# Patient Record
Sex: FEMALE | Race: WHITE | NOT HISPANIC OR LATINO | Employment: OTHER | URBAN - METROPOLITAN AREA
[De-identification: names, ages, dates, MRNs, and addresses within clinical notes are randomized per-mention and may not be internally consistent; named-entity substitution may affect disease eponyms.]

---

## 2017-01-06 ENCOUNTER — OFFICE VISIT (OUTPATIENT)
Dept: INTERNAL MEDICINE | Facility: CLINIC | Age: 67
End: 2017-01-06

## 2017-01-06 VITALS
BODY MASS INDEX: 28.79 KG/M2 | WEIGHT: 173 LBS | RESPIRATION RATE: 16 BRPM | DIASTOLIC BLOOD PRESSURE: 82 MMHG | HEART RATE: 84 BPM | SYSTOLIC BLOOD PRESSURE: 132 MMHG

## 2017-01-06 DIAGNOSIS — K59.00 CONSTIPATION, UNSPECIFIED CONSTIPATION TYPE: Primary | ICD-10-CM

## 2017-01-06 DIAGNOSIS — F32.A DEPRESSION, UNSPECIFIED DEPRESSION TYPE: ICD-10-CM

## 2017-01-06 DIAGNOSIS — M19.90 ARTHRITIS: ICD-10-CM

## 2017-01-06 DIAGNOSIS — R13.10 DYSPHAGIA, UNSPECIFIED TYPE: ICD-10-CM

## 2017-01-06 PROCEDURE — 99214 OFFICE O/P EST MOD 30 MIN: CPT | Performed by: INTERNAL MEDICINE

## 2017-01-06 RX ORDER — VENLAFAXINE HYDROCHLORIDE 150 MG/1
150 CAPSULE, EXTENDED RELEASE ORAL DAILY
Qty: 90 CAPSULE | Refills: 3 | Status: SHIPPED | OUTPATIENT
Start: 2017-01-06 | End: 2018-01-23 | Stop reason: SDUPTHER

## 2017-01-06 RX ORDER — PANTOPRAZOLE SODIUM 40 MG/1
TABLET, DELAYED RELEASE ORAL
Qty: 90 TABLET | Refills: 3 | Status: SHIPPED | OUTPATIENT
Start: 2017-01-06 | End: 2018-02-28 | Stop reason: SDUPTHER

## 2017-01-06 RX ORDER — FLUTICASONE PROPIONATE 50 MCG
SPRAY, SUSPENSION (ML) NASAL
Qty: 3 BOTTLE | Refills: 3 | Status: SHIPPED | OUTPATIENT
Start: 2017-01-06 | End: 2018-01-23 | Stop reason: SDUPTHER

## 2017-01-06 RX ORDER — PANTOPRAZOLE SODIUM 40 MG/1
TABLET, DELAYED RELEASE ORAL
Refills: 11 | COMMUNITY
Start: 2016-12-02 | End: 2017-01-06 | Stop reason: SDUPTHER

## 2017-01-06 NOTE — PROGRESS NOTES
Subjective   Barbara Coelho is a 66 y.o. female.   Pt is here to follow up on constipation,arthritis, depression and dysphagia.           History of Present Illness   Pt is here to follow up on constipation,dysphagia, arthritis and depression. She states that all chronic issues are doing well. Dysphagia has resolved. Constipation resolves with Linzess when needed.   EGD on 12/2/2016 revealed hiatal hernia, past gastric surgery and esophagitis, she is currently taking Protonix daily.             The following portions of the patient's history were reviewed and updated as appropriate: allergies, current medications, past family history, past medical history, past social history, past surgical history and problem list.               Review of Systems   Constitutional: Negative.    HENT: Negative.    Eyes: Negative.    Respiratory: Negative.    Cardiovascular: Negative.    Gastrointestinal:        See HPI.    Endocrine: Negative.    Genitourinary: Negative.    Musculoskeletal:        See HPI.    Skin: Negative.    Allergic/Immunologic: Negative.    Neurological: Negative.    Hematological: Negative.    Psychiatric/Behavioral:        See HPI.                 Objective   Physical Exam   Constitutional: She is oriented to person, place, and time. She appears well-developed and well-nourished.   HENT:   Head: Normocephalic and atraumatic.   Right Ear: External ear normal.   Left Ear: External ear normal.   Nose: Nose normal.   Mouth/Throat: Oropharynx is clear and moist.   Eyes: Conjunctivae and EOM are normal. Pupils are equal, round, and reactive to light.   Neck: Normal range of motion. Neck supple.   Cardiovascular: Normal rate, regular rhythm, normal heart sounds and intact distal pulses.    Pulmonary/Chest: Effort normal and breath sounds normal.   Abdominal: Soft. Bowel sounds are normal. She exhibits no distension. There is no tenderness.   Neurological: She is alert and oriented to person, place, and time. She  has normal reflexes.   Skin: Skin is warm and dry.   Psychiatric: She has a normal mood and affect.   Nursing note and vitals reviewed.                 Assessment/Plan   Diagnoses and all orders for this visit:    Constipation, unspecified constipation type  -     Linaclotide 145 MCG capsule; Take 145 mcg by mouth Daily.    Dysphagia, unspecified type  -     pantoprazole (PROTONIX) 40 MG EC tablet; Take 1 tablet PO Daily.    Depression, unspecified depression type  -     venlafaxine XR (EFFEXOR-XR) 150 MG 24 hr capsule; Take 1 capsule by mouth Daily.    Arthritis    Other orders  -     Discontinue: pantoprazole (PROTONIX) 40 MG EC tablet; TAKE 1 TABLET BY MOUTH EVERY MORNING  -     Discontinue: fluticasone-salmeterol (ADVAIR) 500-50 MCG/DOSE DISKUS; Inhale 1 puff 2 (Two) Times a Day.  -     fluticasone (FLONASE) 50 MCG/ACT nasal spray; ! Spray up each nostril once daily.  -     fluticasone-salmeterol (ADVAIR) 500-50 MCG/DOSE DISKUS; Inhale 1 puff 2 (Two) Times a Day.          1.) Keep scheduled follow up for physical next month   2.) Reviewed chronic meds and refilled.     * Total time spent in discussion and exam 25 minutes.

## 2017-01-06 NOTE — MR AVS SNAPSHOT
Barbara ZEINA Coelho   1/6/2017 2:00 PM   Office Visit    Provider:  Alvina Sellers DO   Department:  [facility] INTERNAL MEDICINE AND PEDIATRICS   Dept Phone:  [phone]                Your Full Care Plan              Today's Medication Changes          These changes are accurate as of: 1/6/17  2:41 PM.  If you have any questions, ask your nurse or doctor.               Medication(s)that have changed:     fluticasone 50 MCG/ACT nasal spray   Commonly known as:  FLONASE   ! Spray up each nostril once daily.   What changed:  See the new instructions.   Changed by:  Alvina Sellers DO       fluticasone-salmeterol 500-50 MCG/DOSE DISKUS   Commonly known as:  ADVAIR   Inhale 1 puff 2 (Two) Times a Day.   What changed:  Another medication with the same name was removed. Continue taking this medication, and follow the directions you see here.   Changed by:  Alvina Sellers DO       pantoprazole 40 MG EC tablet   Commonly known as:  PROTONIX   Take 1 tablet PO Daily.   What changed:  See the new instructions.   Changed by:  Alvina Sellers DO       venlafaxine  MG 24 hr capsule   Commonly known as:  EFFEXOR-XR   Take 1 capsule by mouth Daily.   What changed:  See the new instructions.   Changed by:  Alvina Sellers DO         Stop taking medication(s)listed here:     ipratropium 0.03 % nasal spray   Commonly known as:  ATROVENT   Stopped by:  Alvina Sellers DO                Where to Get Your Medications      These medications were sent to [facility]/pharmacy #[id number] - [place], [place] - [address] [facility] - [phone]  - [phone]   [address] [facility] [address]     Phone:  [phone]     fluticasone 50 MCG/ACT nasal spray    fluticasone-salmeterol 500-50 MCG/DOSE DISKUS    Linaclotide 145 MCG capsule    pantoprazole 40 MG EC tablet    venlafaxine  MG 24 hr capsule                     Your Updated Medication List          This list is accurate as of: 17  2:41 PM.  Always use your most recent med list.                fluticasone 50 MCG/ACT nasal spray   Commonly known as:  FLONASE   ! Spray up each nostril once daily.       fluticasone-salmeterol 500-50 MCG/DOSE DISKUS   Commonly known as:  ADVAIR   Inhale 1 puff 2 (Two) Times a Day.       Linaclotide 145 MCG capsule   Take 145 mcg by mouth Daily.       pantoprazole 40 MG EC tablet   Commonly known as:  PROTONIX   Take 1 tablet PO Daily.       venlafaxine  MG 24 hr capsule   Commonly known as:  EFFEXOR-XR   Take 1 capsule by mouth Daily.               You Were Diagnosed With        Codes Comments    Constipation, unspecified constipation type    -  Primary ICD-10-CM: K59.00  ICD-9-CM: 564.00     Dysphagia, unspecified type     ICD-10-CM: R13.10  ICD-9-CM: 787.20     Depression, unspecified depression type     ICD-10-CM: F32.9  ICD-9-CM: 311     Arthritis     ICD-10-CM: M19.90  ICD-9-CM: 716.90       Instructions     None    Patient Instructions History      [employer] [facility] Signup     [employer] allows you to send messages to your doctor, view your test results, renew your prescriptions, schedule appointments, and more. To sign up, go to [employer] and click on the Sign Up Now link in the New User? box. Enter your [employer] Activation Code exactly as it appears below along with the last four digits of your Social Security Number and your Date of Birth () to complete the sign-up process. If you do not sign up before the expiration date, you must request a new code.    [employer] Activation Code: [email or web address]  Expires: 2017  2:40 PM    If you have questions, you can email [email or web address] or call [phone] to talk to our [employer] staff. Remember, [employer] is NOT to be used for urgent needs. For medical emergencies, dial 911.               Other Info from Your Visit           Your Appointments      Feb 17, 2017 10:30 AM EST   Physical with Alvina Sellers DO   [facility] INTERNAL MEDICINE AND PEDIATRICS (--)    [address]  [place] [address] [phone]           Arrive 15 minutes prior to appointment.              Allergies     Celebrex [Celecoxib]      CAPS      Reason for Visit     Constipation follow up      Vital Signs     Blood Pressure Pulse Respirations Weight Body Mass Index Smoking Status    132/82 (BP Location: Left arm, Patient Position: Sitting) 84 16 173 lb (78.5 kg) 28.79 kg/m2 Never Smoker      Problems and Diagnoses Noted     Arthritis    Constipation    Depression    Difficulty swallowing      No Longer an Issue     Tiredness    Routine medical exam    Breast lump

## 2017-01-15 PROCEDURE — 87077 CULTURE AEROBIC IDENTIFY: CPT | Performed by: FAMILY MEDICINE

## 2017-01-15 PROCEDURE — 87186 SC STD MICRODIL/AGAR DIL: CPT | Performed by: FAMILY MEDICINE

## 2017-01-15 PROCEDURE — 87086 URINE CULTURE/COLONY COUNT: CPT | Performed by: FAMILY MEDICINE

## 2017-01-17 ENCOUNTER — TELEPHONE (OUTPATIENT)
Dept: URGENT CARE | Facility: CLINIC | Age: 67
End: 2017-01-17

## 2017-01-17 NOTE — TELEPHONE ENCOUNTER
Called pt, discussed urine culture results and that she is on appropriate medication.  Instructed to complete medication, drink plenty of fluids and follow up with PCP.  She states she is feeling better.

## 2017-02-17 ENCOUNTER — OFFICE VISIT (OUTPATIENT)
Dept: INTERNAL MEDICINE | Facility: CLINIC | Age: 67
End: 2017-02-17

## 2017-02-17 VITALS
RESPIRATION RATE: 20 BRPM | WEIGHT: 180 LBS | BODY MASS INDEX: 29.95 KG/M2 | SYSTOLIC BLOOD PRESSURE: 118 MMHG | HEART RATE: 68 BPM | DIASTOLIC BLOOD PRESSURE: 65 MMHG

## 2017-02-17 DIAGNOSIS — Z13.6 SCREENING FOR CARDIOVASCULAR CONDITION: ICD-10-CM

## 2017-02-17 DIAGNOSIS — F32.A DEPRESSION, UNSPECIFIED DEPRESSION TYPE: Primary | ICD-10-CM

## 2017-02-17 DIAGNOSIS — L98.9 SKIN LESIONS: ICD-10-CM

## 2017-02-17 DIAGNOSIS — Z11.59 NEED FOR HEPATITIS C SCREENING TEST: ICD-10-CM

## 2017-02-17 LAB
ALBUMIN SERPL-MCNC: 3.9 G/DL (ref 3.2–4.8)
ALBUMIN/GLOB SERPL: 1.6 G/DL (ref 1.5–2.5)
ALP SERPL-CCNC: 82 U/L (ref 25–100)
ALT SERPL W P-5'-P-CCNC: 13 U/L (ref 7–40)
ANION GAP SERPL CALCULATED.3IONS-SCNC: 3 MMOL/L (ref 3–11)
ARTICHOKE IGE QN: 97 MG/DL (ref 0–130)
AST SERPL-CCNC: 18 U/L (ref 0–33)
BILIRUB SERPL-MCNC: 0.4 MG/DL (ref 0.3–1.2)
BUN BLD-MCNC: 9 MG/DL (ref 9–23)
BUN/CREAT SERPL: 10 (ref 7–25)
CALCIUM SPEC-SCNC: 9.5 MG/DL (ref 8.7–10.4)
CHLORIDE SERPL-SCNC: 104 MMOL/L (ref 99–109)
CHOLEST SERPL-MCNC: 208 MG/DL (ref 0–200)
CO2 SERPL-SCNC: 33 MMOL/L (ref 20–31)
CREAT BLD-MCNC: 0.9 MG/DL (ref 0.6–1.3)
DEPRECATED RDW RBC AUTO: 42 FL (ref 37–54)
ERYTHROCYTE [DISTWIDTH] IN BLOOD BY AUTOMATED COUNT: 12.8 % (ref 11.3–14.5)
GFR SERPL CREATININE-BSD FRML MDRD: 62 ML/MIN/1.73
GLOBULIN UR ELPH-MCNC: 2.5 GM/DL
GLUCOSE BLD-MCNC: 99 MG/DL (ref 70–100)
HCT VFR BLD AUTO: 41.8 % (ref 34.5–44)
HCV AB SER DONR QL: NORMAL
HDLC SERPL-MCNC: 94 MG/DL (ref 40–60)
HGB BLD-MCNC: 13.5 G/DL (ref 11.5–15.5)
MCH RBC QN AUTO: 29.2 PG (ref 27–31)
MCHC RBC AUTO-ENTMCNC: 32.3 G/DL (ref 32–36)
MCV RBC AUTO: 90.3 FL (ref 80–99)
PLATELET # BLD AUTO: 234 10*3/MM3 (ref 150–450)
PMV BLD AUTO: 9.5 FL (ref 6–12)
POTASSIUM BLD-SCNC: 3.8 MMOL/L (ref 3.5–5.5)
PROT SERPL-MCNC: 6.4 G/DL (ref 5.7–8.2)
RBC # BLD AUTO: 4.63 10*6/MM3 (ref 3.89–5.14)
SODIUM BLD-SCNC: 140 MMOL/L (ref 132–146)
TRIGL SERPL-MCNC: 74 MG/DL (ref 0–150)
WBC NRBC COR # BLD: 4.41 10*3/MM3 (ref 3.5–10.8)

## 2017-02-17 PROCEDURE — 85027 COMPLETE CBC AUTOMATED: CPT | Performed by: INTERNAL MEDICINE

## 2017-02-17 PROCEDURE — 99214 OFFICE O/P EST MOD 30 MIN: CPT | Performed by: INTERNAL MEDICINE

## 2017-02-17 PROCEDURE — 86803 HEPATITIS C AB TEST: CPT | Performed by: INTERNAL MEDICINE

## 2017-02-17 PROCEDURE — 80061 LIPID PANEL: CPT | Performed by: INTERNAL MEDICINE

## 2017-02-17 PROCEDURE — 80053 COMPREHEN METABOLIC PANEL: CPT | Performed by: INTERNAL MEDICINE

## 2017-02-17 PROCEDURE — 36415 COLL VENOUS BLD VENIPUNCTURE: CPT | Performed by: INTERNAL MEDICINE

## 2017-02-17 RX ORDER — DESLORATADINE 5 MG/1
TABLET ORAL
Refills: 6 | COMMUNITY
Start: 2017-01-29 | End: 2018-04-25

## 2017-02-17 NOTE — PROGRESS NOTES
Subjective   Barbara Coelho is a 67 y.o. female.   Pt is here to follow up on depression, skin lesions and  cardiovascular screenings.           History of Present Illness   Pt is here to follow up on depression, skin lesions and  cardiovascular screenings. She states that all chronic issues are doing well with no acute complaints.     Colonoscopy- last year   Pap-last year by GYN  Breast exam- Done at GYN, declines today   Mammogram- will be due next year did last year.   DEXA scan- She can not remember if it has been done in the last 2 years, she states her last one was done here.   Shingles vaccine- 1/16/2016  Tetanus vaccine- will give today   Flu vaccine- 11/12016   PNA vaccine- Has received but can not remember when     She would like referred to a new Dermatology to have annual skin checks done, she also has several lesions on her face and back that need to be excised.           The following portions of the patient's history were reviewed and updated as appropriate: allergies, current medications, past family history, past medical history, past social history, past surgical history and problem list.             Review of Systems   Constitutional: Negative.    HENT: Negative.    Eyes: Negative.    Respiratory: Negative.    Cardiovascular: Negative.    Gastrointestinal: Negative.    Endocrine: Negative.    Genitourinary: Negative.    Musculoskeletal: Negative.    Skin:        See HPI.    Allergic/Immunologic: Negative.    Neurological: Negative.    Hematological: Negative.    Psychiatric/Behavioral:        See HPI.               Objective   Physical Exam   Constitutional: She is oriented to person, place, and time. She appears well-developed and well-nourished.   HENT:   Head: Normocephalic and atraumatic.   Right Ear: External ear normal.   Left Ear: External ear normal.   Nose: Nose normal.   Mouth/Throat: Oropharynx is clear and moist.   Eyes: Conjunctivae and EOM are normal. Pupils are equal, round, and  reactive to light.   Neck: Normal range of motion. Neck supple. No tracheal deviation present. No thyromegaly present.   Cardiovascular: Normal rate, regular rhythm, normal heart sounds and intact distal pulses.    Pulmonary/Chest: Effort normal and breath sounds normal.   Abdominal: Soft. Bowel sounds are normal. She exhibits no distension. There is no tenderness.   Neurological: She is alert and oriented to person, place, and time. She has normal reflexes.   Skin: Skin is warm and dry.   Psychiatric: She has a normal mood and affect.   Nursing note and vitals reviewed.               Assessment/Plan     Depression, unspecified depression type  -     CBC (No Diff)    Screening for cardiovascular condition  -     Comprehensive Metabolic Panel  -     Lipid Panel    Need for hepatitis C screening test  -     Hepatitis C Antibody    Skin lesions  -     Ambulatory Referral to Dermatology    1.) Check CBC,CMP,lipid panel and Hepatitis C screening   2.) Follow up in 1 year   3.) Discussed Contrave and Belviq   4.) Dermatology referral   5.) 25 minutes spent in exam, 15 minutes spent counseling on preventative screenings and vaccines.     * Total time spent in discussion and exam 40 minutes .

## 2017-02-20 ENCOUNTER — TELEPHONE (OUTPATIENT)
Dept: INTERNAL MEDICINE | Facility: CLINIC | Age: 67
End: 2017-02-20

## 2017-02-20 NOTE — TELEPHONE ENCOUNTER
Contrave 1 tab PO daily # 30 wihtno refills.     Tell her she needs to follow up in 1 month so we can see how she is doing.

## 2017-02-20 NOTE — TELEPHONE ENCOUNTER
----- Message from Liane De Jesus sent at 2/20/2017  8:38 AM EST -----  [phone]  PT WAS HERE ON Friday AND TOLD TO GO HOME AND LOOK 2 DIET PILLS UP AND CALL BACK WITH THE ONE PT WANTS TO TAKE , PT WOULD LIKE TO TAKE CONTRAZE . CALL IN TO  [facility]

## 2017-08-14 ENCOUNTER — OFFICE VISIT (OUTPATIENT)
Dept: INTERNAL MEDICINE | Facility: CLINIC | Age: 67
End: 2017-08-14

## 2017-08-14 VITALS
TEMPERATURE: 98.1 F | SYSTOLIC BLOOD PRESSURE: 122 MMHG | DIASTOLIC BLOOD PRESSURE: 70 MMHG | RESPIRATION RATE: 18 BRPM | HEIGHT: 65 IN | BODY MASS INDEX: 29.82 KG/M2 | HEART RATE: 78 BPM | WEIGHT: 179 LBS

## 2017-08-14 DIAGNOSIS — M25.511 RIGHT SHOULDER PAIN, UNSPECIFIED CHRONICITY: Primary | ICD-10-CM

## 2017-08-14 PROCEDURE — 99213 OFFICE O/P EST LOW 20 MIN: CPT | Performed by: INTERNAL MEDICINE

## 2017-08-14 RX ORDER — HYDROCODONE BITARTRATE AND ACETAMINOPHEN 7.5; 325 MG/1; MG/1
1 TABLET ORAL EVERY 6 HOURS PRN
Qty: 20 TABLET | Refills: 0 | Status: SHIPPED | OUTPATIENT
Start: 2017-08-14 | End: 2017-10-27

## 2017-08-14 NOTE — PROGRESS NOTES
Subjective   Barbara Coelho is a 67 y.o. female.   Pt presents with right shoulder pain.           History of Present Illness   Pt presents with right shoulder pain. She has had the same issues in the past and related it to OA. 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 Her pain is located ANT around the bursa. She states it is worse at night and can not sleep on her right side due to this. She denies any trauma to the area.           The following portions of the patient's history were reviewed and updated as appropriate: allergies, current medications, past family history, past medical history, past social history, past surgical history and problem list.           Review of Systems   Constitutional: Negative.    HENT: Negative.    Eyes: Negative.    Respiratory: Negative.    Cardiovascular: Negative.    Gastrointestinal: Negative.    Endocrine: Negative.    Genitourinary: Negative.    Musculoskeletal:        See HPI.    Skin: Negative.    Allergic/Immunologic: Negative.    Neurological: Negative.    Hematological: Negative.    Psychiatric/Behavioral: Negative.              Objective   Physical Exam   Constitutional: She is oriented to person, place, and time. She appears well-developed and well-nourished.   HENT:   Head: Normocephalic and atraumatic.   Cardiovascular: Normal rate, regular rhythm and normal heart sounds.    Pulmonary/Chest: Effort normal and breath sounds normal.   Musculoskeletal:   Pain with any active range of motion to ANT right shoulder. Pain to palpation around Bursa.    Neurological: She is alert and oriented to person, place, and time.   Skin: Skin is warm and dry.   Psychiatric: She has a normal mood and affect.   Nursing note and vitals reviewed.             Assessment/Plan   Right shoulder pain, unspecified chronicity  -     Cancel: XR Shoulder 1 View Right; Future  -     Cancel: XR Spine Cervical 2 or 3 View;  Future  -     PredniSONE 5 MG tablet therapy pack dosepak; Take as directed on package instructions.  -     HYDROcodone-acetaminophen (NORCO) 7.5-325 MG per tablet; Take 1 tablet by mouth Every 6 (Six) Hours As Needed for Moderate Pain (4-6).        1.) Lortab 7.5 mg PO every 4- 6 hours prn   2.) Prednisone taper as directed   3.) Keep scheduled follow up

## 2017-10-27 ENCOUNTER — OFFICE VISIT (OUTPATIENT)
Dept: INTERNAL MEDICINE | Facility: CLINIC | Age: 67
End: 2017-10-27

## 2017-10-27 VITALS
DIASTOLIC BLOOD PRESSURE: 66 MMHG | HEIGHT: 65 IN | BODY MASS INDEX: 30.42 KG/M2 | TEMPERATURE: 98.1 F | SYSTOLIC BLOOD PRESSURE: 126 MMHG | WEIGHT: 182.6 LBS

## 2017-10-27 DIAGNOSIS — M25.511 CHRONIC RIGHT SHOULDER PAIN: Primary | ICD-10-CM

## 2017-10-27 DIAGNOSIS — G89.29 CHRONIC RIGHT SHOULDER PAIN: Primary | ICD-10-CM

## 2017-10-27 DIAGNOSIS — Z23 NEED FOR INFLUENZA VACCINATION: ICD-10-CM

## 2017-10-27 DIAGNOSIS — M54.31 SCIATICA OF RIGHT SIDE WITHOUT BACK PAIN: ICD-10-CM

## 2017-10-27 PROCEDURE — 90662 IIV NO PRSV INCREASED AG IM: CPT | Performed by: INTERNAL MEDICINE

## 2017-10-27 PROCEDURE — 99214 OFFICE O/P EST MOD 30 MIN: CPT | Performed by: INTERNAL MEDICINE

## 2017-10-27 PROCEDURE — G0008 ADMIN INFLUENZA VIRUS VAC: HCPCS | Performed by: INTERNAL MEDICINE

## 2017-10-27 RX ORDER — HYDROCODONE BITARTRATE AND ACETAMINOPHEN 7.5; 325 MG/1; MG/1
1 TABLET ORAL EVERY 6 HOURS PRN
COMMUNITY
End: 2017-10-27 | Stop reason: SDUPTHER

## 2017-10-27 RX ORDER — HYDROCODONE BITARTRATE AND ACETAMINOPHEN 7.5; 325 MG/1; MG/1
1 TABLET ORAL EVERY 6 HOURS PRN
Qty: 20 TABLET | Refills: 0 | Status: SHIPPED | OUTPATIENT
Start: 2017-10-27 | End: 2018-02-28 | Stop reason: SDUPTHER

## 2017-10-27 NOTE — PROGRESS NOTES
Subjective   Barbara Coelho is a 67 y.o. female.   New pt here to establish. Had seen Dr Sellers before    Shoulder Injury    The right shoulder is affected. Incident onset: x 6 months. The injury mechanism is unknown. The quality of the pain is described as aching. The pain radiates to the chest and right arm. The pain is at a severity of 5/10. The pain is moderate. Pertinent negatives include no chest pain. She has tried NSAIDs and acetaminophen (ibuprofen 200, hydrocodone) for the symptoms. The treatment provided moderate relief.    she has tried ice - helps a little. Hurts to lay on it at night. Pain has been constant aching pain. Wakes her up at night. rom is good, hurts at rest. No previous shoulder problems    LBP - h/o back surgery (? Diskectomy) in '95 and uses hydrocodone prn, more in the summer when she is more active- 2-3x/month on average. Usually has just pain in legs, right side more, but sometimes left,down side of leg  all the way to foot. Not much back pain. Doesn't see back specialist here, had seen surgeon in [place]. Also used to see chiropractor regularly. Had a fall yesterday - tripped on rug and hurt R leg and R shoulder. Was able to bear weight    Allergies/asthma/nasal polyps - she has been on allergy shots x 40 yrs, and goes to [place] [facility]. Also on advair, flonase, clarinex. She has had both pneumovax and prevnar since she turned 64    She has been on effexor x years, initially for HRT replacement, then her  was murdered 5 yrs ago so has been very difficult time period for her. She prefers to stay on for now , as re-trial is coming up, but will want to d/c at some point    Gastritis - she had EGD last year w/ Dr Arcos that showed reflux. Taking protonix and works well    The following portions of the patient's history were reviewed and updated as appropriate: allergies, current medications, past family history, past medical history, past social history, past  "surgical history and problem list.    Review of Systems   Constitutional: Negative for activity change, appetite change, fatigue, fever and unexpected weight change.   HENT:        H/o allergies   Respiratory:        H/o asthma   Cardiovascular: Negative for chest pain and leg swelling.   Gastrointestinal: Positive for abdominal pain (protonix helps).   Musculoskeletal: Positive for arthralgias, back pain and myalgias. Negative for neck pain and neck stiffness.   Allergic/Immunologic: Positive for environmental allergies. Negative for immunocompromised state.   Neurological: Negative for seizures and syncope.   Psychiatric/Behavioral: Positive for dysphoric mood (effexor helps).       Objective   Blood pressure 126/66, temperature 98.1 °F (36.7 °C), temperature source Temporal Artery , height 64.6\" (164.1 cm), weight 182 lb 9.6 oz (82.8 kg).  Physical Exam   Constitutional: She is oriented to person, place, and time. She appears well-developed and well-nourished. No distress.   HENT:   Head: Normocephalic and atraumatic.   Eyes: Conjunctivae and EOM are normal.   Cardiovascular: Normal rate, regular rhythm and normal heart sounds.  Exam reveals no gallop and no friction rub.    No murmur heard.  Pulmonary/Chest: Effort normal and breath sounds normal. No respiratory distress. She has no wheezes.   Musculoskeletal: Normal range of motion. She exhibits tenderness (tender along R shoulder and upper right arm. tender below R knee laterally. no lumbar tenderness). She exhibits no edema or deformity.   R shoulder active and passive ROM is normal. Minimal crepitus   Neurological: She is alert and oriented to person, place, and time.   Skin: Skin is warm and dry. She is not diaphoretic.   Psychiatric: She has a normal mood and affect. Her behavior is normal. Judgment and thought content normal.       Assessment/Plan   Barbara was seen today for establish care, shoulder pain and fall.    Diagnoses and all orders for this " visit:    Chronic right shoulder pain  -     XR Shoulder 2+ View Right  -     Ambulatory Referral to Orthopedic Surgery    Need for influenza vaccination  -     Flu Vaccine High Dose PF 65YR+ ([id number])    Sciatica of right side without back pain - uses norco infrequently. Controlled substance contract reviewed and signed by pt. Adverse effects and risks of addiction of medication reviewed with pt. Luis Manuel done today and appropriate.   -     HYDROcodone-acetaminophen (NORCO) 7.5-325 MG per tablet; Take 1 tablet by mouth Every 6 (Six) Hours As Needed for Moderate Pain .      She will return for physical in Feb

## 2017-11-07 ENCOUNTER — HOSPITAL ENCOUNTER (OUTPATIENT)
Dept: GENERAL RADIOLOGY | Facility: HOSPITAL | Age: 67
Discharge: HOME | End: 2017-11-07
Admitting: INTERNAL MEDICINE

## 2017-11-07 PROCEDURE — 73030 X-RAY EXAM OF SHOULDER: CPT

## 2017-11-08 ENCOUNTER — OFFICE VISIT (OUTPATIENT)
Dept: ORTHOPEDIC SURGERY | Facility: CLINIC | Age: 67
End: 2017-11-08

## 2017-11-08 VITALS
SYSTOLIC BLOOD PRESSURE: 140 MMHG | DIASTOLIC BLOOD PRESSURE: 89 MMHG | HEART RATE: 84 BPM | HEIGHT: 65 IN | BODY MASS INDEX: 29.82 KG/M2 | WEIGHT: 179 LBS

## 2017-11-08 DIAGNOSIS — M75.91 SUPRASPINATUS TENDINITIS, RIGHT: ICD-10-CM

## 2017-11-08 DIAGNOSIS — M25.511 RIGHT SHOULDER PAIN, UNSPECIFIED CHRONICITY: Primary | ICD-10-CM

## 2017-11-08 PROCEDURE — 99204 OFFICE O/P NEW MOD 45 MIN: CPT | Performed by: ORTHOPAEDIC SURGERY

## 2017-11-08 PROCEDURE — 20610 DRAIN/INJ JOINT/BURSA W/O US: CPT | Performed by: ORTHOPAEDIC SURGERY

## 2017-11-08 RX ORDER — LIDOCAINE HYDROCHLORIDE 10 MG/ML
4 INJECTION, SOLUTION INFILTRATION; PERINEURAL
Status: COMPLETED | OUTPATIENT
Start: 2017-11-08 | End: 2017-11-08

## 2017-11-08 RX ORDER — BETAMETHASONE SODIUM PHOSPHATE AND BETAMETHASONE ACETATE 3; 3 MG/ML; MG/ML
6 INJECTION, SUSPENSION INTRA-ARTICULAR; INTRALESIONAL; INTRAMUSCULAR; SOFT TISSUE
Status: COMPLETED | OUTPATIENT
Start: 2017-11-08 | End: 2017-11-08

## 2017-11-08 RX ADMIN — LIDOCAINE HYDROCHLORIDE 4 ML: 10 INJECTION, SOLUTION INFILTRATION; PERINEURAL at 10:45

## 2017-11-08 RX ADMIN — BETAMETHASONE SODIUM PHOSPHATE AND BETAMETHASONE ACETATE 6 MG: 3; 3 INJECTION, SUSPENSION INTRA-ARTICULAR; INTRALESIONAL; INTRAMUSCULAR; SOFT TISSUE at 10:45

## 2017-11-08 NOTE — PROGRESS NOTES
Procedure   Large Joint Arthrocentesis  Date/Time: 11/8/2017 10:45 AM  Consent given by: patient  Site marked: site marked  Timeout: Immediately prior to procedure a time out was called to verify the correct patient, procedure, equipment, support staff and site/side marked as required   Supporting Documentation  Indications: pain   Procedure Details  Location: shoulder - R subacromial bursa  Preparation: Patient was prepped and draped in the usual sterile fashion  Needle size: 22 G  Medications administered: 4 mL lidocaine 1 %; 6 mg betamethasone acetate-betamethasone sodium phosphate 6 (3-3) MG/ML (4 cc marcaine- NDC 55759-874-31, Lot [id number], exp 04/01/2019)  Patient tolerance: patient tolerated the procedure well with no immediate complications

## 2017-11-08 NOTE — PROGRESS NOTES
[facility] [place] [facility] Note    Subjective     Chief Complaint   Patient presents with   • Right Shoulder - Pain        HPI      Barbara Coelho is a 67 y.o. female.  She has a 5 month history of right shoulder pain.  It wakes her up at night and is worse with overuse better with rest.  Pain 7 out of 10 the pain as aching and throbbing.        Past Medical History:   Diagnosis Date   • Allergic rhinitis     on allergy shots   • Asthma     ALLERGIC   • Back pain    • Bladder infection     Recurrent Bladder Infections   • Bronchitis    • Depression    • Diverticulitis    • Fibrocystic breast    • GERD (gastroesophageal reflux disease)     EGD 12/16 Arcos - reflux, HH. no barretts   • H/O blood clots    • H/O bone density study 2015   • H/O mammogram 01/13/2016    [facility]   • Nasal polyps    • Pap smear for cervical cancer screening 2015   • Postmenopausal    • Urinary incontinence       Past Surgical History:   Procedure Laterality Date   • BACK SURGERY  1995    Back (L5/S1)   • BREAST LUMPECTOMY  1990    Breast - Lump Removed   • COLONOSCOPY  2015   • GASTRIC BYPASS      HIGH GASTRIC BYPASS   • OTHER SURGICAL HISTORY  1985    Stomach Stabled   • REPLACEMENT TOTAL KNEE Left 2012   • SINUS SURGERY     • TOTAL KNEE ARTHROPLASTY  2012   • TUBAL ABDOMINAL LIGATION  1980      Family History   Problem Relation Age of Onset   • Cancer Other      BREAST, LUNG, OVARIAN, KIDNEY   • Breast cancer Other    • Kidney cancer Other    • Lung cancer Other      pGF as well   • Cancer Mother      kidney   • Cancer Father      lung   • Mental illness Father    • Cancer Sister      breast, age 30yrs, survived     Social History     Social History   • Marital status:      Spouse name: N/A   • Number of children: N/A   • Years of education: N/A     Occupational History   • Not on file.     Social History Main Topics   • Smoking status: Never Smoker   • Smokeless tobacco: Never Used   • Alcohol use 2.4 - 3.0 oz/week      "4 - 5 Standard drinks or equivalent per week      Comment: a week   • Drug use: No   • Sexual activity: Defer     Other Topics Concern   • Not on file     Social History Narrative      Current Outpatient Prescriptions on File Prior to Visit   Medication Sig Dispense Refill   • desloratadine (CLARINEX) 5 MG tablet TAKE 1 TABLET BY MOUTH AT BEDTIME AS DIRECTED  6   • fluticasone (FLONASE) 50 MCG/ACT nasal spray ! Spray up each nostril once daily. 3 bottle 3   • fluticasone-salmeterol (ADVAIR) 500-50 MCG/DOSE DISKUS Inhale 1 puff 2 (Two) Times a Day. 180 each 3   • HYDROcodone-acetaminophen (NORCO) 7.5-325 MG per tablet Take 1 tablet by mouth Every 6 (Six) Hours As Needed for Moderate Pain . 20 tablet 0   • pantoprazole (PROTONIX) 40 MG EC tablet Take 1 tablet PO Daily. 90 tablet 3   • venlafaxine XR (EFFEXOR-XR) 150 MG 24 hr capsule Take 1 capsule by mouth Daily. 90 capsule 3     No current facility-administered medications on file prior to visit.       Allergies   Allergen Reactions   • Celebrex [Celecoxib]      CAPS   • Sulfa Antibiotics Itching        The following portions of the patient's history were reviewed and updated as appropriate: allergies, current medications, past family history, past medical history, past social history, past surgical history and problem list.    Review of Systems   Constitutional: Negative.    HENT: Negative.    Eyes: Negative.    Respiratory: Negative.    Cardiovascular: Negative.    Gastrointestinal: Negative.    Endocrine: Negative.    Genitourinary: Negative.    Musculoskeletal: Positive for arthralgias.   Skin: Negative.    Allergic/Immunologic: Negative.    Neurological: Negative.    Hematological: Negative.    Psychiatric/Behavioral: Negative.         Objective      Physical Exam  /89  Pulse 84  Ht 64.5\" (163.8 cm)  Wt 179 lb (81.2 kg)  BMI 30.25 kg/m2    Body mass index is 30.25 kg/(m^2).        GENERAL APPEARANCE: awake, alert & oriented x 3, in no acute distress " and well developed, well nourished  PSYCH: normal mood andaffect  LUNGS:  breathing nonlabored, no wheezing  EYES: sclera anicteric, pupils equal  CARDIOVASCULAR: palpable pulses dorsalis pedis, palpable posterior tibial bilaterally. Capillary refill less than 2 seconds  INTEGUMENTARY: skin intact, no clubbing, cyanosis  NEUROLOGIC:  Normal gait and balance            Ortho Exam  Musculoskeletal   Upper Extremity   Right Shoulder     Inspection and Palpation:     Tenderness - none    Crepitus - none    Sensation is normal    Examination reveals no ecchymosis.      Strength and Tone:    Supraspinatus - 5/5    Infraspinatus - 5/5    Subscapularis - 5/5    Deltoid - 5/5     Range of Motion   Left shoulder:    Internal Rotation: ROM - T7    External Rotation: AROM - 80 degrees    Elevation through flexion: AROM - 180 degrees    Right Shoulder:    Internal Rotation: ROM - T7    External Rotation: AROM - 80 degrees    Elevation through flexion: AROM - 180 degrees     Abduction -180 degrees     Instability   Right shoulder    Sulcus sign negative    Apprehension test negative    Enoch relocation test negative    Jerk test negative   Impingement   Right shoulder    Olvera impingement test positive    Neer impingement test positive   Functional Testing   Right shoulder    AC crossover adduction test negative    Abdominal compression test negative    Lift-off sign negative    Speed's test negative    White's test negative    Horriblower's sign negative       Imaging/Studies  Imaging Results (last 24 hours)     ** No results found for the last 24 hours. **      I reviewed x-rays right shoulder which are negative.    Assessment/Plan      Barbara was seen today for pain.    Diagnoses and all orders for this visit:    Right shoulder pain, unspecified chronicity  -     Large Joint Arthrocentesis    Supraspinatus tendinitis, right      She received a right shoulder subacromial steroid injection today.  She tolerated the injection  well without complication.  She will follow up in 3 weeks.  I ordered physical therapy she does not want to go.  She wants to try the injection first    Medical Decision Making  Management Options : over-the-counter medicine and prescription/IM medicine  Data/Risk: radiology tests and independent visualization of imaging, lab tests, or EMG/NCV    Emeka Douglas MD  11/08/17  11:00 AM

## 2017-11-29 ENCOUNTER — OFFICE VISIT (OUTPATIENT)
Dept: ORTHOPEDIC SURGERY | Facility: CLINIC | Age: 67
End: 2017-11-29

## 2017-11-29 VITALS
HEART RATE: 84 BPM | DIASTOLIC BLOOD PRESSURE: 82 MMHG | WEIGHT: 182 LBS | HEIGHT: 65 IN | SYSTOLIC BLOOD PRESSURE: 146 MMHG | BODY MASS INDEX: 30.32 KG/M2

## 2017-11-29 DIAGNOSIS — M75.91 SUPRASPINATUS TENDINITIS, RIGHT: Primary | ICD-10-CM

## 2017-11-29 PROCEDURE — 99212 OFFICE O/P EST SF 10 MIN: CPT | Performed by: ORTHOPAEDIC SURGERY

## 2017-11-29 NOTE — PROGRESS NOTES
[facility] [place] [facility] Note    Subjective     Chief Complaint   Patient presents with   • Right Shoulder - Follow-up     3 week follow up: Right shoulder pain, supraspinatus tendinitis (Corticosteroid injection last visit on 11/8/2017)        TERENCE Coelho is a 67 y.o. female. She is doing well status post the right shoulder injection.  She is doing well with no pain.  She has full motion and full strength    Past Medical History:   Diagnosis Date   • Allergic rhinitis     on allergy shots   • Asthma     ALLERGIC   • Back pain    • Bladder infection     Recurrent Bladder Infections   • Bronchitis    • Depression    • Diverticulitis    • Fibrocystic breast    • GERD (gastroesophageal reflux disease)     EGD 12/16 [facility] - reflux, HH. no barretts   • H/O blood clots    • H/O bone density study 2015   • H/O mammogram 01/13/2016    [facility]   • Nasal polyps    • Pap smear for cervical cancer screening 2015   • Postmenopausal    • Urinary incontinence       Past Surgical History:   Procedure Laterality Date   • BACK SURGERY  1995    Back (L5/S1)   • BREAST LUMPECTOMY  1990    Breast - Lump Removed   • COLONOSCOPY  2015   • GASTRIC BYPASS      HIGH GASTRIC BYPASS   • OTHER SURGICAL HISTORY  1985    Stomach Stabled   • REPLACEMENT TOTAL KNEE Left 2012   • SINUS SURGERY     • TOTAL KNEE ARTHROPLASTY  2012   • TUBAL ABDOMINAL LIGATION  1980      Family History   Problem Relation Age of Onset   • Cancer Other      BREAST, LUNG, OVARIAN, KIDNEY   • Breast cancer Other    • Kidney cancer Other    • Lung cancer Other      pGF as well   • Cancer Mother      kidney   • Cancer Father      lung   • Mental illness Father    • Cancer Sister      breast, age 30yrs, survived     Social History     Social History   • Marital status:      Spouse name: N/A   • Number of children: N/A   • Years of education: N/A     Occupational History   • Not on file.     Social History Main Topics   • Smoking status: Never  "Smoker   • Smokeless tobacco: Never Used   • Alcohol use 2.4 - 3.0 oz/week     4 - 5 Standard drinks or equivalent per week      Comment: a week   • Drug use: No   • Sexual activity: Defer     Other Topics Concern   • Not on file     Social History Narrative      Current Outpatient Prescriptions on File Prior to Visit   Medication Sig Dispense Refill   • desloratadine (CLARINEX) 5 MG tablet TAKE 1 TABLET BY MOUTH AT BEDTIME AS DIRECTED  6   • fluticasone (FLONASE) 50 MCG/ACT nasal spray ! Spray up each nostril once daily. 3 bottle 3   • fluticasone-salmeterol (ADVAIR) 500-50 MCG/DOSE DISKUS Inhale 1 puff 2 (Two) Times a Day. 180 each 3   • HYDROcodone-acetaminophen (NORCO) 7.5-325 MG per tablet Take 1 tablet by mouth Every 6 (Six) Hours As Needed for Moderate Pain . 20 tablet 0   • pantoprazole (PROTONIX) 40 MG EC tablet Take 1 tablet PO Daily. 90 tablet 3   • venlafaxine XR (EFFEXOR-XR) 150 MG 24 hr capsule Take 1 capsule by mouth Daily. 90 capsule 3     No current facility-administered medications on file prior to visit.       Allergies   Allergen Reactions   • Celebrex [Celecoxib]      CAPS   • Sulfa Antibiotics Itching        The following portions of the patient's history were reviewed and updated as appropriate: allergies, current medications, past family history, past medical history, past social history, past surgical history and problem list.    Review of Systems   Constitutional: Negative.    HENT: Positive for congestion and postnasal drip.    Eyes: Negative.    Respiratory: Positive for cough.    Cardiovascular: Negative.    Gastrointestinal: Negative.    Endocrine: Negative.    Genitourinary: Negative.    Musculoskeletal: Positive for arthralgias.   Skin: Negative.    Allergic/Immunologic: Negative.    Neurological: Negative.    Hematological: Negative.    Psychiatric/Behavioral: Negative.         Objective      Physical Exam  /82  Pulse 84  Ht 64.5\" (163.8 cm)  Wt 182 lb (82.6 kg)  BMI 30.76 " kg/m2    Body mass index is 30.76 kg/(m^2).        GENERAL APPEARANCE: awake, alert & oriented x 3, in no acute distress and well developed, well nourished  PSYCH: normal mood andaffect  LUNGS:  breathing nonlabored, no wheezing  EYES: sclera anicteric, pupils equal  CARDIOVASCULAR: palpable pulses dorsalis pedis, palpable posterior tibial bilaterally. Capillary refill less than 2 seconds  INTEGUMENTARY: skin intact, no clubbing, cyanosis  NEUROLOGIC:  Normal gait and balance            Ortho Exam  Musculoskeletal   Upper Extremity   Right Shoulder     Inspection and Palpation:     Tenderness - none    Crepitus - none    Sensation is normal    Examination reveals no ecchymosis.      Strength and Tone:    Supraspinatus - 5/5    Infraspinatus - 5/5    Subscapularis - 5/5    Deltoid - 5/5     Range of Motion   Left shoulder:    Internal Rotation: ROM - T7    External Rotation: AROM - 80 degrees    Elevation through flexion: AROM - 180 degrees    Right Shoulder:    Internal Rotation: ROM - T7    External Rotation: AROM - 80 degrees    Elevation through flexion: AROM - 180 degrees     Abduction -180 degrees     Instability   Right shoulder    Sulcus sign negative    Apprehension test negative    Enoch relocation test negative    Jerk test negative   Impingement   Right shoulder    Olvera impingement test negative    Neer impingement test negative   Functional Testing   Right shoulder    AC crossover adduction test negative    Abdominal compression test negative    Lift-off sign negative    Speed's test negative    White's test negative    Horriblower's sign negative       Imaging/Studies  Imaging Results (last 24 hours)     ** No results found for the last 24 hours. **          Assessment/Plan      Barbara was seen today for follow-up.    Diagnoses and all orders for this visit:    Supraspinatus tendinitis, right      She will follow-up as needed    Medical Decision Making  Management Options : over-the-counter  medicine      Emeka Douglas MD  11/29/17  1:13 PM

## 2018-01-23 ENCOUNTER — TELEPHONE (OUTPATIENT)
Dept: INTERNAL MEDICINE | Facility: CLINIC | Age: 68
End: 2018-01-23

## 2018-01-23 DIAGNOSIS — F32.A DEPRESSION, UNSPECIFIED DEPRESSION TYPE: ICD-10-CM

## 2018-01-23 RX ORDER — VENLAFAXINE HYDROCHLORIDE 150 MG/1
150 CAPSULE, EXTENDED RELEASE ORAL DAILY
Qty: 90 CAPSULE | Refills: 1 | Status: SHIPPED | OUTPATIENT
Start: 2018-01-23 | End: 2018-07-26 | Stop reason: SDUPTHER

## 2018-01-23 RX ORDER — FLUTICASONE PROPIONATE 50 MCG
SPRAY, SUSPENSION (ML) NASAL
Qty: 3 BOTTLE | Refills: 3 | Status: SHIPPED | OUTPATIENT
Start: 2018-01-23 | End: 2019-07-01

## 2018-02-28 ENCOUNTER — OFFICE VISIT (OUTPATIENT)
Dept: INTERNAL MEDICINE | Facility: CLINIC | Age: 68
End: 2018-02-28

## 2018-02-28 VITALS
HEART RATE: 70 BPM | SYSTOLIC BLOOD PRESSURE: 138 MMHG | DIASTOLIC BLOOD PRESSURE: 82 MMHG | OXYGEN SATURATION: 96 % | TEMPERATURE: 98.1 F | HEIGHT: 65 IN | WEIGHT: 185 LBS | BODY MASS INDEX: 30.82 KG/M2 | RESPIRATION RATE: 12 BRPM

## 2018-02-28 DIAGNOSIS — R13.10 DYSPHAGIA, UNSPECIFIED TYPE: ICD-10-CM

## 2018-02-28 DIAGNOSIS — M54.31 SCIATICA OF RIGHT SIDE WITHOUT BACK PAIN: ICD-10-CM

## 2018-02-28 DIAGNOSIS — Z78.0 POSTMENOPAUSAL: ICD-10-CM

## 2018-02-28 DIAGNOSIS — E55.9 VITAMIN D DEFICIENCY: ICD-10-CM

## 2018-02-28 DIAGNOSIS — E78.00 PURE HYPERCHOLESTEROLEMIA: ICD-10-CM

## 2018-02-28 DIAGNOSIS — R10.10 UPPER ABDOMINAL PAIN: ICD-10-CM

## 2018-02-28 DIAGNOSIS — Z00.00 ANNUAL PHYSICAL EXAM: Primary | ICD-10-CM

## 2018-02-28 LAB
25(OH)D3 SERPL-MCNC: 6.7 NG/ML
ALBUMIN SERPL-MCNC: 4.2 G/DL (ref 3.2–4.8)
ALBUMIN/GLOB SERPL: 1.8 G/DL (ref 1.5–2.5)
ALP SERPL-CCNC: 86 U/L (ref 25–100)
ALT SERPL W P-5'-P-CCNC: 20 U/L (ref 7–40)
ANION GAP SERPL CALCULATED.3IONS-SCNC: 7 MMOL/L (ref 3–11)
ARTICHOKE IGE QN: 111 MG/DL (ref 0–130)
AST SERPL-CCNC: 26 U/L (ref 0–33)
BASOPHILS # BLD AUTO: 0.04 10*3/MM3 (ref 0–0.2)
BASOPHILS NFR BLD AUTO: 0.8 % (ref 0–1)
BILIRUB BLD-MCNC: NEGATIVE MG/DL
BILIRUB SERPL-MCNC: 0.5 MG/DL (ref 0.3–1.2)
BUN BLD-MCNC: 14 MG/DL (ref 9–23)
BUN/CREAT SERPL: 17.5 (ref 7–25)
CALCIUM SPEC-SCNC: 8.9 MG/DL (ref 8.7–10.4)
CHLORIDE SERPL-SCNC: 102 MMOL/L (ref 99–109)
CHOLEST SERPL-MCNC: 204 MG/DL (ref 0–200)
CLARITY, POC: CLEAR
CO2 SERPL-SCNC: 31 MMOL/L (ref 20–31)
COLOR UR: YELLOW
CREAT BLD-MCNC: 0.8 MG/DL (ref 0.6–1.3)
DEPRECATED RDW RBC AUTO: 42.8 FL (ref 37–54)
EOSINOPHIL # BLD AUTO: 0.71 10*3/MM3 (ref 0–0.3)
EOSINOPHIL NFR BLD AUTO: 13.3 % (ref 0–3)
ERYTHROCYTE [DISTWIDTH] IN BLOOD BY AUTOMATED COUNT: 12.7 % (ref 11.3–14.5)
GFR SERPL CREATININE-BSD FRML MDRD: 71 ML/MIN/1.73
GLOBULIN UR ELPH-MCNC: 2.4 GM/DL
GLUCOSE BLD-MCNC: 103 MG/DL (ref 70–100)
GLUCOSE UR STRIP-MCNC: NEGATIVE MG/DL
HCT VFR BLD AUTO: 43.6 % (ref 34.5–44)
HDLC SERPL-MCNC: 77 MG/DL (ref 40–60)
HGB BLD-MCNC: 13.8 G/DL (ref 11.5–15.5)
IMM GRANULOCYTES # BLD: 0.01 10*3/MM3 (ref 0–0.03)
IMM GRANULOCYTES NFR BLD: 0.2 % (ref 0–0.6)
KETONES UR QL: NEGATIVE
LEUKOCYTE EST, POC: NEGATIVE
LYMPHOCYTES # BLD AUTO: 1.69 10*3/MM3 (ref 0.6–4.8)
LYMPHOCYTES NFR BLD AUTO: 31.7 % (ref 24–44)
MCH RBC QN AUTO: 29.1 PG (ref 27–31)
MCHC RBC AUTO-ENTMCNC: 31.7 G/DL (ref 32–36)
MCV RBC AUTO: 91.8 FL (ref 80–99)
MONOCYTES # BLD AUTO: 0.48 10*3/MM3 (ref 0–1)
MONOCYTES NFR BLD AUTO: 9 % (ref 0–12)
NEUTROPHILS # BLD AUTO: 2.4 10*3/MM3 (ref 1.5–8.3)
NEUTROPHILS NFR BLD AUTO: 45 % (ref 41–71)
NITRITE UR-MCNC: NEGATIVE MG/ML
PH UR: 6 [PH] (ref 5–8)
PLATELET # BLD AUTO: 244 10*3/MM3 (ref 150–450)
PMV BLD AUTO: 9.6 FL (ref 6–12)
POTASSIUM BLD-SCNC: 4 MMOL/L (ref 3.5–5.5)
PROT SERPL-MCNC: 6.6 G/DL (ref 5.7–8.2)
PROT UR STRIP-MCNC: NEGATIVE MG/DL
RBC # BLD AUTO: 4.75 10*6/MM3 (ref 3.89–5.14)
RBC # UR STRIP: NEGATIVE /UL
SODIUM BLD-SCNC: 140 MMOL/L (ref 132–146)
SP GR UR: 1.02 (ref 1–1.03)
TRIGL SERPL-MCNC: 107 MG/DL (ref 0–150)
TSH SERPL DL<=0.05 MIU/L-ACNC: 1.57 MIU/ML (ref 0.35–5.35)
UROBILINOGEN UR QL: NORMAL
WBC NRBC COR # BLD: 5.33 10*3/MM3 (ref 3.5–10.8)

## 2018-02-28 PROCEDURE — 80061 LIPID PANEL: CPT | Performed by: INTERNAL MEDICINE

## 2018-02-28 PROCEDURE — 81003 URINALYSIS AUTO W/O SCOPE: CPT | Performed by: INTERNAL MEDICINE

## 2018-02-28 PROCEDURE — 84443 ASSAY THYROID STIM HORMONE: CPT | Performed by: INTERNAL MEDICINE

## 2018-02-28 PROCEDURE — 80053 COMPREHEN METABOLIC PANEL: CPT | Performed by: INTERNAL MEDICINE

## 2018-02-28 PROCEDURE — G0439 PPPS, SUBSEQ VISIT: HCPCS | Performed by: INTERNAL MEDICINE

## 2018-02-28 PROCEDURE — 99397 PER PM REEVAL EST PAT 65+ YR: CPT | Performed by: INTERNAL MEDICINE

## 2018-02-28 PROCEDURE — 85025 COMPLETE CBC W/AUTO DIFF WBC: CPT | Performed by: INTERNAL MEDICINE

## 2018-02-28 PROCEDURE — 82306 VITAMIN D 25 HYDROXY: CPT | Performed by: INTERNAL MEDICINE

## 2018-02-28 RX ORDER — PANTOPRAZOLE SODIUM 40 MG/1
TABLET, DELAYED RELEASE ORAL
Qty: 90 TABLET | Refills: 3 | Status: SHIPPED | OUTPATIENT
Start: 2018-02-28 | End: 2019-02-27

## 2018-02-28 RX ORDER — HYDROCODONE BITARTRATE AND ACETAMINOPHEN 7.5; 325 MG/1; MG/1
1 TABLET ORAL EVERY 6 HOURS PRN
Qty: 20 TABLET | Refills: 0 | Status: SHIPPED | OUTPATIENT
Start: 2018-02-28 | End: 2018-04-26 | Stop reason: HOSPADM

## 2018-02-28 NOTE — PROGRESS NOTES
"HPI:  Here for physical and wellness exam. Doing well overall and in good health    Depression screen - PHQ-2 - 8 - harder in the winter  Falls: tripped once but overall balance good  Memory: good voerall, just a little forgetful  Living will: pt has  Specialists:  Ortho - Dr Douglas. GI - Dr Arcos  Exercise: does have elliptical but hasn't done as much  Diet: likes sweets; not a lot of fruits veggies; centrum daily.      PMH, SH, FH reviewed and updated  Meds and allergies: reviewed and updated, see chart    ROS:  Gen: wt stable; energy ok  GI:takes protonix prn for gastritis. Misses it occasionally; also some ruq pain intermittently, under R breast. Not worse w/ foods; also a lot of a gas. Uses miralax daily and has regular BM w/ that; no n/v  :  No urinary sympoms  CV: no CP, does have the lower chest pressure  Pulm:   Neuro:    PE:  Vitals:    02/28/18 1056   BP: 138/82   BP Location: Right arm   Pulse: 70   Resp: 12   Temp: 98.1 °F (36.7 °C)   TempSrc: Temporal Artery    SpO2: 96%   Weight: 83.9 kg (185 lb)   Height: 163.8 cm (64.5\")     Physical Exam   Constitutional: oriented to person, place, and time.  well-developed and well-nourished. No distress.   HENT:   Head: Normocephalic and atraumatic.   Eyes: Conjunctivae and EOM are normal.   Cardiovascular: Normal rate, regular rhythm and normal heart sounds.  Exam reveals no gallop and no friction rub.    No murmur heard.  Pulmonary/Chest: Effort normal and breath sounds normal. No respiratory distress.   no wheezes.   GI: tender over RUQ and epigastrum  Neurological:  alert and oriented to person, place, and time.   Skin: Skin is warm and dry. not diaphoretic.   Psychiatric:  normal mood and affect. behavior is normal. Judgment and thought content normal.   Breast - no masses or lesions    A/P:  1. Wellness exam/physical - frida numbers given - she will schedule.BSE q month. dexa due (last one was '14) - will schedule. Colon due in '25. shingrex discussed -  " can check at pharmacy since we do not have yet. Regular exercise/healthy diet. BSE q month. Sunscreen use encouraged .      2. Upper abdominal pain - will check abd US. Continue protonix    3. LBP/sciatica - uses norco jut occasionally. Pt has already signed controlled substance contract. Luis Manuel done today and appropriate.norco refilled today

## 2018-03-01 RX ORDER — ERGOCALCIFEROL 1.25 MG/1
50000 CAPSULE ORAL
Qty: 4 CAPSULE | Refills: 2 | Status: SHIPPED | OUTPATIENT
Start: 2018-03-01 | End: 2018-06-04

## 2018-03-13 ENCOUNTER — HOSPITAL ENCOUNTER (OUTPATIENT)
Dept: ULTRASOUND IMAGING | Facility: HOSPITAL | Age: 68
Discharge: HOME | End: 2018-03-13
Admitting: INTERNAL MEDICINE

## 2018-03-13 DIAGNOSIS — K80.20 GALLSTONES: Primary | ICD-10-CM

## 2018-03-13 PROCEDURE — 76700 US EXAM ABDOM COMPLETE: CPT

## 2018-03-16 ENCOUNTER — TRANSCRIBE ORDERS (OUTPATIENT)
Dept: ADMINISTRATIVE | Facility: HOSPITAL | Age: 68
End: 2018-03-16

## 2018-03-16 DIAGNOSIS — Z12.31 VISIT FOR SCREENING MAMMOGRAM: Primary | ICD-10-CM

## 2018-03-29 ENCOUNTER — OFFICE VISIT (OUTPATIENT)
Dept: INTERNAL MEDICINE | Facility: CLINIC | Age: 68
End: 2018-03-29

## 2018-03-29 VITALS
WEIGHT: 187.4 LBS | TEMPERATURE: 98.7 F | BODY MASS INDEX: 31.22 KG/M2 | DIASTOLIC BLOOD PRESSURE: 78 MMHG | HEART RATE: 93 BPM | SYSTOLIC BLOOD PRESSURE: 140 MMHG | HEIGHT: 65 IN | OXYGEN SATURATION: 98 %

## 2018-03-29 DIAGNOSIS — M25.532 PAIN AND SWELLING OF LEFT WRIST: Primary | ICD-10-CM

## 2018-03-29 DIAGNOSIS — M25.432 PAIN AND SWELLING OF LEFT WRIST: Primary | ICD-10-CM

## 2018-03-29 DIAGNOSIS — R79.89 POSITIVE D DIMER: ICD-10-CM

## 2018-03-29 LAB
ANION GAP SERPL CALCULATED.3IONS-SCNC: 6 MMOL/L (ref 3–11)
BASOPHILS # BLD AUTO: 0.02 10*3/MM3 (ref 0–0.2)
BASOPHILS NFR BLD AUTO: 0.2 % (ref 0–1)
BUN BLD-MCNC: 13 MG/DL (ref 9–23)
BUN/CREAT SERPL: 13 (ref 7–25)
CALCIUM SPEC-SCNC: 9.2 MG/DL (ref 8.7–10.4)
CHLORIDE SERPL-SCNC: 101 MMOL/L (ref 99–109)
CO2 SERPL-SCNC: 32 MMOL/L (ref 20–31)
CREAT BLD-MCNC: 1 MG/DL (ref 0.6–1.3)
D DIMER PPP FEU-MCNC: 1.76 MG/L (FEU) (ref 0–0.5)
DEPRECATED RDW RBC AUTO: 42.7 FL (ref 37–54)
EOSINOPHIL # BLD AUTO: 1.57 10*3/MM3 (ref 0–0.3)
EOSINOPHIL NFR BLD AUTO: 19.4 % (ref 0–3)
ERYTHROCYTE [DISTWIDTH] IN BLOOD BY AUTOMATED COUNT: 12.9 % (ref 11.3–14.5)
ERYTHROCYTE [SEDIMENTATION RATE] IN BLOOD: 12 MM/HR (ref 0–30)
GFR SERPL CREATININE-BSD FRML MDRD: 55 ML/MIN/1.73
GLUCOSE BLD-MCNC: 121 MG/DL (ref 70–100)
HCT VFR BLD AUTO: 44.8 % (ref 34.5–44)
HGB BLD-MCNC: 14.4 G/DL (ref 11.5–15.5)
IMM GRANULOCYTES # BLD: 0.02 10*3/MM3 (ref 0–0.03)
IMM GRANULOCYTES NFR BLD: 0.2 % (ref 0–0.6)
LYMPHOCYTES # BLD AUTO: 1.53 10*3/MM3 (ref 0.6–4.8)
LYMPHOCYTES NFR BLD AUTO: 18.9 % (ref 24–44)
MCH RBC QN AUTO: 29.2 PG (ref 27–31)
MCHC RBC AUTO-ENTMCNC: 32.1 G/DL (ref 32–36)
MCV RBC AUTO: 90.9 FL (ref 80–99)
MONOCYTES # BLD AUTO: 0.43 10*3/MM3 (ref 0–1)
MONOCYTES NFR BLD AUTO: 5.3 % (ref 0–12)
NEUTROPHILS # BLD AUTO: 4.53 10*3/MM3 (ref 1.5–8.3)
NEUTROPHILS NFR BLD AUTO: 56 % (ref 41–71)
PLATELET # BLD AUTO: 290 10*3/MM3 (ref 150–450)
PMV BLD AUTO: 9.7 FL (ref 6–12)
POTASSIUM BLD-SCNC: 4.5 MMOL/L (ref 3.5–5.5)
RBC # BLD AUTO: 4.93 10*6/MM3 (ref 3.89–5.14)
SODIUM BLD-SCNC: 139 MMOL/L (ref 132–146)
URATE SERPL-MCNC: 4.8 MG/DL (ref 3.1–7.8)
WBC NRBC COR # BLD: 8.1 10*3/MM3 (ref 3.5–10.8)

## 2018-03-29 PROCEDURE — 85025 COMPLETE CBC W/AUTO DIFF WBC: CPT | Performed by: NURSE PRACTITIONER

## 2018-03-29 PROCEDURE — 85652 RBC SED RATE AUTOMATED: CPT | Performed by: NURSE PRACTITIONER

## 2018-03-29 PROCEDURE — 99213 OFFICE O/P EST LOW 20 MIN: CPT | Performed by: NURSE PRACTITIONER

## 2018-03-29 PROCEDURE — 85379 FIBRIN DEGRADATION QUANT: CPT | Performed by: NURSE PRACTITIONER

## 2018-03-29 PROCEDURE — 84550 ASSAY OF BLOOD/URIC ACID: CPT | Performed by: NURSE PRACTITIONER

## 2018-03-29 PROCEDURE — 80048 BASIC METABOLIC PNL TOTAL CA: CPT | Performed by: NURSE PRACTITIONER

## 2018-03-29 RX ORDER — CEPHALEXIN 500 MG/1
500 CAPSULE ORAL 2 TIMES DAILY
Qty: 20 CAPSULE | Refills: 0 | OUTPATIENT
Start: 2018-03-29 | End: 2018-04-18

## 2018-03-29 NOTE — PROGRESS NOTES
Subjective   Barbara Coelho is a 68 y.o. female.     Upper Extremity Issue   This is a new problem. The current episode started in the past 7 days. The problem occurs constantly. The problem has been gradually worsening. Associated symptoms include arthralgias (left wrist). Pertinent negatives include no abdominal pain, anorexia, change in bowel habit, chest pain, chills, congestion, coughing, diaphoresis, fatigue, fever, headaches, myalgias, nausea, neck pain, numbness, rash, sore throat, swollen glands, urinary symptoms, vertigo, visual change, vomiting or weakness. Exacerbated by: touching it. Treatments tried: jose rice. The treatment provided no relief.    Left arm swelling and pain at wrist with onset 2 days ago.  Hot and painful to the touch.  She reports a history of phlebitis in the left forearm in the past.  Also reports a history of DVT to the lower extremities in the past.  No fever, injury/trauma or wounds.     The following portions of the patient's history were reviewed and updated as appropriate: allergies, current medications, past family history, past medical history, past social history, past surgical history and problem list.    Review of Systems   Constitutional: Negative for chills, diaphoresis, fatigue and fever.   HENT: Negative for congestion and sore throat.    Respiratory: Negative for cough.    Cardiovascular: Negative for chest pain.   Gastrointestinal: Negative for abdominal pain, anorexia, change in bowel habit, nausea and vomiting.   Musculoskeletal: Positive for arthralgias (left wrist). Negative for myalgias and neck pain.        Left wrist pain, swelling and warmth.    Skin: Negative for rash.   Neurological: Negative for vertigo, weakness, numbness and headaches.   Hematological: Negative for adenopathy. Does not bruise/bleed easily.       Objective   Physical Exam   Constitutional: She is oriented to person, place, and time. She appears well-developed and well-nourished.   HENT:    Head: Normocephalic and atraumatic.   Cardiovascular: Normal rate, regular rhythm and normal heart sounds.    Pulmonary/Chest: Effort normal and breath sounds normal. No respiratory distress.   Abdominal: Soft.   Neurological: She is alert and oriented to person, place, and time.   Skin: Skin is warm. Capillary refill takes less than 2 seconds. There is erythema.        Psychiatric: She has a normal mood and affect. Her behavior is normal.   Nursing note and vitals reviewed.      Assessment/Plan      Barbara was seen today for arm pain.    Diagnoses and all orders for this visit:    Pain and swelling of left wrist  -     CBC & Differential  -     Uric Acid  -     D-dimer, Quantitative  -     Sedimentation Rate  -     Basic metabolic panel  -     cephalexin (KEFLEX) 500 MG capsule; Take 1 capsule by mouth 2 (Two) Times a Day.  -     CBC Auto Differential      Labs sent  Keflex as directed  Return in about 7 days (around 4/5/2018). Will call with results and change FU accordingly.   Plan of care discussed with pt. They verbalized understanding and agreement.

## 2018-03-29 NOTE — TELEPHONE ENCOUNTER
Received a request for 90 day fill on her Vit. D2 1.25 mg.  She had already filled for one month and will  the monthly script for this.  I denied the 90 day fill to the pharmacy.

## 2018-03-30 ENCOUNTER — HOSPITAL ENCOUNTER (OUTPATIENT)
Dept: CARDIOLOGY | Facility: HOSPITAL | Age: 68
Discharge: HOME | End: 2018-03-30

## 2018-03-30 ENCOUNTER — TELEPHONE (OUTPATIENT)
Dept: INTERNAL MEDICINE | Facility: CLINIC | Age: 68
End: 2018-03-30

## 2018-03-30 ENCOUNTER — HOSPITAL ENCOUNTER (OUTPATIENT)
Dept: BONE DENSITY | Facility: HOSPITAL | Age: 68
Discharge: HOME | End: 2018-03-30
Admitting: INTERNAL MEDICINE

## 2018-03-30 DIAGNOSIS — M25.432 PAIN AND SWELLING OF LEFT WRIST: ICD-10-CM

## 2018-03-30 DIAGNOSIS — R79.89 POSITIVE D DIMER: ICD-10-CM

## 2018-03-30 DIAGNOSIS — M25.532 PAIN AND SWELLING OF LEFT WRIST: ICD-10-CM

## 2018-03-30 LAB
BH CV ECHO MEAS - BSA(HAYCOCK): 2 M^2
BH CV ECHO MEAS - BSA: 1.9 M^2
BH CV ECHO MEAS - BZI_BMI: 31.1 KILOGRAMS/M^2
BH CV ECHO MEAS - BZI_METRIC_HEIGHT: 165.1 CM
BH CV ECHO MEAS - BZI_METRIC_WEIGHT: 84.8 KG
BH CV UPPER VENOUS LEFT AXILLARY AUGMENT: NORMAL
BH CV UPPER VENOUS LEFT AXILLARY COMPETENT: NORMAL
BH CV UPPER VENOUS LEFT AXILLARY COMPRESS: NORMAL
BH CV UPPER VENOUS LEFT AXILLARY PHASIC: NORMAL
BH CV UPPER VENOUS LEFT AXILLARY SPONT: NORMAL
BH CV UPPER VENOUS LEFT BASILIC FOREARM COMPRESS: NORMAL
BH CV UPPER VENOUS LEFT BASILIC UPPER COMPRESS: NORMAL
BH CV UPPER VENOUS LEFT BRACHIAL COMPRESS: NORMAL
BH CV UPPER VENOUS LEFT CEPHALIC FOREARM COMPRESS: NORMAL
BH CV UPPER VENOUS LEFT CEPHALIC UPPER COMPRESS: NORMAL
BH CV UPPER VENOUS LEFT INTERNAL JUGULAR AUGMENT: NORMAL
BH CV UPPER VENOUS LEFT INTERNAL JUGULAR COMPETENT: NORMAL
BH CV UPPER VENOUS LEFT INTERNAL JUGULAR COMPRESS: NORMAL
BH CV UPPER VENOUS LEFT INTERNAL JUGULAR PHASIC: NORMAL
BH CV UPPER VENOUS LEFT INTERNAL JUGULAR SPONT: NORMAL
BH CV UPPER VENOUS LEFT RADIAL COMPRESS: NORMAL
BH CV UPPER VENOUS LEFT SUBCLAVIAN AUGMENT: NORMAL
BH CV UPPER VENOUS LEFT SUBCLAVIAN COMPETENT: NORMAL
BH CV UPPER VENOUS LEFT SUBCLAVIAN COMPRESS: NORMAL
BH CV UPPER VENOUS LEFT SUBCLAVIAN PHASIC: NORMAL
BH CV UPPER VENOUS LEFT SUBCLAVIAN SPONT: NORMAL
BH CV UPPER VENOUS LEFT ULNAR COMPRESS: NORMAL
BH CV UPPER VENOUS RIGHT SUBCLAVIAN AUGMENT: NORMAL
BH CV UPPER VENOUS RIGHT SUBCLAVIAN COMPETENT: NORMAL
BH CV UPPER VENOUS RIGHT SUBCLAVIAN COMPRESS: NORMAL
BH CV UPPER VENOUS RIGHT SUBCLAVIAN PHASIC: NORMAL
BH CV UPPER VENOUS RIGHT SUBCLAVIAN SPONT: NORMAL

## 2018-03-30 PROCEDURE — 93971 EXTREMITY STUDY: CPT | Performed by: INTERNAL MEDICINE

## 2018-03-30 PROCEDURE — 93971 EXTREMITY STUDY: CPT

## 2018-03-30 PROCEDURE — 77080 DXA BONE DENSITY AXIAL: CPT

## 2018-03-30 NOTE — TELEPHONE ENCOUNTER
----- Message from SULEMA Linda sent at 3/30/2018  4:21 PM EDT -----  Please let her know us is negative for DVT. She can continue the antibiotic and if not resolved come in for recheck.

## 2018-04-06 ENCOUNTER — TELEPHONE (OUTPATIENT)
Dept: INTERNAL MEDICINE | Facility: CLINIC | Age: 68
End: 2018-04-06

## 2018-04-06 NOTE — TELEPHONE ENCOUNTER
PATIENT WAS SEEN RECENTLY FOR SWOLLEN ARM. PATIENT WAS SENT FOR A DUPLEX DOPPLER THAT SHOWED NO SIGNS OF A CLOT. PATIENT SAID THAT HER ARM GOT BETTER BUT SHE CALLS THIS AFTERNOON STATING THAT NOW HER OTHER ARM IS SWELLING. SHE ALSO STATES THAT SHE HAS SWOLLEN EYES AND HER GLANDS ARE SWOLLEN. SHE QUESTIONS IF SHE HAS STREPT OR NOT. ADVISED PATIENT TO GO TO ER OR TO URGENT CARE TO BE EVALUATED.

## 2018-04-17 ENCOUNTER — HOSPITAL ENCOUNTER (OUTPATIENT)
Dept: MAMMOGRAPHY | Facility: HOSPITAL | Age: 68
Discharge: HOME | End: 2018-04-17
Attending: INTERNAL MEDICINE | Admitting: INTERNAL MEDICINE

## 2018-04-17 DIAGNOSIS — Z12.31 VISIT FOR SCREENING MAMMOGRAM: ICD-10-CM

## 2018-04-17 PROCEDURE — 77067 SCR MAMMO BI INCL CAD: CPT

## 2018-04-17 PROCEDURE — 77063 BREAST TOMOSYNTHESIS BI: CPT | Performed by: RADIOLOGY

## 2018-04-17 PROCEDURE — 77063 BREAST TOMOSYNTHESIS BI: CPT

## 2018-04-17 PROCEDURE — 77067 SCR MAMMO BI INCL CAD: CPT | Performed by: RADIOLOGY

## 2018-04-25 ENCOUNTER — APPOINTMENT (OUTPATIENT)
Dept: PREADMISSION TESTING | Facility: HOSPITAL | Age: 68
End: 2018-04-25

## 2018-04-25 ENCOUNTER — ANESTHESIA EVENT (OUTPATIENT)
Dept: PERIOP | Facility: HOSPITAL | Age: 68
End: 2018-04-25

## 2018-04-25 VITALS — HEIGHT: 65 IN | WEIGHT: 186.73 LBS | BODY MASS INDEX: 31.11 KG/M2

## 2018-04-25 LAB
ALBUMIN SERPL-MCNC: 4.2 G/DL (ref 3.2–4.8)
ALBUMIN/GLOB SERPL: 2 G/DL (ref 1.5–2.5)
ALP SERPL-CCNC: 83 U/L (ref 25–100)
ALT SERPL W P-5'-P-CCNC: 23 U/L (ref 7–40)
ANION GAP SERPL CALCULATED.3IONS-SCNC: 5 MMOL/L (ref 3–11)
AST SERPL-CCNC: 22 U/L (ref 0–33)
BILIRUB SERPL-MCNC: 0.5 MG/DL (ref 0.3–1.2)
BUN BLD-MCNC: 11 MG/DL (ref 9–23)
BUN/CREAT SERPL: 12.2 (ref 7–25)
CALCIUM SPEC-SCNC: 9.4 MG/DL (ref 8.7–10.4)
CHLORIDE SERPL-SCNC: 106 MMOL/L (ref 99–109)
CO2 SERPL-SCNC: 30 MMOL/L (ref 20–31)
CREAT BLD-MCNC: 0.9 MG/DL (ref 0.6–1.3)
DEPRECATED RDW RBC AUTO: 43.7 FL (ref 37–54)
ERYTHROCYTE [DISTWIDTH] IN BLOOD BY AUTOMATED COUNT: 13.3 % (ref 11.3–14.5)
GFR SERPL CREATININE-BSD FRML MDRD: 62 ML/MIN/1.73
GLOBULIN UR ELPH-MCNC: 2.1 GM/DL
GLUCOSE BLD-MCNC: 116 MG/DL (ref 70–100)
HCT VFR BLD AUTO: 43 % (ref 34.5–44)
HGB BLD-MCNC: 13.8 G/DL (ref 11.5–15.5)
MCH RBC QN AUTO: 28.9 PG (ref 27–31)
MCHC RBC AUTO-ENTMCNC: 32.1 G/DL (ref 32–36)
MCV RBC AUTO: 90 FL (ref 80–99)
PLATELET # BLD AUTO: 213 10*3/MM3 (ref 150–450)
PMV BLD AUTO: 9 FL (ref 6–12)
POTASSIUM BLD-SCNC: 4.1 MMOL/L (ref 3.5–5.5)
PROT SERPL-MCNC: 6.3 G/DL (ref 5.7–8.2)
RBC # BLD AUTO: 4.78 10*6/MM3 (ref 3.89–5.14)
SODIUM BLD-SCNC: 141 MMOL/L (ref 132–146)
WBC NRBC COR # BLD: 5.05 10*3/MM3 (ref 3.5–10.8)

## 2018-04-25 PROCEDURE — 80053 COMPREHEN METABOLIC PANEL: CPT | Performed by: SURGERY

## 2018-04-25 PROCEDURE — 93005 ELECTROCARDIOGRAM TRACING: CPT

## 2018-04-25 PROCEDURE — 36415 COLL VENOUS BLD VENIPUNCTURE: CPT

## 2018-04-25 PROCEDURE — 85027 COMPLETE CBC AUTOMATED: CPT | Performed by: SURGERY

## 2018-04-25 PROCEDURE — 93010 ELECTROCARDIOGRAM REPORT: CPT | Performed by: INTERNAL MEDICINE

## 2018-04-25 RX ORDER — DESLORATADINE 5 MG/1
5 TABLET ORAL DAILY PRN
COMMUNITY
End: 2018-06-04 | Stop reason: SDUPTHER

## 2018-04-25 NOTE — DISCHARGE INSTRUCTIONS

## 2018-04-26 ENCOUNTER — ANESTHESIA (OUTPATIENT)
Dept: PERIOP | Facility: HOSPITAL | Age: 68
End: 2018-04-26

## 2018-04-26 ENCOUNTER — APPOINTMENT (OUTPATIENT)
Dept: GENERAL RADIOLOGY | Facility: HOSPITAL | Age: 68
End: 2018-04-26

## 2018-04-26 ENCOUNTER — HOSPITAL ENCOUNTER (OUTPATIENT)
Facility: HOSPITAL | Age: 68
Setting detail: SURGERY ADMIT
Discharge: HOME | End: 2018-04-26
Attending: SURGERY | Admitting: SURGERY

## 2018-04-26 VITALS
BODY MASS INDEX: 30.99 KG/M2 | TEMPERATURE: 98.3 F | SYSTOLIC BLOOD PRESSURE: 147 MMHG | HEART RATE: 75 BPM | OXYGEN SATURATION: 91 % | RESPIRATION RATE: 18 BRPM | WEIGHT: 186 LBS | HEIGHT: 65 IN | DIASTOLIC BLOOD PRESSURE: 81 MMHG

## 2018-04-26 DIAGNOSIS — K80.20 CHOLELITHIASIS: ICD-10-CM

## 2018-04-26 PROCEDURE — 25010000002 PROMETHAZINE PER 50 MG: Performed by: NURSE ANESTHETIST, CERTIFIED REGISTERED

## 2018-04-26 PROCEDURE — 25010000002 ONDANSETRON PER 1 MG: Performed by: NURSE ANESTHETIST, CERTIFIED REGISTERED

## 2018-04-26 PROCEDURE — 25010000002 NEOSTIGMINE 10 MG/10ML SOLUTION: Performed by: NURSE ANESTHETIST, CERTIFIED REGISTERED

## 2018-04-26 PROCEDURE — 25010000002 PROPOFOL 10 MG/ML EMULSION: Performed by: NURSE ANESTHETIST, CERTIFIED REGISTERED

## 2018-04-26 PROCEDURE — 25010000002 PHENYLEPHRINE PER 1 ML: Performed by: NURSE ANESTHETIST, CERTIFIED REGISTERED

## 2018-04-26 PROCEDURE — 25010000002 DEXAMETHASONE PER 1 MG: Performed by: NURSE ANESTHETIST, CERTIFIED REGISTERED

## 2018-04-26 PROCEDURE — 25010000002 FENTANYL CITRATE (PF) 100 MCG/2ML SOLUTION: Performed by: NURSE ANESTHETIST, CERTIFIED REGISTERED

## 2018-04-26 PROCEDURE — 88304 TISSUE EXAM BY PATHOLOGIST: CPT | Performed by: SURGERY

## 2018-04-26 PROCEDURE — 74300 X-RAY BILE DUCTS/PANCREAS: CPT

## 2018-04-26 PROCEDURE — 25010000002 IOPAMIDOL 61 % SOLUTION: Performed by: SURGERY

## 2018-04-26 PROCEDURE — 25010000002 VANCOMYCIN 10 G RECONSTITUTED SOLUTION: Performed by: SURGERY

## 2018-04-26 RX ORDER — NALOXONE HCL 0.4 MG/ML
0.4 VIAL (ML) INJECTION AS NEEDED
Status: DISCONTINUED | OUTPATIENT
Start: 2018-04-26 | End: 2018-04-26 | Stop reason: HOSPADM

## 2018-04-26 RX ORDER — DOCUSATE SODIUM 250 MG
250 CAPSULE ORAL 2 TIMES DAILY
Qty: 20 CAPSULE | Refills: 0 | Status: SHIPPED | OUTPATIENT
Start: 2018-04-26 | End: 2018-06-04

## 2018-04-26 RX ORDER — PROMETHAZINE HYDROCHLORIDE 12.5 MG/1
12.5 TABLET ORAL ONCE AS NEEDED
Status: DISCONTINUED | OUTPATIENT
Start: 2018-04-26 | End: 2018-04-26 | Stop reason: HOSPADM

## 2018-04-26 RX ORDER — PROMETHAZINE HYDROCHLORIDE 25 MG/ML
6.25 INJECTION, SOLUTION INTRAMUSCULAR; INTRAVENOUS ONCE AS NEEDED
Status: COMPLETED | OUTPATIENT
Start: 2018-04-26 | End: 2018-04-26

## 2018-04-26 RX ORDER — LIDOCAINE HYDROCHLORIDE 10 MG/ML
INJECTION, SOLUTION EPIDURAL; INFILTRATION; INTRACAUDAL; PERINEURAL AS NEEDED
Status: DISCONTINUED | OUTPATIENT
Start: 2018-04-26 | End: 2018-04-26 | Stop reason: SURG

## 2018-04-26 RX ORDER — SODIUM CHLORIDE 9 MG/ML
INJECTION, SOLUTION INTRAVENOUS AS NEEDED
Status: DISCONTINUED | OUTPATIENT
Start: 2018-04-26 | End: 2018-04-26 | Stop reason: HOSPADM

## 2018-04-26 RX ORDER — NEOSTIGMINE METHYLSULFATE 1 MG/ML
INJECTION, SOLUTION INTRAVENOUS AS NEEDED
Status: DISCONTINUED | OUTPATIENT
Start: 2018-04-26 | End: 2018-04-26 | Stop reason: SURG

## 2018-04-26 RX ORDER — OXYCODONE AND ACETAMINOPHEN 7.5; 325 MG/1; MG/1
1 TABLET ORAL ONCE AS NEEDED
Status: DISCONTINUED | OUTPATIENT
Start: 2018-04-26 | End: 2018-04-26 | Stop reason: HOSPADM

## 2018-04-26 RX ORDER — HYDRALAZINE HYDROCHLORIDE 20 MG/ML
5 INJECTION INTRAMUSCULAR; INTRAVENOUS
Status: DISCONTINUED | OUTPATIENT
Start: 2018-04-26 | End: 2018-04-26 | Stop reason: HOSPADM

## 2018-04-26 RX ORDER — BUPIVACAINE HYDROCHLORIDE AND EPINEPHRINE 2.5; 5 MG/ML; UG/ML
INJECTION, SOLUTION EPIDURAL; INFILTRATION; INTRACAUDAL; PERINEURAL AS NEEDED
Status: DISCONTINUED | OUTPATIENT
Start: 2018-04-26 | End: 2018-04-26 | Stop reason: HOSPADM

## 2018-04-26 RX ORDER — LABETALOL HYDROCHLORIDE 5 MG/ML
5 INJECTION, SOLUTION INTRAVENOUS
Status: DISCONTINUED | OUTPATIENT
Start: 2018-04-26 | End: 2018-04-26 | Stop reason: HOSPADM

## 2018-04-26 RX ORDER — MEPERIDINE HYDROCHLORIDE 25 MG/ML
12.5 INJECTION INTRAMUSCULAR; INTRAVENOUS; SUBCUTANEOUS
Status: DISCONTINUED | OUTPATIENT
Start: 2018-04-26 | End: 2018-04-26 | Stop reason: HOSPADM

## 2018-04-26 RX ORDER — DEXAMETHASONE SODIUM PHOSPHATE 4 MG/ML
INJECTION, SOLUTION INTRA-ARTICULAR; INTRALESIONAL; INTRAMUSCULAR; INTRAVENOUS; SOFT TISSUE AS NEEDED
Status: DISCONTINUED | OUTPATIENT
Start: 2018-04-26 | End: 2018-04-26 | Stop reason: SURG

## 2018-04-26 RX ORDER — SODIUM CHLORIDE 0.9 % (FLUSH) 0.9 %
1-10 SYRINGE (ML) INJECTION AS NEEDED
Status: DISCONTINUED | OUTPATIENT
Start: 2018-04-26 | End: 2018-04-26 | Stop reason: HOSPADM

## 2018-04-26 RX ORDER — GLYCOPYRROLATE 0.2 MG/ML
INJECTION INTRAMUSCULAR; INTRAVENOUS AS NEEDED
Status: DISCONTINUED | OUTPATIENT
Start: 2018-04-26 | End: 2018-04-26 | Stop reason: SURG

## 2018-04-26 RX ORDER — SODIUM CHLORIDE, SODIUM LACTATE, POTASSIUM CHLORIDE, CALCIUM CHLORIDE 600; 310; 30; 20 MG/100ML; MG/100ML; MG/100ML; MG/100ML
9 INJECTION, SOLUTION INTRAVENOUS CONTINUOUS
Status: DISCONTINUED | OUTPATIENT
Start: 2018-04-26 | End: 2018-04-26 | Stop reason: HOSPADM

## 2018-04-26 RX ORDER — IPRATROPIUM BROMIDE AND ALBUTEROL SULFATE 2.5; .5 MG/3ML; MG/3ML
3 SOLUTION RESPIRATORY (INHALATION) ONCE AS NEEDED
Status: DISCONTINUED | OUTPATIENT
Start: 2018-04-26 | End: 2018-04-26 | Stop reason: HOSPADM

## 2018-04-26 RX ORDER — OXYCODONE HYDROCHLORIDE AND ACETAMINOPHEN 5; 325 MG/1; MG/1
1 TABLET ORAL ONCE AS NEEDED
Status: DISCONTINUED | OUTPATIENT
Start: 2018-04-26 | End: 2018-04-26 | Stop reason: HOSPADM

## 2018-04-26 RX ORDER — ONDANSETRON 2 MG/ML
4 INJECTION INTRAMUSCULAR; INTRAVENOUS ONCE AS NEEDED
Status: DISCONTINUED | OUTPATIENT
Start: 2018-04-26 | End: 2018-04-26 | Stop reason: HOSPADM

## 2018-04-26 RX ORDER — PROMETHAZINE HYDROCHLORIDE 25 MG/1
25 SUPPOSITORY RECTAL ONCE AS NEEDED
Status: COMPLETED | OUTPATIENT
Start: 2018-04-26 | End: 2018-04-26

## 2018-04-26 RX ORDER — PROMETHAZINE HYDROCHLORIDE 25 MG/1
25 TABLET ORAL ONCE AS NEEDED
Status: COMPLETED | OUTPATIENT
Start: 2018-04-26 | End: 2018-04-26

## 2018-04-26 RX ORDER — FAMOTIDINE 20 MG/1
20 TABLET, FILM COATED ORAL ONCE
Status: COMPLETED | OUTPATIENT
Start: 2018-04-26 | End: 2018-04-26

## 2018-04-26 RX ORDER — ATRACURIUM BESYLATE 10 MG/ML
INJECTION, SOLUTION INTRAVENOUS AS NEEDED
Status: DISCONTINUED | OUTPATIENT
Start: 2018-04-26 | End: 2018-04-26 | Stop reason: SURG

## 2018-04-26 RX ORDER — LIDOCAINE HYDROCHLORIDE 10 MG/ML
0.5 INJECTION, SOLUTION EPIDURAL; INFILTRATION; INTRACAUDAL; PERINEURAL ONCE AS NEEDED
Status: COMPLETED | OUTPATIENT
Start: 2018-04-26 | End: 2018-04-26

## 2018-04-26 RX ORDER — DOCUSATE SODIUM 100 MG/1
100 CAPSULE, LIQUID FILLED ORAL ONCE
Status: DISCONTINUED | OUTPATIENT
Start: 2018-04-26 | End: 2018-04-26 | Stop reason: HOSPADM

## 2018-04-26 RX ORDER — HYDROMORPHONE HYDROCHLORIDE 1 MG/ML
0.2 INJECTION, SOLUTION INTRAMUSCULAR; INTRAVENOUS; SUBCUTANEOUS
Status: DISCONTINUED | OUTPATIENT
Start: 2018-04-26 | End: 2018-04-26 | Stop reason: HOSPADM

## 2018-04-26 RX ORDER — OXYCODONE HYDROCHLORIDE AND ACETAMINOPHEN 5; 325 MG/1; MG/1
1-2 TABLET ORAL EVERY 4 HOURS PRN
Qty: 37 TABLET | Refills: 0 | Status: SHIPPED | OUTPATIENT
Start: 2018-04-26 | End: 2018-06-04

## 2018-04-26 RX ORDER — PROPOFOL 10 MG/ML
VIAL (ML) INTRAVENOUS AS NEEDED
Status: DISCONTINUED | OUTPATIENT
Start: 2018-04-26 | End: 2018-04-26 | Stop reason: SURG

## 2018-04-26 RX ORDER — FENTANYL CITRATE 50 UG/ML
INJECTION, SOLUTION INTRAMUSCULAR; INTRAVENOUS AS NEEDED
Status: DISCONTINUED | OUTPATIENT
Start: 2018-04-26 | End: 2018-04-26 | Stop reason: SURG

## 2018-04-26 RX ORDER — FENTANYL CITRATE 50 UG/ML
50 INJECTION, SOLUTION INTRAMUSCULAR; INTRAVENOUS
Status: DISCONTINUED | OUTPATIENT
Start: 2018-04-26 | End: 2018-04-26 | Stop reason: HOSPADM

## 2018-04-26 RX ORDER — ONDANSETRON 2 MG/ML
INJECTION INTRAMUSCULAR; INTRAVENOUS AS NEEDED
Status: DISCONTINUED | OUTPATIENT
Start: 2018-04-26 | End: 2018-04-26 | Stop reason: SURG

## 2018-04-26 RX ADMIN — FENTANYL CITRATE 50 MCG: 50 INJECTION, SOLUTION INTRAMUSCULAR; INTRAVENOUS at 09:35

## 2018-04-26 RX ADMIN — PROMETHAZINE HYDROCHLORIDE 6.25 MG: 25 INJECTION INTRAMUSCULAR; INTRAVENOUS at 11:28

## 2018-04-26 RX ADMIN — FAMOTIDINE 20 MG: 20 TABLET ORAL at 06:54

## 2018-04-26 RX ADMIN — LIDOCAINE HYDROCHLORIDE 40 MG: 10 INJECTION, SOLUTION EPIDURAL; INFILTRATION; INTRACAUDAL; PERINEURAL at 07:57

## 2018-04-26 RX ADMIN — VANCOMYCIN HYDROCHLORIDE 1250 MG: 10 INJECTION, POWDER, LYOPHILIZED, FOR SOLUTION INTRAVENOUS at 07:41

## 2018-04-26 RX ADMIN — ATRACURIUM BESYLATE 35 MG: 10 INJECTION, SOLUTION INTRAVENOUS at 07:57

## 2018-04-26 RX ADMIN — FENTANYL CITRATE 25 MCG: 50 INJECTION, SOLUTION INTRAMUSCULAR; INTRAVENOUS at 08:49

## 2018-04-26 RX ADMIN — EPHEDRINE SULFATE 10 MG: 50 INJECTION INTRAMUSCULAR; INTRAVENOUS; SUBCUTANEOUS at 08:13

## 2018-04-26 RX ADMIN — ONDANSETRON 4 MG: 2 INJECTION INTRAMUSCULAR; INTRAVENOUS at 08:37

## 2018-04-26 RX ADMIN — FENTANYL CITRATE 25 MCG: 50 INJECTION, SOLUTION INTRAMUSCULAR; INTRAVENOUS at 08:33

## 2018-04-26 RX ADMIN — DEXAMETHASONE SODIUM PHOSPHATE 8 MG: 4 INJECTION, SOLUTION INTRAMUSCULAR; INTRAVENOUS at 07:57

## 2018-04-26 RX ADMIN — SODIUM CHLORIDE, POTASSIUM CHLORIDE, SODIUM LACTATE AND CALCIUM CHLORIDE 9 ML/HR: 600; 310; 30; 20 INJECTION, SOLUTION INTRAVENOUS at 06:25

## 2018-04-26 RX ADMIN — FENTANYL CITRATE 50 MCG: 50 INJECTION, SOLUTION INTRAMUSCULAR; INTRAVENOUS at 08:04

## 2018-04-26 RX ADMIN — OXYCODONE HYDROCHLORIDE AND ACETAMINOPHEN 1 TABLET: 5; 325 TABLET ORAL at 11:04

## 2018-04-26 RX ADMIN — SODIUM CHLORIDE, POTASSIUM CHLORIDE, SODIUM LACTATE AND CALCIUM CHLORIDE: 600; 310; 30; 20 INJECTION, SOLUTION INTRAVENOUS at 07:52

## 2018-04-26 RX ADMIN — LIDOCAINE HYDROCHLORIDE 0.2 ML: 10 INJECTION, SOLUTION EPIDURAL; INFILTRATION; INTRACAUDAL; PERINEURAL at 06:25

## 2018-04-26 RX ADMIN — NEOSTIGMINE METHYLSULFATE 2 MG: 1 INJECTION, SOLUTION INTRAVENOUS at 08:43

## 2018-04-26 RX ADMIN — PHENYLEPHRINE HYDROCHLORIDE 160 MCG: 10 INJECTION INTRAVENOUS at 08:16

## 2018-04-26 RX ADMIN — GLYCOPYRROLATE 0.4 MG: 0.2 INJECTION, SOLUTION INTRAMUSCULAR; INTRAVENOUS at 08:43

## 2018-04-26 RX ADMIN — EPHEDRINE SULFATE 10 MG: 50 INJECTION INTRAMUSCULAR; INTRAVENOUS; SUBCUTANEOUS at 08:11

## 2018-04-26 RX ADMIN — PROPOFOL 160 MG: 10 INJECTION, EMULSION INTRAVENOUS at 07:57

## 2018-04-26 NOTE — ANESTHESIA POSTPROCEDURE EVALUATION
Patient: Barbara Coelho    Procedure Summary     Date:  04/26/18 Room / Location:   [facility] /  [facility]    Anesthesia Start:  0752 Anesthesia Stop:  0858    Procedure:  CHOLECYSTECTOMY LAPAROSCOPIC INTRAOPERATIVE CHOLANGIOGRAM (N/A Abdomen) Diagnosis:      Surgeon:  Lit Morelos MD Provider:  Keven Gil MD    Anesthesia Type:  general ASA Status:  2          Anesthesia Type: general  Last vitals  BP   122/85 (04/26/18 0644)   Temp   98.2   Pulse 76   Resp 18   SpO2 99%     Post Anesthesia Care and Evaluation    Patient location during evaluation: PACU  Patient participation: complete - patient participated  Level of consciousness: sleepy but conscious  Pain score: 0  Pain management: adequate  Airway patency: patent  Anesthetic complications: No anesthetic complications  PONV Status: none  Cardiovascular status: hemodynamically stable and acceptable  Respiratory status: nonlabored ventilation, acceptable and nasal cannula  Hydration status: acceptable

## 2018-04-26 NOTE — ANESTHESIA PREPROCEDURE EVALUATION
Anesthesia Evaluation     Patient summary reviewed and Nursing notes reviewed   no history of anesthetic complications:  NPO Solid Status: > 8 hours  NPO Liquid Status: > 8 hours           Airway   Mallampati: II  TM distance: >3 FB  Neck ROM: full  No difficulty expected  Dental - normal exam     Pulmonary - normal exam    breath sounds clear to auscultation  (+) asthma (controlled),   Cardiovascular - negative cardio ROS and normal exam    ECG reviewed  Rhythm: regular  Rate: normal        Neuro/Psych- negative ROS  GI/Hepatic/Renal/Endo    (+) obesity,  GERD well controlled,      Musculoskeletal     (+) back pain,   Abdominal    Substance History      OB/GYN          Other   (+) arthritis                     Anesthesia Plan    ASA 2     general     intravenous induction   Anesthetic plan and risks discussed with patient.    Plan discussed with CRNA.

## 2018-04-26 NOTE — ANESTHESIA PROCEDURE NOTES
Airway  Urgency: elective    Airway not difficult    General Information and Staff    Patient location during procedure: OR  CRNA: PAMELA YI    Indications and Patient Condition  Indications for airway management: airway protection    Preoxygenated: yes  MILS not maintained throughout  Mask difficulty assessment: 1 - vent by mask    Final Airway Details  Final airway type: endotracheal airway      Successful airway: ETT  Cuffed: yes   Successful intubation technique: direct laryngoscopy  Facilitating devices/methods: intubating stylet  Endotracheal tube insertion site: oral  Blade: Mendez  Blade size: #3  ETT size: 7.0 mm  Cormack-Lehane Classification: grade IIa - partial view of glottis  Placement verified by: chest auscultation and capnometry   Cuff volume (mL): 6  Measured from: lips  ETT to lips (cm): 20  Number of attempts at approach: 1    Additional Comments  Negative epigastric sounds, Breath sound equal bilaterally with symmetric chest rise and fall. Teeth and lips as preop.

## 2018-04-27 LAB
CYTO UR: NORMAL
LAB AP CASE REPORT: NORMAL
LAB AP CLINICAL INFORMATION: NORMAL
Lab: NORMAL
PATH REPORT.FINAL DX SPEC: NORMAL
PATH REPORT.GROSS SPEC: NORMAL

## 2018-05-14 ENCOUNTER — OFFICE VISIT (OUTPATIENT)
Dept: INTERNAL MEDICINE | Facility: CLINIC | Age: 68
End: 2018-05-14

## 2018-05-14 VITALS
OXYGEN SATURATION: 97 % | TEMPERATURE: 99.5 F | WEIGHT: 183.6 LBS | BODY MASS INDEX: 30.59 KG/M2 | HEART RATE: 86 BPM | SYSTOLIC BLOOD PRESSURE: 126 MMHG | HEIGHT: 65 IN | DIASTOLIC BLOOD PRESSURE: 66 MMHG

## 2018-05-14 DIAGNOSIS — M25.512 ACUTE PAIN OF LEFT SHOULDER: ICD-10-CM

## 2018-05-14 DIAGNOSIS — R07.2 PRECORDIAL PAIN: ICD-10-CM

## 2018-05-14 DIAGNOSIS — R22.0 FACIAL SWELLING: Primary | ICD-10-CM

## 2018-05-14 PROCEDURE — 93000 ELECTROCARDIOGRAM COMPLETE: CPT | Performed by: INTERNAL MEDICINE

## 2018-05-14 PROCEDURE — 99214 OFFICE O/P EST MOD 30 MIN: CPT | Performed by: INTERNAL MEDICINE

## 2018-05-14 NOTE — PROGRESS NOTES
History of Present Illness   Here for sweling in her face - woke up yesterday  w/ facial swelling on both sides. Had felt fine the day before. Didn't hurt initially, but did have some jaw pain last night B. No throat swelling, no SOB. Did mow the day before the symptoms started and she is allergic to grass. She is on allergy shots every 2 weeks. She takes clarinex as needed but has not recently. She does feel swelling is better today  Had another episode of swelling in her face at end of March, but it was while she was on keflex, so it was felt to be allergy to keflex. It was similar to this episode. Also yesterday she had pain in L upper arm into her shoulder and hurt to move arm. This is better today, though still a little residual pain  Just had dental cleaning 2 weeks ago - had xrays no problems seen. No tooth pain w/ this swelling    In March she had swelling in her L arm near wrist and was seen here. Ddimer was slightly high, but duplex was normal, and that is when she was placed on the keflex for presumed cellulitis, which is then when the facial swelling 1st happened.she has not had a recurrence of the wrist swelling since then    She is s/p CCY on 4/26 w/ Dr Morelos and feels well from that    No h/o tobacco abuse    The following portions of the patient's history were reviewed and updated as appropriate: allergies, current medications, past family history, past medical history, past social history, past surgical history and problem list.    Review of Systems   Constitutional: Negative for fatigue and fever.   Eye: no pain in eyes, no vision loss  ENT: some drainage from her allergies, no sinus symptoms  Respiratory: Negative for shortness of breath.  neg cough  Cardiovascular: positive for chest pain - occasional in center of chest, short-lived  Gastrointestinal: Negative for abdominal pain.   PV: no arm swelling  Skin: no rash    Objective    Vitals:    05/14/18 1557   BP: 126/66   Pulse: 86   Temp: 99.5 °F  (37.5 °C)   SpO2: 97%     Physical Exam   Constitutional: oriented to person, place, and time. well-developed and well-nourished. No distress.   HENT: Op clear, no exudate or drainage  Head: slight edema B cheeks. No enlargement of parotids appreciated. No sinus tenderness. TM dull R, normal L  Eyes: Conjunctivae and EOM are normal.   Cardiovascular: Normal rate, regular rhythm and normal heart sounds.  Exam reveals no gallop and no friction rub.    No murmur heard.  Pulmonary/Chest: Effort normal and breath sounds normal. No respiratory distress.  no wheezes.   Abd: soft, NTND; ccy incisions well-healed  Neurological:  alert and oriented to person, place, and time.   Skin: Skin is warm and dry. not diaphoretic. no rash  Psychiatric: normal mood and affect. Behavior and judgement normal  MS: L shoulder ROM normal. Slight tenderness in upper L arm, no tenderness over AC joint    ECG 12 Lead  Date/Time: 5/14/2018 4:35 PM  Performed by: STU WILSON.  Authorized by: STU WILSON   Comparison: not compared with previous ECG   Previous ECG: no previous ECG available  Rhythm: sinus rhythm  Rate: normal  Conduction: conduction normal  T Waves: T waves normal  QRS axis: normal  Clinical impression: normal ECG          Assessment/Plan   Barbara was seen today for facial swelling and arm pain.    Diagnoses and all orders for this visit:    Facial swelling - better today. Will have her restart antihistamine daily. If worsens she will call and we cna do a round of prednisone. Not clear the cause - possibly the exposure to grass since she is allergic, though on allergy shots.     Precordial pain  -     XR Chest PA & Lateral  -     ECG 12 Lead    Acute pain of left shoulder  -     XR Shoulder 2+ View Left        Preventative:

## 2018-05-16 ENCOUNTER — HOSPITAL ENCOUNTER (OUTPATIENT)
Dept: GENERAL RADIOLOGY | Facility: HOSPITAL | Age: 68
Discharge: HOME | End: 2018-05-16
Attending: INTERNAL MEDICINE | Admitting: INTERNAL MEDICINE

## 2018-05-16 PROCEDURE — 73030 X-RAY EXAM OF SHOULDER: CPT

## 2018-05-16 PROCEDURE — 71046 X-RAY EXAM CHEST 2 VIEWS: CPT

## 2018-06-04 ENCOUNTER — OFFICE VISIT (OUTPATIENT)
Dept: INTERNAL MEDICINE | Facility: CLINIC | Age: 68
End: 2018-06-04

## 2018-06-04 VITALS
TEMPERATURE: 97.8 F | HEART RATE: 81 BPM | SYSTOLIC BLOOD PRESSURE: 116 MMHG | OXYGEN SATURATION: 96 % | HEIGHT: 65 IN | WEIGHT: 183 LBS | DIASTOLIC BLOOD PRESSURE: 68 MMHG | BODY MASS INDEX: 30.49 KG/M2

## 2018-06-04 DIAGNOSIS — R22.0 FACIAL SWELLING: Primary | ICD-10-CM

## 2018-06-04 DIAGNOSIS — E55.9 VITAMIN D DEFICIENCY: ICD-10-CM

## 2018-06-04 DIAGNOSIS — R53.83 OTHER FATIGUE: ICD-10-CM

## 2018-06-04 DIAGNOSIS — M79.10 MYALGIA: ICD-10-CM

## 2018-06-04 DIAGNOSIS — R21 RASH: ICD-10-CM

## 2018-06-04 LAB
25(OH)D3 SERPL-MCNC: 52.4 NG/ML
ALBUMIN SERPL-MCNC: 3.6 G/DL (ref 3.2–4.8)
ALBUMIN/GLOB SERPL: 1.3 G/DL (ref 1.5–2.5)
ALP SERPL-CCNC: 105 U/L (ref 25–100)
ALT SERPL W P-5'-P-CCNC: 18 U/L (ref 7–40)
ANION GAP SERPL CALCULATED.3IONS-SCNC: 6 MMOL/L (ref 3–11)
AST SERPL-CCNC: 24 U/L (ref 0–33)
BASOPHILS # BLD MANUAL: 0 10*3/MM3 (ref 0–0.2)
BASOPHILS NFR BLD AUTO: 0 % (ref 0–1)
BILIRUB SERPL-MCNC: 0.6 MG/DL (ref 0.3–1.2)
BUN BLD-MCNC: 10 MG/DL (ref 9–23)
BUN/CREAT SERPL: 10 (ref 7–25)
CALCIUM SPEC-SCNC: 8.7 MG/DL (ref 8.7–10.4)
CHLORIDE SERPL-SCNC: 102 MMOL/L (ref 99–109)
CK SERPL-CCNC: 64 U/L (ref 26–174)
CO2 SERPL-SCNC: 30 MMOL/L (ref 20–31)
CREAT BLD-MCNC: 1 MG/DL (ref 0.6–1.3)
CRP SERPL-MCNC: 2.51 MG/DL (ref 0–1)
DEPRECATED RDW RBC AUTO: 44.7 FL (ref 37–54)
EOSINOPHIL # BLD MANUAL: 2.48 10*3/MM3 (ref 0.1–0.3)
EOSINOPHIL NFR BLD MANUAL: 29 % (ref 0–3)
ERYTHROCYTE [DISTWIDTH] IN BLOOD BY AUTOMATED COUNT: 13.8 % (ref 11.3–14.5)
GFR SERPL CREATININE-BSD FRML MDRD: 55 ML/MIN/1.73
GLOBULIN UR ELPH-MCNC: 2.7 GM/DL
GLUCOSE BLD-MCNC: 86 MG/DL (ref 70–100)
HCT VFR BLD AUTO: 42.4 % (ref 34.5–44)
HGB BLD-MCNC: 13.6 G/DL (ref 11.5–15.5)
LYMPHOCYTES # BLD MANUAL: 1.37 10*3/MM3 (ref 0.6–4.8)
LYMPHOCYTES NFR BLD MANUAL: 16 % (ref 24–44)
LYMPHOCYTES NFR BLD MANUAL: 6 % (ref 0–12)
MCH RBC QN AUTO: 28.9 PG (ref 27–31)
MCHC RBC AUTO-ENTMCNC: 32.1 G/DL (ref 32–36)
MCV RBC AUTO: 90.2 FL (ref 80–99)
MONOCYTES # BLD AUTO: 0.51 10*3/MM3 (ref 0–1)
NEUTROPHILS # BLD AUTO: 4.1 10*3/MM3 (ref 1.5–8.3)
NEUTROPHILS NFR BLD MANUAL: 47 % (ref 41–71)
NEUTS BAND NFR BLD MANUAL: 1 % (ref 0–5)
PLAT MORPH BLD: NORMAL
PLATELET # BLD AUTO: 367 10*3/MM3 (ref 150–450)
PMV BLD AUTO: 9.9 FL (ref 6–12)
POTASSIUM BLD-SCNC: 4.3 MMOL/L (ref 3.5–5.5)
PROT SERPL-MCNC: 6.3 G/DL (ref 5.7–8.2)
RBC # BLD AUTO: 4.7 10*6/MM3 (ref 3.89–5.14)
RBC MORPH BLD: NORMAL
SODIUM BLD-SCNC: 138 MMOL/L (ref 132–146)
TSH SERPL DL<=0.05 MIU/L-ACNC: 1.34 MIU/ML (ref 0.35–5.35)
VARIANT LYMPHS NFR BLD MANUAL: 1 % (ref 0–5)
VIT B12 BLD-MCNC: 553 PG/ML (ref 211–911)
WBC MORPH BLD: NORMAL
WBC NRBC COR # BLD: 8.54 10*3/MM3 (ref 3.5–10.8)

## 2018-06-04 PROCEDURE — 85025 COMPLETE CBC W/AUTO DIFF WBC: CPT | Performed by: INTERNAL MEDICINE

## 2018-06-04 PROCEDURE — 80053 COMPREHEN METABOLIC PANEL: CPT | Performed by: INTERNAL MEDICINE

## 2018-06-04 PROCEDURE — 86140 C-REACTIVE PROTEIN: CPT | Performed by: INTERNAL MEDICINE

## 2018-06-04 PROCEDURE — 82306 VITAMIN D 25 HYDROXY: CPT | Performed by: INTERNAL MEDICINE

## 2018-06-04 PROCEDURE — 99214 OFFICE O/P EST MOD 30 MIN: CPT | Performed by: INTERNAL MEDICINE

## 2018-06-04 PROCEDURE — 36415 COLL VENOUS BLD VENIPUNCTURE: CPT | Performed by: INTERNAL MEDICINE

## 2018-06-04 PROCEDURE — 86038 ANTINUCLEAR ANTIBODIES: CPT | Performed by: INTERNAL MEDICINE

## 2018-06-04 PROCEDURE — 85007 BL SMEAR W/DIFF WBC COUNT: CPT | Performed by: INTERNAL MEDICINE

## 2018-06-04 PROCEDURE — 86160 COMPLEMENT ANTIGEN: CPT | Performed by: INTERNAL MEDICINE

## 2018-06-04 PROCEDURE — 84443 ASSAY THYROID STIM HORMONE: CPT | Performed by: INTERNAL MEDICINE

## 2018-06-04 PROCEDURE — 86162 COMPLEMENT TOTAL (CH50): CPT | Performed by: INTERNAL MEDICINE

## 2018-06-04 PROCEDURE — 82550 ASSAY OF CK (CPK): CPT | Performed by: INTERNAL MEDICINE

## 2018-06-04 PROCEDURE — 82607 VITAMIN B-12: CPT | Performed by: INTERNAL MEDICINE

## 2018-06-04 RX ORDER — DESLORATADINE 5 MG/1
5 TABLET ORAL DAILY
Qty: 30 TABLET | Refills: 5 | Status: SHIPPED | OUTPATIENT
Start: 2018-06-04 | End: 2018-10-08 | Stop reason: SDUPTHER

## 2018-06-04 NOTE — PROGRESS NOTES
History of Present Illness   Here for f/u on:     Episodes of facial swelling as well as swelling in both arms intermittently. Will get erythema over area that swells, and will be tender as well. l foot was swollen a few weeks ago, but that is better. A few days ago upper lip was swollen, but that has resolved. Has had a rash in B axilla over last 2-3 days. She is using otc antihistamine, maybe cetirizine, but not daily. She is not on an ACEI. She does take ibuprofen about 2x/week  She has used benadryl gel on rash.  No throat swelling w/ these episodes, but does feel like she is more SOB. Also very fatigued  She has h/o allergies and asthma, and is on allergy shots; she is on advair and allergist had raised dose last visit 6months ago to 500 bid, but can't tell much of a difference so far. Has albuterol, but does not use much.   She sees allergist tomorrow for her scheduled appt - Dr Jaramillo's PA or ARNP.     Vit D def - She took last dose of high dose D last week    The following portions of the patient's history were reviewed and updated as appropriate: allergies, current medications, past family history, past medical history, past social history, past surgical history and problem list.    Review of Systems   Constitutional:+ fatigue; +subj fever.   Respiratory: + for shortness of breath.    Cardiovascular: Negative for chest pain.   Gastrointestinal:neg abdominal pain.+nausea; no vomitting; no diarrhea - uses miralax   Psych: she is on effexor and doing ok; has upcoming trial in 8/18 for her 's murder so is a lot of stress  Skin: rash over last 3-4 days on B axilla; R arm is red      Objective    Vitals:    06/04/18 1053   BP: 116/68   Pulse: 81   Temp: 97.8 °F (36.6 °C)   SpO2: 96%     Physical Exam   Constitutional: oriented to person, place, and time. well-developed and well-nourished. No distress.   HENT:   Head: Normocephalic and atraumatic. OP: no edema in uvula; op clear. Lips not swollen. Left  side of face slightly edematous; no erythema or rash on face  Eyes: Conjunctivae and EOM are normal.   Neck: no LAD  Cardiovascular: Normal rate, regular rhythm and normal heart sounds.  Exam reveals no gallop and no friction rub.    No murmur heard.  Pulmonary/Chest: Effort normal and breath sounds normal. No respiratory distress.  no wheezes.   Abd: soft, mild tenderness in RUQ  Neurological:  alert and oriented to person, place, and time.   Skin: Skin is warm and dry. not diaphoretic. R arm mild erythema upper and lower arm and slightly edematous. B axilla w/ blotchy erythema  Psychiatric:tearful at times as she feels bad.  Behavior and judgement normal    Assessment/Plan   Barbara was seen today for edema, nausea, rash, fatigue and vitamin d deficiency.    Diagnoses and all orders for this visit:    Facial swelling  -intermittent - ? Angioedema. D/c the ibuprofen in case a trigger. Will check labs. She sees allergist tomorrow and will also discuss w/ them. We will send labs to their office when available  -     desloratadine (CLARINEX) 5 MG tablet; Take 1 tablet by mouth Daily.  -     C4 Complement  -     Complement, Total    Other fatigue - check labs; consider a stress test. cxr and ekg ok last visit  -     Comprehensive Metabolic Panel  -     CBC & Differential  -     Nuclear Antigen Antibody, IFA  -     C-reactive Protein  -     Vitamin B12  -     TSH  -     CBC Auto Differential  -     Scan Slide; Future  -     Cancel: Scan Slide  -     Manual Differential; Future  -     Manual Differential    Rash  -     Comprehensive Metabolic Panel  -     CBC & Differential  -     Nuclear Antigen Antibody, IFA  -     C-reactive Protein  -     C4 Complement  -     TSH  -     Complement, Total  -     CBC Auto Differential  -     Scan Slide; Future  -     Cancel: Scan Slide  -     Manual Differential; Future  -     Manual Differential    Myalgia  -     Nuclear Antigen Antibody, IFA  -     C-reactive Protein  -     TSH  -      CK    Vitamin D deficiency  -     Vitamin D 25 Hydroxy

## 2018-06-05 ENCOUNTER — TELEPHONE (OUTPATIENT)
Dept: INTERNAL MEDICINE | Facility: CLINIC | Age: 68
End: 2018-06-05

## 2018-06-05 LAB
C4 SERPL-MCNC: 21 MG/DL (ref 14–44)
CH50 SERPL-ACNC: 38 U/ML

## 2018-06-05 NOTE — TELEPHONE ENCOUNTER
----- Message from Ania Martel MD sent at 6/4/2018  9:54 PM EDT -----  Can you fax these labs that are back to Dr Jaramillo's office? She has appt there Tues 6/5

## 2018-06-06 LAB
ANA HOMOGEN TITR SER: NORMAL {TITER}
ANA SER QL IA: POSITIVE
Lab: NORMAL

## 2018-06-11 ENCOUNTER — TELEPHONE (OUTPATIENT)
Dept: INTERNAL MEDICINE | Facility: CLINIC | Age: 68
End: 2018-06-11

## 2018-06-28 DIAGNOSIS — R60.9 SWELLING: ICD-10-CM

## 2018-06-28 DIAGNOSIS — R76.8 ANA POSITIVE: ICD-10-CM

## 2018-06-28 DIAGNOSIS — R21 RASH: Primary | ICD-10-CM

## 2018-07-26 DIAGNOSIS — F32.A DEPRESSION, UNSPECIFIED DEPRESSION TYPE: ICD-10-CM

## 2018-07-26 RX ORDER — VENLAFAXINE HYDROCHLORIDE 150 MG/1
150 CAPSULE, EXTENDED RELEASE ORAL DAILY
Qty: 90 CAPSULE | Refills: 0 | Status: SHIPPED | OUTPATIENT
Start: 2018-07-26 | End: 2018-08-27 | Stop reason: SDUPTHER

## 2018-08-25 PROBLEM — M51.36 DDD (DEGENERATIVE DISC DISEASE), LUMBAR: Status: ACTIVE | Noted: 2018-08-25

## 2018-08-25 PROBLEM — Z11.59 NEED FOR HEPATITIS C SCREENING TEST: Status: RESOLVED | Noted: 2017-02-17 | Resolved: 2018-08-25

## 2018-08-25 PROBLEM — M51.369 DDD (DEGENERATIVE DISC DISEASE), LUMBAR: Status: ACTIVE | Noted: 2018-08-25

## 2018-08-26 NOTE — PROGRESS NOTES
"Subjective   Barbara Coelho is a 68 y.o. female.     History of Present Illness     Episodes of swelling in face, arms - her allergist is having more testing done and has found more food allergies - yeast, milk, and is having more testing done as well. She has been doing better and no recent episodes  She was also placed on steroids for about 2 weeks (has finished), as well as ranitidine bid which she is still on and these have seemed to help too    LBP - h/o back surgery (? Diskectomy) in '95 and uses hydrocodone prn,  Not needed it in last several months so does not need refill today    Depression - she is doing ok w/ the effexor. She does have the re-trial for her 's murder coming up, so she knows this will be very difficult. However, she thinks after it has finished, she may want to get off the effexor    The following portions of the patient's history were reviewed and updated as appropriate: allergies, current medications, past family history, past medical history, past social history, past surgical history and problem list.    Review of Systems   Constitutional: Positive for unexpected weight loss (less fluid-weight). Negative for activity change, appetite change and unexpected weight gain.   Respiratory: Negative for chest tightness and shortness of breath.    Cardiovascular: Negative for leg swelling.   Gastrointestinal: Negative for abdominal pain (better w/ the zantac).   Skin: Negative for rash.   Allergic/Immunologic: Positive for food allergies.   Psychiatric/Behavioral: Positive for stress. Negative for depressed mood (controlled w/ effexor).       Objective    Vitals:    08/27/18 1017   BP: 128/72   BP Location: Right arm   Pulse: 76   Temp: 98.3 °F (36.8 °C)   TempSrc: Temporal Artery    SpO2: 98%   Weight: 79.6 kg (175 lb 6.4 oz)   Height: 165.1 cm (65\")     Physical Exam   Constitutional: She is oriented to person, place, and time. She appears well-developed and well-nourished. No " distress.   HENT:   Head: Normocephalic and atraumatic.   Eyes: Pupils are equal, round, and reactive to light. EOM are normal.   Neck: Normal range of motion. Neck supple.   Cardiovascular: Normal rate and regular rhythm.    Pulmonary/Chest: Effort normal and breath sounds normal.   Musculoskeletal: She exhibits no edema.   Neurological: She is alert and oriented to person, place, and time. No cranial nerve deficit.   Skin: Skin is warm and dry. No rash noted. She is not diaphoretic.   Psychiatric: She has a normal mood and affect. Her behavior is normal. Judgment and thought content normal.         Assessment/Plan   Barbara was seen today for depression.    Diagnoses and all orders for this visit:    Chronic allergic rhinitis due to food - doing better w/ no recent episodes of edema. Seeing allergist regualrly    DDD (degenerative disc disease), lumbar - does not need refill on norco today    Depression, unspecified depression type - will continue effexor. We did discuss a gradual wean down the road - she will let me know when she is ready to try  -     venlafaxine XR (EFFEXOR-XR) 150 MG 24 hr capsule; Take 1 capsule by mouth Daily.      Will see rheumatologist in October at [facility] [place]    Prev - flu shot when available. I looked through old records and she had prevnar at Dr Romero in '15, and that note indicated that she had pneumovax prior

## 2018-08-27 ENCOUNTER — OFFICE VISIT (OUTPATIENT)
Dept: INTERNAL MEDICINE | Facility: CLINIC | Age: 68
End: 2018-08-27

## 2018-08-27 VITALS
SYSTOLIC BLOOD PRESSURE: 128 MMHG | HEART RATE: 76 BPM | DIASTOLIC BLOOD PRESSURE: 72 MMHG | TEMPERATURE: 98.3 F | OXYGEN SATURATION: 98 % | HEIGHT: 65 IN | BODY MASS INDEX: 29.22 KG/M2 | WEIGHT: 175.4 LBS

## 2018-08-27 DIAGNOSIS — M51.36 DDD (DEGENERATIVE DISC DISEASE), LUMBAR: ICD-10-CM

## 2018-08-27 DIAGNOSIS — J30.5 CHRONIC ALLERGIC RHINITIS DUE TO FOOD: Primary | ICD-10-CM

## 2018-08-27 DIAGNOSIS — F32.A DEPRESSION, UNSPECIFIED DEPRESSION TYPE: ICD-10-CM

## 2018-08-27 PROCEDURE — 99213 OFFICE O/P EST LOW 20 MIN: CPT | Performed by: INTERNAL MEDICINE

## 2018-08-27 RX ORDER — VENLAFAXINE HYDROCHLORIDE 150 MG/1
150 CAPSULE, EXTENDED RELEASE ORAL DAILY
Qty: 90 CAPSULE | Refills: 3 | Status: SHIPPED | OUTPATIENT
Start: 2018-08-27 | End: 2019-10-26 | Stop reason: SDUPTHER

## 2018-10-08 RX ORDER — DESLORATADINE 5 MG/1
5 TABLET ORAL DAILY
Qty: 90 TABLET | Refills: 3 | Status: SHIPPED | OUTPATIENT
Start: 2018-10-08 | End: 2019-07-01

## 2018-10-19 ENCOUNTER — FLU SHOT (OUTPATIENT)
Dept: INTERNAL MEDICINE | Facility: CLINIC | Age: 68
End: 2018-10-19

## 2018-10-19 PROCEDURE — G0008 ADMIN INFLUENZA VIRUS VAC: HCPCS | Performed by: INTERNAL MEDICINE

## 2018-10-19 PROCEDURE — 90662 IIV NO PRSV INCREASED AG IM: CPT | Performed by: INTERNAL MEDICINE

## 2018-12-03 ENCOUNTER — TELEPHONE (OUTPATIENT)
Dept: INTERNAL MEDICINE | Facility: CLINIC | Age: 68
End: 2018-12-03

## 2018-12-03 NOTE — TELEPHONE ENCOUNTER
REFILL: PIETER, MS. AGUIRRE SAYS THAT SHE IS OUT OF TOWN, AND HAS RUN OUT OF HER ADVAIR, SHE IS NEEDING SENT TO [facility] SHE IS GOING TO BE IN COURT TODAY. LEAVE A MESSAGE ON VM IF YOU ARE UNABLE TO SPEAK TO HER.

## 2019-01-24 ENCOUNTER — TRANSCRIBE ORDERS (OUTPATIENT)
Dept: PULMONOLOGY | Facility: HOSPITAL | Age: 69
End: 2019-01-24

## 2019-01-24 DIAGNOSIS — G47.33 OBSTRUCTIVE SLEEP APNEA (ADULT) (PEDIATRIC): Primary | ICD-10-CM

## 2019-02-06 ENCOUNTER — HOSPITAL ENCOUNTER (OUTPATIENT)
Dept: SLEEP MEDICINE | Facility: HOSPITAL | Age: 69
End: 2019-02-06

## 2019-02-06 DIAGNOSIS — G47.33 OBSTRUCTIVE SLEEP APNEA (ADULT) (PEDIATRIC): ICD-10-CM

## 2019-02-06 PROCEDURE — 95806 SLEEP STUDY UNATT&RESP EFFT: CPT

## 2019-02-08 VITALS
WEIGHT: 170 LBS | DIASTOLIC BLOOD PRESSURE: 72 MMHG | RESPIRATION RATE: 16 BRPM | SYSTOLIC BLOOD PRESSURE: 132 MMHG | HEART RATE: 72 BPM | OXYGEN SATURATION: 93 % | HEIGHT: 65 IN | BODY MASS INDEX: 28.32 KG/M2

## 2019-02-26 PROBLEM — F32.A DEPRESSION (EMOTION): Status: RESOLVED | Noted: 2017-01-06 | Resolved: 2019-02-26

## 2019-02-27 ENCOUNTER — OFFICE VISIT (OUTPATIENT)
Dept: INTERNAL MEDICINE | Facility: CLINIC | Age: 69
End: 2019-02-27

## 2019-02-27 VITALS
DIASTOLIC BLOOD PRESSURE: 64 MMHG | HEART RATE: 109 BPM | OXYGEN SATURATION: 98 % | HEIGHT: 64 IN | SYSTOLIC BLOOD PRESSURE: 134 MMHG | BODY MASS INDEX: 29.88 KG/M2 | WEIGHT: 175 LBS | TEMPERATURE: 98.9 F

## 2019-02-27 DIAGNOSIS — M35.1 MIXED CONNECTIVE TISSUE DISEASE (HCC): ICD-10-CM

## 2019-02-27 DIAGNOSIS — J45.20 MILD INTERMITTENT ASTHMA WITHOUT COMPLICATION: ICD-10-CM

## 2019-02-27 DIAGNOSIS — Z00.00 ENCOUNTER FOR MEDICARE ANNUAL WELLNESS EXAM: Primary | ICD-10-CM

## 2019-02-27 DIAGNOSIS — Z79.899 HIGH RISK MEDICATIONS (NOT ANTICOAGULANTS) LONG-TERM USE: ICD-10-CM

## 2019-02-27 DIAGNOSIS — Z00.00 ROUTINE GENERAL MEDICAL EXAMINATION AT A HEALTH CARE FACILITY: ICD-10-CM

## 2019-02-27 DIAGNOSIS — Z13.220 SCREENING CHOLESTEROL LEVEL: ICD-10-CM

## 2019-02-27 DIAGNOSIS — F32.1 CURRENT MODERATE EPISODE OF MAJOR DEPRESSIVE DISORDER, UNSPECIFIED WHETHER RECURRENT (HCC): ICD-10-CM

## 2019-02-27 DIAGNOSIS — J30.5 CHRONIC ALLERGIC RHINITIS DUE TO FOOD: ICD-10-CM

## 2019-02-27 DIAGNOSIS — L50.9 HIVES: ICD-10-CM

## 2019-02-27 LAB
ALBUMIN SERPL-MCNC: 3.46 G/DL (ref 3.2–4.8)
ALBUMIN/GLOB SERPL: 1.3 G/DL (ref 1.5–2.5)
ALP SERPL-CCNC: 84 U/L (ref 25–100)
ALT SERPL W P-5'-P-CCNC: 13 U/L (ref 7–40)
ANION GAP SERPL CALCULATED.3IONS-SCNC: 8 MMOL/L (ref 3–11)
ARTICHOKE IGE QN: 98 MG/DL (ref 0–130)
AST SERPL-CCNC: 17 U/L (ref 0–33)
BASOPHILS # BLD AUTO: 0.01 10*3/MM3 (ref 0–0.2)
BASOPHILS NFR BLD AUTO: 0.1 % (ref 0–1)
BILIRUB BLD-MCNC: NEGATIVE MG/DL
BILIRUB SERPL-MCNC: 0.7 MG/DL (ref 0.3–1.2)
BUN BLD-MCNC: 9 MG/DL (ref 9–23)
BUN/CREAT SERPL: 10.8 (ref 7–25)
CALCIUM SPEC-SCNC: 8.4 MG/DL (ref 8.7–10.4)
CHLORIDE SERPL-SCNC: 100 MMOL/L (ref 99–109)
CHOLEST SERPL-MCNC: 157 MG/DL (ref 0–200)
CLARITY, POC: CLEAR
CO2 SERPL-SCNC: 26 MMOL/L (ref 20–31)
COLOR UR: NORMAL
CREAT BLD-MCNC: 0.83 MG/DL (ref 0.6–1.3)
DEPRECATED RDW RBC AUTO: 47.5 FL (ref 37–54)
EOSINOPHIL # BLD AUTO: 1.24 10*3/MM3 (ref 0–0.3)
EOSINOPHIL NFR BLD AUTO: 18.4 % (ref 0–3)
ERYTHROCYTE [DISTWIDTH] IN BLOOD BY AUTOMATED COUNT: 14.5 % (ref 11.3–14.5)
GFR SERPL CREATININE-BSD FRML MDRD: 68 ML/MIN/1.73
GLOBULIN UR ELPH-MCNC: 2.7 GM/DL
GLUCOSE BLD-MCNC: 104 MG/DL (ref 70–100)
GLUCOSE UR STRIP-MCNC: NEGATIVE MG/DL
HCT VFR BLD AUTO: 38.8 % (ref 34.5–44)
HDLC SERPL-MCNC: 43 MG/DL (ref 40–60)
HGB BLD-MCNC: 12.4 G/DL (ref 11.5–15.5)
IMM GRANULOCYTES # BLD AUTO: 0.05 10*3/MM3 (ref 0–0.05)
IMM GRANULOCYTES NFR BLD AUTO: 0.7 % (ref 0–0.6)
KETONES UR QL: NEGATIVE
LEUKOCYTE EST, POC: NEGATIVE
LYMPHOCYTES # BLD AUTO: 1.7 10*3/MM3 (ref 0.6–4.8)
LYMPHOCYTES NFR BLD AUTO: 25.2 % (ref 24–44)
MCH RBC QN AUTO: 29 PG (ref 27–31)
MCHC RBC AUTO-ENTMCNC: 32 G/DL (ref 32–36)
MCV RBC AUTO: 90.7 FL (ref 80–99)
MONOCYTES # BLD AUTO: 0.14 10*3/MM3 (ref 0–1)
MONOCYTES NFR BLD AUTO: 2.1 % (ref 0–12)
NEUTROPHILS # BLD AUTO: 3.61 10*3/MM3 (ref 1.5–8.3)
NEUTROPHILS NFR BLD AUTO: 53.5 % (ref 41–71)
NITRITE UR-MCNC: NEGATIVE MG/ML
PH UR: 6 [PH] (ref 5–8)
PLATELET # BLD AUTO: 426 10*3/MM3 (ref 150–450)
PMV BLD AUTO: 8.9 FL (ref 6–12)
POTASSIUM BLD-SCNC: 4.2 MMOL/L (ref 3.5–5.5)
PROT SERPL-MCNC: 6.2 G/DL (ref 5.7–8.2)
PROT UR STRIP-MCNC: NEGATIVE MG/DL
RBC # BLD AUTO: 4.28 10*6/MM3 (ref 3.89–5.14)
RBC # UR STRIP: NEGATIVE /UL
SODIUM BLD-SCNC: 134 MMOL/L (ref 132–146)
SP GR UR: 1.01 (ref 1–1.03)
TRIGL SERPL-MCNC: 91 MG/DL (ref 0–150)
TSH SERPL DL<=0.05 MIU/L-ACNC: 1.31 MIU/ML (ref 0.35–5.35)
UROBILINOGEN UR QL: NORMAL
WBC NRBC COR # BLD: 6.75 10*3/MM3 (ref 3.5–10.8)

## 2019-02-27 PROCEDURE — 85025 COMPLETE CBC W/AUTO DIFF WBC: CPT | Performed by: INTERNAL MEDICINE

## 2019-02-27 PROCEDURE — 81003 URINALYSIS AUTO W/O SCOPE: CPT | Performed by: INTERNAL MEDICINE

## 2019-02-27 PROCEDURE — G0439 PPPS, SUBSEQ VISIT: HCPCS | Performed by: INTERNAL MEDICINE

## 2019-02-27 PROCEDURE — 84443 ASSAY THYROID STIM HORMONE: CPT | Performed by: INTERNAL MEDICINE

## 2019-02-27 PROCEDURE — 80053 COMPREHEN METABOLIC PANEL: CPT | Performed by: INTERNAL MEDICINE

## 2019-02-27 PROCEDURE — 80061 LIPID PANEL: CPT | Performed by: INTERNAL MEDICINE

## 2019-02-27 PROCEDURE — 99397 PER PM REEVAL EST PAT 65+ YR: CPT | Performed by: INTERNAL MEDICINE

## 2019-02-27 RX ORDER — LEVOCETIRIZINE DIHYDROCHLORIDE 5 MG/1
5 TABLET, FILM COATED ORAL EVERY EVENING
Start: 2019-02-27 | End: 2019-07-01

## 2019-02-27 NOTE — PROGRESS NOTES
History of Present Illness     Here for medicare wellness exam    Depression screen - PHQ-9 -8  Falls: tripped once but overall balance good  Memory: good overall  Living will: pt has  Specialists:  Ortho - Dr Douglas. GI - Dr Arcos. Surgeon - Dr Morelos. Allergist - Hortensia Gonzales. Eye - Gary. Rheum - Blaze  Exercise: is thinking about joining gym. does have elliptical but hasn't done as much  Diet: likes sweets; not a lot of fruits veggies; centrum daily    She has been diagnosed with mixed connective disease - she is seeing Dr Pinky Thakur (sees her again this spring). Not on anything right now, but they are thinking about plaquenil. Has the rash on her back and arms and legs again over the last several weeks, and is very tender over the areas. She was on prednisone, which does help. She did see allergist 2 weeks ago, and was given prednisone rx, but has not started yet    Osteopenia - we did DEXA lastyear and her hip FRAX was 3%. She was on medication several years ago - tolerated but just got tired of taking    The following portions of the patient's history were reviewed and updated as appropriate: allergies, current medications, past family history, past medical history, past social history, past surgical history and problem list.    Review of Systems   Constitutional: Positive for fatigue. Negative for activity change, appetite change, fever, unexpected weight gain and unexpected weight loss.   HENT: Positive for trouble swallowing (in past - had EGD).    Eyes: Negative.    Respiratory: Negative for shortness of breath (does use advair bid) and wheezing.    Cardiovascular: Negative for chest pain, palpitations and leg swelling.   Gastrointestinal: Negative.    Endocrine: Negative.    Genitourinary: Negative for difficulty urinating and dysuria.   Musculoskeletal: Positive for arthralgias and myalgias.   Skin: Negative.    Allergic/Immunologic: Negative for immunocompromised state.   Neurological: Negative for  seizures, speech difficulty, memory problem and confusion.   Hematological: Does not bruise/bleed easily.   Psychiatric/Behavioral: Negative for agitation.         Objective    Vitals:    02/27/19 1354   BP: 134/64   Pulse: 109   Temp: 98.9 °F (37.2 °C)   SpO2: 98%     Physical Exam   Physical Exam   Constitutional: She is oriented to person, place, and time. She appears well-developed and well-nourished. No distress.   HENT:   Head: Normocephalic and atraumatic.   Right Ear: External ear normal.   Left Ear: External ear normal.   Nose: Nose normal.   Mouth/Throat: Oropharynx is clear and moist. No oropharyngeal exudate.   Eyes: Conjunctivae and EOM are normal. Pupils are equal, round, and reactive to light. Right eye exhibits no discharge. Left eye exhibits no discharge. No scleral icterus.   Neck: Normal range of motion. Neck supple. No thyromegaly present.   Cardiovascular: Normal rate, regular rhythm, normal heart sounds and intact distal pulses. Exam reveals no gallop and no friction rub.   No murmur heard.  Pulmonary/Chest: Effort normal and breath sounds normal. No respiratory distress. She has no wheezes. She has no rales. Right breast exhibits no mass, no nipple discharge, no skin change and no tenderness. Left breast exhibits no mass, no nipple discharge, no skin change and no tenderness.   Abdominal: Soft. Bowel sounds are normal. She exhibits no distension and no mass. There is no tenderness. There is no rebound and no guarding.   Musculoskeletal: Normal range of motion. She exhibits no edema or deformity.   Lymphadenopathy:     She has no cervical adenopathy.   Neurological: She is alert and oriented to person, place, and time. She displays normal reflexes. Coordination normal.   Skin: Skin is warm and dry. No rash noted. She is not diaphoretic. No erythema. No pallor.   Psychiatric: She has a normal mood and affect. Her behavior is normal. Judgment and thought content normal.   Nursing note and vitals  reviewed.         Assessment/Plan   Barbara was seen today for medicare wellness-subsequent and annual exam.    Diagnoses and all orders for this visit:    Encounter for Medicare annual wellness exam/Routine general medical examination at a health care facility  -     POC Urinalysis Dipstick, Automated  Regular exercise/healthy diet. BSE q month. Sunscreen use encouraged. caclium intake reviewed.  Living will -has  Mariano due 4/19  Colon due in '25  Pneumovax - had  Flu - had  Hep A - she will check at pharmacy for this  prevnar - had  DEXA due 2/20 - we discussed medication for osteopenia but will hold for now since she may be starting medication for the mixed connective tissue disease  Shingles - shingrix discussed -  can check at pharmacy since we do not have   Does not need paps since age 65 or older and has had normal paps in past    Depression-she eventually would like to cut back on the Effexor dose.  She will let me know when she is due for a refill, and we can send in the 75 mg    Screening cholesterol level  -     Lipid Panel        Skin change   -     TSH    Mixed connective tissue disease (CMS/HCC)  Comments:  She has become more symptomatic this month.  I asked her to talk with Dr. Thakur and see if she needs to be seen sooner and started on medication

## 2019-02-28 ENCOUNTER — TELEPHONE (OUTPATIENT)
Dept: INTERNAL MEDICINE | Facility: CLINIC | Age: 69
End: 2019-02-28

## 2019-02-28 NOTE — TELEPHONE ENCOUNTER
----- Message from Ania Martel MD sent at 2/27/2019  2:43 PM EST -----  Regarding: Dr Pinky Thakur  Can we get her office note?

## 2019-03-04 ENCOUNTER — TRANSCRIBE ORDERS (OUTPATIENT)
Dept: PULMONOLOGY | Facility: HOSPITAL | Age: 69
End: 2019-03-04

## 2019-03-07 ENCOUNTER — TRANSCRIBE ORDERS (OUTPATIENT)
Dept: PULMONOLOGY | Facility: HOSPITAL | Age: 69
End: 2019-03-07

## 2019-03-07 DIAGNOSIS — G47.33 OBSTRUCTIVE SLEEP APNEA (ADULT) (PEDIATRIC): Primary | ICD-10-CM

## 2019-03-08 ENCOUNTER — OFFICE VISIT (OUTPATIENT)
Dept: INTERNAL MEDICINE | Facility: CLINIC | Age: 69
End: 2019-03-08

## 2019-03-08 VITALS
BODY MASS INDEX: 31.21 KG/M2 | HEIGHT: 64 IN | WEIGHT: 182.8 LBS | DIASTOLIC BLOOD PRESSURE: 64 MMHG | TEMPERATURE: 98.8 F | SYSTOLIC BLOOD PRESSURE: 124 MMHG

## 2019-03-08 DIAGNOSIS — R21 RASH: ICD-10-CM

## 2019-03-08 DIAGNOSIS — M35.1 MIXED CONNECTIVE TISSUE DISEASE (HCC): Primary | ICD-10-CM

## 2019-03-08 PROCEDURE — 99214 OFFICE O/P EST MOD 30 MIN: CPT | Performed by: INTERNAL MEDICINE

## 2019-03-08 PROCEDURE — 96372 THER/PROPH/DIAG INJ SC/IM: CPT | Performed by: INTERNAL MEDICINE

## 2019-03-08 RX ORDER — METHYLPREDNISOLONE ACETATE 40 MG/ML
40 INJECTION, SUSPENSION INTRA-ARTICULAR; INTRALESIONAL; INTRAMUSCULAR; SOFT TISSUE ONCE
Status: COMPLETED | OUTPATIENT
Start: 2019-03-08 | End: 2019-03-08

## 2019-03-08 RX ORDER — DEXAMETHASONE SODIUM PHOSPHATE 4 MG/ML
4 INJECTION, SOLUTION INTRA-ARTICULAR; INTRALESIONAL; INTRAMUSCULAR; INTRAVENOUS; SOFT TISSUE ONCE
Status: COMPLETED | OUTPATIENT
Start: 2019-03-08 | End: 2019-03-08

## 2019-03-08 RX ADMIN — METHYLPREDNISOLONE ACETATE 40 MG: 40 INJECTION, SUSPENSION INTRA-ARTICULAR; INTRALESIONAL; INTRAMUSCULAR; SOFT TISSUE at 16:55

## 2019-03-08 RX ADMIN — DEXAMETHASONE SODIUM PHOSPHATE 4 MG: 4 INJECTION, SOLUTION INTRA-ARTICULAR; INTRALESIONAL; INTRAMUSCULAR; INTRAVENOUS; SOFT TISSUE at 16:55

## 2019-03-08 NOTE — PROGRESS NOTES
"Subjective   Barbara Coelho is a 69 y.o. female.     History of Present Illness     History of mixed connective tissue disease- Rash has worsened over the last week and is now present on her trunk and her extremities.  It is very itchy and uncomfortable.  She does have a prescription for steroids from her allergist, but she has not taken them yet.  She has used antihistamines and benadryl gel, with minimal relief.  She feels her breathing is about the same.   She called her rheumatologist, Dr. Thakur's, office but they were unable to work her in this week and advised her to come here.  They are going to call her back with an earlier appointment.    The following portions of the patient's history were reviewed and updated as appropriate: allergies, current medications, past family history, past medical history, past social history, past surgical history and problem list.    Review of Systems   Constitutional: Positive for fatigue and unexpected weight gain. Negative for fever and unexpected weight loss.   Respiratory: Positive for shortness of breath.    Gastrointestinal: Abdominal pain: epigastric.   Musculoskeletal: Positive for myalgias.   Skin: Positive for rash.   Allergic/Immunologic: Positive for environmental allergies. Negative for immunocompromised state.   Psychiatric/Behavioral: Positive for agitation and stress.       Objective   /64 (BP Location: Right arm)   Temp 98.8 °F (37.1 °C) (Temporal)   Ht 161.7 cm (63.65\")   Wt 82.9 kg (182 lb 12.8 oz)   BMI 31.72 kg/m²   Physical Exam   Constitutional: She is oriented to person, place, and time. She appears well-developed and well-nourished. No distress.   HENT:   Head: Normocephalic and atraumatic.   Nose: Nose normal.   Eyes: Conjunctivae and EOM are normal. Pupils are equal, round, and reactive to light. Right eye exhibits no discharge. Left eye exhibits no discharge. No scleral icterus.   Neurological: She is alert and oriented to person, place, " and time. No cranial nerve deficit.   Skin: Skin is warm and dry. Rash (Diffuse purplish patchy purplish rash on trunk and extremities.) noted. She is not diaphoretic. No erythema.   Psychiatric: Her behavior is normal. Judgment and thought content normal.   Tearful at times   Nursing note and vitals reviewed.        Assessment/Plan   Barbara was seen today for rash.    Diagnoses and all orders for this visit:    Mixed connective tissue disease (CMS/HCC)/Rash -patient is quite miserable with her symptoms currently.  We will go ahead with depo-dexa steroid injection today, and she will start her oral steroids that her allergist gave her tomorrow.  Dr. Thakur's office is supposed to call her soon for a follow-up as well  -     dexamethasone (DECADRON) injection 4 mg; Inject 1 mL into the appropriate muscle as directed by prescriber 1 (One) Time.  -     methylPREDNISolone acetate (DEPO-medrol) injection 40 mg; Inject 1 mL into the appropriate muscle as directed by prescriber 1 (One) Time.

## 2019-03-12 DIAGNOSIS — Z78.9 NEED FOR FOLLOW-UP BY SOCIAL WORKER: Primary | ICD-10-CM

## 2019-03-28 ENCOUNTER — HOSPITAL ENCOUNTER (OUTPATIENT)
Dept: CARDIOLOGY | Facility: HOSPITAL | Age: 69
Discharge: HOME | End: 2019-03-28
Admitting: OTOLARYNGOLOGY

## 2019-03-28 ENCOUNTER — TRANSCRIBE ORDERS (OUTPATIENT)
Dept: LAB | Facility: HOSPITAL | Age: 69
End: 2019-03-28

## 2019-03-28 ENCOUNTER — TRANSCRIBE ORDERS (OUTPATIENT)
Dept: ADMINISTRATIVE | Facility: HOSPITAL | Age: 69
End: 2019-03-28

## 2019-03-28 ENCOUNTER — LAB (OUTPATIENT)
Dept: LAB | Facility: HOSPITAL | Age: 69
End: 2019-03-28

## 2019-03-28 DIAGNOSIS — Z01.818 PRE-OP TESTING: ICD-10-CM

## 2019-03-28 DIAGNOSIS — Z01.812 PRE-OPERATIVE LABORATORY EXAMINATION: Primary | ICD-10-CM

## 2019-03-28 DIAGNOSIS — Z01.818 PRE-OP TESTING: Primary | ICD-10-CM

## 2019-03-28 DIAGNOSIS — Z01.812 PRE-OPERATIVE LABORATORY EXAMINATION: ICD-10-CM

## 2019-03-28 LAB
ALBUMIN SERPL-MCNC: 3.82 G/DL (ref 3.2–4.8)
ALBUMIN/GLOB SERPL: 1.5 G/DL (ref 1.5–2.5)
ALP SERPL-CCNC: 86 U/L (ref 25–100)
ALT SERPL W P-5'-P-CCNC: 21 U/L (ref 7–40)
ANION GAP SERPL CALCULATED.3IONS-SCNC: 8 MMOL/L (ref 3–11)
AST SERPL-CCNC: 24 U/L (ref 0–33)
BASOPHILS # BLD AUTO: 0.01 10*3/MM3 (ref 0–0.2)
BASOPHILS NFR BLD AUTO: 0.2 % (ref 0–1)
BILIRUB SERPL-MCNC: 0.5 MG/DL (ref 0.3–1.2)
BUN BLD-MCNC: 11 MG/DL (ref 9–23)
BUN/CREAT SERPL: 12.8 (ref 7–25)
CALCIUM SPEC-SCNC: 8.9 MG/DL (ref 8.7–10.4)
CHLORIDE SERPL-SCNC: 104 MMOL/L (ref 99–109)
CO2 SERPL-SCNC: 29 MMOL/L (ref 20–31)
CREAT BLD-MCNC: 0.86 MG/DL (ref 0.6–1.3)
DEPRECATED RDW RBC AUTO: 52.3 FL (ref 37–54)
EOSINOPHIL # BLD AUTO: 0.87 10*3/MM3 (ref 0–0.3)
EOSINOPHIL NFR BLD AUTO: 16.9 % (ref 0–3)
ERYTHROCYTE [DISTWIDTH] IN BLOOD BY AUTOMATED COUNT: 15.2 % (ref 11.3–14.5)
GFR SERPL CREATININE-BSD FRML MDRD: 65 ML/MIN/1.73
GLOBULIN UR ELPH-MCNC: 2.5 GM/DL
GLUCOSE BLD-MCNC: 99 MG/DL (ref 70–100)
HCT VFR BLD AUTO: 39.4 % (ref 34.5–44)
HGB BLD-MCNC: 12.4 G/DL (ref 11.5–15.5)
IMM GRANULOCYTES # BLD AUTO: 0.01 10*3/MM3 (ref 0–0.05)
IMM GRANULOCYTES NFR BLD AUTO: 0.2 % (ref 0–0.6)
LYMPHOCYTES # BLD AUTO: 1.82 10*3/MM3 (ref 0.6–4.8)
LYMPHOCYTES NFR BLD AUTO: 35.3 % (ref 24–44)
MCH RBC QN AUTO: 29.6 PG (ref 27–31)
MCHC RBC AUTO-ENTMCNC: 31.5 G/DL (ref 32–36)
MCV RBC AUTO: 94 FL (ref 80–99)
MONOCYTES # BLD AUTO: 0.33 10*3/MM3 (ref 0–1)
MONOCYTES NFR BLD AUTO: 6.4 % (ref 0–12)
NEUTROPHILS # BLD AUTO: 2.12 10*3/MM3 (ref 1.5–8.3)
NEUTROPHILS NFR BLD AUTO: 41.2 % (ref 41–71)
PLATELET # BLD AUTO: 315 10*3/MM3 (ref 150–450)
PMV BLD AUTO: 9.1 FL (ref 6–12)
POTASSIUM BLD-SCNC: 4.2 MMOL/L (ref 3.5–5.5)
PROT SERPL-MCNC: 6.3 G/DL (ref 5.7–8.2)
RBC # BLD AUTO: 4.19 10*6/MM3 (ref 3.89–5.14)
SODIUM BLD-SCNC: 141 MMOL/L (ref 132–146)
WBC NRBC COR # BLD: 5.15 10*3/MM3 (ref 3.5–10.8)

## 2019-03-28 PROCEDURE — 85025 COMPLETE CBC W/AUTO DIFF WBC: CPT

## 2019-03-28 PROCEDURE — 93010 ELECTROCARDIOGRAM REPORT: CPT | Performed by: INTERNAL MEDICINE

## 2019-03-28 PROCEDURE — 36415 COLL VENOUS BLD VENIPUNCTURE: CPT

## 2019-03-28 PROCEDURE — 93005 ELECTROCARDIOGRAM TRACING: CPT | Performed by: OTOLARYNGOLOGY

## 2019-03-28 PROCEDURE — 80053 COMPREHEN METABOLIC PANEL: CPT

## 2019-04-15 ENCOUNTER — HOSPITAL ENCOUNTER (OUTPATIENT)
Dept: SLEEP MEDICINE | Facility: HOSPITAL | Age: 69
Discharge: HOME | End: 2019-04-15
Admitting: INTERNAL MEDICINE

## 2019-04-15 VITALS
WEIGHT: 164.68 LBS | BODY MASS INDEX: 27.44 KG/M2 | SYSTOLIC BLOOD PRESSURE: 147 MMHG | HEART RATE: 85 BPM | OXYGEN SATURATION: 98 % | HEIGHT: 65 IN | DIASTOLIC BLOOD PRESSURE: 71 MMHG

## 2019-04-15 DIAGNOSIS — G47.33 OBSTRUCTIVE SLEEP APNEA (ADULT) (PEDIATRIC): ICD-10-CM

## 2019-04-15 PROCEDURE — 95810 POLYSOM 6/> YRS 4/> PARAM: CPT

## 2019-07-01 ENCOUNTER — HOSPITAL ENCOUNTER (OUTPATIENT)
Facility: HOSPITAL | Age: 69
Setting detail: OBSERVATION
Discharge: HOME | End: 2019-07-03
Attending: EMERGENCY MEDICINE | Admitting: HOSPITALIST

## 2019-07-01 ENCOUNTER — APPOINTMENT (OUTPATIENT)
Dept: CT IMAGING | Facility: HOSPITAL | Age: 69
End: 2019-07-01

## 2019-07-01 ENCOUNTER — APPOINTMENT (OUTPATIENT)
Dept: GENERAL RADIOLOGY | Facility: HOSPITAL | Age: 69
End: 2019-07-01

## 2019-07-01 DIAGNOSIS — D72.10 EOSINOPHILIA: ICD-10-CM

## 2019-07-01 DIAGNOSIS — J90 PLEURAL EFFUSION: Primary | ICD-10-CM

## 2019-07-01 DIAGNOSIS — R07.9 CHEST PAIN, UNSPECIFIED TYPE: ICD-10-CM

## 2019-07-01 DIAGNOSIS — R06.02 SHORTNESS OF BREATH: ICD-10-CM

## 2019-07-01 LAB
ALBUMIN SERPL-MCNC: 3 G/DL (ref 3.5–5.2)
ALBUMIN/GLOB SERPL: 1 G/DL
ALP SERPL-CCNC: 84 U/L (ref 39–117)
ALT SERPL W P-5'-P-CCNC: 8 U/L (ref 1–33)
ANION GAP SERPL CALCULATED.3IONS-SCNC: 8 MMOL/L (ref 5–15)
AST SERPL-CCNC: 14 U/L (ref 1–32)
BASOPHILS # BLD MANUAL: 0.06 10*3/MM3 (ref 0–0.2)
BASOPHILS NFR BLD AUTO: 1 % (ref 0–1.5)
BILIRUB SERPL-MCNC: 0.3 MG/DL (ref 0.2–1.2)
BUN BLD-MCNC: 10 MG/DL (ref 8–23)
BUN/CREAT SERPL: 12.8 (ref 7–25)
CALCIUM SPEC-SCNC: 8.1 MG/DL (ref 8.6–10.5)
CHLORIDE SERPL-SCNC: 102 MMOL/L (ref 98–107)
CHROMATIN AB SERPL-ACNC: >650 IU/ML (ref 0–14)
CO2 SERPL-SCNC: 27 MMOL/L (ref 22–29)
CREAT BLD-MCNC: 0.78 MG/DL (ref 0.57–1)
CRP SERPL-MCNC: 2.34 MG/DL (ref 0–0.5)
DEPRECATED RDW RBC AUTO: 51.1 FL (ref 37–54)
EOSINOPHIL # BLD MANUAL: 1.33 10*3/MM3 (ref 0–0.4)
EOSINOPHIL NFR BLD MANUAL: 21 % (ref 0.3–6.2)
ERYTHROCYTE [DISTWIDTH] IN BLOOD BY AUTOMATED COUNT: 15 % (ref 12.3–15.4)
ERYTHROCYTE [SEDIMENTATION RATE] IN BLOOD: 32 MM/HR (ref 0–30)
GFR SERPL CREATININE-BSD FRML MDRD: 73 ML/MIN/1.73
GLOBULIN UR ELPH-MCNC: 3 GM/DL
GLUCOSE BLD-MCNC: 112 MG/DL (ref 65–99)
HCT VFR BLD AUTO: 37.2 % (ref 34–46.6)
HGB BLD-MCNC: 11.7 G/DL (ref 12–15.9)
HOLD SPECIMEN: NORMAL
HOLD SPECIMEN: NORMAL
LYMPHOCYTES # BLD MANUAL: 1.14 10*3/MM3 (ref 0.7–3.1)
LYMPHOCYTES NFR BLD MANUAL: 18 % (ref 19.6–45.3)
LYMPHOCYTES NFR BLD MANUAL: 3 % (ref 5–12)
MCH RBC QN AUTO: 29.2 PG (ref 26.6–33)
MCHC RBC AUTO-ENTMCNC: 31.5 G/DL (ref 31.5–35.7)
MCV RBC AUTO: 92.8 FL (ref 79–97)
MONOCYTES # BLD AUTO: 0.19 10*3/MM3 (ref 0.1–0.9)
NEUTROPHILS # BLD AUTO: 3.6 10*3/MM3 (ref 1.7–7)
NEUTROPHILS NFR BLD MANUAL: 57 % (ref 42.7–76)
NT-PROBNP SERPL-MCNC: 437.3 PG/ML (ref 5–900)
PLAT MORPH BLD: NORMAL
PLATELET # BLD AUTO: 323 10*3/MM3 (ref 140–450)
PMV BLD AUTO: 8.4 FL (ref 6–12)
POTASSIUM BLD-SCNC: 4 MMOL/L (ref 3.5–5.2)
PROT SERPL-MCNC: 6 G/DL (ref 6–8.5)
RBC # BLD AUTO: 4.01 10*6/MM3 (ref 3.77–5.28)
RBC MORPH BLD: NORMAL
SODIUM BLD-SCNC: 137 MMOL/L (ref 136–145)
TROPONIN T SERPL-MCNC: <0.01 NG/ML (ref 0–0.03)
WBC MORPH BLD: NORMAL
WBC NRBC COR # BLD: 6.31 10*3/MM3 (ref 3.4–10.8)
WHOLE BLOOD HOLD SPECIMEN: NORMAL
WHOLE BLOOD HOLD SPECIMEN: NORMAL

## 2019-07-01 PROCEDURE — 86235 NUCLEAR ANTIGEN ANTIBODY: CPT | Performed by: HOSPITALIST

## 2019-07-01 PROCEDURE — 86682 HELMINTH ANTIBODY: CPT | Performed by: EMERGENCY MEDICINE

## 2019-07-01 PROCEDURE — 86225 DNA ANTIBODY NATIVE: CPT | Performed by: HOSPITALIST

## 2019-07-01 PROCEDURE — 86003 ALLG SPEC IGE CRUDE XTRC EA: CPT | Performed by: EMERGENCY MEDICINE

## 2019-07-01 PROCEDURE — 80053 COMPREHEN METABOLIC PANEL: CPT | Performed by: EMERGENCY MEDICINE

## 2019-07-01 PROCEDURE — 25010000002 METHYLPREDNISOLONE PER 125 MG: Performed by: EMERGENCY MEDICINE

## 2019-07-01 PROCEDURE — 0 IOPAMIDOL PER 1 ML: Performed by: EMERGENCY MEDICINE

## 2019-07-01 PROCEDURE — 82785 ASSAY OF IGE: CPT | Performed by: EMERGENCY MEDICINE

## 2019-07-01 PROCEDURE — 85652 RBC SED RATE AUTOMATED: CPT | Performed by: EMERGENCY MEDICINE

## 2019-07-01 PROCEDURE — 99284 EMERGENCY DEPT VISIT MOD MDM: CPT

## 2019-07-01 PROCEDURE — 86431 RHEUMATOID FACTOR QUANT: CPT | Performed by: EMERGENCY MEDICINE

## 2019-07-01 PROCEDURE — G0378 HOSPITAL OBSERVATION PER HR: HCPCS

## 2019-07-01 PROCEDURE — 25010000002 HYDROMORPHONE PER 4 MG: Performed by: EMERGENCY MEDICINE

## 2019-07-01 PROCEDURE — 96376 TX/PRO/DX INJ SAME DRUG ADON: CPT

## 2019-07-01 PROCEDURE — 93005 ELECTROCARDIOGRAM TRACING: CPT | Performed by: EMERGENCY MEDICINE

## 2019-07-01 PROCEDURE — 71045 X-RAY EXAM CHEST 1 VIEW: CPT

## 2019-07-01 PROCEDURE — 25010000002 ONDANSETRON PER 1 MG: Performed by: EMERGENCY MEDICINE

## 2019-07-01 PROCEDURE — 85025 COMPLETE CBC W/AUTO DIFF WBC: CPT | Performed by: EMERGENCY MEDICINE

## 2019-07-01 PROCEDURE — 96374 THER/PROPH/DIAG INJ IV PUSH: CPT

## 2019-07-01 PROCEDURE — 25010000002 HYDROMORPHONE PER 4 MG: Performed by: FAMILY MEDICINE

## 2019-07-01 PROCEDURE — 86140 C-REACTIVE PROTEIN: CPT | Performed by: EMERGENCY MEDICINE

## 2019-07-01 PROCEDURE — 96375 TX/PRO/DX INJ NEW DRUG ADDON: CPT

## 2019-07-01 PROCEDURE — 84484 ASSAY OF TROPONIN QUANT: CPT | Performed by: EMERGENCY MEDICINE

## 2019-07-01 PROCEDURE — 71275 CT ANGIOGRAPHY CHEST: CPT

## 2019-07-01 PROCEDURE — 99219 PR INITIAL OBSERVATION CARE/DAY 50 MINUTES: CPT | Performed by: FAMILY MEDICINE

## 2019-07-01 PROCEDURE — 93005 ELECTROCARDIOGRAM TRACING: CPT

## 2019-07-01 PROCEDURE — 83880 ASSAY OF NATRIURETIC PEPTIDE: CPT | Performed by: EMERGENCY MEDICINE

## 2019-07-01 PROCEDURE — 85007 BL SMEAR W/DIFF WBC COUNT: CPT | Performed by: EMERGENCY MEDICINE

## 2019-07-01 RX ORDER — DESLORATADINE 5 MG/1
5 TABLET ORAL NIGHTLY
COMMUNITY
End: 2019-10-28 | Stop reason: SDUPTHER

## 2019-07-01 RX ORDER — ONDANSETRON 2 MG/ML
4 INJECTION INTRAMUSCULAR; INTRAVENOUS ONCE
Status: COMPLETED | OUTPATIENT
Start: 2019-07-01 | End: 2019-07-01

## 2019-07-01 RX ORDER — ONDANSETRON 2 MG/ML
4 INJECTION INTRAMUSCULAR; INTRAVENOUS EVERY 6 HOURS PRN
Status: DISCONTINUED | OUTPATIENT
Start: 2019-07-01 | End: 2019-07-03 | Stop reason: HOSPADM

## 2019-07-01 RX ORDER — METHYLPREDNISOLONE SODIUM SUCCINATE 125 MG/2ML
125 INJECTION, POWDER, LYOPHILIZED, FOR SOLUTION INTRAMUSCULAR; INTRAVENOUS ONCE
Status: COMPLETED | OUTPATIENT
Start: 2019-07-01 | End: 2019-07-01

## 2019-07-01 RX ORDER — BUDESONIDE AND FORMOTEROL FUMARATE DIHYDRATE 160; 4.5 UG/1; UG/1
2 AEROSOL RESPIRATORY (INHALATION)
Status: DISCONTINUED | OUTPATIENT
Start: 2019-07-01 | End: 2019-07-03 | Stop reason: HOSPADM

## 2019-07-01 RX ORDER — CETIRIZINE HYDROCHLORIDE 10 MG/1
10 TABLET ORAL DAILY
Status: DISCONTINUED | OUTPATIENT
Start: 2019-07-02 | End: 2019-07-03 | Stop reason: HOSPADM

## 2019-07-01 RX ORDER — NALOXONE HCL 0.4 MG/ML
0.4 VIAL (ML) INJECTION
Status: DISCONTINUED | OUTPATIENT
Start: 2019-07-01 | End: 2019-07-03 | Stop reason: HOSPADM

## 2019-07-01 RX ORDER — VENLAFAXINE HYDROCHLORIDE 75 MG/1
150 CAPSULE, EXTENDED RELEASE ORAL DAILY
Status: DISCONTINUED | OUTPATIENT
Start: 2019-07-02 | End: 2019-07-03 | Stop reason: HOSPADM

## 2019-07-01 RX ORDER — TEMAZEPAM 15 MG/1
15 CAPSULE ORAL NIGHTLY PRN
Status: DISCONTINUED | OUTPATIENT
Start: 2019-07-01 | End: 2019-07-03 | Stop reason: HOSPADM

## 2019-07-01 RX ORDER — HYDROMORPHONE HYDROCHLORIDE 1 MG/ML
0.5 INJECTION, SOLUTION INTRAMUSCULAR; INTRAVENOUS; SUBCUTANEOUS ONCE
Status: COMPLETED | OUTPATIENT
Start: 2019-07-01 | End: 2019-07-01

## 2019-07-01 RX ORDER — FLUTICASONE PROPIONATE 50 MCG
1 SPRAY, SUSPENSION (ML) NASAL 2 TIMES DAILY
Status: DISCONTINUED | OUTPATIENT
Start: 2019-07-01 | End: 2019-07-03 | Stop reason: HOSPADM

## 2019-07-01 RX ORDER — SODIUM CHLORIDE 0.9 % (FLUSH) 0.9 %
10 SYRINGE (ML) INJECTION AS NEEDED
Status: DISCONTINUED | OUTPATIENT
Start: 2019-07-01 | End: 2019-07-03 | Stop reason: HOSPADM

## 2019-07-01 RX ORDER — ONDANSETRON 4 MG/1
4 TABLET, FILM COATED ORAL EVERY 6 HOURS PRN
Status: DISCONTINUED | OUTPATIENT
Start: 2019-07-01 | End: 2019-07-03 | Stop reason: HOSPADM

## 2019-07-01 RX ORDER — SODIUM CHLORIDE 0.9 % (FLUSH) 0.9 %
3 SYRINGE (ML) INJECTION EVERY 12 HOURS SCHEDULED
Status: DISCONTINUED | OUTPATIENT
Start: 2019-07-01 | End: 2019-07-03 | Stop reason: HOSPADM

## 2019-07-01 RX ORDER — SODIUM CHLORIDE 0.9 % (FLUSH) 0.9 %
3-10 SYRINGE (ML) INJECTION AS NEEDED
Status: DISCONTINUED | OUTPATIENT
Start: 2019-07-01 | End: 2019-07-03 | Stop reason: HOSPADM

## 2019-07-01 RX ORDER — HYDROMORPHONE HYDROCHLORIDE 1 MG/ML
0.5 INJECTION, SOLUTION INTRAMUSCULAR; INTRAVENOUS; SUBCUTANEOUS
Status: DISCONTINUED | OUTPATIENT
Start: 2019-07-01 | End: 2019-07-03 | Stop reason: HOSPADM

## 2019-07-01 RX ORDER — FLUTICASONE PROPIONATE 50 MCG
1 SPRAY, SUSPENSION (ML) NASAL 2 TIMES DAILY
COMMUNITY
End: 2021-01-11

## 2019-07-01 RX ADMIN — IOPAMIDOL 66 ML: 755 INJECTION, SOLUTION INTRAVENOUS at 17:15

## 2019-07-01 RX ADMIN — HYDROMORPHONE HYDROCHLORIDE 0.5 MG: 1 INJECTION, SOLUTION INTRAMUSCULAR; INTRAVENOUS; SUBCUTANEOUS at 16:59

## 2019-07-01 RX ADMIN — ONDANSETRON 4 MG: 2 INJECTION INTRAMUSCULAR; INTRAVENOUS at 16:59

## 2019-07-01 RX ADMIN — HYDROMORPHONE HYDROCHLORIDE 0.5 MG: 1 INJECTION, SOLUTION INTRAMUSCULAR; INTRAVENOUS; SUBCUTANEOUS at 21:46

## 2019-07-01 RX ADMIN — METHYLPREDNISOLONE SODIUM SUCCINATE 125 MG: 125 INJECTION, POWDER, FOR SOLUTION INTRAMUSCULAR; INTRAVENOUS at 18:05

## 2019-07-01 RX ADMIN — TEMAZEPAM 15 MG: 15 CAPSULE ORAL at 23:39

## 2019-07-01 RX ADMIN — SODIUM CHLORIDE, PRESERVATIVE FREE 3 ML: 5 INJECTION INTRAVENOUS at 23:39

## 2019-07-01 NOTE — ED PROVIDER NOTES
Subjective   Barbara Coelho is a 69 y.o.female who presents to the emergency department via EMS with complaints of chest pain. She states that 4 days ago she developed a pain on the posterior aspect of the left knee. The pain then moved to her groin, right armpit and is not on the left lateral chest. The pain radiates to her left shoulder and worsens with deep breathing or movement. She has had associated shortness of breath. She notes that she has some swelling in her ankles and feet. She has had recent long travel. She does not take daily aspirin. There are no other acute complaints at this time.          History provided by:  Patient  Chest Pain   Pain location:  L lateral chest  Pain radiates to:  L shoulder  Pain severity:  Severe  Onset quality:  Sudden  Duration:  4 days  Timing:  Constant  Progression:  Worsening  Chronicity:  New  Relieved by:  None tried  Worsened by:  Deep breathing and movement  Ineffective treatments:  None tried  Associated symptoms: shortness of breath    Associated symptoms: no cough and no fever    Risk factors: prior DVT/PE        Review of Systems   Constitutional: Negative for chills and fever.   HENT: Negative for congestion.    Respiratory: Positive for shortness of breath. Negative for cough.    Cardiovascular: Positive for chest pain and leg swelling.   All other systems reviewed and are negative.      Past Medical History:   Diagnosis Date   • Allergic rhinitis     on allergy shots   • Asthma     ALLERGIC   • Back pain     Low   • Bladder infection     Recurrent Bladder Infections   • Depression    • Diverticulitis    • GERD (gastroesophageal reflux disease)     EGD 12/16 [facility], [facility]. no barretts   • H/O bone density study 2015   • H/O mammogram 04/17/2018    [facility]   • History of DVT of lower extremity     left leg   • Mixed connective tissue disease (CMS/HCC) 01/2019    Dr. Thakur   • Osteopenia 03/28/2018    calculated frax - 11/3%   • Pap smear for cervical  cancer screening 2015   • Postmenopausal    • Urinary incontinence    • Wears contact lenses    • Wears prescription eyeglasses        Allergies   Allergen Reactions   • Celebrex [Celecoxib] Hives     HIVES    • Sulfa Antibiotics Itching     ITCHING        Past Surgical History:   Procedure Laterality Date   • BACK SURGERY  1995    Back (L5/S1)   • BREAST EXCISIONAL BIOPSY Left 1985   • CHOLECYSTECTOMY     • CHOLECYSTECTOMY WITH INTRAOPERATIVE CHOLANGIOGRAM N/A 4/26/2018    Procedure: CHOLECYSTECTOMY LAPAROSCOPIC INTRAOPERATIVE CHOLANGIOGRAM;  Surgeon: Lit Morelos MD;  Location: [facility];  Service: General   • COLONOSCOPY  2016   • GASTRIC BYPASS      HIGH GASTRIC BYPASS, 1980's   • OTHER SURGICAL HISTORY  1985    Stomach Stabled   • REPLACEMENT TOTAL KNEE Left 2012   • SINUS SURGERY      x2   • TUBAL ABDOMINAL LIGATION  1980       Family History   Problem Relation Age of Onset   • Cancer Other         BREAST, LUNG, OVARIAN, KIDNEY   • Kidney cancer Other    • Lung cancer Other         pGF as well   • Cancer Mother         kidney   • Cancer Father         lung   • Mental illness Father    • Breast cancer Sister 30   • Connective Tissue Disease Sister    • Hypertension Sister         pulmonary hypertension   • Ovarian cancer Neg Hx        Social History     Socioeconomic History   • Marital status:      Spouse name: Not on file   • Number of children: Not on file   • Years of education: Not on file   • Highest education level: Not on file   Tobacco Use   • Smoking status: Never Smoker   • Smokeless tobacco: Never Used   Substance and Sexual Activity   • Alcohol use: Yes     Alcohol/week: 2.4 - 3.0 oz     Types: 4 - 5 Standard drinks or equivalent per week     Comment: a week   • Drug use: No   • Sexual activity: Defer         Objective   Physical Exam   Constitutional: She is oriented to person, place, and time. She appears well-developed and well-nourished. No distress.   HENT:   Head: Normocephalic  and atraumatic.   Nose: Nose normal.   Eyes: Conjunctivae are normal. No scleral icterus.   Neck: Normal range of motion. Neck supple. No JVD present.   Cardiovascular: Normal rate, regular rhythm and normal heart sounds.   No bruit appreciated.   Pulmonary/Chest: Effort normal and breath sounds normal. No respiratory distress. She exhibits tenderness.   Abdominal: Soft. There is no tenderness.   Musculoskeletal: Normal range of motion.   No lower extremity edema. No joint swelling or erythema.    Neurological: She is alert and oriented to person, place, and time.   Skin: Skin is warm and dry.   Psychiatric: She has a normal mood and affect. Her behavior is normal.   Nursing note and vitals reviewed.      Procedures         ED Course  ED Course as of Jul 02 0149   Mon Jul 01, 2019   1649 EKG is normal, chest x-ray is nonspecific.  Will proceed with pain medicine and CT angiogram of her chest.  We are awaiting laboratory exam  [DT]   1757 Discussed with Dr. Gonzalez, who ask to discuss with the pulmonologist. Paged the Dr. Chauhan at this time.  [TK]   1811 Discussed with Dr. Chauhan, Pulmonology, who recommends laboratory studies, admission, steroid treatment and pulmonary follow up in the morning. He reviewed the CT scan and he recommends tapping the effusion.  [TK]   1815 I called lab regarding the RANDI with reflex as it does not appear on my screen.  The  is going to try to enter the order as it is not available to me.  [DT]   1819 Discussed with Dr. Gonzalez who agrees with plan for care and accepts the patient for admission.  [TK]   1820 Dr. Najera spoke with Mrs. Coelho and her daughter who agree with treatment plan.  [TK]      ED Course User Index  [DT] Henok Najera MD  [TK] Jim Li     Recent Results (from the past 24 hour(s))   Comprehensive Metabolic Panel    Collection Time: 07/01/19  4:26 PM   Result Value Ref Range    Glucose 112 (H) 65 - 99 mg/dL    BUN 10 8 - 23 mg/dL    Creatinine  0.78 0.57 - 1.00 mg/dL    Sodium 137 136 - 145 mmol/L    Potassium 4.0 3.5 - 5.2 mmol/L    Chloride 102 98 - 107 mmol/L    CO2 27.0 22.0 - 29.0 mmol/L    Calcium 8.1 (L) 8.6 - 10.5 mg/dL    Total Protein 6.0 6.0 - 8.5 g/dL    Albumin 3.00 (L) 3.50 - 5.20 g/dL    ALT (SGPT) 8 1 - 33 U/L    AST (SGOT) 14 1 - 32 U/L    Alkaline Phosphatase 84 39 - 117 U/L    Total Bilirubin 0.3 0.2 - 1.2 mg/dL    eGFR Non African Amer 73 >60 mL/min/1.73    Globulin 3.0 gm/dL    A/G Ratio 1.0 g/dL    BUN/Creatinine Ratio 12.8 7.0 - 25.0    Anion Gap 8.0 5.0 - 15.0 mmol/L   BNP    Collection Time: 07/01/19  4:26 PM   Result Value Ref Range    proBNP 437.3 5.0 - 900.0 pg/mL   Troponin    Collection Time: 07/01/19  4:26 PM   Result Value Ref Range    Troponin T <0.010 0.000 - 0.030 ng/mL   Light Blue Top    Collection Time: 07/01/19  4:26 PM   Result Value Ref Range    Extra Tube hold for add-on    Green Top (Gel)    Collection Time: 07/01/19  4:26 PM   Result Value Ref Range    Extra Tube Hold for add-ons.    Lavender Top    Collection Time: 07/01/19  4:26 PM   Result Value Ref Range    Extra Tube hold for add-on    Gold Top - SST    Collection Time: 07/01/19  4:26 PM   Result Value Ref Range    Extra Tube Hold for add-ons.    CBC Auto Differential    Collection Time: 07/01/19  4:26 PM   Result Value Ref Range    WBC 6.31 3.40 - 10.80 10*3/mm3    RBC 4.01 3.77 - 5.28 10*6/mm3    Hemoglobin 11.7 (L) 12.0 - 15.9 g/dL    Hematocrit 37.2 34.0 - 46.6 %    MCV 92.8 79.0 - 97.0 fL    MCH 29.2 26.6 - 33.0 pg    MCHC 31.5 31.5 - 35.7 g/dL    RDW 15.0 12.3 - 15.4 %    RDW-SD 51.1 37.0 - 54.0 fl    MPV 8.4 6.0 - 12.0 fL    Platelets 323 140 - 450 10*3/mm3   C-reactive Protein    Collection Time: 07/01/19  4:26 PM   Result Value Ref Range    C-Reactive Protein 2.34 (H) 0.00 - 0.50 mg/dL   Sedimentation Rate    Collection Time: 07/01/19  4:26 PM   Result Value Ref Range    Sed Rate 32 (H) 0 - 30 mm/hr   Manual Differential    Collection Time: 07/01/19   4:26 PM   Result Value Ref Range    Neutrophil % 57.0 42.7 - 76.0 %    Lymphocyte % 18.0 (L) 19.6 - 45.3 %    Monocyte % 3.0 (L) 5.0 - 12.0 %    Eosinophil % 21.0 (H) 0.3 - 6.2 %    Basophil % 1.0 0.0 - 1.5 %    Neutrophils Absolute 3.60 1.70 - 7.00 10*3/mm3    Lymphocytes Absolute 1.14 0.70 - 3.10 10*3/mm3    Monocytes Absolute 0.19 0.10 - 0.90 10*3/mm3    Eosinophils Absolute 1.33 (H) 0.00 - 0.40 10*3/mm3    Basophils Absolute 0.06 0.00 - 0.20 10*3/mm3    RBC Morphology Normal Normal    WBC Morphology Normal Normal    Platelet Morphology Normal Normal   Rheumatoid Factor    Collection Time: 07/01/19  4:26 PM   Result Value Ref Range    Rheumatoid Factor Quantitative >650.0 (H) 0.0 - 14.0 IU/mL     Note: In addition to lab results from this visit, the labs listed above may include labs taken at another facility or during a different encounter within the last 24 hours. Please correlate lab times with ED admission and discharge times for further clarification of the services performed during this visit.    CT Angiogram Chest With Contrast   Preliminary Result   1. No PE at least level of the lobar arteries on somewhat limited   contrast bolus timing with cardiac silhouette in normal limits and   without evidence of right heart strain or central filling defect within   the pulmonary arteries to suggest PE.   2. Interlobular septal thickening of the lower lobe predominance   consistent with interstitial edema pattern along with small right and   trace to small left pleural effusions and adjacent atelectasis.   3. No focal pulmonary consolidation or infiltrates.              XR Chest 1 View   Final Result   Small bilateral pleural effusions with mild increased   markings at lung bases. The upper lung fields are clear.       D:  07/01/2019   E:  07/01/2019       This report was finalized on 7/1/2019 4:46 PM by Dr. Lupe Palencia MD.            Vitals:    07/01/19 1830 07/01/19 1858 07/01/19 1935 07/02/19 0004  "  BP: 128/68 131/64 118/67 115/69   BP Location:  Right arm Left arm    Patient Position:  Lying Lying    Pulse: 90 81     Resp:  20  18   Temp:  99.3 °F (37.4 °C)  98.7 °F (37.1 °C)   TempSrc:  Oral  Oral   SpO2: 94% 94%     Weight:  79.7 kg (175 lb 9.6 oz)     Height:  165.1 cm (65\")       Medications   sodium chloride 0.9 % flush 10 mL (not administered)   cetirizine (zyrTEC) tablet 10 mg (not administered)   fluticasone (FLONASE) 50 MCG/ACT nasal spray 1 spray (1 spray Nasal Not Given 7/1/19 2317)   budesonide-formoterol (SYMBICORT) 160-4.5 MCG/ACT inhaler 2 puff ( Inhalation Canceled Entry 7/2/19 0025)   venlafaxine XR (EFFEXOR-XR) 24 hr capsule 150 mg (not administered)   sodium chloride 0.9 % flush 3 mL (3 mL Intravenous Given 7/1/19 2339)   sodium chloride 0.9 % flush 3-10 mL (not administered)   HYDROmorphone (DILAUDID) injection 0.5 mg (not administered)     And   naloxone (NARCAN) injection 0.4 mg (not administered)   ondansetron (ZOFRAN) tablet 4 mg (not administered)     Or   ondansetron (ZOFRAN) injection 4 mg (not administered)   temazepam (RESTORIL) capsule 15 mg (15 mg Oral Given 7/1/19 2339)   HYDROmorphone (DILAUDID) injection 0.5 mg (0.5 mg Intravenous Given 7/1/19 1659)   ondansetron (ZOFRAN) injection 4 mg (4 mg Intravenous Given 7/1/19 1659)   iopamidol (ISOVUE-370) 76 % injection 100 mL (66 mL Intravenous Given 7/1/19 1715)   methylPREDNISolone sodium succinate (SOLU-Medrol) injection 125 mg (125 mg Intravenous Given 7/1/19 1805)   HYDROmorphone (DILAUDID) injection 0.5 mg (0.5 mg Intravenous Given 7/1/19 2146)     ECG/EMG Results (last 24 hours)     Procedure Component Value Units Date/Time    ECG 12 Lead [[id number]] Collected:  07/01/19 1625     Updated:  07/01/19 1624        ECG 12 Lead                             MDM  Number of Diagnoses or Management Options  Chest pain, unspecified type: new and requires workup  Eosinophilia:   Pleural effusion: established and worsening  Shortness of " breath: new and requires workup     Amount and/or Complexity of Data Reviewed  Clinical lab tests: reviewed and ordered  Tests in the radiology section of CPT®: ordered and reviewed  Review and summarize past medical records: yes  Discuss the patient with other providers: yes  Independent visualization of images, tracings, or specimens: yes    Patient Progress  Patient progress: improved      Final diagnoses:   Pleural effusion   Chest pain, unspecified type   Shortness of breath   Eosinophilia       Documentation assistance provided by rebeca Li.  Information recorded by the scribe was done at my direction and has been verified and validated by me.     Jim Li  07/01/19 0219       Henok Najera MD  07/02/19 0146

## 2019-07-02 ENCOUNTER — APPOINTMENT (OUTPATIENT)
Dept: CT IMAGING | Facility: HOSPITAL | Age: 69
End: 2019-07-02

## 2019-07-02 LAB
ANION GAP SERPL CALCULATED.3IONS-SCNC: 10 MMOL/L (ref 5–15)
APPEARANCE FLD: ABNORMAL
APTT PPP: 25.6 SECONDS (ref 24–37)
BASOPHILS # BLD AUTO: 0.02 10*3/MM3 (ref 0–0.2)
BASOPHILS NFR BLD AUTO: 0.3 % (ref 0–1.5)
BUN BLD-MCNC: 14 MG/DL (ref 8–23)
BUN/CREAT SERPL: 16.7 (ref 7–25)
CALCIUM SPEC-SCNC: 8.4 MG/DL (ref 8.6–10.5)
CHLORIDE SERPL-SCNC: 101 MMOL/L (ref 98–107)
CO2 SERPL-SCNC: 27 MMOL/L (ref 22–29)
COLOR FLD: YELLOW
CREAT BLD-MCNC: 0.84 MG/DL (ref 0.57–1)
DEPRECATED RDW RBC AUTO: 52 FL (ref 37–54)
EOSINOPHIL # BLD AUTO: 0.12 10*3/MM3 (ref 0–0.4)
EOSINOPHIL NFR BLD AUTO: 2 % (ref 0.3–6.2)
ERYTHROCYTE [DISTWIDTH] IN BLOOD BY AUTOMATED COUNT: 15.2 % (ref 12.3–15.4)
GFR SERPL CREATININE-BSD FRML MDRD: 67 ML/MIN/1.73
GLUCOSE BLD-MCNC: 205 MG/DL (ref 65–99)
HCT VFR BLD AUTO: 34.8 % (ref 34–46.6)
HGB BLD-MCNC: 11.1 G/DL (ref 12–15.9)
IMM GRANULOCYTES # BLD AUTO: 0.04 10*3/MM3 (ref 0–0.05)
IMM GRANULOCYTES NFR BLD AUTO: 0.7 % (ref 0–0.5)
INR PPP: 1.07 (ref 0.85–1.16)
LDH SERPL-CCNC: 265 U/L (ref 135–214)
LYMPHOCYTES # BLD AUTO: 1.49 10*3/MM3 (ref 0.7–3.1)
LYMPHOCYTES NFR BLD AUTO: 24.7 % (ref 19.6–45.3)
LYMPHOCYTES NFR FLD MANUAL: 4 %
MCH RBC QN AUTO: 29.8 PG (ref 26.6–33)
MCHC RBC AUTO-ENTMCNC: 31.9 G/DL (ref 31.5–35.7)
MCV RBC AUTO: 93.3 FL (ref 79–97)
MONOCYTES # BLD AUTO: 0.31 10*3/MM3 (ref 0.1–0.9)
MONOCYTES NFR BLD AUTO: 5.1 % (ref 5–12)
MONOCYTES NFR FLD: 3 %
NEUTROPHILS # BLD AUTO: 4.06 10*3/MM3 (ref 1.7–7)
NEUTROPHILS NFR BLD AUTO: 67.2 % (ref 42.7–76)
NEUTROPHILS NFR FLD MANUAL: 93 %
NRBC BLD AUTO-RTO: 0 /100 WBC (ref 0–0.2)
PH FLD: 7.59 [PH]
PLATELET # BLD AUTO: 320 10*3/MM3 (ref 140–450)
PMV BLD AUTO: 8.7 FL (ref 6–12)
POTASSIUM BLD-SCNC: 4.8 MMOL/L (ref 3.5–5.2)
PROTHROMBIN TIME: 13.4 SECONDS (ref 11.2–14.3)
RBC # BLD AUTO: 3.73 10*6/MM3 (ref 3.77–5.28)
RBC # FLD AUTO: 7000 /MM3
SODIUM BLD-SCNC: 138 MMOL/L (ref 136–145)
WBC # FLD AUTO: ABNORMAL /MM3
WBC NRBC COR # BLD: 6.04 10*3/MM3 (ref 3.4–10.8)

## 2019-07-02 PROCEDURE — 85610 PROTHROMBIN TIME: CPT | Performed by: RADIOLOGY

## 2019-07-02 PROCEDURE — 88112 CYTOPATH CELL ENHANCE TECH: CPT | Performed by: INTERNAL MEDICINE

## 2019-07-02 PROCEDURE — 89051 BODY FLUID CELL COUNT: CPT | Performed by: INTERNAL MEDICINE

## 2019-07-02 PROCEDURE — 75989 ABSCESS DRAINAGE UNDER X-RAY: CPT

## 2019-07-02 PROCEDURE — 25010000003 LIDOCAINE 1 % SOLUTION: Performed by: RADIOLOGY

## 2019-07-02 PROCEDURE — 82945 GLUCOSE OTHER FLUID: CPT | Performed by: INTERNAL MEDICINE

## 2019-07-02 PROCEDURE — 80048 BASIC METABOLIC PNL TOTAL CA: CPT | Performed by: NURSE PRACTITIONER

## 2019-07-02 PROCEDURE — G0378 HOSPITAL OBSERVATION PER HR: HCPCS

## 2019-07-02 PROCEDURE — 94799 UNLISTED PULMONARY SVC/PX: CPT

## 2019-07-02 PROCEDURE — 87206 SMEAR FLUORESCENT/ACID STAI: CPT | Performed by: INTERNAL MEDICINE

## 2019-07-02 PROCEDURE — 88184 FLOWCYTOMETRY/ TC 1 MARKER: CPT

## 2019-07-02 PROCEDURE — 83986 ASSAY PH BODY FLUID NOS: CPT | Performed by: INTERNAL MEDICINE

## 2019-07-02 PROCEDURE — 87102 FUNGUS ISOLATION CULTURE: CPT | Performed by: INTERNAL MEDICINE

## 2019-07-02 PROCEDURE — 87075 CULTR BACTERIA EXCEPT BLOOD: CPT | Performed by: INTERNAL MEDICINE

## 2019-07-02 PROCEDURE — 87205 SMEAR GRAM STAIN: CPT | Performed by: INTERNAL MEDICINE

## 2019-07-02 PROCEDURE — 83615 LACTATE (LD) (LDH) ENZYME: CPT | Performed by: INTERNAL MEDICINE

## 2019-07-02 PROCEDURE — 87116 MYCOBACTERIA CULTURE: CPT | Performed by: INTERNAL MEDICINE

## 2019-07-02 PROCEDURE — 99220 PR INITIAL OBSERVATION CARE/DAY 70 MINUTES: CPT | Performed by: INTERNAL MEDICINE

## 2019-07-02 PROCEDURE — 99225 PR SBSQ OBSERVATION CARE/DAY 25 MINUTES: CPT | Performed by: HOSPITALIST

## 2019-07-02 PROCEDURE — 94640 AIRWAY INHALATION TREATMENT: CPT

## 2019-07-02 PROCEDURE — 88305 TISSUE EXAM BY PATHOLOGIST: CPT | Performed by: INTERNAL MEDICINE

## 2019-07-02 PROCEDURE — 83615 LACTATE (LD) (LDH) ENZYME: CPT | Performed by: HOSPITALIST

## 2019-07-02 PROCEDURE — 85025 COMPLETE CBC W/AUTO DIFF WBC: CPT | Performed by: NURSE PRACTITIONER

## 2019-07-02 PROCEDURE — 84157 ASSAY OF PROTEIN OTHER: CPT | Performed by: INTERNAL MEDICINE

## 2019-07-02 PROCEDURE — 87015 SPECIMEN INFECT AGNT CONCNTJ: CPT | Performed by: INTERNAL MEDICINE

## 2019-07-02 PROCEDURE — 87070 CULTURE OTHR SPECIMN AEROBIC: CPT | Performed by: INTERNAL MEDICINE

## 2019-07-02 PROCEDURE — 85730 THROMBOPLASTIN TIME PARTIAL: CPT | Performed by: RADIOLOGY

## 2019-07-02 PROCEDURE — 88185 FLOWCYTOMETRY/TC ADD-ON: CPT

## 2019-07-02 RX ORDER — LIDOCAINE HYDROCHLORIDE 10 MG/ML
20 INJECTION, SOLUTION INFILTRATION; PERINEURAL ONCE
Status: COMPLETED | OUTPATIENT
Start: 2019-07-02 | End: 2019-07-02

## 2019-07-02 RX ADMIN — FLUTICASONE PROPIONATE 1 SPRAY: 50 SPRAY, METERED NASAL at 20:06

## 2019-07-02 RX ADMIN — CETIRIZINE HYDROCHLORIDE 10 MG: 10 TABLET, FILM COATED ORAL at 11:31

## 2019-07-02 RX ADMIN — SODIUM CHLORIDE, PRESERVATIVE FREE 3 ML: 5 INJECTION INTRAVENOUS at 20:05

## 2019-07-02 RX ADMIN — BUDESONIDE AND FORMOTEROL FUMARATE DIHYDRATE 2 PUFF: 160; 4.5 AEROSOL RESPIRATORY (INHALATION) at 20:27

## 2019-07-02 RX ADMIN — FLUTICASONE PROPIONATE 1 SPRAY: 50 SPRAY, METERED NASAL at 08:40

## 2019-07-02 RX ADMIN — BUDESONIDE AND FORMOTEROL FUMARATE DIHYDRATE 2 PUFF: 160; 4.5 AEROSOL RESPIRATORY (INHALATION) at 07:49

## 2019-07-02 RX ADMIN — LIDOCAINE HYDROCHLORIDE 20 ML: 10 INJECTION, SOLUTION INFILTRATION; PERINEURAL at 11:08

## 2019-07-02 RX ADMIN — VENLAFAXINE HYDROCHLORIDE 150 MG: 75 CAPSULE, EXTENDED RELEASE ORAL at 11:32

## 2019-07-02 NOTE — PROGRESS NOTES
[facility] Medicine Services  PROGRESS NOTE    Patient Name: Barbara Coelho  : 1950  MRN: [id number]    Date of Admission: 2019  Length of Stay: 0  Primary Care Physician: Ania Martel MD    Subjective   Subjective     CC:  F/u pleural effusions    HPI:  Pt sitting up in bed, on room air, during my visit. She has no active left-sided pleurisy and is comfortable. She does note a dry non-productive cough.  Daughter at bedside.     Review of Systems  Gen- No active fevers or chills  CV- No chest pain, palpitations  Resp- As above   GI- No V/D, abd pain; nausea has resolved     Otherwise ROS is negative except as mentioned in the HPI.    Objective   Objective     Vital Signs:   Temp:  [94.2 °F (34.6 °C)-99.8 °F (37.7 °C)] 96.8 °F (36 °C)  Heart Rate:  [66-96] 70  Resp:  [16-20] 20  BP: (110-137)/(59-80) 112/67      Physical Exam:  Constitutional: No acute distress, awake, alert  HENT: NCAT, mucous membranes moist  Respiratory: Clear to auscultation bilaterally with no crackles or wheezing, respiratory effort normal   Cardiovascular: RRR, no murmurs, rubs, or gallops, palpable pedal pulses bilaterally  Gastrointestinal: Positive bowel sounds, soft, nontender, nondistended  Musculoskeletal: No bilateral ankle edema  Psychiatric: Appropriate affect, cooperative  Neurologic: Strength symmetric in all extremities, speech clear    Results Reviewed:  I have personally reviewed current lab, radiology, and data and agree.    Results from last 7 days   Lab Units 19  0923 19  0554 19  1626   WBC 10*3/mm3  --  6.04 6.31   HEMOGLOBIN g/dL  --  11.1* 11.7*   HEMATOCRIT %  --  34.8 37.2   PLATELETS 10*3/mm3  --  320 323   INR  1.07  --   --      Results from last 7 days   Lab Units 19  0554 19  1626   SODIUM mmol/L 138 137   POTASSIUM mmol/L 4.8 4.0   CHLORIDE mmol/L 101 102   CO2 mmol/L 27.0 27.0   BUN mg/dL 14 10   CREATININE mg/dL 0.84 0.78   GLUCOSE mg/dL  205* 112*   CALCIUM mg/dL 8.4* 8.1*   ALT (SGPT) U/L  --  8   AST (SGOT) U/L  --  14   TROPONIN T ng/mL  --  <0.010   PROBNP pg/mL  --  437.3     Estimated Creatinine Clearance: 65 mL/min (by C-G formula based on SCr of 0.84 mg/dL).    Microbiology Results Abnormal     None          Imaging Results (last 24 hours)     Procedure Component Value Units Date/Time    CT Guided Thoracentesis [[id number]] Collected:  07/02/19 1234     Updated:  07/02/19 1252    Narrative:       EXAMINATION: CT-GUIDED RIGHT DIAGNOSTIC THORACENTESIS     HISTORY: Pleural effusions.     COMPARISON: Unenhanced chest CT scan 07/01/2019.     FINDINGS: Informed written consent was obtained from the patient prior  to the beginning. We discussed the procedure in detail including  potential risks of bleeding, infection and lung collapse. Ms. Coelho  indicates her understanding and agrees to proceed.     With the patient in LPO position on the CT scan table, the skin  overlying the relatively small right pleural effusion was marked,  prepped and draped in sterile fashion and 1% lidocaine infiltrated into  the skin and subcutaneous tissues as local anesthesia. Effusion is  borderline too small for thoracentesis catheter, and with the patient  change in positions, and with different breath holds, there is somewhat  increased risk from catheter drainage.  It was decided to perform FNA  for diagnosis as the patient was not significantly short of breath, and  effusions are relatively small.     The skin overlying the right pleural effusion was marked, prepped and  draped in sterile fashion and 1% lidocaine infiltrated into the skin  subcutaneous tissues and down the pleura with a 1-1/2 inch 25-gauge  needle. Subsequently, a 20-gauge Chiba needle was advanced to the  effusion, but fluid could only be withdrawn in fits and starts despite  apparent appropriate positioning of the needle, and repositioning. This  needle was ultimately withdrawn and an 18-gauge  Chiba needle advanced  into the fusion under CT guidance, with somewhat better flow of pleural  fluid into the needle. A total of 65 cc of cloudy almost turbid  straw-colored fluid was removed. The needle was then withdrawn and  pressure held at the stick site for 3 minutes. Followup scanning shows  small residual effusion and no evidence of hemorrhage or pneumothorax.  Samples were distributed to labeled containers appropriate for labs  previously ordered and hand-carried to pathology by the technologist.  There were no complications. Ms. Coelho tolerated the procedure very  well.       Impression:       CT guided diagnostic right thoracentesis without  complication.     D:  07/02/2019  E:  07/02/2019       CT Angiogram Chest With Contrast [[id number]] Collected:  07/01/19 1734     Updated:  07/02/19 0851    Narrative:       EXAMINATION: CT ANGIOGRAM CHEST W CONTRAST-      INDICATION: Chest pain, questionable PE.      TECHNIQUE: CT angiogram chest with intravenous contrast administration  following PE protocol.  Two-D reconstructions performed.     The radiation dose reduction device was turned on for each scan per the  ALARA (As Low as Reasonably Achievable) protocol.     COMPARISON: None.     FINDINGS: Thyroid is homogeneous in attenuation. No bulky mediastinal  adenopathy. Central airways are patent. Esophagus is in normal course  with thickened mucosa of the distal segment. No hiatal hernia. Patent  nonaneurysmal thoracic aorta with patent great vessel origins.  Satisfactory opacification of the pulmonary arteries to the level of the  lobar arteries in particular without filling defect in the segments to  suggest pulmonary embolus. Cardiac size within normal limits without  pericardial effusion. Extended lung windows demonstrate intralobular  septal thickening of a lower lobe predominance consistent with  interstitial edema pattern and decompensation as there is small right  and trace to small left pleural  effusions with adjacent atelectasis. No  focal consolidation within the parenchyma otherwise noted. No soft  tissue body wall findings of concern. Visual is portions of the upper  abdomen demonstrate postoperative changes of the gastric lumen and prior  cholecystectomy.       Impression:       1. No PE at least to the level of the lobar arteries on somewhat limited  contrast bolus timing with cardiac silhouette in normal limits and  without evidence of right heart strain or central filling defect within  the pulmonary arteries to suggest PE.  2. Interlobular septal thickening of the lower lobe predominance  consistent with interstitial edema pattern along with small right and  trace to small left pleural effusions and adjacent atelectasis.  3. No focal pulmonary consolidation or infiltrates.     D:  07/01/2019  E:  07/02/2019       XR Chest 1 View [[id number]] Collected:  07/01/19 1639     Updated:  07/01/19 1649    Narrative:       EXAMINATION: XR CHEST 1 VW- 07/01/2019      INDICATION: SOA triage protocol      COMPARISON: 05/16/2018     FINDINGS: Portable chest reveals heart to be borderline enlarged. Small  bilateral pleural effusions with mild increased markings at lung bases  bilaterally. The upper lung fields are clear.           Impression:       Small bilateral pleural effusions with mild increased  markings at lung bases. The upper lung fields are clear.     D:  07/01/2019  E:  07/01/2019     This report was finalized on 7/1/2019 4:46 PM by Dr. Lupe Palencia MD.                  I have reviewed the medications:  Scheduled Meds:  hold 1 each Does not apply Once   budesonide-formoterol 2 puff Inhalation BID - RT   cetirizine 10 mg Oral Daily   fluticasone 1 spray Nasal BID   sodium chloride 3 mL Intravenous Q12H   venlafaxine  mg Oral Daily     Continuous Infusions:   PRN Meds:.HYDROmorphone **AND** naloxone  •  ondansetron **OR** ondansetron  •  sodium chloride  •  sodium chloride  •   temazepam      Assessment/Plan   Assessment / Plan     Active Hospital Problems    Diagnosis  POA   • **Pleural effusion [J90]  Yes   • GERD (gastroesophageal reflux disease) [K21.9]  Yes   • Asthma [J45.909]  Yes      Resolved Hospital Problems   No resolved problems to display.        Brief Hospital Course to date:  Mrs. Coelho is a 69 year-old F with a past medical hx of asthma, GERD, depression, possible mixed connective tissue disease (follows with Dr. Pinky Thakur), constipation, and osteoarthritis who presented to the [facility] ER on 7/1 with left lateral chest pain, aggravated by deep breathing and movement, in the setting of recent left knee and shoulder pain.  Pt admitted to observation status for further evaluation:     Bilateral Pleuritic Chest Pain, improved, due to effusions   - Initially had right-sided pleurisy, then left-sided at time of admission    Bilateral Pleural Effusions with elevated inflammatory markers  - s/p CT guided right thoracentesis today with removal of 65mL of cloudy, straw-colored fluid   - Pulm consulted   - F/u on pleural fluid studies to determine exudate vs. Transudate   - autoimmune serologies pending  - continue further inpt vs. outpt work-up depending on results and consultant input  - Follows with Dr. Pinky Thakur  - hold on further steroids pending Pulmonology input     Possible MCTD  - positive family hx     Asthma without exacerbation  Chronic Anemia   GERD    DVT Prophylaxis:  Mechanical   Disposition: I expect the patient to be discharged home pending results of pleural studies and clinical progress.    CODE STATUS:   Code Status and Medical Interventions:   Ordered at: 07/01/19 8029     Level Of Support Discussed With:    Patient     Code Status:    CPR     Medical Interventions (Level of Support Prior to Arrest):    Full         Electronically signed by Keven Lang MD, 07/02/19, 1:08 PM.

## 2019-07-02 NOTE — CONSULTS
PULMONARY CONSULT  NOTE    Chief Complaint     Pleural effusion    History of Present Illness     69-year-old white female who we have been asked to see regarding pleuritic chest pain and bilateral pleural effusions.    Patient is a lifelong non-smoker.  She has no history of chronic lung disease although has been treated in the past for asthma.    She has had a rash for which she recently underwent a skin biopsy [facility].  Those results are still outstanding.    She also has seen Dr. Thakur for possible autoimmune process.  My understanding is that she has tentatively been diagnosed with mixed connective tissue disease.  As of yet, she has not been started on any disease modifying medication.    She was admitted through the emergency room last evening with complaints of chest pain.  She had had the chest pain beginning last week.  She has had migratory pains including pains in her leg, arm, and most recently in her left chest wall.  She also had some swelling in her ankles, as well.    She underwent a CT scan of the chest which revealed no pulmonary emboli but small bilateral pleural effusions.  She has been admitted for further evaluation and care.    Problem List, Surgical History, Family, Social History, and ROS     Patient Active Problem List   Diagnosis   • Arthritis   • Dysphagia   • Constipation   • Screening for cardiovascular condition   • Skin lesions (Recent Bx results pending)   • Right shoulder pain   • Chronic allergic rhinitis due to food   • Asthma   • GERD (gastroesophageal reflux disease)   • Urinary incontinence   • Symptomatic cholelithiasis   • DDD (degenerative disc disease), lumbar   • Depression   • Mixed connective tissue disease (CMS/HCC)   • Bilateral pleural effusions (Exudate on Right with high WBC)     Past Surgical History:   Procedure Laterality Date   • BACK SURGERY  1995    Back (L5/S1)   • BREAST EXCISIONAL BIOPSY Left 1985   • CHOLECYSTECTOMY     •  CHOLECYSTECTOMY WITH INTRAOPERATIVE CHOLANGIOGRAM N/A 4/26/2018    Procedure: CHOLECYSTECTOMY LAPAROSCOPIC INTRAOPERATIVE CHOLANGIOGRAM;  Surgeon: Lit Morelos MD;  Location: [facility];  Service: General   • COLONOSCOPY  2016   • GASTRIC BYPASS      HIGH GASTRIC BYPASS, 1980's   • OTHER SURGICAL HISTORY  1985    Stomach Stabled   • REPLACEMENT TOTAL KNEE Left 2012   • SINUS SURGERY      x2   • TUBAL ABDOMINAL LIGATION  1980       Allergies   Allergen Reactions   • Celebrex [Celecoxib] Hives     HIVES    • Sulfa Antibiotics Itching     ITCHING      No current facility-administered medications on file prior to encounter.      Current Outpatient Medications on File Prior to Encounter   Medication Sig   • desloratadine (CLARINEX) 5 MG tablet Take 5 mg by mouth Every Night.   • fluticasone (FLONASE) 50 MCG/ACT nasal spray 1 spray into the nostril(s) as directed by provider 2 (Two) Times a Day.   • fluticasone-salmeterol (ADVAIR) 500-50 MCG/DOSE DISKUS Inhale 1 puff 2 (Two) Times a Day.   • venlafaxine XR (EFFEXOR-XR) 150 MG 24 hr capsule Take 1 capsule by mouth Daily.     MEDICATION LIST AND ALLERGIES REVIEWED.    Family History   Problem Relation Age of Onset   • Cancer Other         BREAST, LUNG, OVARIAN, KIDNEY   • Kidney cancer Other    • Lung cancer Other         pGF as well   • Cancer Mother         kidney   • Cancer Father         lung   • Mental illness Father    • Breast cancer Sister 30   • Connective Tissue Disease Sister    • Hypertension Sister         pulmonary hypertension   • Ovarian cancer Neg Hx      Social History     Tobacco Use   • Smoking status: Never Smoker   • Smokeless tobacco: Never Used   Substance Use Topics   • Alcohol use: Yes     Alcohol/week: 2.4 - 3.0 oz     Types: 4 - 5 Standard drinks or equivalent per week     Comment: a week   • Drug use: No     Social History     Social History Narrative   • Not on file     FAMILY AND SOCIAL HISTORY REVIEWED.    Review of Systems  ALL OTHER  "SYSTEMS REVIEWED AND ARE NEGATIVE.    Physical Exam and Clinical Information   /67 (BP Location: Right arm, Patient Position: Lying)   Pulse 70   Temp 96.8 °F (36 °C) (Axillary)   Resp 20   Ht 165.1 cm (65\")   Wt 79.7 kg (175 lb 9.6 oz)   SpO2 (!) 87%   BMI 29.22 kg/m²   Physical Exam:   GENERAL: Awake, no distress    HEENT: No adenopathy or thyromegaly   LUNGS: No wheezes or rhonchi   HEART: Regular rate and rhythm without murmurs   GI: Soft, nontender   EXTREMITIES: Palpable pulses.  No clubbing or cyanosis.  She has some patchy areas on her abdomen on her legs which are \"fading\" according to the patient without palpable petechial lesions   NEURO/PSYCH: Awake, intact, no deficit    Results from last 7 days   Lab Units 07/02/19  0554 07/01/19  1626   WBC 10*3/mm3 6.04 6.31   HEMOGLOBIN g/dL 11.1* 11.7*   PLATELETS 10*3/mm3 320 323     Results from last 7 days   Lab Units 07/02/19  0554 07/01/19  1626   SODIUM mmol/L 138 137   POTASSIUM mmol/L 4.8 4.0   CO2 mmol/L 27.0 27.0   BUN mg/dL 14 10   CREATININE mg/dL 0.84 0.78   GLUCOSE mg/dL 205* 112*     Estimated Creatinine Clearance: 65 mL/min (by C-G formula based on SCr of 0.84 mg/dL).          No results found for: LACTATE    IMAGES: CT scan of the chest revealed small bilateral pleural effusions and some basilar atelectasis but no other overt abnormalities    I reviewed the patient's results and images    Assesment     Active Hospital Problems    Diagnosis   • **Bilateral pleural effusions (Exudate on Right with high WBC)   • Mixed connective tissue disease (CMS/HCC)     Dr. Thakur     • GERD (gastroesophageal reflux disease)     EGD 12/16 [facility], . no barretts     • Asthma     ALLERGIC     • Skin lesions (Recent Bx results pending)     Plan/Recommendations     Patient underwent a right diagnostic thoracentesis this morning which revealed a small amount of pleural fluid that was exudate by criteria and had extremely high WBC count, most of " which are noted to be neutrophils.  She does not have evidence of an infectious pneumonic process, with no real parenchymal lesions.  In addition she is afebrile and has a normal white count.  This does not appear to be an empyema clinically.  Her clinical picture appears most consistent with pleuritis, possibly of postinfectious etiology (viral) or autoimmune.    We will try to get the skin biopsy results and follow-up on the pleural fluid analysis.  I will see if they can send pleural fluid for flow cytometry as well.  We will try to get records from Dr. Thakur's office and a rheumatology consultation either as an inpatient or for her to see Dr. Thakur again as an outpatient would probably be beneficial    TIMBO Sellers MD  Pulmonary and Critical Care Medicine     CC: Ania Martel MD

## 2019-07-02 NOTE — POST-PROCEDURE NOTE
Radiology Procedure    Pre-procedure: Informed written consent was obtained prior to beginning.   agrees to proceed with CT guided right thoracentesis.                 Procedure Performed: CT guided right thoracentesis     IV Sedation and/or Anesthesia:  No    Complications: None.    Preliminary Findings: 65 cc cloudy/turbid dull straw colored thin fluid    Specimen Removed: As above, sent for labs ordered    Estimated Blood Loss:  0ml    Post-Procedure Diagnosis: Right pleural effusion    Post-Procedure Plan: She has no c/o.  Return to room and resume previous orders.     Standard Discharge Instructions Given:no     Rigoberto Mccloud MD  07/02/19  12:38 PM

## 2019-07-02 NOTE — PLAN OF CARE
Problem: Fall Risk (Adult)  Goal: Absence of Fall   07/02/19 0406   Fall Risk (Adult)   Absence of Fall making progress toward outcome

## 2019-07-02 NOTE — H&P
[facility] Medicine Services  HISTORY AND PHYSICAL    Patient Name: Barbara Coelho  : 1950  MRN: [id number]  Primary Care Physician: Ania Martel MD  Date of admission: 2019      Subjective   Subjective     Chief Complaint:  Chest pain    HPI:  Barbara Coelho is a 69 y.o. female with a history of asthma, GERD, DVT, presents to the ED with complaints of left lateral chest pain that is worse with deep breathing or movement.  Patient reports that four days ago she developed pain behind her left knee and left shoulder that improved after sitting in a whirlpool.  On Friday after she mowed her daughters lawn she developed left groin pain and pain in her right armpit that resolved by Saturday morning.  Today she developed left lateral chest pain that has continued to worsen which prompted her to come to the ED.  She reports having shortness of air, nausea, diaphoresis, and some swelling in her ankles and feet. She denies fever, cough, vomiting, abdominal pain, dysuria, or any other complaints at this time.  Patient reports that over the last three years he she has had numerous rashes and is currently being followed by Dr. Pinky Thakur (Rheumatology).  On Monday of last week she had a biopsy of a rash to her right lower abdomen to rule out a possible mixed connective tissue disorder.  Patient reports that in  she traveled by car to [place] and [place].  CTA of the chest shows interlobular septal thickening of the lower lobe predominance consistent with interstitial edema pattern along with small right and trace to small left pleural effusions and adjacent atelectasis.  Labs show an elevated sed rate and CRP.  ED discussed case with Dr. Chauhan who ordered additional lab work.  Patient was given IV Solu-Medrol in the ED, and is being admitted to the Hospitalist for further evaluation and management.       Review of Systems   Constitutional: Negative.    HENT: Negative.     Eyes: Negative.    Respiratory: Positive for shortness of breath. Negative for cough and wheezing.    Cardiovascular: Positive for chest pain (left lateral ) and leg swelling. Negative for palpitations.   Gastrointestinal: Positive for nausea. Negative for abdominal distention, abdominal pain, blood in stool, constipation, diarrhea and vomiting.   Endocrine: Negative.    Genitourinary: Negative.    Musculoskeletal: Negative.    Skin: Positive for rash. Negative for color change, pallor and wound.   Allergic/Immunologic: Negative.    Neurological: Negative.    Hematological: Negative.    Psychiatric/Behavioral: Negative.           Otherwise complete ROS reviewed and is negative except as mentioned in the HPI.    Personal History     Past Medical History:   Diagnosis Date   • Allergic rhinitis     on allergy shots   • Asthma     ALLERGIC   • Back pain     Low   • Bladder infection     Recurrent Bladder Infections   • Depression    • Diverticulitis    • GERD (gastroesophageal reflux disease)     EGD 12/16 [facility] - reflux, HH. no barretts   • H/O bone density study 2015   • H/O mammogram 04/17/2018    [facility]   • History of DVT of lower extremity     left leg   • Mixed connective tissue disease (CMS/HCC) 01/2019    Dr. Thakur   • Osteopenia 03/28/2018    calculated frax - 11/3%   • Pap smear for cervical cancer screening 2015   • Postmenopausal    • Urinary incontinence    • Wears contact lenses    • Wears prescription eyeglasses        Past Surgical History:   Procedure Laterality Date   • BACK SURGERY  1995    Back (L5/S1)   • BREAST EXCISIONAL BIOPSY Left 1985   • CHOLECYSTECTOMY     • CHOLECYSTECTOMY WITH INTRAOPERATIVE CHOLANGIOGRAM N/A 4/26/2018    Procedure: CHOLECYSTECTOMY LAPAROSCOPIC INTRAOPERATIVE CHOLANGIOGRAM;  Surgeon: Lit Morelos MD;  Location: [facility];  Service: General   • COLONOSCOPY  2016   • GASTRIC BYPASS      HIGH GASTRIC BYPASS, 1980's   • OTHER SURGICAL HISTORY  1985    Stomach Stabled    • REPLACEMENT TOTAL KNEE Left 2012   • SINUS SURGERY      x2   • TUBAL ABDOMINAL LIGATION  1980       Family History: family history includes Breast cancer (age of onset: 30) in her sister; Cancer in her father, mother, and other; Connective Tissue Disease in her sister; Hypertension in her sister; Kidney cancer in her other; Lung cancer in her other; Mental illness in her father. Otherwise pertinent FHx was reviewed and unremarkable.     Social History:  reports that she has never smoked. She has never used smokeless tobacco. She reports that she drinks about 2.4 - 3.0 oz of alcohol per week. She reports that she does not use drugs.  Social History     Social History Narrative   • Not on file       Medications:    Available home medication information reviewed.  Medications Prior to Admission   Medication Sig Dispense Refill Last Dose   • desloratadine (CLARINEX) 5 MG tablet Take 5 mg by mouth Every Night.   6/30/2019 at Unknown time   • fluticasone (FLONASE) 50 MCG/ACT nasal spray 1 spray into the nostril(s) as directed by provider 2 (Two) Times a Day.   7/1/2019 at Unknown time   • fluticasone-salmeterol (ADVAIR) 500-50 MCG/DOSE DISKUS Inhale 1 puff 2 (Two) Times a Day.   7/1/2019 at Unknown time   • venlafaxine XR (EFFEXOR-XR) 150 MG 24 hr capsule Take 1 capsule by mouth Daily. 90 capsule 3 6/30/2019 at Unknown time       Allergies   Allergen Reactions   • Celebrex [Celecoxib] Hives     HIVES    • Sulfa Antibiotics Itching     ITCHING        Objective   Objective     Vital Signs:   Temp:  [99.3 °F (37.4 °C)-99.8 °F (37.7 °C)] 99.3 °F (37.4 °C)  Heart Rate:  [81-96] 81  Resp:  [16-20] 20  BP: (118-137)/(64-74) 118/67        Physical Exam   Constitutional: She is oriented to person, place, and time. She appears well-developed and well-nourished. No distress.   HENT:   Head: Normocephalic.   Eyes: Pupils are equal, round, and reactive to light.   Neck: Normal range of motion. Neck supple.   Cardiovascular: Normal  rate, regular rhythm, normal heart sounds and intact distal pulses. Exam reveals no gallop and no friction rub.   No murmur heard.  Pulmonary/Chest: Effort normal. No stridor. No respiratory distress. She has no wheezes. She has no rales. She exhibits tenderness (left lower lateral chest).   Diminished in the bases   Abdominal: Soft. Bowel sounds are normal. She exhibits no distension and no mass. There is no tenderness. There is no rebound and no guarding.   Musculoskeletal: Normal range of motion. She exhibits no edema, tenderness or deformity.   Neurological: She is alert and oriented to person, place, and time. No cranial nerve deficit.   Skin: Skin is warm and dry. Rash (bilateral thighs, abdomen) noted. She is not diaphoretic. No erythema. No pallor.   Psychiatric: She has a normal mood and affect. Her behavior is normal. Judgment and thought content normal.        Results Reviewed:  I have personally reviewed current lab, radiology, and data and agree.    Results from last 7 days   Lab Units 07/01/19  1626   WBC 10*3/mm3 6.31   HEMOGLOBIN g/dL 11.7*   HEMATOCRIT % 37.2   PLATELETS 10*3/mm3 323     Results from last 7 days   Lab Units 07/01/19  1626   SODIUM mmol/L 137   POTASSIUM mmol/L 4.0   CHLORIDE mmol/L 102   CO2 mmol/L 27.0   BUN mg/dL 10   CREATININE mg/dL 0.78   GLUCOSE mg/dL 112*   CALCIUM mg/dL 8.1*   ALT (SGPT) U/L 8   AST (SGOT) U/L 14   TROPONIN T ng/mL <0.010   PROBNP pg/mL 437.3     Estimated Creatinine Clearance: 68.3 mL/min (by C-G formula based on SCr of 0.78 mg/dL).  Brief Urine Lab Results  (Last result in the past 365 days)      Color   Clarity   Blood   Leuk Est   Nitrite   Protein   CREAT   Urine HCG        02/27/19 1424 Dark Yellow Clear Negative Negative Negative Negative             Imaging Results (last 24 hours)     Procedure Component Value Units Date/Time    CT Angiogram Chest With Contrast [[id number]] Collected:  07/01/19 1734     Updated:  07/01/19 1737    Narrative:        EXAMINATION: CT ANGIOGRAM CHEST W CONTRAST-      INDICATION: cp, ? PE      TECHNIQUE: CT angiogram chest with intravenous contrast administration  via PE protocol. 2-D reconstructions performed.     The radiation dose reduction device was turned on for each scan per the  ALARA (As Low as Reasonably Achievable) protocol.     COMPARISON: NONE     FINDINGS: Thyroid is homogeneous in attenuation. No bulky mediastinal  adenopathy. Central airways are patent. Esophagus is in normal course  with thickened mucosa of the distal segment. No hiatal hernia. Patent  nonaneurysmal thoracic aorta with patent great vessel origins.  Satisfactory opacification of the pulmonary arteries to the level of the  lobar arteries in particular without filling defect in the segments to  suggest pulmonary embolus. Cardiac size within normal limits without  pericardial effusion. Extended lung windows demonstrate intralobular  septal thickening of a lower lobe predominance consistent with  interstitial edema pattern and decompensation as there is small right  and trace to small left pleural effusions with adjacent atelectasis. No  focal consolidation within the parenchyma otherwise noted. No soft  tissue body wall findings of concern. Visual is portions of the upper  abdomen show postoperative changes of the gastric lumen and prior  cholecystectomy.             Impression:       1. No PE at least level of the lobar arteries on somewhat limited  contrast bolus timing with cardiac silhouette in normal limits and  without evidence of right heart strain or central filling defect within  the pulmonary arteries to suggest PE.  2. Interlobular septal thickening of the lower lobe predominance  consistent with interstitial edema pattern along with small right and  trace to small left pleural effusions and adjacent atelectasis.  3. No focal pulmonary consolidation or infiltrates.          XR Chest 1 View [[id number]] Collected:  07/01/19 1631     Updated:   07/01/19 1649    Narrative:       EXAMINATION: XR CHEST 1 VW- 07/01/2019      INDICATION: SOA triage protocol      COMPARISON: 05/16/2018     FINDINGS: Portable chest reveals heart to be borderline enlarged. Small  bilateral pleural effusions with mild increased markings at lung bases  bilaterally. The upper lung fields are clear.           Impression:       Small bilateral pleural effusions with mild increased  markings at lung bases. The upper lung fields are clear.     D:  07/01/2019  E:  07/01/2019     This report was finalized on 7/1/2019 4:46 PM by Dr. Lupe Palencia MD.                Assessment/Plan   Assessment / Plan     Active Hospital Problems    Diagnosis POA   • **Pleural effusion [J90] Yes   • GERD (gastroesophageal reflux disease) [K21.9] Yes     EGD 12/16 [facility] - reflux, HH. no barretts     • Asthma [J45.909] Yes     ALLERGIC         ASSESSMENT and PLAN:    1.  Bilateral pleural effusion with elevated inflammatory markers   -consult pulmonary in the am for possible thoracentesis   -npo after midnight   -Solu-Medrol 60 mg IV BID   -incentive spirometry   -rheumatoid factor, tien with reflex, regional allergen zone 8 with IgE, Strongyloides antibody IgG, Dasha pending (recommended by pulm)   -cbc, bmp in the am    2.  History of asthma   -symbicort    3.  GERD    4.  Elevated inflammatory markers   -followed by Dr. Pinky Thakur   -biopsy of rash on abdomen last week with results pending r/o mixed connective tissue disease       DVT prophylaxis:  Mechanical    CODE STATUS:    Code Status and Medical Interventions:   Ordered at: 07/01/19 0804     Level Of Support Discussed With:    Patient     Code Status:    CPR     Medical Interventions (Level of Support Prior to Arrest):    Full       Admission Status:  I believe this patient meets OBSERVATION status, however if further evaluation or treatment plans warrant, status may change.  Based upon current information, I predict patient's care encounter to  be less than or equal to 2 midnights.       Electronically signed by SULEMA Beach, 07/01/19, 9:49 PM.      Brief Attending Admission Attestation     I have seen and examined the patient, performing an independent face-to-face diagnostic evaluation with plan of care reviewed and developed with the advanced practice clinician (APC).      Brief Summary Statement:   Barbara Coelho is a 69 y.o. female with PMH significant for asthma, allergies, GERD, and history of DVT.  She developed left sided chest pain today associated with nausea, diaphoresis and some LE edema.  In the 3-4 days prior, she has had migratory pains of the left leg, left shoulder, left groin and right axilla.  Of note, she is being evaluated by Dr. Pinky Thakur for mixed connective tissue disease.  In the ER today, She was noted to have elevated inflammatory markers with negative troponins and EKG.  On CTA, she was found to have NO PE, but did have interlobular septal thickening (of the lower lobe predominant) and small right and trace to small left pleural effusions.      Dr. Chauhan was consulted by the ER and advised admission with pulm consult in the AM with consideration for thoracentesis.    Remainder of detailed HPI is as noted above and has been reviewed and/or edited by me for completeness.      Attending Physical Exam:  Constitutional: Awake, alert  Eyes: PERRLA, sclerae anicteric, no conjunctival injection  HENT: NCAT, mucous membranes moist  Neck: Supple, no thyromegaly, no lymphadenopathy, trachea midline  Respiratory: Coarse auscultation bilaterally, nonlabored respirations   Cardiovascular: RRR, no murmurs, rubs, or gallops, palpable pedal pulses bilaterally  Gastrointestinal: Positive bowel sounds, soft, nontender, nondistended  Musculoskeletal: No bilateral ankle edema, no clubbing or cyanosis to extremities  Psychiatric: Appropriate affect, cooperative  Neurologic: Oriented x 3, strength symmetric in all extremities,  Cranial Nerves grossly intact to confrontation, speech clear  Skin: Macular widespread dark rash anterior thighs (sin distribution area)        Brief Assessment/Plan :  See above for further detailed assessment and plan developed with APC which I have reviewed and/or edited for completeness.      Electronically signed by Anna Marie Gonzalez MD, 07/01/19, 11:23 PM.

## 2019-07-02 NOTE — PROGRESS NOTES
Discharge Planning Assessment  [facility]     Patient Name: Barbara Coelho  MRN: [id number]  Today's Date: 7/2/2019    Admit Date: 7/1/2019    Discharge Needs Assessment     Row Name 07/02/19 1203       Living Environment    Lives With  other (see comments)    Name(s) of Who Lives With Patient  Pts 89 Mother in law lives with her    Current Living Arrangements  home/apartment/condo    Primary Care Provided by  self    Provides Primary Care For  other (see comments) Pt assists her mother in law if needed    Family Caregiver if Needed  child(desirae), adult    Quality of Family Relationships  involved;helpful;supportive    Able to Return to Prior Arrangements  yes    Living Arrangement Comments  Spoke with pt in room with permission in regards to discharge plan. Pts daughter and grandchild present. Pt resides in [facility] and her mother in law lives with her.        Resource/Environmental Concerns    Resource/Environmental Concerns  none       Transition Planning    Patient/Family Anticipates Transition to  home with family    Patient/Family Anticipated Services at Transition      Transportation Anticipated  car, drives self;family or friend will provide       Discharge Needs Assessment    Readmission Within the Last 30 Days  no previous admission in last 30 days    Concerns to be Addressed  denies needs/concerns at this time    Equipment Currently Used at Home  bipap/cpap    Equipment Needed After Discharge  bipap/cpap    Discharge Coordination/Progress  Pt has [employer] Replacement insurance and denies recent changes in insurance. Pt has prescription coverage and uses [facility] in [place]. Plan is home when medically ready for discharge. Pts daughter states she lives just down the road and will help her.  Daughter will transport pt home. Pt denies discharge needs.  CM will cont to follow        Discharge Plan     Row Name 07/02/19 8114       Plan    Plan  discharge plan     Patient/Family in Agreement with Plan  yes    Plan Comments  Plan is home with family at discharge. Pt denies discharge needs.  Daughter will transport pt home and assist her as needed,. CM will cont to follow        Destination      No service coordination in this encounter.      Durable Medical Equipment      No service coordination in this encounter.      Dialysis/Infusion      No service coordination in this encounter.      Home Medical Care      No service coordination in this encounter.      Therapy      No service coordination in this encounter.      Community Resources      No service coordination in this encounter.        Expected Discharge Date and Time     Expected Discharge Date Expected Discharge Time    Jul 4, 2019         Demographic Summary     Row Name 07/02/19 1155       General Information    General Information Comments  Pts PCP is Lakeshia Martel       Contact Information    Permission Granted to Share Info With      Contact Information Obtained for      Contact Information Comments  Lindsay Caldera( daughter): [phone]        Functional Status     Row Name 07/02/19 1156       Functional Status    Functional Status Comments  Pt is independent of ADLs prior to admission        Psychosocial    No documentation.       Abuse/Neglect    No documentation.       Legal    No documentation.       Substance Abuse    No documentation.       Patient Forms    No documentation.           Shae Estrada RN

## 2019-07-02 NOTE — PLAN OF CARE
Problem: Fall Risk (Adult)  Intervention: Monitor/Assist with Self Care   07/02/19 1708   Activity   Activity Assistance Provided independent;assistance, stand-by   Daily Care Interventions   Self-Care Promotion independence encouraged;BADL personal objects within reach     Intervention: Reduce Risk/Promote Restraint Free Environment   07/02/19 1708   Safety Management   Environmental Safety Modification clutter free environment maintained;lighting adjusted   Safety Promotion/Fall Prevention nonskid shoes/slippers when out of bed     Intervention: Review Medications/Identify Contributors to Fall Risk   07/02/19 1708   Safety Management   Medication Review/Management medications reviewed       Goal: Absence of Fall  Outcome: Ongoing (interventions implemented as appropriate)   07/02/19 1708   Fall Risk (Adult)   Absence of Fall making progress toward outcome

## 2019-07-02 NOTE — NURSING NOTE
Right thoracentesis done by Dr Mccloud.  Minimal fluid noted on CT.  65 ml removed and sent to lab for testing.  Report called to 5G.

## 2019-07-03 VITALS
WEIGHT: 175.6 LBS | BODY MASS INDEX: 29.26 KG/M2 | SYSTOLIC BLOOD PRESSURE: 122 MMHG | OXYGEN SATURATION: 92 % | RESPIRATION RATE: 18 BRPM | DIASTOLIC BLOOD PRESSURE: 64 MMHG | HEART RATE: 71 BPM | HEIGHT: 65 IN | TEMPERATURE: 97.8 F

## 2019-07-03 PROBLEM — R09.1 PLEURITIS: Status: ACTIVE | Noted: 2019-07-03

## 2019-07-03 LAB
CENTROMERE B AB SER-ACNC: <0.2 AI (ref 0–0.9)
CHROMATIN AB SERPL-ACNC: <0.2 AI (ref 0–0.9)
DSDNA AB SER-ACNC: 1 IU/ML (ref 0–9)
ENA JO1 AB SER-ACNC: <0.2 AI (ref 0–0.9)
ENA RNP AB SER-ACNC: 0.4 AI (ref 0–0.9)
ENA SCL70 AB SER-ACNC: <0.2 AI (ref 0–0.9)
ENA SM AB SER-ACNC: <0.2 AI (ref 0–0.9)
ENA SS-A AB SER-ACNC: <0.2 AI (ref 0–0.9)
ENA SS-B AB SER-ACNC: <0.2 AI (ref 0–0.9)
GLUCOSE FLD-MCNC: <54 MG/DL
LDH FLD-CCNC: >1066 U/L
LDH SERPL-CCNC: 215 U/L (ref 135–214)
Lab: NORMAL
PROT FLD-MCNC: 4.1 G/DL

## 2019-07-03 PROCEDURE — 86658 ENTEROVIRUS ANTIBODY: CPT | Performed by: INTERNAL MEDICINE

## 2019-07-03 PROCEDURE — 86481 TB AG RESPONSE T-CELL SUSP: CPT | Performed by: INTERNAL MEDICINE

## 2019-07-03 PROCEDURE — 94799 UNLISTED PULMONARY SVC/PX: CPT

## 2019-07-03 PROCEDURE — G0378 HOSPITAL OBSERVATION PER HR: HCPCS

## 2019-07-03 PROCEDURE — 83615 LACTATE (LD) (LDH) ENZYME: CPT | Performed by: INTERNAL MEDICINE

## 2019-07-03 PROCEDURE — 99225 PR SBSQ OBSERVATION CARE/DAY 25 MINUTES: CPT | Performed by: INTERNAL MEDICINE

## 2019-07-03 PROCEDURE — 99217 PR OBSERVATION CARE DISCHARGE MANAGEMENT: CPT | Performed by: HOSPITALIST

## 2019-07-03 RX ORDER — HYDROXYCHLOROQUINE SULFATE 200 MG/1
200 TABLET, FILM COATED ORAL 2 TIMES DAILY
Qty: 60 TABLET | Refills: 0 | Status: SHIPPED | OUTPATIENT
Start: 2019-07-03 | End: 2023-03-30

## 2019-07-03 RX ORDER — TRAMADOL HYDROCHLORIDE 50 MG/1
50 TABLET ORAL EVERY 6 HOURS PRN
Qty: 12 TABLET | Refills: 0 | Status: SHIPPED | OUTPATIENT
Start: 2019-07-03 | End: 2020-02-05

## 2019-07-03 RX ADMIN — VENLAFAXINE HYDROCHLORIDE 150 MG: 75 CAPSULE, EXTENDED RELEASE ORAL at 08:34

## 2019-07-03 RX ADMIN — FLUTICASONE PROPIONATE 1 SPRAY: 50 SPRAY, METERED NASAL at 08:37

## 2019-07-03 RX ADMIN — BUDESONIDE AND FORMOTEROL FUMARATE DIHYDRATE 2 PUFF: 160; 4.5 AEROSOL RESPIRATORY (INHALATION) at 08:31

## 2019-07-03 RX ADMIN — CETIRIZINE HYDROCHLORIDE 10 MG: 10 TABLET, FILM COATED ORAL at 08:34

## 2019-07-03 RX ADMIN — SODIUM CHLORIDE, PRESERVATIVE FREE 3 ML: 5 INJECTION INTRAVENOUS at 08:37

## 2019-07-03 NOTE — DISCHARGE SUMMARY
[facility] Medicine Services  DISCHARGE SUMMARY    Patient Name: Barbara Coelho  : 1950  MRN: [id number]    Date of Admission: 2019  Date of Discharge:  19    Primary Care Physician: Ania Martel MD  Rheumatologist: Dr. Pinky Thakur   Consulting Pulmonologist: Dr. 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      Hospital Course:  Barbara Coelho is a 69 y.o. female with a past medical history of asthma, GERD, depression, constipation, and osteoarthritis who presented to the [facility] ER on  for evaluation of acute left lateral pleuritic chest pain that followed transient right lateral pleuritic chest pain on the days prior.  Evaluation in the ER disclosed bilateral pleural effusions and elevated inflammatory markers. CTA chest showed no PE and no infiltrates. Pt has had significant arthritis affecting multiple joints over the past several months and has been following with Dr. Thakur who suspects that she may have mixed connective tissue disease (pt's sister does have MCTD).      Pt's pain was managed with as needed IV dilaudid and she underwent a diagnostic and therapeutic thoracentesis on  with removal of 65mL of cloudy, straw-colored fluid.  Diagnostic pleural studies showed exudative fluid with differential including empyema (however no clinical evidence of  pneumonia), tuberculosis (however no risk factors or symptoms), and autoimmune pleuritis (favored diagnosis). Extensive autoimmune workup is pending. She does have a positive Rheumatoid Factor.  Dr. Sellers discussed case with Dr. Thakur who recommended initiation of plaquenil 200mg twice daily. Close follow up is planned with both Annabella Thakur and Marlo.     At time of discharge, pt was on room air and without significant dyspnea but she was still having intermittent pleuritic chest pain.  She was discharged with a short course of tramadol for supportive care. JUSTUS was checked at time of discharge and was appropriate.     Discharge Follow Up Recommendations for labs/diagnostics:  1. Follow up with Dr. Thakur within the next few weeks as available.  2. Follow up with Dr. Milan Sellers in 2 weeks.  3. Follow up with PCP as needed.     Day of Discharge     HPI:   F/u pleurisy    Pt resting comfortably on room air when seen. She is still having intermittent right pleurisy but denies cough or dyspnea.      Review of Systems  Otherwise ROS is negative except as mentioned in the HPI.    Vital Signs:   Temp:  [97.5 °F (36.4 °C)-97.9 °F (36.6 °C)] 97.8 °F (36.6 °C)  Heart Rate:  [71-86] 71  Resp:  [18] 18  BP: (115-122)/(63-65) 122/64     Physical Exam:  Constitutional: No acute distress, awake, alert  HENT: NCAT, mucous membranes moist  Respiratory: Clear to auscultation but incomplete expansion respiratory effort normal, nonlabored respirations, slight hesitation with deep breath  Cardiovascular: RRR, no murmurs, rubs, or gallops, palpable pedal pulses bilaterally  Gastrointestinal: Positive bowel sounds, soft, nontender, nondistended  Musculoskeletal: No bilateral ankle edema  Psychiatric: Appropriate affect, cooperative    Pertinent  and/or Most Recent Results     Results from last 7 days   Lab Units 07/02/19  0554 07/01/19  1626   WBC 10*3/mm3 6.04 6.31   HEMOGLOBIN g/dL 11.1* 11.7*   HEMATOCRIT % 34.8 37.2   PLATELETS  10*3/mm3 320 323   SODIUM mmol/L 138 137   POTASSIUM mmol/L 4.8 4.0   CHLORIDE mmol/L 101 102   CO2 mmol/L 27.0 27.0   BUN mg/dL 14 10   CREATININE mg/dL 0.84 0.78   GLUCOSE mg/dL 205* 112*   CALCIUM mg/dL 8.4* 8.1*     Results from last 7 days   Lab Units 07/02/19  0923 07/01/19  1626   BILIRUBIN mg/dL  --  0.3   ALK PHOS U/L  --  84   ALT (SGPT) U/L  --  8   AST (SGOT) U/L  --  14   PROTIME Seconds 13.4  --    INR  1.07  --    APTT seconds 25.6  --            Invalid input(s): TG, LDLCALC, LDLREALC  Results from last 7 days   Lab Units 07/01/19  1626   PROBNP pg/mL 437.3   TROPONIN T ng/mL <0.010       Brief Urine Lab Results  (Last result in the past 365 days)      Color   Clarity   Blood   Leuk Est   Nitrite   Protein   CREAT   Urine HCG        02/27/19 1424 Dark Yellow Clear Negative Negative Negative Negative               Microbiology Results Abnormal     Procedure Component Value - Date/Time    AFB Culture - Body Fluid, Pleural Cavity [[id number]] Collected:  07/02/19 1115    Lab Status:  Preliminary result Specimen:  Body Fluid from Pleural Cavity Updated:  07/03/19 1159     AFB Stain No acid fast bacilli seen on concentrated smear    Body Fluid Culture - Body Fluid, Pleural Cavity [[id number]] Collected:  07/02/19 1115    Lab Status:  Preliminary result Specimen:  Body Fluid from Pleural Cavity Updated:  07/03/19 1016     Body Fluid Culture No growth     Gram Stain No organisms seen      Many (4+) WBCs per low power field          Imaging Results (all)     Procedure Component Value Units Date/Time    CT Guided Thoracentesis [[id number]] Collected:  07/02/19 1234     Updated:  07/02/19 2143    Narrative:       EXAMINATION: CT-GUIDED RIGHT DIAGNOSTIC THORACENTESIS     HISTORY: Pleural effusions.     COMPARISON: Unenhanced chest CT scan 07/01/2019.     FINDINGS: Informed written consent was obtained from the patient prior  to the beginning. We discussed the procedure in detail including  potential risks of  bleeding, infection and lung collapse. Ms. Coelho  indicates her understanding and agrees to proceed.     With the patient in LPO position on the CT scan table, the skin  overlying the relatively small right pleural effusion was marked,  prepped and draped in sterile fashion and 1% lidocaine infiltrated into  the skin and subcutaneous tissues as local anesthesia. Effusion is  borderline too small for thoracentesis catheter, and with the patient  change in positions, and with different breath holds, there is somewhat  increased risk from catheter drainage.  It was decided to perform FNA  for diagnosis as the patient was not significantly short of breath, and  effusions are relatively small.     The skin overlying the right pleural effusion was marked, prepped and  draped in sterile fashion and 1% lidocaine infiltrated into the skin  subcutaneous tissues and down the pleura with a 1-1/2 inch 25-gauge  needle. Subsequently, a 20-gauge Chiba needle was advanced to the  effusion, but fluid could only be withdrawn in fits and starts despite  apparent appropriate positioning of the needle, and repositioning. This  needle was ultimately withdrawn and an 18-gauge Chiba needle advanced  into the effusion under CT guidance, with somewhat better flow of  pleural fluid into the needle. A total of 65 cc of cloudy almost turbid  straw-colored fluid was removed. The needle was then withdrawn and  pressure held at the stick site for 3 minutes. Followup scanning shows  small residual effusion and no evidence of hemorrhage or pneumothorax.  Samples were distributed to labeled containers appropriate for labs  previously ordered and hand-carried to pathology by the technologist.  There were no complications. Ms. Coelho tolerated the procedure very  well.       Impression:       CT guided diagnostic right thoracentesis without  complication.     D:  07/02/2019  E:  07/02/2019     This report was finalized on 7/2/2019 9:40 PM by DR. Franklin  MD Rogers.       CT Angiogram Chest With Contrast [[id number]] Collected:  07/01/19 1734     Updated:  07/02/19 1704    Narrative:       EXAMINATION: CT ANGIOGRAM CHEST W CONTRAST-      INDICATION: Chest pain, questionable PE.      TECHNIQUE: CT angiogram chest with intravenous contrast administration  following PE protocol.  Two-D reconstructions performed.     The radiation dose reduction device was turned on for each scan per the  ALARA (As Low as Reasonably Achievable) protocol.     COMPARISON: None.     FINDINGS: Thyroid is homogeneous in attenuation. No bulky mediastinal  adenopathy. Central airways are patent. Esophagus is in normal course  with thickened mucosa of the distal segment. No hiatal hernia. Patent  nonaneurysmal thoracic aorta with patent great vessel origins.  Satisfactory opacification of the pulmonary arteries to the level of the  lobar arteries in particular without filling defect in the segments to  suggest pulmonary embolus. Cardiac size within normal limits without  pericardial effusion. Extended lung windows demonstrate intralobular  septal thickening of a lower lobe predominance consistent with  interstitial edema pattern and decompensation as there is small right  and trace to small left pleural effusions with adjacent atelectasis. No  focal consolidation within the parenchyma otherwise noted. No soft  tissue body wall findings of concern. Visual is portions of the upper  abdomen demonstrate postoperative changes of the gastric lumen and prior  cholecystectomy.       Impression:       1. No PE at least to the level of the lobar arteries on somewhat limited  contrast bolus timing with cardiac silhouette in normal limits and  without evidence of right heart strain or central filling defect within  the pulmonary arteries to suggest PE.  2. Interlobular septal thickening of the lower lobe predominance  consistent with interstitial edema pattern along with small right and  trace to small left  pleural effusions and adjacent atelectasis.  3. No focal pulmonary consolidation or infiltrates.     D:  07/01/2019  E:  07/02/2019     This report was finalized on 7/2/2019 5:06 PM by Dr. Jonathan Paul.       XR Chest 1 View [[id number]] Collected:  07/01/19 1639     Updated:  07/01/19 1649    Narrative:       EXAMINATION: XR CHEST 1 VW- 07/01/2019      INDICATION: SOA triage protocol      COMPARISON: 05/16/2018     FINDINGS: Portable chest reveals heart to be borderline enlarged. Small  bilateral pleural effusions with mild increased markings at lung bases  bilaterally. The upper lung fields are clear.           Impression:       Small bilateral pleural effusions with mild increased  markings at lung bases. The upper lung fields are clear.     D:  07/01/2019  E:  07/01/2019     This report was finalized on 7/1/2019 4:46 PM by Dr. Lupe Palencia MD.             Results for orders placed during the hospital encounter of 03/30/18   Duplex Venous Upper Extremity - Left CAR    Narrative · Normal left upper extremity venous duplex scan.          Results for orders placed during the hospital encounter of 03/30/18   Duplex Venous Upper Extremity - Left CAR    Narrative · Normal left upper extremity venous duplex scan.                Order Current Status    ARNDI Comprehensive Panel In process    Anaerobic Culture - Pleural Fluid, Pleural Cavity In process    Coxsackie Virus Group B Ab In process    Fungus Culture - Body Fluid, Pleural Cavity In process    Fungus Smear - Body Fluid, Pleural Cavity In process    Non-gynecologic Cytology In process    Regional Allergen Zone 8 / With IgE In process    Strongyloides Antibody IgG, ALMA In process    TSPOT In process    AFB Culture - Body Fluid, Pleural Cavity Preliminary result    Body Fluid Culture - Body Fluid, Pleural Cavity Preliminary result        Discharge Details        Discharge Medications      New Medications      Instructions Start Date   hydroxychloroquine 200 MG  tablet  Commonly known as:  PLAQUENIL   200 mg, Oral, 2 Times Daily      traMADol 50 MG tablet  Commonly known as:  ULTRAM   50 mg, Oral, Every 6 Hours PRN         Continue These Medications      Instructions Start Date   CLARINEX 5 MG tablet  Generic drug:  desloratadine   5 mg, Oral, Nightly      fluticasone 50 MCG/ACT nasal spray  Commonly known as:  FLONASE   1 spray, Nasal, 2 Times Daily      fluticasone-salmeterol 500-50 MCG/DOSE DISKUS  Commonly known as:  ADVAIR   1 puff, Inhalation, 2 Times Daily - RT      venlafaxine  MG 24 hr capsule  Commonly known as:  EFFEXOR-XR   150 mg, Oral, Daily             Allergies   Allergen Reactions   • Celebrex [Celecoxib] Hives     HIVES    • Sulfa Antibiotics Itching     ITCHING          Discharge Disposition:  Home or Self Care    Discharge Diet:  Diet Order   Procedures   • Diet Regular         Discharge Activity:   Activity Instructions     Activity as Tolerated              CODE STATUS:    Code Status and Medical Interventions:   Ordered at: 07/01/19 7894     Level Of Support Discussed With:    Patient     Code Status:    CPR     Medical Interventions (Level of Support Prior to Arrest):    Full         Future Appointments   Date Time Provider Department Center   7/29/2019 10:30 AM Porter Sellers MD [facility] None       Additional Instructions for the Follow-ups that You Need to Schedule     Discharge Follow-up with PCP   As directed       Currently Documented PCP:    Ania Martel MD    PCP Phone Number:    [phone]     Follow Up Details:  as needed         Discharge Follow-up with Specified Provider: Dr. Pinky Thakur, Rheumatology, first available within next 2 weeks   As directed      To:  Dr. Pinky Thakur, Rheumatology, first available within next 2 weeks         Discharge Follow-up with Specified Provider: Dr. Milan Sellers [facility]; 2 Weeks   As directed      To:  Dr. Milan Sellers [facility]    Follow Up:  2 Weeks                Time Spent on Discharge:  40 minutes    Electronically signed by Keven Lang MD, 07/03/19, 1:31 PM.

## 2019-07-03 NOTE — PROGRESS NOTES
Pulmonary Service Follow-up      LOS: 0 days     Ms. Barbara Coelho, 69 y.o. female is followed for: Pleural effusion     Subjective - Interval History     Feels okay today  Minimal chest discomfort  Skin lesions have been slowly improving  Oxygenating adequately on room air  Eating well    The patient's relevant past medical, surgical and social history were reviewed and updated in Epic as appropriate.     Objective     Medications:    budesonide-formoterol 2 puff Inhalation BID - RT   cetirizine 10 mg Oral Daily   fluticasone 1 spray Nasal BID   sodium chloride 3 mL Intravenous Q12H   venlafaxine  mg Oral Daily     Intake/Output       07/02/19 0700 - 07/03/19 0659 07/03/19 0700 - 07/04/19 0659    Intake (ml) 120 --    Output (ml) 350 700    Net (ml) -230 -700        Vital Sign Min/Max for last 24 hours  Temp  Min: 97.5 °F (36.4 °C)  Max: 97.9 °F (36.6 °C)   BP  Min: 115/63  Max: 122/64   Pulse  Min: 71  Max: 86   Resp  Min: 18  Max: 18   SpO2  Min: 92 %  Max: 96 %   No Data Recorded        Physical Exam:   GENERAL: Awake, no distress   HEENT: No adenopathy or thyromegaly   LUNGS: A few basilar crackles, no wheezes   HEART: Regular rate and rhythm without murmurs   GI: Soft, nontender   EXTREMITIES: No clubbing or cyanosis   NEURO/PSYCH: Awake, alert, appropriate    Results from last 7 days   Lab Units 07/02/19  0554 07/01/19  1626   WBC 10*3/mm3 6.04 6.31   HEMOGLOBIN g/dL 11.1* 11.7*   PLATELETS 10*3/mm3 320 323     Results from last 7 days   Lab Units 07/02/19  0554 07/01/19  1626   SODIUM mmol/L 138 137   POTASSIUM mmol/L 4.8 4.0   CO2 mmol/L 27.0 27.0   BUN mg/dL 14 10   CREATININE mg/dL 0.84 0.78   GLUCOSE mg/dL 205* 112*     Estimated Creatinine Clearance: 65 mL/min (by C-G formula based on SCr of 0.84 mg/dL).               Images: Chest x-ray and CT scan is noted with right greater than left small pleural effusions and some basilar atelectasis no consolidation    I reviewed the patient's results and  images.     Impression      Active Hospital Problems    Diagnosis   • **Bilateral pleural effusions (Exudate on Right with high WBC)   • Mixed connective tissue disease (CMS/HCC)     Dr. Thakur     • GERD (gastroesophageal reflux disease)     EGD 12/16 [place] - reflux, HH. no barretts     • Asthma     ALLERGIC     • Skin lesions (Recent Bx results pending)            Plan        Pleural fluid analysis most consistent with either a tuberculous pleuritis or autoimmune pleuritis.  Empyema highly unlikely without organisms on the Gram stain and also given the patient's clinical picture which is certainly nontoxic.  A T-Spot is pending then I think the most likely etiology for her pleural effusions is an autoimmune related pleuritis which would go along with her somewhat ill-defined systemic/autoimmune symptoms for which she has been evaluated over the last several months.    Discussed with Dr. Pinky Thakur, rheumatology, on the phone.  Apparently, they had discussed initiating Plaquenil therapy in the past.  Given the patient's nontoxic state I think it is reasonable to initiate immunosuppressive therapy, such as Plaquenil, without adding high-dose steroids.    We will start Plaquenil 250 mg twice a day.  She will follow-up with Dr. Thakur as an outpatient.  An appointment already has been scheduled.  We will follow-up on the T spot and cultures and obviously change therapy direction if either of those are positive/abnormal.    We can follow-up in the office as an outpatient in the next couple of weeks     I discussed the patient's findings and my recommendations with patient, family and primary care team   .    TIMBO Sellers MD  Pulmonary and Critical Care Medicine

## 2019-07-03 NOTE — PLAN OF CARE
Problem: Patient Care Overview  Goal: Plan of Care Review  Outcome: Ongoing (interventions implemented as appropriate)   07/03/19 0420   Coping/Psychosocial   Plan of Care Reviewed With patient   Plan of Care Review   Progress improving       Problem: Fall Risk (Adult)  Goal: Absence of Fall  Outcome: Ongoing (interventions implemented as appropriate)   07/03/19 0420   Fall Risk (Adult)   Absence of Fall making progress toward outcome

## 2019-07-03 NOTE — PROGRESS NOTES
Continued Stay Note   [place]     Patient Name: Barbara Coelho  MRN: [id number]  Today's Date: 7/3/2019    Admit Date: 7/1/2019    Discharge Plan     Row Name 07/03/19 1212       Plan    Plan  discharge plan    Patient/Family in Agreement with Plan  yes    Plan Comments  Spoke with pt and pts daughter in room. Pt doesnt anticipate any discharge needs at this time.  CM will cont to follow        Discharge Codes    No documentation.       Expected Discharge Date and Time     Expected Discharge Date Expected Discharge Time    Jul 4, 2019             Shae Estrada RN

## 2019-07-03 NOTE — PROGRESS NOTES
Case Management Discharge Note    Final Note: Plan is home with family. Denies discharge needs.     Destination      No service has been selected for the patient.      Durable Medical Equipment      No service has been selected for the patient.      Dialysis/Infusion      No service has been selected for the patient.      Home Medical Care      No service has been selected for the patient.      Therapy      No service has been selected for the patient.      Community Resources      No service has been selected for the patient.             Final Discharge Disposition Code: 01 - home or self-care

## 2019-07-04 ENCOUNTER — READMISSION MANAGEMENT (OUTPATIENT)
Dept: CALL CENTER | Facility: HOSPITAL | Age: 69
End: 2019-07-04

## 2019-07-04 LAB
STRONGYLOIDES AB SER-ACNC: NEGATIVE
TSPOT INTERPRETATION: NEGATIVE
TSPOT NIL CONTROL INTERPRETATION: NORMAL
TSPOT PANEL A: 0
TSPOT PANEL B: 0
TSPOT POS CONTROL INTERPRETATION: NORMAL

## 2019-07-04 NOTE — OUTREACH NOTE
Prep Survey      Responses   Facility patient discharged from?  [facility]   Is patient eligible?  Yes   Discharge diagnosis  Pleural effusion   Skin lesions (Recent Bx results pending   Does the patient have one of the following disease processes/diagnoses(primary or secondary)?  Other   Does the patient have Home health ordered?  No   Is there a DME ordered?  No   Prep survey completed?  Yes          Holley Nova RN

## 2019-07-05 ENCOUNTER — TRANSITIONAL CARE MANAGEMENT TELEPHONE ENCOUNTER (OUTPATIENT)
Dept: INTERNAL MEDICINE | Facility: CLINIC | Age: 69
End: 2019-07-05

## 2019-07-05 LAB
BACTERIA FLD CULT: NORMAL
GRAM STN SPEC: NORMAL
GRAM STN SPEC: NORMAL
LAB AP CASE REPORT: NORMAL
LAB AP FLOW CYTOMETRY SUMMARY: NORMAL
PATH REPORT.FINAL DX SPEC: NORMAL

## 2019-07-05 NOTE — OUTREACH NOTE
MARY call completed. See TCM call flowsheet for details.    Pt states feeling better, just tired. She denies n/v, cough, dyspnea, or pleuritic chest pain. She does report pain at the back of her left knee that started today. She states she had the same pain about a week before hospitalization that moved up into her groin and then to her chest which prompt her to go to the ER. Encd her to report back to ER with worsening pain and/or new symptoms and she verb understanding. She declined an appt with pcp and denies any questions or needs at this time.

## 2019-07-06 LAB — GIE STN SPEC: NORMAL

## 2019-07-07 LAB
A ALTERNATA IGE QN: >100 KU/L
A FUMIGATUS IGE QN: >100 KU/L
AMER ROACH IGE QN: 7.61 KU/L
BACTERIA SPEC ANAEROBE CULT: NORMAL
BAHIA GRASS IGE QN: 5.28 KU/L
BERMUDA GRASS IGE QN: 3.95 KU/L
BOXELDER IGE QN: 7.26 KU/L
C HERBARUM IGE QN: >100 KU/L
CAT DANDER IGG QN: 5.56 KU/L
CMN PIGWEED IGE QN: 8.65 KU/L
COMMON RAGWEED IGE QN: 4.51 KU/L
CONV CLASS DESCRIPTION: ABNORMAL
D FARINAE IGE QN: 86.7 KU/L
D PTERONYSS IGE QN: 33.8 KU/L
DOG DANDER IGE QN: 6.67 KU/L
ENGL PLANTAIN IGE QN: 5.58 KU/L
HAZELNUT POLN IGE QN: 4.09 KU/L
JOHNSON GRASS IGE QN: 7.09 KU/L
KENT BLUE GRASS IGE QN: 26.6 KU/L
M RACEMOSUS IGE QN: 13.8 KU/L
MT JUNIPER IGE QN: 19.1 KU/L
MUGWORT IGE QN: 3.97 KU/L
NETTLE IGE QN: 5.6 KU/L
P NOTATUM IGE QN: >100 KU/L
S BOTRYOSUM IGE QN: >100 KU/L
SHEEP SORREL IGE QN: 3.76 KU/L
SWEET GUM IGE QN: 3.68 KU/L
T011-IGE MAPLE LEAF SYCAMORE: 7.62 KU/L
TOTAL IGE SMQN RAST: 1130 IU/ML (ref 6–495)
WHITE ELM IGE QN: 4.4 KU/L
WHITE HICKORY IGE QN: 9.23 KU/L
WHITE MULBERRY IGE QN: 3.88 KU/L
WHITE OAK IGE QN: 4.6 KU/L

## 2019-07-08 ENCOUNTER — READMISSION MANAGEMENT (OUTPATIENT)
Dept: CALL CENTER | Facility: HOSPITAL | Age: 69
End: 2019-07-08

## 2019-07-08 NOTE — OUTREACH NOTE
Medical Week 1 Survey      Responses   Facility patient discharged from?  [facility]   Does the patient have one of the following disease processes/diagnoses(primary or secondary)?  Other   Is there a successful TCM telephone encounter documented?  Yes          Dilcia Doe RN

## 2019-07-10 LAB
CV B1 AB TITR SER CF: NORMAL {TITER}
CV B2 AB TITR SER CF: NORMAL {TITER}
CV B3 AB TITR SER CF: NORMAL {TITER}
CV B4 AB TITR SER CF: NORMAL {TITER}
CV B5 AB TITR SER CF: NORMAL {TITER}
CV B6 AB TITR SER CF: NORMAL {TITER}

## 2019-07-11 ENCOUNTER — READMISSION MANAGEMENT (OUTPATIENT)
Dept: CALL CENTER | Facility: HOSPITAL | Age: 69
End: 2019-07-11

## 2019-07-11 NOTE — OUTREACH NOTE
Medical Week 2 Survey      Responses   Facility patient discharged from?  [facility]   Does the patient have one of the following disease processes/diagnoses(primary or secondary)?  Other   Week 2 attempt successful?  No   Unsuccessful attempts  Attempt 1          Alicia Baltazar RN

## 2019-07-12 ENCOUNTER — READMISSION MANAGEMENT (OUTPATIENT)
Dept: CALL CENTER | Facility: HOSPITAL | Age: 69
End: 2019-07-12

## 2019-07-12 NOTE — OUTREACH NOTE
Medical Week 2 Survey      Responses   Facility patient discharged from?  [facility]   Does the patient have one of the following disease processes/diagnoses(primary or secondary)?  Other   Week 2 attempt successful?  Yes   Call start time  1401   Discharge diagnosis  Pleural effusion   Skin lesions (Recent Bx results pending   Call end time  1406   Meds reviewed with patient/caregiver?  Yes   Is the patient having any side effects they believe may be caused by any medication additions or changes?  No   Does the patient have all medications ordered at discharge?  Yes   Is the patient taking all medications as directed (includes completed medication regime)?  Yes   Does the patient have a primary care provider?   Yes   Does the patient have an appointment with their PCP within 7 days of discharge?  Yes   Has the patient kept scheduled appointments due by today?  Yes   Has home health visited the patient within 72 hours of discharge?  N/A   Psychosocial issues?  No   Did the patient receive a copy of their discharge instructions?  Yes   Nursing interventions  Reviewed instructions with patient   What is the patient's perception of their health status since discharge?  Improving   Is the patient/caregiver able to teach back signs and symptoms related to disease process for when to call PCP?  Yes   Is the patient/caregiver able to teach back signs and symptoms related to disease process for when to call 911?  Yes   Is the patient/caregiver able to teach back the hierarchy of who to call/visit for symptoms/problems? PCP, Specialist, Home health nurse, Urgent Care, ED, 911  Yes   Additional teach back comments  Pt says she is doing good, no questions or concerns at this time.   Week 2 Call Completed?  Yes          Sujatha Day RN

## 2019-07-19 ENCOUNTER — READMISSION MANAGEMENT (OUTPATIENT)
Dept: CALL CENTER | Facility: HOSPITAL | Age: 69
End: 2019-07-19

## 2019-07-19 NOTE — OUTREACH NOTE
Medical Week 3 Survey      Responses   Facility patient discharged from?  [facility]   Does the patient have one of the following disease processes/diagnoses(primary or secondary)?  Other   Week 3 attempt successful?  Yes   Call start time  1601   Call end time  1603   Discharge diagnosis  Pleural effusion   Skin lesions (Recent Bx results pending   Is the patient having any side effects they believe may be caused by any medication additions or changes?  No   Is the patient taking all medications as directed (includes completed medication regime)?  Yes   Does the patient have a primary care provider?   Yes   Does the patient have an appointment with their PCP within 7 days of discharge?  No [Order was 'as needed' and she hasn't needed due to keeping other appts]   Has the patient kept scheduled appointments due by today?  Yes   Psychosocial issues?  No   What is the patient's perception of their health status since discharge?  Improving   Week 3 Call Completed?  Yes   Graduated  Yes   Did the patient feel the follow up calls were helpful during their recovery period?  Yes   Was the number of calls appropriate?  Yes          Holley Crowell RN

## 2019-07-29 ENCOUNTER — OFFICE VISIT (OUTPATIENT)
Dept: PULMONOLOGY | Facility: CLINIC | Age: 69
End: 2019-07-29

## 2019-07-29 VITALS
BODY MASS INDEX: 28.99 KG/M2 | WEIGHT: 174 LBS | HEART RATE: 81 BPM | HEIGHT: 65 IN | TEMPERATURE: 97.6 F | OXYGEN SATURATION: 97 % | DIASTOLIC BLOOD PRESSURE: 70 MMHG | SYSTOLIC BLOOD PRESSURE: 124 MMHG

## 2019-07-29 DIAGNOSIS — M35.1 MIXED CONNECTIVE TISSUE DISEASE (HCC): ICD-10-CM

## 2019-07-29 DIAGNOSIS — R09.1 PLEURITIS: ICD-10-CM

## 2019-07-29 DIAGNOSIS — Z99.89 OSA ON CPAP: ICD-10-CM

## 2019-07-29 DIAGNOSIS — G47.33 OSA ON CPAP: ICD-10-CM

## 2019-07-29 DIAGNOSIS — J90 PLEURAL EFFUSION: Primary | ICD-10-CM

## 2019-07-29 DIAGNOSIS — J45.909 IDIOPATHIC ASTHMA: ICD-10-CM

## 2019-07-29 PROCEDURE — 99214 OFFICE O/P EST MOD 30 MIN: CPT | Performed by: INTERNAL MEDICINE

## 2019-07-30 PROBLEM — J45.909 IDIOPATHIC ASTHMA: Status: ACTIVE | Noted: 2019-07-30

## 2019-07-30 PROBLEM — G47.33 OSA ON CPAP: Status: ACTIVE | Noted: 2019-07-30

## 2019-07-30 PROBLEM — Z99.89 OSA ON CPAP: Status: ACTIVE | Noted: 2019-07-30

## 2019-07-30 NOTE — PROGRESS NOTES
PULMONARY  NOTE    Chief Complaint     Exudative pleural effusion, mixed connective tissue disease, history of reflux, asthma, skin lesions, CARITO    History of Present Illness     69-year-old white female returns today for follow-up.  I last saw her during her hospital stay in early July.    She has been having migratory skin lesions that were felt to be possibly autoimmune in etiology.  She had been treated with periodic courses of steroids.    She was admitted to [facility] on 7/1/2019 with left pleuritic and intermittent right pleuritic chest discomfort.  She was found to have bilateral pleural effusions, right greater than left.  She underwent a diagnostic thoracentesis which revealed an exudative effusion most consistent with an autoimmune pleuritis or tuberculosis.  Cultures of all been negative and T spot was negative.  A histoplasmosis antibody was positive, however.    She was discharged home on Plaquenil after discussions with Dr. Thakur, rheumatology.    She returns today indicating that she feels that she is doing fairly well.  She has no dyspnea, or regular cough/sputum production.    She has occasional sinus drainage and postnasal drip which at times will precipitate cough.    She is a lifelong non-smoker.  She does have a history of asthma but is not on regular asthma therapy.    She does have a history of perennial rhinitis typically uses Claritin and Flonase.    She has been diagnosed with obstructive sleep apnea in the past and uses CPAP on a nightly basis.    Patient Active Problem List   Diagnosis   • Arthritis   • Dysphagia   • Constipation   • Screening for cardiovascular condition   • Skin lesions (Recent Bx results pending)   • Right shoulder pain   • Chronic allergic rhinitis due to food   • GERD (gastroesophageal reflux disease)   • Urinary incontinence   • Symptomatic cholelithiasis   • DDD (degenerative disc disease), lumbar   • Depression   • Mixed connective tissue disease  "(CMS/HCC)   • Bilateral pleural effusions (Exudate on Right with high WBC)   • Autoimmune Pleuritis   • History of asthma     Allergies   Allergen Reactions   • Celebrex [Celecoxib] Hives     HIVES    • Sulfa Antibiotics Itching     ITCHING        Current Outpatient Medications:   •  desloratadine (CLARINEX) 5 MG tablet, Take 5 mg by mouth Every Night., Disp: , Rfl:   •  fluticasone (FLONASE) 50 MCG/ACT nasal spray, 1 spray into the nostril(s) as directed by provider 2 (Two) Times a Day., Disp: , Rfl:   •  fluticasone-salmeterol (ADVAIR) 500-50 MCG/DOSE DISKUS, Inhale 1 puff 2 (Two) Times a Day., Disp: , Rfl:   •  hydroxychloroquine (PLAQUENIL) 200 MG tablet, Take 1 tablet by mouth 2 (Two) Times a Day., Disp: 60 tablet, Rfl: 0  •  traMADol (ULTRAM) 50 MG tablet, Take 1 tablet by mouth Every 6 (Six) Hours As Needed for Moderate Pain ., Disp: 12 tablet, Rfl: 0  •  venlafaxine XR (EFFEXOR-XR) 150 MG 24 hr capsule, Take 1 capsule by mouth Daily., Disp: 90 capsule, Rfl: 3  MEDICATION LIST AND ALLERGIES REVIEWED.    Family History   Problem Relation Age of Onset   • Cancer Other         BREAST, LUNG, OVARIAN, KIDNEY   • Kidney cancer Other    • Lung cancer Other         pGF as well   • Cancer Mother         kidney   • Cancer Father         lung   • Mental illness Father    • Breast cancer Sister 30   • Connective Tissue Disease Sister    • Hypertension Sister         pulmonary hypertension   • Ovarian cancer Neg Hx      Social History     Tobacco Use   • Smoking status: Never Smoker   • Smokeless tobacco: Never Used   Substance Use Topics   • Alcohol use: Yes     Alcohol/week: 2.4 - 3.0 oz     Types: 4 - 5 Standard drinks or equivalent per week     Comment: a week   • Drug use: No     Social History     Social History Narrative   • Not on file     FAMILY AND SOCIAL HISTORY REVIEWED.    Review of Systems  ALSO REFER TO SCANNED ROS SHEET FROM SAME DATE.    /70   Pulse 81   Temp 97.6 °F (36.4 °C)   Ht 165.1 cm (65\") "   Wt 78.9 kg (174 lb)   LMP  (LMP Unknown)   SpO2 97% Comment: resting, room air  Breastfeeding? No   BMI 28.96 kg/m²   Physical Exam   Constitutional: She is oriented to person, place, and time. She appears well-developed. No distress.   HENT:   Head: Normocephalic and atraumatic.   Neck: No thyromegaly present.   Cardiovascular: Normal rate, regular rhythm and normal heart sounds.   No murmur heard.  Pulmonary/Chest: Effort normal and breath sounds normal. No stridor.   Abdominal: Soft. Bowel sounds are normal.   Musculoskeletal: Normal range of motion. She exhibits no edema.   Lymphadenopathy:     She has no cervical adenopathy.        Right: No supraclavicular and no epitrochlear adenopathy present.        Left: No supraclavicular and no epitrochlear adenopathy present.   Neurological: She is alert and oriented to person, place, and time.   Skin: Skin is warm and dry. She is not diaphoretic.   Psychiatric: She has a normal mood and affect. Her behavior is normal.   Nursing note and vitals reviewed.      Results     PA and lateral chest x-ray today reveals small bilateral pleural effusions with just blunting of the costophrenic angles    Problem List       ICD-10-CM ICD-9-CM   1. Bilateral pleural effusions (Exudate on Right with high WBC) J90 511.9   2. Autoimmune Pleuritis R09.1 511.0   3. Mixed connective tissue disease (CMS/[facility]) M35.1 710.8   4. History of asthma J45.909 493.90       Discussion     We reviewed her test results.  Her clinical picture and pleural fluid analysis appears to be consistent with an autoimmune pleuritis.  There has been no increase in pleural effusion with institution of Plaquenil.  I do not think that the positive histoplasmosis antibody represents an indication of active disease.    While she has a history of asthma, she does not appear to have symptoms currently attributable to asthma.    Have encouraged continued follow-up with Dr. Thakur.  I will plan to see her back in at  least 6 months or earlier if she has any recurrent pulmonary symptoms.    Porter Sellers MD  Note electronically signed    CC: Ania Martel MD

## 2019-08-13 LAB
FUNGUS WND CULT: NORMAL
MYCOBACTERIUM SPEC CULT: NORMAL
NIGHT BLUE STAIN TISS: NORMAL

## 2019-09-26 ENCOUNTER — TRANSCRIBE ORDERS (OUTPATIENT)
Dept: ADMINISTRATIVE | Facility: HOSPITAL | Age: 69
End: 2019-09-26

## 2019-09-26 DIAGNOSIS — Z12.31 VISIT FOR SCREENING MAMMOGRAM: Primary | ICD-10-CM

## 2019-10-26 DIAGNOSIS — F32.A DEPRESSION, UNSPECIFIED DEPRESSION TYPE: ICD-10-CM

## 2019-10-28 RX ORDER — VENLAFAXINE HYDROCHLORIDE 150 MG/1
CAPSULE, EXTENDED RELEASE ORAL
Qty: 90 CAPSULE | Refills: 1 | Status: SHIPPED | OUTPATIENT
Start: 2019-10-28 | End: 2020-04-27

## 2019-10-28 RX ORDER — DESLORATADINE 5 MG/1
TABLET ORAL
Qty: 90 TABLET | Refills: 3 | Status: SHIPPED | OUTPATIENT
Start: 2019-10-28 | End: 2020-10-26

## 2019-11-26 ENCOUNTER — FLU SHOT (OUTPATIENT)
Dept: INTERNAL MEDICINE | Facility: CLINIC | Age: 69
End: 2019-11-26

## 2019-11-26 DIAGNOSIS — Z23 NEED FOR INFLUENZA VACCINATION: ICD-10-CM

## 2019-11-26 PROCEDURE — G0008 ADMIN INFLUENZA VIRUS VAC: HCPCS | Performed by: INTERNAL MEDICINE

## 2019-11-26 PROCEDURE — 90653 IIV ADJUVANT VACCINE IM: CPT | Performed by: INTERNAL MEDICINE

## 2019-12-06 ENCOUNTER — HOSPITAL ENCOUNTER (OUTPATIENT)
Dept: MAMMOGRAPHY | Facility: HOSPITAL | Age: 69
Discharge: HOME | End: 2019-12-06
Admitting: INTERNAL MEDICINE

## 2019-12-06 DIAGNOSIS — Z12.31 VISIT FOR SCREENING MAMMOGRAM: ICD-10-CM

## 2019-12-06 PROCEDURE — 77067 SCR MAMMO BI INCL CAD: CPT

## 2019-12-06 PROCEDURE — 77067 SCR MAMMO BI INCL CAD: CPT | Performed by: RADIOLOGY

## 2019-12-06 PROCEDURE — 77063 BREAST TOMOSYNTHESIS BI: CPT

## 2019-12-06 PROCEDURE — 77063 BREAST TOMOSYNTHESIS BI: CPT | Performed by: RADIOLOGY

## 2020-01-23 PROCEDURE — 87186 SC STD MICRODIL/AGAR DIL: CPT | Performed by: NURSE PRACTITIONER

## 2020-01-23 PROCEDURE — 87086 URINE CULTURE/COLONY COUNT: CPT | Performed by: NURSE PRACTITIONER

## 2020-01-23 PROCEDURE — 87088 URINE BACTERIA CULTURE: CPT | Performed by: NURSE PRACTITIONER

## 2020-01-26 ENCOUNTER — TELEPHONE (OUTPATIENT)
Dept: INTERNAL MEDICINE | Facility: CLINIC | Age: 70
End: 2020-01-26

## 2020-01-27 ENCOUNTER — TELEPHONE (OUTPATIENT)
Dept: URGENT CARE | Facility: CLINIC | Age: 70
End: 2020-01-27

## 2020-01-27 NOTE — TELEPHONE ENCOUNTER
PT is not feeling better but is going to f/u with PCP. Advised to complete medication and return to [facility] [place] if needed.

## 2020-01-27 NOTE — TELEPHONE ENCOUNTER
Patient called in to schedule 2020 wellness visit. No availabilty until July of 2020 and she was not happy with that because she is used to having them earlier in the year. Patient advised that she was told to contact and schedule wellness and would like to be worked in sooner.     Please contact patient to discuss.

## 2020-02-05 ENCOUNTER — OFFICE VISIT (OUTPATIENT)
Dept: INTERNAL MEDICINE | Facility: CLINIC | Age: 70
End: 2020-02-05

## 2020-02-05 VITALS
HEIGHT: 65 IN | DIASTOLIC BLOOD PRESSURE: 68 MMHG | SYSTOLIC BLOOD PRESSURE: 128 MMHG | BODY MASS INDEX: 30.32 KG/M2 | TEMPERATURE: 98.5 F | WEIGHT: 182 LBS | OXYGEN SATURATION: 97 % | RESPIRATION RATE: 18 BRPM | HEART RATE: 81 BPM

## 2020-02-05 DIAGNOSIS — N30.01 ACUTE CYSTITIS WITH HEMATURIA: ICD-10-CM

## 2020-02-05 DIAGNOSIS — R82.90 ABNORMAL URINALYSIS: ICD-10-CM

## 2020-02-05 DIAGNOSIS — R30.0 BURNING WITH URINATION: Primary | ICD-10-CM

## 2020-02-05 LAB
BILIRUB BLD-MCNC: NEGATIVE MG/DL
CLARITY, POC: CLEAR
COLOR UR: YELLOW
GLUCOSE UR STRIP-MCNC: NEGATIVE MG/DL
KETONES UR QL: NEGATIVE
LEUKOCYTE EST, POC: ABNORMAL
NITRITE UR-MCNC: NEGATIVE MG/ML
PH UR: 8 [PH] (ref 5–8)
PROT UR STRIP-MCNC: NEGATIVE MG/DL
RBC # UR STRIP: ABNORMAL /UL
SP GR UR: 1.01 (ref 1–1.03)
UROBILINOGEN UR QL: NORMAL

## 2020-02-05 PROCEDURE — 87077 CULTURE AEROBIC IDENTIFY: CPT | Performed by: NURSE PRACTITIONER

## 2020-02-05 PROCEDURE — 99213 OFFICE O/P EST LOW 20 MIN: CPT | Performed by: NURSE PRACTITIONER

## 2020-02-05 PROCEDURE — 81003 URINALYSIS AUTO W/O SCOPE: CPT | Performed by: NURSE PRACTITIONER

## 2020-02-05 PROCEDURE — 87086 URINE CULTURE/COLONY COUNT: CPT | Performed by: NURSE PRACTITIONER

## 2020-02-05 PROCEDURE — 87186 SC STD MICRODIL/AGAR DIL: CPT | Performed by: NURSE PRACTITIONER

## 2020-02-05 RX ORDER — NITROFURANTOIN 25; 75 MG/1; MG/1
100 CAPSULE ORAL 2 TIMES DAILY
Qty: 14 CAPSULE | Refills: 0 | Status: SHIPPED | OUTPATIENT
Start: 2020-02-05 | End: 2020-02-12

## 2020-02-05 NOTE — PROGRESS NOTES
Subjective   Barbara Coelho is a 69 y.o. female.     Chief Complaint   Patient presents with   • Urinary Tract Infection     burning and bleeding with urination started this morning. Just finished 5 day course of antibiotic for UTI went to  at that time       Urinary Tract Infection    This is a new problem. The current episode started 1 to 4 weeks ago. The problem occurs every urination. The problem has been gradually worsening. The quality of the pain is described as burning. The pain is mild. There has been no fever. There is no history of pyelonephritis. Associated symptoms include frequency, hematuria and urgency. Pertinent negatives include no chills, discharge, flank pain, hesitancy, nausea, possible pregnancy, sweats or vomiting. Her past medical history is significant for recurrent UTIs. There is no history of catheterization, kidney stones, a single kidney, urinary stasis or a urological procedure.       She had a recent UTI and was seen at the urgent care center.  She was treated with Macrobid.  She had resolution in her symptoms but today noted some burning and small amount of blood with urination.    The following portions of the patient's history were reviewed and updated as appropriate: allergies, current medications, past family history, past medical history, past social history, past surgical history and problem list.    Review of Systems   Constitutional: Negative for chills, fatigue, fever and unexpected weight change.   Respiratory: Negative for cough, chest tightness, shortness of breath and wheezing.    Cardiovascular: Negative for chest pain, palpitations and leg swelling.   Gastrointestinal: Negative for abdominal pain, constipation, diarrhea, nausea and vomiting.   Genitourinary: Positive for dysuria, frequency, hematuria and urgency. Negative for difficulty urinating, flank pain and hesitancy.   Skin: Negative for color change and rash.   Neurological: Negative for dizziness, syncope,  "weakness and headaches.   Psychiatric/Behavioral: Negative for sleep disturbance.       Outpatient Medications Marked as Taking for the 2/5/20 encounter (Office Visit) with eRdEmilee APRN   Medication Sig Dispense Refill   • desloratadine (CLARINEX) 5 MG tablet TAKE 1 TABLET BY MOUTH EVERY DAY 90 tablet 3   • fluticasone (FLONASE) 50 MCG/ACT nasal spray 1 spray into the nostril(s) as directed by provider 2 (Two) Times a Day.     • hydroxychloroquine (PLAQUENIL) 200 MG tablet Take 1 tablet by mouth 2 (Two) Times a Day. 60 tablet 0   • venlafaxine XR (EFFEXOR-XR) 150 MG 24 hr capsule TAKE 1 CAPSULE BY MOUTH EVERY DAY 90 capsule 1   • WIXELA INHUB 500-50 MCG/DOSE DISKUS INHALE 1 PUFF 2 (TWO) TIMES A DAY. 180 each 1           Objective   Physical Exam   Constitutional: She is oriented to person, place, and time. She appears well-developed and well-nourished.   HENT:   Head: Normocephalic and atraumatic.   Eyes: Pupils are equal, round, and reactive to light. Conjunctivae are normal.   Neck: Normal range of motion.   Cardiovascular: Normal rate, regular rhythm and normal heart sounds.   Pulmonary/Chest: Effort normal and breath sounds normal. No respiratory distress.   Abdominal: Soft. Normal appearance and bowel sounds are normal. She exhibits no distension and no mass. There is no tenderness. There is no rigidity, no rebound, no guarding and no CVA tenderness. No hernia.   Neurological: She is alert and oriented to person, place, and time.   Skin: Skin is warm and dry.   Psychiatric: She has a normal mood and affect. Her behavior is normal. Judgment and thought content normal.   Nursing note and vitals reviewed.      Vitals:    02/05/20 1455   BP: 128/68   Pulse: 81   Resp: 18   Temp: 98.5 °F (36.9 °C)   SpO2: 97%   Weight: 82.6 kg (182 lb)   Height: 165.1 cm (65\")     Body mass index is 30.29 kg/m².        Assessment/Plan   Barbara was seen today for urinary tract infection.    Diagnoses and all orders for " this visit:    Burning with urination  -     POCT urinalysis dipstick, automated    Abnormal urinalysis  -     Urine Culture - Urine, Urine, Clean Catch    Acute cystitis with hematuria  -     nitrofurantoin, macrocrystal-monohydrate, (MACROBID) 100 MG capsule; Take 1 capsule by mouth 2 (Two) Times a Day for 7 days.      Brief Urine Lab Results  (Last result in the past 365 days)      Color   Clarity   Blood   Leuk Est   Nitrite   Protein   CREAT   Urine HCG        02/05/20 1518 Yellow Clear 2+  Comment:  80 Felipe/uL Small (1+)  Comment:  70 Bryson/uL Negative Negative               Urine positive for leuks and blood.I discussed treating versus waiting on the culture and she wishes to go ahead and start treating rather than wait.  I feel this is reasonable.    We will go ahead and start her back on some Macrobid.  Encourage fluids.  Empty bladder frequently.  May need to adjust antibiotic therapy based on culture.           Return if symptoms worsen or fail to improve.  I discussed my findings and recommendations with patient.  The plan of care was  discussed with patient. They verbalized understanding and agreement.  Patient was encouraged to keep me informed of any acute changes, lack of improvement, or any new concerning symptoms.       * Please note that portions of this note were completed with a voice recognition program. Efforts were made to edit the dictation but occasionally words are erroneously transcribed.

## 2020-02-08 LAB — BACTERIA SPEC AEROBE CULT: ABNORMAL

## 2020-02-12 ENCOUNTER — TELEPHONE (OUTPATIENT)
Dept: INTERNAL MEDICINE | Facility: CLINIC | Age: 70
End: 2020-02-12

## 2020-02-12 NOTE — TELEPHONE ENCOUNTER
----- Message from SULEMA Linda sent at 2/11/2020  5:38 PM EST -----  Please let her know her urine culture grew out a small amount of bacteria and the macrobid should take care of it

## 2020-04-23 ENCOUNTER — TELEMEDICINE (OUTPATIENT)
Dept: PULMONOLOGY | Facility: CLINIC | Age: 70
End: 2020-04-23

## 2020-04-23 DIAGNOSIS — G47.33 OSA ON CPAP: ICD-10-CM

## 2020-04-23 DIAGNOSIS — J45.909 IDIOPATHIC ASTHMA: ICD-10-CM

## 2020-04-23 DIAGNOSIS — Z99.89 OSA ON CPAP: ICD-10-CM

## 2020-04-23 DIAGNOSIS — M35.1 MIXED CONNECTIVE TISSUE DISEASE (HCC): ICD-10-CM

## 2020-04-23 DIAGNOSIS — J90 PLEURAL EFFUSION: Primary | ICD-10-CM

## 2020-04-23 DIAGNOSIS — R09.1 PLEURITIS: ICD-10-CM

## 2020-04-23 PROCEDURE — 99214 OFFICE O/P EST MOD 30 MIN: CPT | Performed by: INTERNAL MEDICINE

## 2020-04-23 NOTE — PROGRESS NOTES
[facility] PULMONARY  VIDEO VISIT NOTE    Chief Complaint     Exudative pleural effusion, mixed connective tissue disease, history of reflux, asthma, skin lesions, CARITO  Consent for Video Visit was obtained via [employer].  History of Present Illness     70-year-old white female last seen in the office on 7/29/2019.    I originally saw her during a hospital stay at [facility] in July 2019.    She had migratory skin lesions and was found to have pleural effusions.  A diagnostic thoracentesis revealed an exudative effusion with negative cultures and cytology.  Most consistent with an autoimmune pleuritis or tuberculosis.  Cultures were negative and T spot was negative.  A histoplasmosis antibody was positive, however.    She has been followed by Dr. Thakur and is felt to have mixed connective tissue disease  She has been on Plaquenil which she is tolerating well.    She has had no recurrence of skin lesions.  She has had no recurrence of dyspnea, cough, or sputum production.    She does have perennial sinus symptoms and continues to use Claritin or Flonase.  She is also on allergy shots.  Those symptoms are under fairly good control.    She has a history of obstructive sleep apnea and uses CPAP nightly.  She is followed by Dr. Petersen.  She tolerates her treatment well.    She is a lifelong non-smoker    Patient Active Problem List   Diagnosis   • Arthritis   • Dysphagia   • Constipation   • Screening for cardiovascular condition   • Skin lesions (Recent Bx results pending)   • Right shoulder pain   • Chronic allergic rhinitis due to food   • GERD (gastroesophageal reflux disease)   • Urinary incontinence   • Symptomatic cholelithiasis   • DDD (degenerative disc disease), lumbar   • Depression   • Mixed connective tissue disease (CMS/HCC)   • Bilateral pleural effusions (Exudate on Right with high WBC)   • Autoimmune Pleuritis   • History of asthma   • CARITO on CPAP     Allergies   Allergen Reactions   •  Celebrex [Celecoxib] Hives     HIVES    • Sulfa Antibiotics Itching     ITCHING        Current Outpatient Medications:   •  desloratadine (CLARINEX) 5 MG tablet, TAKE 1 TABLET BY MOUTH EVERY DAY, Disp: 90 tablet, Rfl: 3  •  fluticasone (FLONASE) 50 MCG/ACT nasal spray, 1 spray into the nostril(s) as directed by provider 2 (Two) Times a Day., Disp: , Rfl:   •  hydroxychloroquine (PLAQUENIL) 200 MG tablet, Take 1 tablet by mouth 2 (Two) Times a Day., Disp: 60 tablet, Rfl: 0  •  venlafaxine XR (EFFEXOR-XR) 150 MG 24 hr capsule, TAKE 1 CAPSULE BY MOUTH EVERY DAY, Disp: 90 capsule, Rfl: 1  •  WIXELA INHUB 500-50 MCG/DOSE DISKUS, INHALE 1 PUFF 2 (TWO) TIMES A DAY., Disp: 180 each, Rfl: 1  MEDICATION LIST AND ALLERGIES REVIEWED.    Family History   Problem Relation Age of Onset   • Cancer Other         BREAST, LUNG, OVARIAN, KIDNEY   • Kidney cancer Other    • Lung cancer Other         pGF as well   • Cancer Mother         kidney   • Cancer Father         lung   • Mental illness Father    • Breast cancer Sister 30   • Connective Tissue Disease Sister    • Hypertension Sister         pulmonary hypertension   • Cancer Sister         [place]   • Cancer Paternal Grandmother         stomach   • Cancer Paternal Grandfather         lung   • Ovarian cancer Neg Hx      Social History     Tobacco Use   • Smoking status: Never Smoker   • Smokeless tobacco: Never Used   Substance Use Topics   • Alcohol use: Yes     Alcohol/week: 3.0 standard drinks     Types: 3 Cans of beer per week     Comment: a week   • Drug use: No     Social History     Social History Narrative   • Not on file     FAMILY AND SOCIAL HISTORY REVIEWED.    Review of Systems   Constitutional: Negative.  Negative for fatigue and unexpected weight change.   HENT: Positive for postnasal drip and rhinorrhea.    Respiratory: Negative for cough, shortness of breath and wheezing.    Cardiovascular: Negative.  Negative for chest pain, palpitations and leg swelling.    Gastrointestinal: Negative for abdominal distention and nausea.   Musculoskeletal: Negative.  Negative for arthralgias and joint swelling.   Skin: Negative.  Negative for rash and wound.   Allergic/Immunologic: Negative.  Negative for immunocompromised state.   Hematological: Negative.  Negative for adenopathy.   Psychiatric/Behavioral: Negative.  Negative for sleep disturbance.     ALSO REFER TO SCANNED ROS SHEET FROM SAME DATE.    LMP  (LMP Unknown)   Physical Exam   Constitutional: She is oriented to person, place, and time. No distress.   Eyes: Right eye exhibits no discharge. Left eye exhibits no discharge.   Pulmonary/Chest: Effort normal.   Neurological: She is alert and oriented to person, place, and time.   Skin: No rash noted. She is not diaphoretic. No erythema.   Psychiatric: She has a normal mood and affect. Her behavior is normal. Judgment and thought content normal.       Results     Chest x-ray from 7/29/2019 again reviewed on PACS.  Small pleural effusions    Problem List       ICD-10-CM ICD-9-CM   1. Bilateral pleural effusions (Exudate on Right with high WBC) J90 511.9   2. Autoimmune Pleuritis R09.1 511.0   3. Mixed connective tissue disease (CMS/HCC) M35.1 710.8   4. CARITO on CPAP G47.33 327.23    Z99.89 V46.8   5. History of asthma J45.909 493.90       Discussion     Overall, she appears to be doing well.  Clinically, no recurrent symptoms suggestive of recurrent pleural effusions.  I suspect that as long as her autoimmune disease is under control, her pleural effusions will as well.    She is going to continue to use her CPAP on a nightly basis.    She will remain on OTC medications for her sinus symptoms.    I will see her back in 6 months in the office with a chest x-ray    Porter Sellers MD  Note electronically signed    CC: Ania Martel MD

## 2020-04-26 DIAGNOSIS — F32.A DEPRESSION, UNSPECIFIED DEPRESSION TYPE: ICD-10-CM

## 2020-04-27 RX ORDER — VENLAFAXINE HYDROCHLORIDE 150 MG/1
CAPSULE, EXTENDED RELEASE ORAL
Qty: 90 CAPSULE | Refills: 0 | Status: SHIPPED | OUTPATIENT
Start: 2020-04-27 | End: 2020-07-22 | Stop reason: SDUPTHER

## 2020-07-17 ENCOUNTER — TELEPHONE (OUTPATIENT)
Dept: INTERNAL MEDICINE | Facility: CLINIC | Age: 70
End: 2020-07-17

## 2020-07-17 DIAGNOSIS — Z20.822 EXPOSURE TO COVID-19 VIRUS: Primary | ICD-10-CM

## 2020-07-17 NOTE — TELEPHONE ENCOUNTER
Called and lvm informing pt that lab orders have been placed as requested with office number given to return call if she has any questions or concerns.

## 2020-07-17 NOTE — TELEPHONE ENCOUNTER
Pt has appt on Wednesday 7/22/20. Would like to get labs completed monday prior to appt. Please advise, Thanks.

## 2020-07-17 NOTE — TELEPHONE ENCOUNTER
Patient states that she would like to get her labs done on Monday and needs an order put in.  She can be reached at [phone]

## 2020-07-17 NOTE — PROGRESS NOTES
History of Present Illness     Here for medicare wellness exam and physical. No hospitalizations in last year and pt feels health is stable  Depression screen - PHQ-2- 0  Falls: one near-fall - tripped over her flip flop  Memory: good overall  Living will: pt has  Specialists:  Ortho - Dr Douglas. GI - Dr Arcos. Surgeon - Dr Morelos. Allergist - Hortensia Gonzales. Eye - Gary. Rheum - Blaze  Exercise: she is walking a few miles most days  Diet: likes sweets; not a lot of fruits veggies; centrum daily     She has been diagnosed with mixed connective disease - she is seeing Dr Pinky Thakur and she is on Plaquenil and this is working well.  Has not had any recurrence of the rash.       Osteopenia -  She was on medication several years ago - tolerated but just got tired of taking    Anxiety - takes effexor daily, or occasionally every other day.  She is still awaiting the retrial for her 's murder so still quite a bit of stress    Neck pain/ Right shoulder pain -Dr. Thakur had her see Dr. Mckeon and it sounds like she might of had a nerve conduction test and Dr. Mckeon thought it was a pinched nerve in her neck.  However, she also has decreased range of motion in the right shoulder.  She does have a follow-up with Dr. jones next month   R arm    Urinary symptoms have some leakage with coughing and sneezing as well some urge incontinence.  She has not been on any bladder medication.    Current Outpatient Medications on File Prior to Visit   Medication Sig Dispense Refill   • desloratadine (CLARINEX) 5 MG tablet TAKE 1 TABLET BY MOUTH EVERY DAY 90 tablet 3   • fluticasone (FLONASE) 50 MCG/ACT nasal spray 1 spray into the nostril(s) as directed by provider 2 (Two) Times a Day.     • hydroxychloroquine (PLAQUENIL) 200 MG tablet Take 1 tablet by mouth 2 (Two) Times a Day. 60 tablet 0   • venlafaxine XR (EFFEXOR-XR) 150 MG 24 hr capsule TAKE 1 CAPSULE BY MOUTH EVERY DAY 90 capsule 0   • WIXELA INHUB 500-50 MCG/DOSE DISKUS INHALE  1 PUFF 2 (TWO) TIMES A DAY. 180 each 1     No current facility-administered medications on file prior to visit.        The following portions of the patient's history were reviewed and updated as appropriate: allergies, current medications, past family history, past medical history, past social history, past surgical history and problem list.    Review of Systems   Constitutional: Positive for unexpected weight gain. Negative for activity change, appetite change, fever and unexpected weight loss.   HENT: Negative.    Eyes: Negative.    Respiratory: Negative for shortness of breath (uses wixela daily, hasn't needed albuterol) and wheezing.    Cardiovascular: Negative for chest pain, palpitations and leg swelling.   Gastrointestinal: Negative.  Positive for indigestion (has hiatal hernia). Negative for blood in stool and constipation.   Endocrine: Negative.    Genitourinary: Negative for difficulty urinating and dysuria.   Musculoskeletal: Positive for arthralgias (R shoulder).   Skin: Negative.    Allergic/Immunologic: Positive for environmental allergies. Negative for immunocompromised state.   Neurological: Negative for seizures, speech difficulty, memory problem and confusion.   Hematological: Does not bruise/bleed easily.   Psychiatric/Behavioral: Negative for agitation and sleep disturbance.         Objective    Vitals:    07/22/20 1544   BP: 122/66   Pulse: 84   Temp: 97.3 °F (36.3 °C)   SpO2: 98%     Physical Exam   Physical Exam   Constitutional: She is oriented to person, place, and time. She appears well-developed and well-nourished. No distress.   HENT:   Head: Normocephalic and atraumatic.   Right Ear: External ear normal.   Left Ear: External ear normal.   Nose: Nose normal.   Mouth/Throat: Oropharynx is clear and moist. No oropharyngeal exudate.   Eyes: Pupils are equal, round, and reactive to light. Conjunctivae and EOM are normal. Right eye exhibits no discharge. Left eye exhibits no discharge. No  scleral icterus.   Neck: Normal range of motion. Neck supple. No thyromegaly present.   Cardiovascular: Normal rate, regular rhythm, normal heart sounds and intact distal pulses. Exam reveals no gallop and no friction rub.   No murmur heard.  Pulmonary/Chest: Effort normal and breath sounds normal. No respiratory distress. She has no wheezes. She has no rales. Right breast exhibits no mass, no nipple discharge, no skin change and no tenderness. Left breast exhibits no mass, no nipple discharge, no skin change and no tenderness.   Abdominal: Soft. Bowel sounds are normal. She exhibits no distension and no mass. There is no tenderness. There is no rebound and no guarding. A hernia (incisional hernia) is present.   Musculoskeletal: Normal range of motion. She exhibits no edema or deformity.   Lymphadenopathy:     She has no cervical adenopathy.   Neurological: She is alert and oriented to person, place, and time. She displays normal reflexes. Coordination normal.   Skin: Skin is warm and dry. No rash noted. She is not diaphoretic. No erythema. No pallor.   Psychiatric: She has a normal mood and affect. Her behavior is normal. Judgment and thought content normal.   Nursing note and vitals reviewed.     Results for orders placed or performed in visit on 07/22/20   POC Urinalysis Dipstick, Automated   Result Value Ref Range    Color Yellow Yellow, Straw, Dark Yellow, Feli    Clarity, UA Clear Clear    Specific Gravity  1.030 1.005 - 1.030    pH, Urine 5.0 5.0 - 8.0    Leukocytes Negative Negative    Nitrite, UA Negative Negative    Protein, POC Trace (A) Negative mg/dL    Glucose, UA Negative Negative, 1000 mg/dL (3+) mg/dL    Ketones, UA Negative Negative    Urobilinogen, UA Normal Normal    Bilirubin Small (1+) (A) Negative    Blood, UA Negative Negative       Assessment/Plan   Barbara was seen today for medicare wellness-subsequent and annual exam.    Diagnoses and all orders for this visit:    Encounter for Medicare  annual wellness exam/Routine general medical examination at a health care facility  Regular exercise/healthy diet. BSE q month. Sunscreen use encouraged. caclium intake reviewed.  Labs reviewed today  Living will -has and is on file  Mariano due 12/20 - will schedule  Colon due in '25  Pneumovax - had  prevnar - had  DEXA due now- will scheduke  Shinkimes - shingrix discussed -  can check at pharmacy since we do not have   Does not need paps since age 64 or older and has had normal paps in past    -     POC Urinalysis Dipstick, Automated    Mixed connective tissue disease (CMS/HCC)-stable on Plaquenil.  -     CBC & Differential; Future  -     TSH; Future  -     Comprehensive Metabolic Panel; Future      Mild intermittent asthma without complication-Advair refilled    Vitamin D deficiency  -     Vitamin D 25 Hydroxy; Future    Screening cholesterol level  -     Lipid Panel; Future        Depression, unspecified depression type-we will continue Effexor for now    Carlos leakage-we discussed some pelvic floor therapy-she wants to first do physical therapy for her shoulder through Dr. Thakur's office but then will let us know.

## 2020-07-20 ENCOUNTER — LAB (OUTPATIENT)
Dept: LAB | Facility: HOSPITAL | Age: 70
End: 2020-07-20

## 2020-07-20 DIAGNOSIS — M35.1 MIXED CONNECTIVE TISSUE DISEASE (HCC): ICD-10-CM

## 2020-07-20 DIAGNOSIS — Z13.220 SCREENING CHOLESTEROL LEVEL: ICD-10-CM

## 2020-07-20 DIAGNOSIS — E55.9 VITAMIN D DEFICIENCY: ICD-10-CM

## 2020-07-20 LAB
25(OH)D3 SERPL-MCNC: 31.9 NG/ML (ref 30–100)
ALBUMIN SERPL-MCNC: 4.2 G/DL (ref 3.5–5.2)
ALBUMIN/GLOB SERPL: 1.8 G/DL
ALP SERPL-CCNC: 85 U/L (ref 39–117)
ALT SERPL W P-5'-P-CCNC: 12 U/L (ref 1–33)
ANION GAP SERPL CALCULATED.3IONS-SCNC: 11 MMOL/L (ref 5–15)
AST SERPL-CCNC: 14 U/L (ref 1–32)
BASOPHILS # BLD AUTO: 0.04 10*3/MM3 (ref 0–0.2)
BASOPHILS NFR BLD AUTO: 0.7 % (ref 0–1.5)
BILIRUB SERPL-MCNC: 0.4 MG/DL (ref 0–1.2)
BUN SERPL-MCNC: 14 MG/DL (ref 8–23)
BUN/CREAT SERPL: 16.5 (ref 7–25)
CALCIUM SPEC-SCNC: 9.4 MG/DL (ref 8.6–10.5)
CHLORIDE SERPL-SCNC: 104 MMOL/L (ref 98–107)
CHOLEST SERPL-MCNC: 176 MG/DL (ref 0–200)
CO2 SERPL-SCNC: 25 MMOL/L (ref 22–29)
CREAT SERPL-MCNC: 0.85 MG/DL (ref 0.57–1)
DEPRECATED RDW RBC AUTO: 40.6 FL (ref 37–54)
EOSINOPHIL # BLD AUTO: 0.25 10*3/MM3 (ref 0–0.4)
EOSINOPHIL NFR BLD AUTO: 4.4 % (ref 0.3–6.2)
ERYTHROCYTE [DISTWIDTH] IN BLOOD BY AUTOMATED COUNT: 12.9 % (ref 12.3–15.4)
GFR SERPL CREATININE-BSD FRML MDRD: 66 ML/MIN/1.73
GLOBULIN UR ELPH-MCNC: 2.4 GM/DL
GLUCOSE SERPL-MCNC: 107 MG/DL (ref 65–99)
HCT VFR BLD AUTO: 40.7 % (ref 34–46.6)
HDLC SERPL-MCNC: 78 MG/DL (ref 40–60)
HGB BLD-MCNC: 13.8 G/DL (ref 12–15.9)
IMM GRANULOCYTES # BLD AUTO: 0.01 10*3/MM3 (ref 0–0.05)
IMM GRANULOCYTES NFR BLD AUTO: 0.2 % (ref 0–0.5)
LDLC SERPL CALC-MCNC: 86 MG/DL (ref 0–100)
LDLC/HDLC SERPL: 1.1 {RATIO}
LYMPHOCYTES # BLD AUTO: 2.96 10*3/MM3 (ref 0.7–3.1)
LYMPHOCYTES NFR BLD AUTO: 52.2 % (ref 19.6–45.3)
MCH RBC QN AUTO: 29.4 PG (ref 26.6–33)
MCHC RBC AUTO-ENTMCNC: 33.9 G/DL (ref 31.5–35.7)
MCV RBC AUTO: 86.8 FL (ref 79–97)
MONOCYTES # BLD AUTO: 0.39 10*3/MM3 (ref 0.1–0.9)
MONOCYTES NFR BLD AUTO: 6.9 % (ref 5–12)
NEUTROPHILS NFR BLD AUTO: 2.02 10*3/MM3 (ref 1.7–7)
NEUTROPHILS NFR BLD AUTO: 35.6 % (ref 42.7–76)
NRBC BLD AUTO-RTO: 0 /100 WBC (ref 0–0.2)
PLATELET # BLD AUTO: 237 10*3/MM3 (ref 140–450)
PMV BLD AUTO: 10 FL (ref 6–12)
POTASSIUM SERPL-SCNC: 4.1 MMOL/L (ref 3.5–5.2)
PROT SERPL-MCNC: 6.6 G/DL (ref 6–8.5)
RBC # BLD AUTO: 4.69 10*6/MM3 (ref 3.77–5.28)
SODIUM SERPL-SCNC: 140 MMOL/L (ref 136–145)
TRIGL SERPL-MCNC: 60 MG/DL (ref 0–150)
TSH SERPL DL<=0.05 MIU/L-ACNC: 1.28 UIU/ML (ref 0.27–4.2)
VLDLC SERPL-MCNC: 12 MG/DL (ref 5–40)
WBC # BLD AUTO: 5.67 10*3/MM3 (ref 3.4–10.8)

## 2020-07-20 PROCEDURE — 85025 COMPLETE CBC W/AUTO DIFF WBC: CPT

## 2020-07-20 PROCEDURE — 80061 LIPID PANEL: CPT

## 2020-07-20 PROCEDURE — 84443 ASSAY THYROID STIM HORMONE: CPT

## 2020-07-20 PROCEDURE — 80053 COMPREHEN METABOLIC PANEL: CPT

## 2020-07-20 PROCEDURE — 86769 SARS-COV-2 COVID-19 ANTIBODY: CPT | Performed by: INTERNAL MEDICINE

## 2020-07-20 PROCEDURE — 82306 VITAMIN D 25 HYDROXY: CPT

## 2020-07-22 ENCOUNTER — OFFICE VISIT (OUTPATIENT)
Dept: INTERNAL MEDICINE | Facility: CLINIC | Age: 70
End: 2020-07-22

## 2020-07-22 VITALS
TEMPERATURE: 97.3 F | HEIGHT: 65 IN | WEIGHT: 192.2 LBS | DIASTOLIC BLOOD PRESSURE: 66 MMHG | HEART RATE: 84 BPM | SYSTOLIC BLOOD PRESSURE: 122 MMHG | BODY MASS INDEX: 32.02 KG/M2 | OXYGEN SATURATION: 98 %

## 2020-07-22 DIAGNOSIS — J45.20 MILD INTERMITTENT ASTHMA WITHOUT COMPLICATION: ICD-10-CM

## 2020-07-22 DIAGNOSIS — Z00.00 ROUTINE GENERAL MEDICAL EXAMINATION AT A HEALTH CARE FACILITY: ICD-10-CM

## 2020-07-22 DIAGNOSIS — Z12.31 SCREENING MAMMOGRAM FOR HIGH-RISK PATIENT: ICD-10-CM

## 2020-07-22 DIAGNOSIS — Z78.0 POSTMENOPAUSAL: ICD-10-CM

## 2020-07-22 DIAGNOSIS — Z00.00 ENCOUNTER FOR MEDICARE ANNUAL WELLNESS EXAM: Primary | ICD-10-CM

## 2020-07-22 DIAGNOSIS — K59.00 CONSTIPATION, UNSPECIFIED CONSTIPATION TYPE: ICD-10-CM

## 2020-07-22 DIAGNOSIS — E55.9 VITAMIN D DEFICIENCY: ICD-10-CM

## 2020-07-22 DIAGNOSIS — F32.A DEPRESSION, UNSPECIFIED DEPRESSION TYPE: ICD-10-CM

## 2020-07-22 DIAGNOSIS — M35.1 MIXED CONNECTIVE TISSUE DISEASE (HCC): ICD-10-CM

## 2020-07-22 DIAGNOSIS — Z13.220 SCREENING CHOLESTEROL LEVEL: ICD-10-CM

## 2020-07-22 LAB
BILIRUB BLD-MCNC: ABNORMAL MG/DL
CLARITY, POC: CLEAR
COLOR UR: YELLOW
GLUCOSE UR STRIP-MCNC: NEGATIVE MG/DL
KETONES UR QL: NEGATIVE
LEUKOCYTE EST, POC: NEGATIVE
NITRITE UR-MCNC: NEGATIVE MG/ML
PH UR: 5 [PH] (ref 5–8)
PROT UR STRIP-MCNC: ABNORMAL MG/DL
RBC # UR STRIP: NEGATIVE /UL
SP GR UR: 1.03 (ref 1–1.03)
UROBILINOGEN UR QL: NORMAL

## 2020-07-22 PROCEDURE — G0439 PPPS, SUBSEQ VISIT: HCPCS | Performed by: INTERNAL MEDICINE

## 2020-07-22 PROCEDURE — 81003 URINALYSIS AUTO W/O SCOPE: CPT | Performed by: INTERNAL MEDICINE

## 2020-07-22 RX ORDER — ALBUTEROL SULFATE 90 UG/1
2 AEROSOL, METERED RESPIRATORY (INHALATION) EVERY 4 HOURS PRN
Qty: 1 INHALER | Refills: 3 | Status: SHIPPED | OUTPATIENT
Start: 2020-07-22 | End: 2023-03-30 | Stop reason: SDUPTHER

## 2020-07-22 RX ORDER — ALBUTEROL SULFATE 90 UG/1
2 AEROSOL, METERED RESPIRATORY (INHALATION) EVERY 4 HOURS PRN
COMMUNITY
End: 2020-07-22 | Stop reason: SDUPTHER

## 2020-07-22 RX ORDER — VENLAFAXINE HYDROCHLORIDE 150 MG/1
150 CAPSULE, EXTENDED RELEASE ORAL DAILY
Qty: 90 CAPSULE | Refills: 3 | Status: SHIPPED | OUTPATIENT
Start: 2020-07-22 | End: 2021-01-11

## 2020-07-23 LAB — SARS-COV-2 AB SERPL QL IA: NEGATIVE

## 2020-08-24 ENCOUNTER — OFFICE VISIT (OUTPATIENT)
Dept: ORTHOPEDIC SURGERY | Facility: CLINIC | Age: 70
End: 2020-08-24

## 2020-08-24 VITALS — BODY MASS INDEX: 30.86 KG/M2 | WEIGHT: 185.2 LBS | OXYGEN SATURATION: 98 % | HEART RATE: 76 BPM | HEIGHT: 65 IN

## 2020-08-24 DIAGNOSIS — M25.511 RIGHT SHOULDER PAIN, UNSPECIFIED CHRONICITY: Primary | ICD-10-CM

## 2020-08-24 PROCEDURE — 99213 OFFICE O/P EST LOW 20 MIN: CPT | Performed by: ORTHOPAEDIC SURGERY

## 2020-08-24 PROCEDURE — 20610 DRAIN/INJ JOINT/BURSA W/O US: CPT | Performed by: ORTHOPAEDIC SURGERY

## 2020-08-24 RX ORDER — TRIAMCINOLONE ACETONIDE 40 MG/ML
40 INJECTION, SUSPENSION INTRA-ARTICULAR; INTRAMUSCULAR
Status: COMPLETED | OUTPATIENT
Start: 2020-08-24 | End: 2020-08-24

## 2020-08-24 RX ORDER — LIDOCAINE HYDROCHLORIDE 10 MG/ML
4 INJECTION, SOLUTION EPIDURAL; INFILTRATION; INTRACAUDAL; PERINEURAL
Status: COMPLETED | OUTPATIENT
Start: 2020-08-24 | End: 2020-08-24

## 2020-08-24 RX ORDER — BUPIVACAINE HYDROCHLORIDE 2.5 MG/ML
4 INJECTION, SOLUTION EPIDURAL; INFILTRATION; INTRACAUDAL
Status: COMPLETED | OUTPATIENT
Start: 2020-08-24 | End: 2020-08-24

## 2020-08-24 RX ADMIN — BUPIVACAINE HYDROCHLORIDE 4 ML: 2.5 INJECTION, SOLUTION EPIDURAL; INFILTRATION; INTRACAUDAL at 14:19

## 2020-08-24 RX ADMIN — LIDOCAINE HYDROCHLORIDE 4 ML: 10 INJECTION, SOLUTION EPIDURAL; INFILTRATION; INTRACAUDAL; PERINEURAL at 14:19

## 2020-08-24 RX ADMIN — TRIAMCINOLONE ACETONIDE 40 MG: 40 INJECTION, SUSPENSION INTRA-ARTICULAR; INTRAMUSCULAR at 14:19

## 2020-08-24 NOTE — PROGRESS NOTES
Procedure   Large Joint Arthrocentesis: R subacromial bursa  Date/Time: 8/24/2020 2:19 PM  Consent given by: patient  Site marked: site marked  Timeout: Immediately prior to procedure a time out was called to verify the correct patient, procedure, equipment, support staff and site/side marked as required   Supporting Documentation  Indications: pain   Procedure Details  Location: shoulder - R subacromial bursa  Preparation: Patient was prepped and draped in the usual sterile fashion  Needle size: 22 G  Approach: posterior  Medications administered: 4 mL lidocaine PF 1% 1 %; 4 mL bupivacaine (PF) 0.25 %; 40 mg triamcinolone acetonide 40 MG/ML  Patient tolerance: patient tolerated the procedure well with no immediate complications

## 2020-08-24 NOTE — PROGRESS NOTES
[facility] Note    Subjective     Chief Complaint   Patient presents with   • Follow-up     2 year follow up - Supraspinatus tendinitis, right        HPI  Barbara Coelho is a 70 y.o. female.  She complains of right shoulder pain.  She is had a worsening for the past 6 months.  I gave her a steroid shot in 2017.  That helped for a long time.  She saw a neurologist and was told she had a pinched nerve in her neck.  Pain is 5 out of 10.  She has been in physical therapy for neck.  The elbow and hand pain is gotten better.  Past Medical History:   Diagnosis Date   • Allergic rhinitis     on allergy shots   • Asthma     ALLERGIC   • Back pain     Low   • Bladder infection     Recurrent Bladder Infections   • Depression    • Diverticulitis    • GERD (gastroesophageal reflux disease)     EGD 12/16 Arcos - reflux, HH. no barretts   • H/O bone density study 2015   • H/O mammogram 04/17/2018    [facility]   • History of DVT of lower extremity     left leg   • Lupus (CMS/[facility]) 208    Mixed connective tissue   • Mixed connective tissue disease (CMS/HCC) 01/2019    Dr. Thakur   • Osteopenia 03/28/2018    calculated frax - 11/3%   • Pap smear for cervical cancer screening 2015   • Pinched nerve in neck    • Postmenopausal    • Primary central sleep apnea 2018   • Rheumatoid arthritis (CMS/[facility]) 2018   • Urinary incontinence    • Wears contact lenses    • Wears prescription eyeglasses       Past Surgical History:   Procedure Laterality Date   • BACK SURGERY  1995    Back (L5/S1)   • BREAST EXCISIONAL BIOPSY Left 1985   • CHOLECYSTECTOMY     • CHOLECYSTECTOMY WITH INTRAOPERATIVE CHOLANGIOGRAM N/A 4/26/2018    Procedure: CHOLECYSTECTOMY LAPAROSCOPIC INTRAOPERATIVE CHOLANGIOGRAM;  Surgeon: Lit Morelos MD;  Location: [facility] OR;  Service: General   • COLONOSCOPY  2016   • GASTRIC BYPASS      HIGH GASTRIC BYPASS, 1980's   • OTHER SURGICAL HISTORY  1985    Stomach Stabled   • REPLACEMENT TOTAL KNEE Left 2012    • SINUS SURGERY      x2   • TUBAL ABDOMINAL LIGATION  1980      Family History   Problem Relation Age of Onset   • Cancer Other         BREAST, LUNG, OVARIAN, KIDNEY   • Kidney cancer Other    • Lung cancer Other         pGF as well   • Cancer Mother         kidney   • Cancer Father         lung   • Mental illness Father    • Breast cancer Sister 30   • Connective Tissue Disease Sister    • Hypertension Sister         pulmonary hypertension   • Cancer Sister         Brest   • Cancer Paternal Grandmother         stomach   • Cancer Paternal Grandfather         lung   • Ovarian cancer Neg Hx      Social History     Socioeconomic History   • Marital status:      Spouse name: Not on file   • Number of children: Not on file   • Years of education: Not on file   • Highest education level: Not on file   Tobacco Use   • Smoking status: Never Smoker   • Smokeless tobacco: Never Used   Substance and Sexual Activity   • Alcohol use: Yes     Alcohol/week: 3.0 standard drinks     Types: 3 Cans of beer per week     Comment: a week   • Drug use: No   • Sexual activity: Defer      Current Outpatient Medications on File Prior to Visit   Medication Sig Dispense Refill   • albuterol sulfate HFA (Ventolin HFA) 108 (90 Base) MCG/ACT inhaler Inhale 2 puffs Every 4 (Four) Hours As Needed for Wheezing. 1 inhaler 3   • desloratadine (CLARINEX) 5 MG tablet TAKE 1 TABLET BY MOUTH EVERY DAY 90 tablet 3   • fluticasone (FLONASE) 50 MCG/ACT nasal spray 1 spray into the nostril(s) as directed by provider 2 (Two) Times a Day.     • hydroxychloroquine (PLAQUENIL) 200 MG tablet Take 1 tablet by mouth 2 (Two) Times a Day. 60 tablet 0   • venlafaxine XR (EFFEXOR-XR) 150 MG 24 hr capsule Take 1 capsule by mouth Daily. 90 capsule 3   • VITAMIN D, CHOLECALCIFEROL, PO Take 1 capsule by mouth Daily.     • WIXELA INHUB 500-50 MCG/DOSE DISKUS INHALE 1 PUFF 2 (TWO) TIMES A DAY. 180 each 1     No current facility-administered medications on file prior  "to visit.       Allergies   Allergen Reactions   • Celebrex [Celecoxib] Hives     HIVES    • Sulfa Antibiotics Itching     ITCHING         The following portions of the patient's history were reviewed and updated as appropriate: allergies, current medications, past family history, past medical history, past social history, past surgical history and problem list.    Review of Systems   Constitutional: Negative.    HENT: Negative.    Eyes: Negative.    Respiratory: Negative.    Cardiovascular: Negative.    Gastrointestinal: Negative.    Endocrine: Negative.    Genitourinary: Negative.    Musculoskeletal: Positive for arthralgias and joint swelling.   Skin: Negative.    Allergic/Immunologic: Negative.    Neurological: Negative.    Hematological: Negative.    Psychiatric/Behavioral: Negative.         Objective      Physical Exam  Pulse 76   Ht 164.1 cm (64.61\")   Wt 84 kg (185 lb 3.2 oz)   LMP  (LMP Unknown)   SpO2 98%   BMI 31.20 kg/m²     Body mass index is 31.2 kg/m².    GENERAL APPEARANCE: awake, alert & oriented x 3, in no acute distress and well developed, well nourished  PSYCH: normal mood and affect  LUNGS:  breathing nonlabored, no wheezing  Right shoulder with positive impingement.  Weakness of rotator cuff.  No discrete tenderness.  Imaging/Studies  Imaging Results (Last 7 Days)     Procedure Component Value Units Date/Time    XR Shoulder 2+ View Right [[id number]] Resulted:  08/24/20 1412     Updated:  08/24/20 1413    Narrative:       Right Shoulder X-Ray  Indication: Pain  AP, scapular Y, and axillary lateral views    Findings: AC joint osteophytes  No fracture    prior studies were available for comparison.            Assessment/Plan        ICD-10-CM ICD-9-CM   1. Right shoulder pain, unspecified chronicity M25.511 719.41       Orders Placed This Encounter   Procedures   • XR Shoulder 2+ View Right      We discussed different treatment options.  I injected her right shoulder subacromial space with " cortisone.  She tolerated injection well without complication.  She will follow-up when the injection wears off.  Medical Decision Making  Management Options : prescription/IM medicine  Data/Risk: radiology tests and independent visualization of imaging, lab tests, or EMG/NCV    Emeka Douglas MD  08/24/20  14:16         EMR Dragon/Transcription disclaimer:  Much of this encounter note is an electronic transcription of spoken language to printed text. Electronic transcription of spoken language may permit erroneous, or at times, nonsensical words or phrases to be inadvertently transcribed. Although I have reviewed the note for such errors, some may still exist.

## 2020-09-29 ENCOUNTER — FLU SHOT (OUTPATIENT)
Dept: INTERNAL MEDICINE | Facility: CLINIC | Age: 70
End: 2020-09-29

## 2020-09-29 DIAGNOSIS — Z23 NEED FOR INFLUENZA VACCINATION: ICD-10-CM

## 2020-09-29 PROCEDURE — G0008 ADMIN INFLUENZA VIRUS VAC: HCPCS | Performed by: INTERNAL MEDICINE

## 2020-09-29 PROCEDURE — 90694 VACC AIIV4 NO PRSRV 0.5ML IM: CPT | Performed by: INTERNAL MEDICINE

## 2020-10-26 RX ORDER — DESLORATADINE 5 MG/1
TABLET ORAL
Qty: 90 TABLET | Refills: 3 | Status: SHIPPED | OUTPATIENT
Start: 2020-10-26 | End: 2021-12-21

## 2020-12-16 ENCOUNTER — HOSPITAL ENCOUNTER (OUTPATIENT)
Dept: MAMMOGRAPHY | Facility: HOSPITAL | Age: 70
Discharge: HOME | End: 2020-12-16

## 2020-12-16 ENCOUNTER — HOSPITAL ENCOUNTER (OUTPATIENT)
Dept: BONE DENSITY | Facility: HOSPITAL | Age: 70
Discharge: HOME | End: 2020-12-16

## 2020-12-16 DIAGNOSIS — Z78.0 POSTMENOPAUSAL: ICD-10-CM

## 2020-12-16 DIAGNOSIS — Z12.31 SCREENING MAMMOGRAM FOR HIGH-RISK PATIENT: ICD-10-CM

## 2020-12-16 PROCEDURE — 77067 SCR MAMMO BI INCL CAD: CPT | Performed by: RADIOLOGY

## 2020-12-16 PROCEDURE — 77080 DXA BONE DENSITY AXIAL: CPT

## 2020-12-16 PROCEDURE — 77067 SCR MAMMO BI INCL CAD: CPT

## 2020-12-16 PROCEDURE — 77063 BREAST TOMOSYNTHESIS BI: CPT

## 2020-12-16 PROCEDURE — 77063 BREAST TOMOSYNTHESIS BI: CPT | Performed by: RADIOLOGY

## 2021-01-11 ENCOUNTER — OFFICE VISIT (OUTPATIENT)
Dept: INTERNAL MEDICINE | Facility: CLINIC | Age: 71
End: 2021-01-11

## 2021-01-11 VITALS
TEMPERATURE: 96.8 F | BODY MASS INDEX: 32.79 KG/M2 | OXYGEN SATURATION: 98 % | HEART RATE: 90 BPM | SYSTOLIC BLOOD PRESSURE: 126 MMHG | WEIGHT: 196.8 LBS | HEIGHT: 65 IN | DIASTOLIC BLOOD PRESSURE: 64 MMHG

## 2021-01-11 DIAGNOSIS — M51.36 DDD (DEGENERATIVE DISC DISEASE), LUMBAR: Chronic | ICD-10-CM

## 2021-01-11 DIAGNOSIS — G89.29 CHRONIC RIGHT SHOULDER PAIN: ICD-10-CM

## 2021-01-11 DIAGNOSIS — M25.511 CHRONIC RIGHT SHOULDER PAIN: ICD-10-CM

## 2021-01-11 DIAGNOSIS — F33.1 MODERATE RECURRENT MAJOR DEPRESSION (HCC): Primary | Chronic | ICD-10-CM

## 2021-01-11 DIAGNOSIS — M79.601 RIGHT ARM PAIN: ICD-10-CM

## 2021-01-11 DIAGNOSIS — J30.2 SEASONAL ALLERGIC RHINITIS, UNSPECIFIED TRIGGER: ICD-10-CM

## 2021-01-11 DIAGNOSIS — M35.1 MIXED CONNECTIVE TISSUE DISEASE (HCC): Chronic | ICD-10-CM

## 2021-01-11 PROCEDURE — 99215 OFFICE O/P EST HI 40 MIN: CPT | Performed by: INTERNAL MEDICINE

## 2021-01-11 RX ORDER — VENLAFAXINE HYDROCHLORIDE 37.5 MG/1
CAPSULE, EXTENDED RELEASE ORAL
Qty: 90 CAPSULE | Refills: 0 | Status: SHIPPED | OUTPATIENT
Start: 2021-01-11 | End: 2021-09-22

## 2021-01-11 RX ORDER — AZELASTINE HYDROCHLORIDE, FLUTICASONE PROPIONATE 137; 50 UG/1; UG/1
SPRAY, METERED NASAL
Qty: 1 BOTTLE | Refills: 11 | Status: SHIPPED | OUTPATIENT
Start: 2021-01-11 | End: 2021-12-21

## 2021-01-11 NOTE — PROGRESS NOTES
Answers for HPI/ROS submitted by the patient on 1/10/2021   What is the primary reason for your visit?: Other  Please describe your symptoms.: discuss reducing medication for depression and a referral to a chiropractor.  Have you had these symptoms before?: Yes  How long have you been having these symptoms?: Greater than 2 weeks  Please describe any probable cause for these symptoms. : pinched nerve    Chief Complaint  Anxiety (follow up), Depression (ready to go down on the dose of effexor), Back Pain (low back pain), Med Refill, referral (needs a referral to a chiropractor), and Allergies (wants to change fluticasone to dymista)    Subjective          Barbara Coelho presents to [facility] for   History of Present Illness    Depression/anxiety - she is currently on  effexor xr 150, and over the last several months she has been taking it every other day and has done well.  She would like to go ahead and wean off of it.  She does still have the stress of her 's murder trial coming up but she feels like she is managing okay and would like to try to get off of it    LBP - did have surgery in '95 and has had pain on and off since.  Mostly across lower back and will go down right leg at times as well. Has seen chiropractor in past when she lived in [place] and this worked quite well so she would like to start seeing a chiropractor locally.  She has the name of Dr. Quinones in [place] and would like to see him but her insurance is requiring a referral    Neck pain with pain down right arm-she did see  last year as well as Dr. Douglas.  It sounds like Dr. Rosado felt it was a pinched nerve in the neck and Dr. Douglas felt there was some shoulder involvement as well and gave her a shot which helped briefly.  She has pain in the neck, right shoulder, and down the arm.  Fingers can be numb as well.  She would like to see the chiropractor for the neck as  "well    Allergies-she had been on dymista in the past and worked better than flonase so would like a prescription for it    Has gained weight over last few months-not exercising much        Objective   Vital Signs:   /64 (BP Location: Left arm, Patient Position: Sitting)   Pulse 90   Temp 96.8 °F (36 °C) (Infrared)   Ht 164.1 cm (64.6\")   Wt 89.3 kg (196 lb 12.8 oz)   SpO2 98%   BMI 33.16 kg/m²     Physical Exam   Constitutional: oriented to person, place, and time.  well-developed and well-nourished. No distress.   HENT:   Head: Normocephalic and atraumatic.   Eyes: Conjunctivae and EOM are normal.   Cardiovascular: Normal rate, regular rhythm and normal heart sounds.  Exam reveals no gallop and no friction rub.    No murmur heard.  Pulmonary/Chest: Effort normal and breath sounds normal. No respiratory distress.   no wheezes.   Neurological:  alert and oriented to person, place, and time.   Skin: Skin is warm and dry. not diaphoretic.   Psychiatric:  normal mood and affect. behavior is normal. Judgment and thought content normal.   MS: Cervical spine tender and right trap tender.  Right AC tenderness.  Decreased range of motion of right shoulder  Hands-arthritic changes bilaterally    Result Review :   The following data was reviewed by: Ania Martel MD on 01/11/2021:    Data reviewed: Radiologic studies xr r shoulder          Assessment and Plan    Problem List Items Addressed This Visit        Multi-system (Lupus, Sarcoid...)    Mixed connective tissue disease (CMS/HCC)-stable on Plaquenil    Overview                 Musculoskeletal and Injuries    Right shoulder pain-arthritis by x-ray.  Discussed seeing Dr. Douglas again if chiropractic care does not make significant improvement       Neuro    DDD (degenerative disc disease), lumbar-she will see chiropractor.  Might consider repeat MRI if pain worsens    Relevant Orders    Ambulatory Referral to Chiropractic (Completed)      Other Visit Diagnoses "     Moderate recurrent major depression (CMS/HCC)  (Chronic)   -  Primary-patient doing better and would like to wean off Effexor.  She has been doing 150 every other day.  We will do 75 for 2 weeks, then 37.5 for 2 weeks then 37.5 every other day until she finishes the prescription.  She will call if she has any difficulties with withdrawal symptoms    Relevant Medications    venlafaxine XR (Effexor XR) 37.5 MG 24 hr capsule    Right arm pain    -probable cervical radiculopathy.  We will request Dr. Mckeon's note and nerve conduction test to review.    Relevant Orders    Ambulatory Referral to Chiropractic (Completed)    Seasonal allergic rhinitis, unspecified trigger    -Flonase not working and patient would like to try Dymista.  We will send in    Relevant Medications    Azelastine-Fluticasone 137-50 MCG/ACT suspension        I spent 50 minutes caring for Barbara on this date of service. This time includes time spent by me in the following activities:preparing for the visit, reviewing tests, obtaining and/or reviewing a separately obtained history, performing a medically appropriate examination and/or evaluation , counseling and educating the patient/family/caregiver, ordering medications, tests, or procedures and documenting information in the medical record  Follow Up   No follow-ups on file.  Patient was given instructions and counseling regarding her condition or for health maintenance advice. Please see specific information pulled into the AVS if appropriate.

## 2021-01-19 ENCOUNTER — TELEPHONE (OUTPATIENT)
Dept: INTERNAL MEDICINE | Facility: CLINIC | Age: 71
End: 2021-01-19

## 2021-01-19 NOTE — TELEPHONE ENCOUNTER
----- Message from Ania Martel MD sent at 1/11/2021  6:10 PM EST -----  Regarding: Dr Mckeon  Can we get his last office note and NCV/EMG - saw him this summer I think

## 2021-05-11 ENCOUNTER — TELEPHONE (OUTPATIENT)
Dept: ORTHOPEDIC SURGERY | Facility: CLINIC | Age: 71
End: 2021-05-11

## 2021-05-11 NOTE — TELEPHONE ENCOUNTER
Provider:  DR. CHRISTINE WILSON    Caller: JAYY AGUIRRE  Relationship to Patient: SELF    Phone Number:  [phone]    Reason for Call:  PATIENT ASKING IF DR. LEVINE WILL SEE HER FOR HER RIGHT KNEE PAIN. HURTING X 2 MONTHS. NO SWELLING. WEIGHT BEARING PAIN. NO INJURY. PLEASE ADVISE.    When was the patient last seen: 8/24/20 FOR RIGHT SHLDR

## 2021-05-12 ENCOUNTER — OFFICE VISIT (OUTPATIENT)
Dept: ORTHOPEDIC SURGERY | Facility: CLINIC | Age: 71
End: 2021-05-12

## 2021-05-12 VITALS
WEIGHT: 180.2 LBS | SYSTOLIC BLOOD PRESSURE: 152 MMHG | HEIGHT: 65 IN | HEART RATE: 76 BPM | BODY MASS INDEX: 30.02 KG/M2 | DIASTOLIC BLOOD PRESSURE: 85 MMHG

## 2021-05-12 DIAGNOSIS — M17.11 PRIMARY OSTEOARTHRITIS OF RIGHT KNEE: ICD-10-CM

## 2021-05-12 DIAGNOSIS — M25.561 RIGHT KNEE PAIN, UNSPECIFIED CHRONICITY: Primary | ICD-10-CM

## 2021-05-12 PROCEDURE — 20610 DRAIN/INJ JOINT/BURSA W/O US: CPT | Performed by: ORTHOPAEDIC SURGERY

## 2021-05-12 PROCEDURE — 99213 OFFICE O/P EST LOW 20 MIN: CPT | Performed by: ORTHOPAEDIC SURGERY

## 2021-05-12 RX ORDER — TRIAMCINOLONE ACETONIDE 40 MG/ML
40 INJECTION, SUSPENSION INTRA-ARTICULAR; INTRAMUSCULAR
Status: COMPLETED | OUTPATIENT
Start: 2021-05-12 | End: 2021-05-12

## 2021-05-12 RX ORDER — NICOTINE POLACRILEX 2 MG
1 GUM BUCCAL DAILY
COMMUNITY

## 2021-05-12 RX ORDER — LIDOCAINE HYDROCHLORIDE 10 MG/ML
4 INJECTION, SOLUTION EPIDURAL; INFILTRATION; INTRACAUDAL; PERINEURAL
Status: COMPLETED | OUTPATIENT
Start: 2021-05-12 | End: 2021-05-12

## 2021-05-12 RX ORDER — BUPIVACAINE HYDROCHLORIDE 2.5 MG/ML
4 INJECTION, SOLUTION EPIDURAL; INFILTRATION; INTRACAUDAL
Status: COMPLETED | OUTPATIENT
Start: 2021-05-12 | End: 2021-05-12

## 2021-05-12 RX ADMIN — TRIAMCINOLONE ACETONIDE 40 MG: 40 INJECTION, SUSPENSION INTRA-ARTICULAR; INTRAMUSCULAR at 14:35

## 2021-05-12 RX ADMIN — BUPIVACAINE HYDROCHLORIDE 4 ML: 2.5 INJECTION, SOLUTION EPIDURAL; INFILTRATION; INTRACAUDAL at 14:35

## 2021-05-12 RX ADMIN — LIDOCAINE HYDROCHLORIDE 4 ML: 10 INJECTION, SOLUTION EPIDURAL; INFILTRATION; INTRACAUDAL; PERINEURAL at 14:35

## 2021-05-12 NOTE — PROGRESS NOTES
[facility]    Subjective     CC: Pain of the Right Knee      HPI  Barbara Coelho is a 71 y.o. female who presents with new problem of: right knee pain.  Onset: atraumatic and gradual in nature. The issue has been ongoing for 2 month(s). Pain is a 4/10 on the pain scale. Pain is described as dull, aching, burning, throbbing, stabbing and shooting. Associated symptoms include pain, swelling, popping, stiffness and giving way/buckling. The pain is worse with walking, climbing stairs and sleeping; ice and heat improve the pain. Previous treatments have included: nothing.    I have reviewed the following portions of the patient's history:History of Present Illness and review of systems.        She has a history of left knee replacement years ago in [place].  Review of Systems   Constitutional: Negative.    HENT: Positive for postnasal drip.    Eyes: Negative.    Respiratory: Negative.    Cardiovascular: Negative.    Gastrointestinal: Negative.    Endocrine: Negative.    Genitourinary: Negative.    Musculoskeletal: Positive for arthralgias, back pain and neck stiffness.   Skin: Negative.    Allergic/Immunologic: Positive for environmental allergies and immunocompromised state.   Neurological: Positive for light-headedness.   Hematological: Bruises/bleeds easily.   Psychiatric/Behavioral: Negative.        ROS:    Constiutional:Pt denies fever, chills, nausea, or vomiting.  MSK:as above      Objective      Past Medical History  Past Medical History:   Diagnosis Date   • Allergic rhinitis     on allergy shots   • Asthma     ALLERGIC   • Back pain     Low   • Bladder infection     Recurrent Bladder Infections   • Depression    • Diverticulitis    • GERD (gastroesophageal reflux disease)     [facility]. no [facility]ts   • H/O bone density study 2015   • H/O mammogram 04/17/2018    [employer]   • History of DVT of lower extremity     left leg   • Lupus (CMS/HCC) 208    Mixed  "connective tissue   • Mixed connective tissue disease (CMS/[facility]) 01/2019    Dr. Thakur   • Osteopenia 03/28/2018    calculated frax - 11/3%   • Pap smear for cervical cancer screening 2015   • Pinched nerve in neck    • Postmenopausal    • Primary central sleep apnea 2018   • Rheumatoid arthritis (CMS/[facility]) 2018   • Urinary incontinence    • Wears contact lenses    • Wears prescription eyeglasses          Physical Exam  /85   Pulse 76   Ht 164.1 cm (64.61\")   Wt 81.7 kg (180 lb 3.2 oz)   LMP  (LMP Unknown)   BMI 30.35 kg/m²     Body mass index is 30.35 kg/m².    Patient is well nourished and well developed.        Ortho Exam  Right knee with varus deformity.  Medial joint tenderness.    Imaging/Labs/EMG Reviewed:  Imaging Results (Last 24 Hours)     Procedure Component Value Units Date/Time    XR Knee 4+ View Right [[id number]] Resulted: 05/12/21 1427     Updated: 05/12/21 1428    Narrative:      Knee X-Ray  Indication: Pain    Upright AP of bilateral knees. Lateral, skiers and Sunrise views of right   knee     Findings: Varus deformity  No fracture  Bone-on-bone medial compartment with osteophytes    No prior studies were available for comparison.            Assessment:  1. Right knee pain, unspecified chronicity    2. Primary osteoarthritis of right knee        Plan:  Recommend over the counter anti-inflammatories for pain and/or swelling  She has severe arthritis of the right knee.  I injected her right knee with cortisone.  She tolerated injection well without complication    Follow Up:   Return if symptoms worsen or fail to improve.      Medical Decision Making  Management Options : Low - OTC Drugs and 1 of 2 Categories = Category 1 - Review of Tests and Documents Category 2 - Assessment requiring an independent interpretation of tests         Emeka Douglas M.D., FAAOS  Orthopedic Surgeon  Fellowship Trained Sports Medicine  [facility]  Orthopedics and Sports Medicine  [address], [address]  [place] [address]

## 2021-05-12 NOTE — PROGRESS NOTES
Procedure   Large Joint Arthrocentesis: R knee  Date/Time: 5/12/2021 2:35 PM  Consent given by: patient  Site marked: site marked  Timeout: Immediately prior to procedure a time out was called to verify the correct patient, procedure, equipment, support staff and site/side marked as required   Supporting Documentation  Indications: pain   Procedure Details  Location: knee - R knee  Preparation: Patient was prepped and draped in the usual sterile fashion  Needle size: 23 G  Approach: anterolateral  Medications administered: 4 mL bupivacaine (PF) 0.25 %; 4 mL lidocaine PF 1% 1 %; 40 mg triamcinolone acetonide 40 MG/ML  Patient tolerance: patient tolerated the procedure well with no immediate complications

## 2021-05-14 NOTE — TELEPHONE ENCOUNTER
Last Office Visit: 1/11/2021  Next Office Visit: 7/26/2021    Medication: Wixela Inhub   Last Refill Date: 1/27/2020  Quantity: 180   Refills: 1    Pharmacy: Pharmacy:  [id number] - [place], [place] - [address] - [phone]  - [phone]    Please review pended refill request for any changes needed on refills or quantities. Thank you!

## 2021-05-14 NOTE — TELEPHONE ENCOUNTER
Caller: Barbara Coelho    Relationship: Self    Best call back number: [phone]     Medication needed:   Requested Prescriptions     Pending Prescriptions Disp Refills   • fluticasone-salmeterol (Wixela Inhub) 500-50 MCG/DOSE DISKUS 180 each 1     Si puff 2 (Two) Times a Day.       When do you need the refill by: ASAP    What additional details did the patient provide when requesting the medication: PATIENT IS OUT OF THE MEDICATION    Does the patient have less than a 3 day supply:  [x] Yes  [] No    What is the patient's preferred pharmacy: [id number] - [place], [place] - [address] [place] [address] - [phone]  - [phone] FX

## 2021-05-31 DIAGNOSIS — F33.1 MODERATE RECURRENT MAJOR DEPRESSION (HCC): Chronic | ICD-10-CM

## 2021-06-01 RX ORDER — VENLAFAXINE HYDROCHLORIDE 37.5 MG/1
CAPSULE, EXTENDED RELEASE ORAL
Qty: 90 CAPSULE | Refills: 0 | OUTPATIENT
Start: 2021-06-01

## 2021-06-29 ENCOUNTER — TELEPHONE (OUTPATIENT)
Dept: ORTHOPEDIC SURGERY | Facility: CLINIC | Age: 71
End: 2021-06-29

## 2021-06-29 DIAGNOSIS — M17.11 PRIMARY OSTEOARTHRITIS OF RIGHT KNEE: Primary | ICD-10-CM

## 2021-06-29 NOTE — TELEPHONE ENCOUNTER
Called patient and let her know that the order had been placed and our office will contact her once the injections are approved.     Danyell

## 2021-06-29 NOTE — TELEPHONE ENCOUNTER
Caller: JAYY AGUIRRE    Relationship to patient: SELF    Best call back number: [phone]    Chief complaint: SEVERE RIGHT KNEE PAIN    Type of visit: REQUESTING POSSIBLE GEL INJECTION    Requested date:AS SOON AS POSSIBLE    If rescheduling, when is the original appointment:      Additional notes: PATIENT HAS SEVERE PAIN WHEN WALKING- STATES CORTISONE INJ DID NOT OFFER RELIEF.

## 2021-07-14 ENCOUNTER — CLINICAL SUPPORT (OUTPATIENT)
Dept: ORTHOPEDIC SURGERY | Facility: CLINIC | Age: 71
End: 2021-07-14

## 2021-07-14 DIAGNOSIS — M17.11 PRIMARY OSTEOARTHRITIS OF RIGHT KNEE: ICD-10-CM

## 2021-07-14 PROCEDURE — 20610 DRAIN/INJ JOINT/BURSA W/O US: CPT | Performed by: ORTHOPAEDIC SURGERY

## 2021-07-21 ENCOUNTER — CLINICAL SUPPORT (OUTPATIENT)
Dept: ORTHOPEDIC SURGERY | Facility: CLINIC | Age: 71
End: 2021-07-21

## 2021-07-21 DIAGNOSIS — M17.11 PRIMARY OSTEOARTHRITIS OF RIGHT KNEE: ICD-10-CM

## 2021-07-21 PROCEDURE — 20610 DRAIN/INJ JOINT/BURSA W/O US: CPT | Performed by: ORTHOPAEDIC SURGERY

## 2021-07-21 NOTE — PROGRESS NOTES
Procedure   Large Joint Arthrocentesis: R knee  Date/Time: 7/21/2021 2:37 PM  Consent given by: patient  Site marked: site marked  Timeout: Immediately prior to procedure a time out was called to verify the correct patient, procedure, equipment, support staff and site/side marked as required   Supporting Documentation  Indications: pain   Procedure Details  Location: knee - R knee  Preparation: Patient was prepped and draped in the usual sterile fashion  Needle size: 23 G  Approach: anterolateral  Medications administered: 30 mg Hyaluronan 30 MG/2ML  Patient tolerance: patient tolerated the procedure well with no immediate complications         Injected her knee with Orthovisc.  She tolerated injection well.

## 2021-07-24 PROBLEM — G47.33 OSA (OBSTRUCTIVE SLEEP APNEA): Status: ACTIVE | Noted: 2018-01-01

## 2021-07-25 NOTE — PROGRESS NOTES
History of Present Illness     Here for medicare wellness exam and physical. No hospitalizations in last year and pt feels health is stable    Depression screen - PHQ-2- 1  Falls: none - used a cane when her knee was worse. Some trouble w/ balance but not bad  Memory: good overall  Living will: pt has  Pain - 4  Specialists:  Ortho - Dr Douglas. GI - Dr Arcos. Surgeon - Dr Morelos. Allergist - Hortensia Gonzales. Eye - Gary. Rheum - Blaze. Sleep Clinic - [facility] (retired) - she will schedule. Derm - atkins  Exercise: not walking currently w/ her knee, but has a stationary bike so can do it  Diet: likes sweets; not a lot of fruits veggies; centrum daily      mixed connective disease - she is seeing Dr Pinky Thakur and she is on Plaquenil . She also has LBP and neck pain and has been doing PT. Helped a little.  2-6 ibuprofen/day depending on pain level      Anxiety - takes ynvrdpb07.5, qod and doing ok - trial scheduled for early August.    She does think she will continue to wean off of it as she is doing okay     Knee pain - she is seeing Dr Douglas and has had some injections - 3rd one will be this week. Has helped some.     She has a blood pressure cuff but has not checked    Current Outpatient Medications on File Prior to Visit   Medication Sig Dispense Refill   • albuterol sulfate HFA (Ventolin HFA) 108 (90 Base) MCG/ACT inhaler Inhale 2 puffs Every 4 (Four) Hours As Needed for Wheezing. 1 inhaler 3   • Azelastine-Fluticasone 137-50 MCG/ACT suspension 1 spray twice daily 1 bottle 11   • Biotin 1 MG capsule takes every day     • desloratadine (CLARINEX) 5 MG tablet TAKE 1 TABLET BY MOUTH EVERY DAY 90 tablet 3   • fluticasone-salmeterol (Wixela Inhub) 500-50 MCG/DOSE DISKUS Inhale 1 puff 2 (Two) Times a Day. 180 each 1   • hydroxychloroquine (PLAQUENIL) 200 MG tablet Take 1 tablet by mouth 2 (Two) Times a Day. 60 tablet 0   • venlafaxine XR (Effexor XR) 37.5 MG 24 hr capsule 2 daily x 2 week, then 1 daily x 2 weeks,  then 1 qod until runs out (Patient taking differently: 2 daily x 2 week, then 1 daily x 2 weeks, then 1 qod until runs out    Taking 37.5mg every other day) 90 capsule 0   • VITAMIN D, CHOLECALCIFEROL, PO Take 1 capsule by mouth Daily.       No current facility-administered medications on file prior to visit.       The following portions of the patient's history were reviewed and updated as appropriate: allergies, current medications, past family history, past medical history, past social history, past surgical history and problem list.    Review of Systems   Constitutional: Negative for activity change, appetite change, fever, unexpected weight gain and unexpected weight loss.   HENT: Negative.    Eyes: Negative.    Respiratory: Negative for shortness of breath (uses advair bid and helps) and wheezing.    Cardiovascular: Negative for chest pain, palpitations and leg swelling.   Gastrointestinal: Negative.  Negative for blood in stool and constipation.   Endocrine: Negative.    Genitourinary: Positive for urinary incontinence. Negative for difficulty urinating and dysuria.   Musculoskeletal: Positive for arthralgias.   Skin: Negative.    Allergic/Immunologic: Positive for environmental allergies. Negative for immunocompromised state.   Neurological: Negative for seizures, speech difficulty, memory problem and confusion.   Hematological: Does not bruise/bleed easily.   Psychiatric/Behavioral: Positive for stress. Negative for agitation.         Objective    Vitals:    07/26/21 1015   BP: 140/70   Pulse: 78   Temp: 97.5 °F (36.4 °C)   SpO2: 98%     Physical Exam   Physical Exam  Vitals and nursing note reviewed.   Constitutional:       General: She is not in acute distress.     Appearance: She is well-developed. She is not diaphoretic.   HENT:      Head: Normocephalic and atraumatic.      Right Ear: External ear normal.      Left Ear: External ear normal.      Nose: Nose normal.      Mouth/Throat:      Pharynx: No  oropharyngeal exudate.   Eyes:      General: No scleral icterus.        Right eye: No discharge.         Left eye: No discharge.      Conjunctiva/sclera: Conjunctivae normal.      Pupils: Pupils are equal, round, and reactive to light.   Neck:      Thyroid: No thyromegaly.   Cardiovascular:      Rate and Rhythm: Normal rate and regular rhythm.      Heart sounds: Normal heart sounds. No murmur heard.   No friction rub. No gallop.    Pulmonary:      Effort: Pulmonary effort is normal. No respiratory distress.      Breath sounds: Normal breath sounds. No wheezing or rales.   Chest:      Breasts:         Right: No mass, nipple discharge, skin change or tenderness.         Left: No mass, nipple discharge, skin change or tenderness.   Abdominal:      General: Bowel sounds are normal. There is no distension.      Palpations: Abdomen is soft. There is no mass.      Tenderness: There is no abdominal tenderness. There is no guarding or rebound.   Musculoskeletal:         General: No deformity. Normal range of motion.      Cervical back: Normal range of motion and neck supple.   Lymphadenopathy:      Cervical: No cervical adenopathy.   Skin:     General: Skin is warm and dry.      Coloration: Skin is not pale.      Findings: No erythema or rash.   Neurological:      Mental Status: She is alert and oriented to person, place, and time.      Coordination: Coordination normal.      Deep Tendon Reflexes: Reflexes normal.   Psychiatric:         Behavior: Behavior normal.         Thought Content: Thought content normal.         Judgment: Judgment normal.      recheck B 122/68      Assessment/Plan   Diagnoses and all orders for this visit:    1. Encounter for annual wellness exam in Medicare patient (Primary)  Regular exercise/healthy diet. BSE q month. Sunscreen use encouraged. caclium intake reviewed.  Living will -has and is on file  Mariano due 12/21  Colon due in '25  Pneumovax - had in '14  prevnar - had  covid vaccine - had  DEXA  due 12/22 (osteopenia)  Shingles - shingrix discussed -  can check at pharmacy since we do not have   Does not need paps since age 65 or older and has had normal paps in past  2. Routine general medical examination at a health care facility  -     CBC (No Diff); Future  -     TSH Rfx On Abnormal To Free T4; Future  -     Lipid Panel; Future  -     Comprehensive Metabolic Panel; Future  -     POC Urinalysis Dipstick, Automated    3. High risk medications (not anticoagulants) long-term use  -     CBC (No Diff); Future  -     Comprehensive Metabolic Panel; Future    4. Mixed connective tissue disease (CMS/HCC)-stable on Plaquenil  -     CBC (No Diff); Future  -     TSH Rfx On Abnormal To Free T4; Future  -     Comprehensive Metabolic Panel; Future    5. Screening cholesterol level-check cholesterol levels today  -     Lipid Panel; Future    6. Moderate recurrent major depression (CMS/HCC)  Comments:  Patient doing well on low-dose Effexor.  We discussed further weaning-could take 1 every 3rd-4th day until she  runs out

## 2021-07-26 ENCOUNTER — LAB (OUTPATIENT)
Dept: LAB | Facility: HOSPITAL | Age: 71
End: 2021-07-26

## 2021-07-26 ENCOUNTER — OFFICE VISIT (OUTPATIENT)
Dept: INTERNAL MEDICINE | Facility: CLINIC | Age: 71
End: 2021-07-26

## 2021-07-26 VITALS
HEART RATE: 78 BPM | TEMPERATURE: 97.5 F | BODY MASS INDEX: 31.38 KG/M2 | OXYGEN SATURATION: 98 % | HEIGHT: 64 IN | WEIGHT: 183.8 LBS | SYSTOLIC BLOOD PRESSURE: 140 MMHG | DIASTOLIC BLOOD PRESSURE: 70 MMHG

## 2021-07-26 DIAGNOSIS — Z00.00 ROUTINE GENERAL MEDICAL EXAMINATION AT A HEALTH CARE FACILITY: ICD-10-CM

## 2021-07-26 DIAGNOSIS — Z13.220 SCREENING CHOLESTEROL LEVEL: ICD-10-CM

## 2021-07-26 DIAGNOSIS — M35.1 MIXED CONNECTIVE TISSUE DISEASE (HCC): ICD-10-CM

## 2021-07-26 DIAGNOSIS — Z00.00 ENCOUNTER FOR ANNUAL WELLNESS EXAM IN MEDICARE PATIENT: Primary | ICD-10-CM

## 2021-07-26 DIAGNOSIS — Z79.899 HIGH RISK MEDICATIONS (NOT ANTICOAGULANTS) LONG-TERM USE: ICD-10-CM

## 2021-07-26 DIAGNOSIS — F33.1 MODERATE RECURRENT MAJOR DEPRESSION (HCC): ICD-10-CM

## 2021-07-26 LAB
BILIRUB BLD-MCNC: NEGATIVE MG/DL
CLARITY, POC: CLEAR
COLOR UR: YELLOW
DEPRECATED RDW RBC AUTO: 40.8 FL (ref 37–54)
ERYTHROCYTE [DISTWIDTH] IN BLOOD BY AUTOMATED COUNT: 12.6 % (ref 12.3–15.4)
GLUCOSE UR STRIP-MCNC: NEGATIVE MG/DL
HCT VFR BLD AUTO: 42 % (ref 34–46.6)
HGB BLD-MCNC: 13.9 G/DL (ref 12–15.9)
KETONES UR QL: NEGATIVE
LEUKOCYTE EST, POC: NEGATIVE
MCH RBC QN AUTO: 29.6 PG (ref 26.6–33)
MCHC RBC AUTO-ENTMCNC: 33.1 G/DL (ref 31.5–35.7)
MCV RBC AUTO: 89.6 FL (ref 79–97)
NITRITE UR-MCNC: NEGATIVE MG/ML
PH UR: 7 [PH] (ref 5–8)
PLATELET # BLD AUTO: 212 10*3/MM3 (ref 140–450)
PMV BLD AUTO: 10.2 FL (ref 6–12)
PROT UR STRIP-MCNC: NEGATIVE MG/DL
RBC # BLD AUTO: 4.69 10*6/MM3 (ref 3.77–5.28)
RBC # UR STRIP: NEGATIVE /UL
SP GR UR: 1.01 (ref 1–1.03)
UROBILINOGEN UR QL: NORMAL
WBC # BLD AUTO: 8.29 10*3/MM3 (ref 3.4–10.8)

## 2021-07-26 PROCEDURE — 85027 COMPLETE CBC AUTOMATED: CPT | Performed by: INTERNAL MEDICINE

## 2021-07-26 PROCEDURE — 99397 PER PM REEVAL EST PAT 65+ YR: CPT | Performed by: INTERNAL MEDICINE

## 2021-07-26 PROCEDURE — G0439 PPPS, SUBSEQ VISIT: HCPCS | Performed by: INTERNAL MEDICINE

## 2021-07-26 PROCEDURE — 81003 URINALYSIS AUTO W/O SCOPE: CPT | Performed by: INTERNAL MEDICINE

## 2021-07-26 PROCEDURE — 80061 LIPID PANEL: CPT | Performed by: INTERNAL MEDICINE

## 2021-07-26 PROCEDURE — 1160F RVW MEDS BY RX/DR IN RCRD: CPT | Performed by: INTERNAL MEDICINE

## 2021-07-26 PROCEDURE — 84443 ASSAY THYROID STIM HORMONE: CPT | Performed by: INTERNAL MEDICINE

## 2021-07-26 PROCEDURE — 80053 COMPREHEN METABOLIC PANEL: CPT | Performed by: INTERNAL MEDICINE

## 2021-07-26 PROCEDURE — 1125F AMNT PAIN NOTED PAIN PRSNT: CPT | Performed by: INTERNAL MEDICINE

## 2021-07-26 PROCEDURE — 1170F FXNL STATUS ASSESSED: CPT | Performed by: INTERNAL MEDICINE

## 2021-07-27 LAB
ALBUMIN SERPL-MCNC: 4.3 G/DL (ref 3.5–5.2)
ALBUMIN/GLOB SERPL: 2.7 G/DL
ALP SERPL-CCNC: 88 U/L (ref 39–117)
ALT SERPL W P-5'-P-CCNC: 11 U/L (ref 1–33)
ANION GAP SERPL CALCULATED.3IONS-SCNC: 11.9 MMOL/L (ref 5–15)
AST SERPL-CCNC: 15 U/L (ref 1–32)
BILIRUB SERPL-MCNC: 0.3 MG/DL (ref 0–1.2)
BUN SERPL-MCNC: 13 MG/DL (ref 8–23)
BUN/CREAT SERPL: 16.3 (ref 7–25)
CALCIUM SPEC-SCNC: 9.1 MG/DL (ref 8.6–10.5)
CHLORIDE SERPL-SCNC: 105 MMOL/L (ref 98–107)
CHOLEST SERPL-MCNC: 173 MG/DL (ref 0–200)
CO2 SERPL-SCNC: 25.1 MMOL/L (ref 22–29)
CREAT SERPL-MCNC: 0.8 MG/DL (ref 0.57–1)
GFR SERPL CREATININE-BSD FRML MDRD: 71 ML/MIN/1.73
GLOBULIN UR ELPH-MCNC: 1.6 GM/DL
GLUCOSE SERPL-MCNC: 95 MG/DL (ref 65–99)
HDLC SERPL-MCNC: 79 MG/DL (ref 40–60)
LDLC SERPL CALC-MCNC: 83 MG/DL (ref 0–100)
LDLC/HDLC SERPL: 1.05 {RATIO}
POTASSIUM SERPL-SCNC: 4 MMOL/L (ref 3.5–5.2)
PROT SERPL-MCNC: 5.9 G/DL (ref 6–8.5)
SODIUM SERPL-SCNC: 142 MMOL/L (ref 136–145)
TRIGL SERPL-MCNC: 57 MG/DL (ref 0–150)
TSH SERPL DL<=0.05 MIU/L-ACNC: 1.8 UIU/ML (ref 0.27–4.2)
VLDLC SERPL-MCNC: 11 MG/DL (ref 5–40)

## 2021-07-28 ENCOUNTER — CLINICAL SUPPORT (OUTPATIENT)
Dept: ORTHOPEDIC SURGERY | Facility: CLINIC | Age: 71
End: 2021-07-28

## 2021-07-28 DIAGNOSIS — M17.11 PRIMARY OSTEOARTHRITIS OF RIGHT KNEE: Primary | ICD-10-CM

## 2021-07-28 PROCEDURE — 20610 DRAIN/INJ JOINT/BURSA W/O US: CPT | Performed by: ORTHOPAEDIC SURGERY

## 2021-07-28 NOTE — PROGRESS NOTES
Procedure   Large Joint Arthrocentesis: R knee  Date/Time: 7/28/2021 10:44 AM  Consent given by: patient  Site marked: site marked  Timeout: Immediately prior to procedure a time out was called to verify the correct patient, procedure, equipment, support staff and site/side marked as required   Supporting Documentation  Indications: pain   Procedure Details  Location: knee - R knee  Preparation: Patient was prepped and draped in the usual sterile fashion  Needle size: 23 G  Approach: anterolateral  Medications administered: 30 mg Hyaluronan 30 MG/2ML  Patient tolerance: patient tolerated the procedure well with no immediate complications         I injected her right knee with Orthovisc.  She tolerated injection well.  She has not noticed relief yet.

## 2021-09-22 ENCOUNTER — PREP FOR SURGERY (OUTPATIENT)
Dept: OTHER | Facility: HOSPITAL | Age: 71
End: 2021-09-22

## 2021-09-22 ENCOUNTER — OFFICE VISIT (OUTPATIENT)
Dept: ORTHOPEDIC SURGERY | Facility: CLINIC | Age: 71
End: 2021-09-22

## 2021-09-22 VITALS
HEIGHT: 64 IN | HEART RATE: 78 BPM | BODY MASS INDEX: 31.41 KG/M2 | WEIGHT: 184 LBS | DIASTOLIC BLOOD PRESSURE: 60 MMHG | SYSTOLIC BLOOD PRESSURE: 157 MMHG

## 2021-09-22 DIAGNOSIS — M17.11 PRIMARY OSTEOARTHRITIS OF RIGHT KNEE: Primary | ICD-10-CM

## 2021-09-22 PROCEDURE — 99214 OFFICE O/P EST MOD 30 MIN: CPT | Performed by: ORTHOPAEDIC SURGERY

## 2021-09-22 RX ORDER — CHLORHEXIDINE GLUCONATE 500 MG/1
CLOTH TOPICAL ONCE
Status: CANCELLED | OUTPATIENT
Start: 2021-09-22

## 2021-09-22 RX ORDER — CHLORHEXIDINE GLUCONATE 4 G/100ML
SOLUTION TOPICAL DAILY
Qty: 236 ML | Refills: 0 | Status: SHIPPED | OUTPATIENT
Start: 2021-09-22 | End: 2021-11-01 | Stop reason: HOSPADM

## 2021-09-22 RX ORDER — PREGABALIN 150 MG/1
150 CAPSULE ORAL ONCE
Status: CANCELLED | OUTPATIENT
Start: 2021-09-22 | End: 2021-09-22

## 2021-09-22 RX ORDER — ACETAMINOPHEN 500 MG
1000 TABLET ORAL ONCE
Status: CANCELLED | OUTPATIENT
Start: 2021-09-22 | End: 2021-09-22

## 2021-09-22 RX ORDER — CEFAZOLIN SODIUM IN 0.9 % NACL 3 G/100 ML
3 INTRAVENOUS SOLUTION, PIGGYBACK (ML) INTRAVENOUS ONCE
Status: CANCELLED | OUTPATIENT
Start: 2021-09-22 | End: 2021-09-22

## 2021-09-22 RX ORDER — CEFAZOLIN SODIUM 2 G/100ML
2 INJECTION, SOLUTION INTRAVENOUS ONCE
Status: CANCELLED | OUTPATIENT
Start: 2021-09-22 | End: 2021-09-22

## 2021-09-22 RX ORDER — TRANEXAMIC ACID 10 MG/ML
1000 INJECTION, SOLUTION INTRAVENOUS ONCE
Status: CANCELLED | OUTPATIENT
Start: 2021-09-22 | End: 2021-09-22

## 2021-09-22 NOTE — PROGRESS NOTES
[facility] Note    Subjective     CC: Follow-up (8 week recheck - Primary osteoarthritis of right knee)      HPI    Barbara Coelho is a 71 y.o. female.  She is follow-up right knee osteoarthritis.  She has failed injections of steroid and joint gel.  She had her left knee replaced years ago in [place].  She lives by herself.    Review of Systems   Constitutional: Negative.  Negative for chills, fatigue and fever.   HENT: Negative.  Negative for congestion and dental problem.    Eyes: Negative.  Negative for blurred vision.   Respiratory: Negative.  Negative for shortness of breath.    Cardiovascular: Negative.  Negative for leg swelling.   Gastrointestinal: Negative.  Negative for abdominal pain.   Endocrine: Negative.  Negative for polyuria.   Genitourinary: Negative.  Negative for difficulty urinating.   Musculoskeletal: Positive for arthralgias.   Skin: Negative.    Allergic/Immunologic: Negative.    Neurological: Negative.    Hematological: Negative.  Negative for adenopathy.   Psychiatric/Behavioral: Negative.  Negative for behavioral problems.       ROS:    Constiutional:Pt denies fever, chills, nausea, or vomiting.  MSK:as above      Objective      Past Medical History  Past Medical History:   Diagnosis Date   • Allergic rhinitis     on allergy shots   • Asthma     ALLERGIC   • Back pain     Low   • Bladder infection     Recurrent Bladder Infections   • Depression    • Diverticulitis    • GERD (gastroesophageal reflux disease)     [facility]. no barretts   • H/O bone density study 2015   • H/O mammogram 12/16/2020, 04/17/2018    [employer]ism   • History of DVT of lower extremity     left leg   • Knee pain, right    • Lupus (CMS/HCC) 208    Mixed connective tissue   • Mixed connective tissue disease (CMS/HCC) 01/2019    Dr. Thakur   • CARITO (obstructive sleep apnea) 2018   • Osteopenia 03/28/2018    calculated frax - 11/3%   • Pap smear for cervical cancer screening  "2015   • Pinched nerve in neck    • Postmenopausal    • Rheumatoid arthritis (CMS/[facility]) 2018   • Urinary incontinence    • Wears contact lenses    • Wears prescription eyeglasses          Physical Exam  /60   Pulse 78   Ht 161.3 cm (63.5\")   Wt 83.5 kg (184 lb)   LMP  (LMP Unknown)   BMI 32.08 kg/m²     Body mass index is 32.08 kg/m².    Patient is well nourished and well developed.        Ortho Exam  Right knee exam unchanged.  Varus deformity.  Tenderness.    Imaging/Labs/EMG Reviewed:  Imaging Results (Last 24 Hours)     ** No results found for the last 24 hours. **      Previous x-rays with significant arthritis bone-on-bone and osteophytes    Assessment:  1. Primary osteoarthritis of right knee        Plan:  The plan is for right total knee arthroplasty.  She will most likely need to stay 1 night.  She lives by herself.Treatment options and alternatives were discussed with patient.  Surgical risks include but are not limited to pain, bleeding, infection, failure to relieve symptoms, need for further procedures, recurrence of symptoms, damage to healthy adjacent structures, hardware loosening/failure, stiffness, weakness, scar, blood clots/DVT/PE, loss of limb or life. We also discussed the postoperative protocol and expected outcome although no guarantees are possible with surgery. All questions were answered; the patient would like to proceed with surgical intervention.    Follow Up:   Return for 1 week after surgery.      Medical Decision Making  Management Options : Moderate - Decision regarding minor surgery with identified patient  or procedure risk factors        Emeka Douglas M.D., [facility]  Orthopedic Surgeon  Fellowship Trained Sports Medicine  [facility]  Orthopedics and Sports Medicine  [address] [place] [address], [address]  [place] [address]    EMR Dragon/Transcription disclaimer:  Much of this encounter note is an electronic transcription of spoken language to printed " text. Electronic transcription of spoken language may permit erroneous, or at times, nonsensical words or phrases to be inadvertently transcribed. Although I have reviewed the note for such errors, some may still exist.

## 2021-10-09 ENCOUNTER — FLU SHOT (OUTPATIENT)
Dept: INTERNAL MEDICINE | Facility: CLINIC | Age: 71
End: 2021-10-09

## 2021-10-09 DIAGNOSIS — Z23 NEED FOR INFLUENZA VACCINATION: Primary | ICD-10-CM

## 2021-10-09 PROCEDURE — G0008 ADMIN INFLUENZA VIRUS VAC: HCPCS | Performed by: INTERNAL MEDICINE

## 2021-10-09 PROCEDURE — 90662 IIV NO PRSV INCREASED AG IM: CPT | Performed by: INTERNAL MEDICINE

## 2021-10-15 ENCOUNTER — APPOINTMENT (OUTPATIENT)
Dept: PREADMISSION TESTING | Facility: HOSPITAL | Age: 71
End: 2021-10-15

## 2021-10-21 ENCOUNTER — PRE-ADMISSION TESTING (OUTPATIENT)
Dept: PREADMISSION TESTING | Facility: HOSPITAL | Age: 71
End: 2021-10-21

## 2021-10-21 VITALS — BODY MASS INDEX: 31.69 KG/M2 | WEIGHT: 185.63 LBS | HEIGHT: 64 IN

## 2021-10-21 DIAGNOSIS — M17.11 PRIMARY OSTEOARTHRITIS OF RIGHT KNEE: ICD-10-CM

## 2021-10-21 LAB
ANION GAP SERPL CALCULATED.3IONS-SCNC: 12 MMOL/L (ref 5–15)
APTT PPP: 25.9 SECONDS (ref 22–39)
BASOPHILS # BLD AUTO: 0.05 10*3/MM3 (ref 0–0.2)
BASOPHILS NFR BLD AUTO: 0.5 % (ref 0–1.5)
BUN SERPL-MCNC: 16 MG/DL (ref 8–23)
BUN/CREAT SERPL: 13.3 (ref 7–25)
CALCIUM SPEC-SCNC: 9.4 MG/DL (ref 8.6–10.5)
CHLORIDE SERPL-SCNC: 105 MMOL/L (ref 98–107)
CO2 SERPL-SCNC: 27 MMOL/L (ref 22–29)
CREAT SERPL-MCNC: 1.2 MG/DL (ref 0.57–1)
CRP SERPL-MCNC: <0.3 MG/DL (ref 0–0.5)
DEPRECATED RDW RBC AUTO: 41.3 FL (ref 37–54)
EOSINOPHIL # BLD AUTO: 0.22 10*3/MM3 (ref 0–0.4)
EOSINOPHIL NFR BLD AUTO: 2.2 % (ref 0.3–6.2)
ERYTHROCYTE [DISTWIDTH] IN BLOOD BY AUTOMATED COUNT: 12.5 % (ref 12.3–15.4)
ERYTHROCYTE [SEDIMENTATION RATE] IN BLOOD: 8 MM/HR (ref 0–30)
GFR SERPL CREATININE-BSD FRML MDRD: 44 ML/MIN/1.73
GLUCOSE SERPL-MCNC: 174 MG/DL (ref 65–99)
HBA1C MFR BLD: 5.5 % (ref 4.8–5.6)
HCT VFR BLD AUTO: 42.5 % (ref 34–46.6)
HGB BLD-MCNC: 13.7 G/DL (ref 12–15.9)
IMM GRANULOCYTES # BLD AUTO: 0.02 10*3/MM3 (ref 0–0.05)
IMM GRANULOCYTES NFR BLD AUTO: 0.2 % (ref 0–0.5)
INR PPP: 0.94 (ref 0.85–1.16)
LYMPHOCYTES # BLD AUTO: 5.31 10*3/MM3 (ref 0.7–3.1)
LYMPHOCYTES NFR BLD AUTO: 52.4 % (ref 19.6–45.3)
MCH RBC QN AUTO: 29.3 PG (ref 26.6–33)
MCHC RBC AUTO-ENTMCNC: 32.2 G/DL (ref 31.5–35.7)
MCV RBC AUTO: 91 FL (ref 79–97)
MONOCYTES # BLD AUTO: 0.45 10*3/MM3 (ref 0.1–0.9)
MONOCYTES NFR BLD AUTO: 4.4 % (ref 5–12)
NEUTROPHILS NFR BLD AUTO: 4.08 10*3/MM3 (ref 1.7–7)
NEUTROPHILS NFR BLD AUTO: 40.3 % (ref 42.7–76)
NRBC BLD AUTO-RTO: 0 /100 WBC (ref 0–0.2)
PLATELET # BLD AUTO: 237 10*3/MM3 (ref 140–450)
PMV BLD AUTO: 9.2 FL (ref 6–12)
POTASSIUM SERPL-SCNC: 4.5 MMOL/L (ref 3.5–5.2)
PROTHROMBIN TIME: 12.3 SECONDS (ref 11.4–14.4)
RBC # BLD AUTO: 4.67 10*6/MM3 (ref 3.77–5.28)
SODIUM SERPL-SCNC: 144 MMOL/L (ref 136–145)
WBC # BLD AUTO: 10.13 10*3/MM3 (ref 3.4–10.8)

## 2021-10-21 PROCEDURE — 83036 HEMOGLOBIN GLYCOSYLATED A1C: CPT

## 2021-10-21 PROCEDURE — 93010 ELECTROCARDIOGRAM REPORT: CPT | Performed by: INTERNAL MEDICINE

## 2021-10-21 PROCEDURE — 86140 C-REACTIVE PROTEIN: CPT

## 2021-10-21 PROCEDURE — 85730 THROMBOPLASTIN TIME PARTIAL: CPT

## 2021-10-21 PROCEDURE — 85610 PROTHROMBIN TIME: CPT

## 2021-10-21 PROCEDURE — 85652 RBC SED RATE AUTOMATED: CPT

## 2021-10-21 PROCEDURE — 85025 COMPLETE CBC W/AUTO DIFF WBC: CPT

## 2021-10-21 PROCEDURE — 36415 COLL VENOUS BLD VENIPUNCTURE: CPT

## 2021-10-21 PROCEDURE — 93005 ELECTROCARDIOGRAM TRACING: CPT

## 2021-10-21 PROCEDURE — 80048 BASIC METABOLIC PNL TOTAL CA: CPT

## 2021-10-21 RX ORDER — CHLORHEXIDINE GLUCONATE 500 MG/1
CLOTH TOPICAL ONCE
Status: ACTIVE | OUTPATIENT
Start: 2021-10-21

## 2021-10-21 ASSESSMENT — KOOS JR
KOOS JR SCORE: 20
KOOS JR SCORE: 36.931

## 2021-10-21 NOTE — PAT
Patient viewed general PAT education video as instructed in their preoperative information received from their surgeon.  Patient stated the general PAT education video was viewed in its entirety and survey completed.  Copies of PAT general education handouts (Incentive Spirometry, Meds to Beds Program, Patient Belongings, Pre-op skin preparation instructions, Blood Glucose testing, Visitor policy, Surgery FAQ, Code H) distributed to patient if not printed. Education related to the PAT pass and skin preparation for surgery (if applicable) completed in PAT as a reinforcement to PAT education video. Patient instructed to return PAT pass provided today as well as completed skin preparation sheet (if applicable) on the day of procedure.     Additionally if patient had not viewed video yet but intended to view it at home or in our waiting area, then referred them to the handout with QR code/link provided during PAT visit.  Instructed patient to complete survey after viewing the video in its entirety.  Encouraged patient/family to read [facility] general education handouts thoroughly and notify PAT staff with any questions or concerns. Patient verbalized understanding of all information and priority content.    Patient instructed to drink 20 ounces (or until full) of Gatorade and it needs to be completed 1 hour (for Main OR patients) or 2 hours (scheduled  section patients) before given arrival time for procedure (NO RED Gatorade)    Patient verbalized understanding.    Patient to apply Chlorhexadine wipes  to surgical area (as instructed) the night before procedure and the AM of procedure. Wipes provided.    COVID TEST SCHEDULED FOR 10/26    Prescription for Bactroban and Chlorhexidine shower called into patient's pharmacy or [facility] pharmacy by patient's surgeon.  Reinforced with patient to  the prescription from applicable pharmacy if they haven't already.  Verbal and written instructions given regarding proper use  of the Bactroban and Chlorhexidine to patient and/or famlily during PAT visit. Patient/family also instructed to complete checklist and return it to Pre-op on the day of surgery.  Patient and/or family verbalized understanding.    Patient instructed to bring CPAP mask and tubing to the hospital for overnight stay.  Explained that it is not necessary to bring their CPAP machine to the hospital instead a CPAP machine will be provided for use by the hospital. If patient knows their CPAP settings, those settings will be implemented.  If not, the CPAP machine will be utilized on the auto setting using their mask and tubing.    Patient verbalized understanding.    Discussed with patient options for receiving total joint replacement education and assessed patient's ability and preference. Joint Replacement Guide given to patient during PAT visit since not received a copy within the last year. Encouraged patient/family to read guide thoroughly and notify PAT staff with any questions or concerns. Handout provided directing patient to links to watch online videos related to joint replacement surgery on the [facility] website. The handout gives detailed instructions for joining an online joint replacement class through Zoom or phone conference offered on Thursdays. Patient agreed to participate by joining online class through Zoom. Patient verbalized understanding of instructions and to complete the online learning tool survey. Encouraged to share information with family and/or . An overview of the joint replacement education was provided during the visit including general perioperative instructions that are routine for all surgical patients (PAT PASS, wipes, directions to pre-op, etc.).

## 2021-10-22 LAB
QT INTERVAL: 322 MS
QTC INTERVAL: 400 MS

## 2021-10-26 ENCOUNTER — APPOINTMENT (OUTPATIENT)
Dept: PREADMISSION TESTING | Facility: HOSPITAL | Age: 71
End: 2021-10-26

## 2021-10-26 LAB — SARS-COV-2 RNA PNL SPEC NAA+PROBE: NOT DETECTED

## 2021-10-26 PROCEDURE — C9803 HOPD COVID-19 SPEC COLLECT: HCPCS

## 2021-10-26 PROCEDURE — U0004 COV-19 TEST NON-CDC HGH THRU: HCPCS

## 2021-10-28 ENCOUNTER — ANESTHESIA EVENT (OUTPATIENT)
Dept: PERIOP | Facility: HOSPITAL | Age: 71
End: 2021-10-28

## 2021-10-28 RX ORDER — FAMOTIDINE 10 MG/ML
20 INJECTION, SOLUTION INTRAVENOUS ONCE
Status: CANCELLED | OUTPATIENT
Start: 2021-10-28 | End: 2021-10-28

## 2021-10-28 RX ORDER — SODIUM CHLORIDE 0.9 % (FLUSH) 0.9 %
10 SYRINGE (ML) INJECTION EVERY 12 HOURS SCHEDULED
Status: CANCELLED | OUTPATIENT
Start: 2021-10-28

## 2021-10-28 RX ORDER — SODIUM CHLORIDE 0.9 % (FLUSH) 0.9 %
10 SYRINGE (ML) INJECTION AS NEEDED
Status: CANCELLED | OUTPATIENT
Start: 2021-10-28

## 2021-10-29 ENCOUNTER — ANESTHESIA EVENT CONVERTED (OUTPATIENT)
Dept: ANESTHESIOLOGY | Facility: HOSPITAL | Age: 71
End: 2021-10-29

## 2021-10-29 ENCOUNTER — APPOINTMENT (OUTPATIENT)
Dept: GENERAL RADIOLOGY | Facility: HOSPITAL | Age: 71
End: 2021-10-29

## 2021-10-29 ENCOUNTER — HOSPITAL ENCOUNTER (OUTPATIENT)
Facility: HOSPITAL | Age: 71
Discharge: HOME | End: 2021-11-01
Attending: ORTHOPAEDIC SURGERY | Admitting: ORTHOPAEDIC SURGERY

## 2021-10-29 ENCOUNTER — ANESTHESIA (OUTPATIENT)
Dept: PERIOP | Facility: HOSPITAL | Age: 71
End: 2021-10-29

## 2021-10-29 DIAGNOSIS — M17.11 PRIMARY OSTEOARTHRITIS OF RIGHT KNEE: ICD-10-CM

## 2021-10-29 DIAGNOSIS — Z96.651 S/P TOTAL KNEE ARTHROPLASTY, RIGHT: Primary | ICD-10-CM

## 2021-10-29 PROCEDURE — 25010000002 PROPOFOL 10 MG/ML EMULSION: Performed by: NURSE ANESTHETIST, CERTIFIED REGISTERED

## 2021-10-29 PROCEDURE — S0260 H&P FOR SURGERY: HCPCS | Performed by: ORTHOPAEDIC SURGERY

## 2021-10-29 PROCEDURE — 63710000001 MUPIROCIN 2 % OINTMENT 1 G TUBE: Performed by: ORTHOPAEDIC SURGERY

## 2021-10-29 PROCEDURE — C1776 JOINT DEVICE (IMPLANTABLE): HCPCS | Performed by: ORTHOPAEDIC SURGERY

## 2021-10-29 PROCEDURE — 0 MEPIVACAINE HCL (PF) 1.5 % SOLUTION: Performed by: NURSE ANESTHETIST, CERTIFIED REGISTERED

## 2021-10-29 PROCEDURE — C1889 IMPLANT/INSERT DEVICE, NOC: HCPCS | Performed by: ORTHOPAEDIC SURGERY

## 2021-10-29 PROCEDURE — 63710000001 PREGABALIN 75 MG CAPSULE: Performed by: ORTHOPAEDIC SURGERY

## 2021-10-29 PROCEDURE — 25010000002 DEXAMETHASONE PER 1 MG: Performed by: NURSE ANESTHETIST, CERTIFIED REGISTERED

## 2021-10-29 PROCEDURE — 25010000002 FENTANYL CITRATE (PF) 50 MCG/ML SOLUTION

## 2021-10-29 PROCEDURE — 63710000001 BUDESONIDE-FORMOTEROL 80-4.5 MCG/ACT AEROSOL 6.9 G INHALER: Performed by: ORTHOPAEDIC SURGERY

## 2021-10-29 PROCEDURE — 63710000001 OXYCODONE 5 MG TABLET: Performed by: ORTHOPAEDIC SURGERY

## 2021-10-29 PROCEDURE — 97161 PT EVAL LOW COMPLEX 20 MIN: CPT

## 2021-10-29 PROCEDURE — 27447 TOTAL KNEE ARTHROPLASTY: CPT

## 2021-10-29 PROCEDURE — 63710000001 FAMOTIDINE 20 MG TABLET: Performed by: ANESTHESIOLOGY

## 2021-10-29 PROCEDURE — 73560 X-RAY EXAM OF KNEE 1 OR 2: CPT

## 2021-10-29 PROCEDURE — 63710000001 ACETAMINOPHEN 500 MG TABLET: Performed by: ORTHOPAEDIC SURGERY

## 2021-10-29 PROCEDURE — A9270 NON-COVERED ITEM OR SERVICE: HCPCS | Performed by: ORTHOPAEDIC SURGERY

## 2021-10-29 PROCEDURE — 0 CEFAZOLIN IN DEXTROSE 2-4 GM/100ML-% SOLUTION: Performed by: ORTHOPAEDIC SURGERY

## 2021-10-29 PROCEDURE — 94640 AIRWAY INHALATION TREATMENT: CPT

## 2021-10-29 PROCEDURE — C1755 CATHETER, INTRASPINAL: HCPCS | Performed by: ORTHOPAEDIC SURGERY

## 2021-10-29 PROCEDURE — 27447 TOTAL KNEE ARTHROPLASTY: CPT | Performed by: ORTHOPAEDIC SURGERY

## 2021-10-29 PROCEDURE — 97116 GAIT TRAINING THERAPY: CPT

## 2021-10-29 PROCEDURE — 25010000002 ONDANSETRON PER 1 MG: Performed by: NURSE ANESTHETIST, CERTIFIED REGISTERED

## 2021-10-29 PROCEDURE — 25010000002 DEXAMETHASONE SODIUM PHOSPHATE 10 MG/ML SOLUTION: Performed by: NURSE ANESTHETIST, CERTIFIED REGISTERED

## 2021-10-29 PROCEDURE — C1713 ANCHOR/SCREW BN/BN,TIS/BN: HCPCS | Performed by: ORTHOPAEDIC SURGERY

## 2021-10-29 PROCEDURE — 25010000002 ROPIVACINE HCL-NACL: Performed by: NURSE ANESTHETIST, CERTIFIED REGISTERED

## 2021-10-29 PROCEDURE — A9270 NON-COVERED ITEM OR SERVICE: HCPCS | Performed by: ANESTHESIOLOGY

## 2021-10-29 DEVICE — IMPLANTABLE DEVICE: Type: IMPLANTABLE DEVICE | Site: KNEE | Status: FUNCTIONAL

## 2021-10-29 DEVICE — CMT BONE R 1X40: Type: IMPLANTABLE DEVICE | Site: KNEE | Status: FUNCTIONAL

## 2021-10-29 DEVICE — CAP TOTL KN CMT PRIMARY: Type: IMPLANTABLE DEVICE | Site: KNEE | Status: FUNCTIONAL

## 2021-10-29 DEVICE — DEV CONTRL TISS STRATAFIX SPIRAL MNCRYL UD 3/0 PLS 60CM: Type: IMPLANTABLE DEVICE | Site: KNEE | Status: FUNCTIONAL

## 2021-10-29 DEVICE — DEV CONTRL TISS STRATAFIX SYMM PDS PLUS VIL CT-1 45CM: Type: IMPLANTABLE DEVICE | Site: KNEE | Status: FUNCTIONAL

## 2021-10-29 DEVICE — PAT 3PEG THN 31X6.2  31MM: Type: IMPLANTABLE DEVICE | Site: KNEE | Status: FUNCTIONAL

## 2021-10-29 RX ORDER — SODIUM CHLORIDE 9 MG/ML
120 INJECTION, SOLUTION INTRAVENOUS CONTINUOUS
Status: DISCONTINUED | OUTPATIENT
Start: 2021-10-29 | End: 2021-11-01 | Stop reason: HOSPADM

## 2021-10-29 RX ORDER — MIDAZOLAM HYDROCHLORIDE 1 MG/ML
0.5 INJECTION INTRAMUSCULAR; INTRAVENOUS
Status: DISCONTINUED | OUTPATIENT
Start: 2021-10-29 | End: 2021-10-29 | Stop reason: HOSPADM

## 2021-10-29 RX ORDER — FENTANYL CITRATE 50 UG/ML
INJECTION, SOLUTION INTRAMUSCULAR; INTRAVENOUS
Status: COMPLETED
Start: 2021-10-29 | End: 2021-10-29

## 2021-10-29 RX ORDER — CEFAZOLIN SODIUM 2 G/100ML
2 INJECTION, SOLUTION INTRAVENOUS EVERY 8 HOURS
Status: COMPLETED | OUTPATIENT
Start: 2021-10-29 | End: 2021-10-30

## 2021-10-29 RX ORDER — MEPERIDINE HYDROCHLORIDE 25 MG/ML
12.5 INJECTION INTRAMUSCULAR; INTRAVENOUS; SUBCUTANEOUS
Status: DISCONTINUED | OUTPATIENT
Start: 2021-10-29 | End: 2021-10-29 | Stop reason: HOSPADM

## 2021-10-29 RX ORDER — ONDANSETRON 2 MG/ML
4 INJECTION INTRAMUSCULAR; INTRAVENOUS ONCE AS NEEDED
Status: DISCONTINUED | OUTPATIENT
Start: 2021-10-29 | End: 2021-10-29 | Stop reason: HOSPADM

## 2021-10-29 RX ORDER — DEXAMETHASONE SODIUM PHOSPHATE 10 MG/ML
INJECTION, SOLUTION INTRAMUSCULAR; INTRAVENOUS
Status: COMPLETED | OUTPATIENT
Start: 2021-10-29 | End: 2021-10-29

## 2021-10-29 RX ORDER — BUPIVACAINE HYDROCHLORIDE 2.5 MG/ML
INJECTION, SOLUTION EPIDURAL; INFILTRATION; INTRACAUDAL
Status: DISCONTINUED | OUTPATIENT
Start: 2021-10-29 | End: 2021-10-29 | Stop reason: SURG

## 2021-10-29 RX ORDER — FENTANYL CITRATE 50 UG/ML
50 INJECTION, SOLUTION INTRAMUSCULAR; INTRAVENOUS
Status: DISCONTINUED | OUTPATIENT
Start: 2021-10-29 | End: 2021-10-29 | Stop reason: HOSPADM

## 2021-10-29 RX ORDER — EPHEDRINE SULFATE 50 MG/ML
5 INJECTION, SOLUTION INTRAVENOUS ONCE AS NEEDED
Status: DISCONTINUED | OUTPATIENT
Start: 2021-10-29 | End: 2021-10-29 | Stop reason: HOSPADM

## 2021-10-29 RX ORDER — OXYCODONE HYDROCHLORIDE 5 MG/1
5 TABLET ORAL EVERY 4 HOURS PRN
Status: DISCONTINUED | OUTPATIENT
Start: 2021-10-29 | End: 2021-11-01 | Stop reason: HOSPADM

## 2021-10-29 RX ORDER — DEXAMETHASONE SODIUM PHOSPHATE 4 MG/ML
INJECTION, SOLUTION INTRA-ARTICULAR; INTRALESIONAL; INTRAMUSCULAR; INTRAVENOUS; SOFT TISSUE AS NEEDED
Status: DISCONTINUED | OUTPATIENT
Start: 2021-10-29 | End: 2021-10-29 | Stop reason: SURG

## 2021-10-29 RX ORDER — MAGNESIUM HYDROXIDE 1200 MG/15ML
LIQUID ORAL AS NEEDED
Status: DISCONTINUED | OUTPATIENT
Start: 2021-10-29 | End: 2021-10-29 | Stop reason: HOSPADM

## 2021-10-29 RX ORDER — PREGABALIN 150 MG/1
150 CAPSULE ORAL ONCE
Status: COMPLETED | OUTPATIENT
Start: 2021-10-29 | End: 2021-10-29

## 2021-10-29 RX ORDER — ASPIRIN 325 MG
325 TABLET, DELAYED RELEASE (ENTERIC COATED) ORAL EVERY 12 HOURS SCHEDULED
Status: DISCONTINUED | OUTPATIENT
Start: 2021-10-30 | End: 2021-10-29

## 2021-10-29 RX ORDER — LIDOCAINE HYDROCHLORIDE 10 MG/ML
0.5 INJECTION, SOLUTION EPIDURAL; INFILTRATION; INTRACAUDAL; PERINEURAL ONCE AS NEEDED
Status: COMPLETED | OUTPATIENT
Start: 2021-10-29 | End: 2021-10-29

## 2021-10-29 RX ORDER — ONDANSETRON 2 MG/ML
INJECTION INTRAMUSCULAR; INTRAVENOUS AS NEEDED
Status: DISCONTINUED | OUTPATIENT
Start: 2021-10-29 | End: 2021-10-29 | Stop reason: SURG

## 2021-10-29 RX ORDER — CEFAZOLIN SODIUM 2 G/100ML
2 INJECTION, SOLUTION INTRAVENOUS ONCE
Status: COMPLETED | OUTPATIENT
Start: 2021-10-29 | End: 2021-10-29

## 2021-10-29 RX ORDER — LABETALOL HYDROCHLORIDE 5 MG/ML
10 INJECTION, SOLUTION INTRAVENOUS EVERY 4 HOURS PRN
Status: DISCONTINUED | OUTPATIENT
Start: 2021-10-29 | End: 2021-11-01 | Stop reason: HOSPADM

## 2021-10-29 RX ORDER — NALOXONE HCL 0.4 MG/ML
0.4 VIAL (ML) INJECTION AS NEEDED
Status: DISCONTINUED | OUTPATIENT
Start: 2021-10-29 | End: 2021-10-29 | Stop reason: HOSPADM

## 2021-10-29 RX ORDER — LIDOCAINE HYDROCHLORIDE 10 MG/ML
INJECTION, SOLUTION EPIDURAL; INFILTRATION; INTRACAUDAL; PERINEURAL AS NEEDED
Status: DISCONTINUED | OUTPATIENT
Start: 2021-10-29 | End: 2021-10-29 | Stop reason: SURG

## 2021-10-29 RX ORDER — CEFAZOLIN SODIUM IN 0.9 % NACL 3 G/100 ML
3 INTRAVENOUS SOLUTION, PIGGYBACK (ML) INTRAVENOUS ONCE
Status: DISCONTINUED | OUTPATIENT
Start: 2021-10-29 | End: 2021-10-29

## 2021-10-29 RX ORDER — PROPOFOL 10 MG/ML
VIAL (ML) INTRAVENOUS AS NEEDED
Status: DISCONTINUED | OUTPATIENT
Start: 2021-10-29 | End: 2021-10-29 | Stop reason: SURG

## 2021-10-29 RX ORDER — SODIUM CHLORIDE, SODIUM LACTATE, POTASSIUM CHLORIDE, CALCIUM CHLORIDE 600; 310; 30; 20 MG/100ML; MG/100ML; MG/100ML; MG/100ML
9 INJECTION, SOLUTION INTRAVENOUS CONTINUOUS
Status: DISCONTINUED | OUTPATIENT
Start: 2021-10-29 | End: 2021-11-01 | Stop reason: HOSPADM

## 2021-10-29 RX ORDER — SODIUM CHLORIDE, SODIUM LACTATE, POTASSIUM CHLORIDE, CALCIUM CHLORIDE 600; 310; 30; 20 MG/100ML; MG/100ML; MG/100ML; MG/100ML
100 INJECTION, SOLUTION INTRAVENOUS CONTINUOUS
Status: DISCONTINUED | OUTPATIENT
Start: 2021-10-29 | End: 2021-11-01 | Stop reason: HOSPADM

## 2021-10-29 RX ORDER — FAMOTIDINE 20 MG/1
20 TABLET, FILM COATED ORAL ONCE
Status: COMPLETED | OUTPATIENT
Start: 2021-10-29 | End: 2021-10-29

## 2021-10-29 RX ORDER — SODIUM CHLORIDE 0.9 % (FLUSH) 0.9 %
1-10 SYRINGE (ML) INJECTION AS NEEDED
Status: DISCONTINUED | OUTPATIENT
Start: 2021-10-29 | End: 2021-11-01 | Stop reason: HOSPADM

## 2021-10-29 RX ORDER — HYDROMORPHONE HYDROCHLORIDE 1 MG/ML
0.5 INJECTION, SOLUTION INTRAMUSCULAR; INTRAVENOUS; SUBCUTANEOUS
Status: DISCONTINUED | OUTPATIENT
Start: 2021-10-29 | End: 2021-10-29 | Stop reason: HOSPADM

## 2021-10-29 RX ORDER — ALBUTEROL SULFATE 90 UG/1
2 AEROSOL, METERED RESPIRATORY (INHALATION) EVERY 4 HOURS PRN
Status: DISCONTINUED | OUTPATIENT
Start: 2021-10-29 | End: 2021-11-01 | Stop reason: HOSPADM

## 2021-10-29 RX ORDER — SODIUM CHLORIDE 0.9 % (FLUSH) 0.9 %
3 SYRINGE (ML) INJECTION EVERY 12 HOURS SCHEDULED
Status: DISCONTINUED | OUTPATIENT
Start: 2021-10-29 | End: 2021-11-01 | Stop reason: HOSPADM

## 2021-10-29 RX ORDER — BUDESONIDE AND FORMOTEROL FUMARATE DIHYDRATE 80; 4.5 UG/1; UG/1
2 AEROSOL RESPIRATORY (INHALATION)
Refills: 1 | Status: DISCONTINUED | OUTPATIENT
Start: 2021-10-29 | End: 2021-11-01 | Stop reason: HOSPADM

## 2021-10-29 RX ORDER — ACETAMINOPHEN 500 MG
1000 TABLET ORAL ONCE
Status: COMPLETED | OUTPATIENT
Start: 2021-10-29 | End: 2021-10-29

## 2021-10-29 RX ORDER — BUPIVACAINE HYDROCHLORIDE 2.5 MG/ML
INJECTION, SOLUTION EPIDURAL; INFILTRATION; INTRACAUDAL
Status: COMPLETED | OUTPATIENT
Start: 2021-10-29 | End: 2021-10-29

## 2021-10-29 RX ADMIN — LIDOCAINE HYDROCHLORIDE 20 MG: 10 INJECTION, SOLUTION EPIDURAL; INFILTRATION; INTRACAUDAL; PERINEURAL at 09:30

## 2021-10-29 RX ADMIN — FENTANYL CITRATE 50 MCG: 50 INJECTION, SOLUTION INTRAMUSCULAR; INTRAVENOUS at 13:01

## 2021-10-29 RX ADMIN — MUPIROCIN 1 APPLICATION: 20 OINTMENT TOPICAL at 08:20

## 2021-10-29 RX ADMIN — PREGABALIN 150 MG: 75 CAPSULE ORAL at 08:20

## 2021-10-29 RX ADMIN — ACETAMINOPHEN 1000 MG: 500 TABLET ORAL at 08:20

## 2021-10-29 RX ADMIN — OXYCODONE 5 MG: 5 TABLET ORAL at 17:38

## 2021-10-29 RX ADMIN — OXYCODONE 5 MG: 5 TABLET ORAL at 13:42

## 2021-10-29 RX ADMIN — BUPIVACAINE HYDROCHLORIDE 30 ML: 2.5 INJECTION, SOLUTION EPIDURAL; INFILTRATION; INTRACAUDAL; PERINEURAL at 09:40

## 2021-10-29 RX ADMIN — CEFAZOLIN SODIUM 2 G: 2 INJECTION, SOLUTION INTRAVENOUS at 17:38

## 2021-10-29 RX ADMIN — BUPIVACAINE HYDROCHLORIDE 30 ML: 2.5 INJECTION, SOLUTION EPIDURAL; INFILTRATION; INTRACAUDAL at 11:20

## 2021-10-29 RX ADMIN — ONDANSETRON 4 MG: 2 INJECTION INTRAMUSCULAR; INTRAVENOUS at 10:44

## 2021-10-29 RX ADMIN — LIDOCAINE HYDROCHLORIDE 0.2 ML: 10 INJECTION, SOLUTION EPIDURAL; INFILTRATION; INTRACAUDAL; PERINEURAL at 08:00

## 2021-10-29 RX ADMIN — OXYCODONE 5 MG: 5 TABLET ORAL at 21:26

## 2021-10-29 RX ADMIN — DEXAMETHASONE SODIUM PHOSPHATE 4 MG: 10 INJECTION, SOLUTION INTRAMUSCULAR; INTRAVENOUS at 09:40

## 2021-10-29 RX ADMIN — DEXAMETHASONE SODIUM PHOSPHATE 8 MG: 4 INJECTION, SOLUTION INTRA-ARTICULAR; INTRALESIONAL; INTRAMUSCULAR; INTRAVENOUS; SOFT TISSUE at 10:44

## 2021-10-29 RX ADMIN — PROPOFOL 40 MG: 10 INJECTION, EMULSION INTRAVENOUS at 09:30

## 2021-10-29 RX ADMIN — ROPIVACAINE HYDROCHLORIDE 10 ML/HR: 2 INJECTION, SOLUTION EPIDURAL; INFILTRATION at 11:02

## 2021-10-29 RX ADMIN — PROPOFOL 125 MCG/KG/MIN: 10 INJECTION, EMULSION INTRAVENOUS at 09:40

## 2021-10-29 RX ADMIN — BUDESONIDE AND FORMOTEROL FUMARATE DIHYDRATE 2 PUFF: 80; 4.5 AEROSOL RESPIRATORY (INHALATION) at 20:01

## 2021-10-29 RX ADMIN — CEFAZOLIN SODIUM 2 G: 2 INJECTION, SOLUTION INTRAVENOUS at 09:26

## 2021-10-29 RX ADMIN — MEPIVACAINE HYDROCHLORIDE 4 ML: 15 INJECTION, SOLUTION EPIDURAL; INFILTRATION at 09:36

## 2021-10-29 RX ADMIN — FAMOTIDINE 20 MG: 20 TABLET, FILM COATED ORAL at 08:20

## 2021-10-29 RX ADMIN — SODIUM CHLORIDE, POTASSIUM CHLORIDE, SODIUM LACTATE AND CALCIUM CHLORIDE 9 ML/HR: 600; 310; 30; 20 INJECTION, SOLUTION INTRAVENOUS at 08:00

## 2021-10-30 PROBLEM — G89.18 POSTOPERATIVE PAIN: Status: ACTIVE | Noted: 2021-10-30

## 2021-10-30 LAB
DEPRECATED RDW RBC AUTO: 41.1 FL (ref 37–54)
ERYTHROCYTE [DISTWIDTH] IN BLOOD BY AUTOMATED COUNT: 12.6 % (ref 12.3–15.4)
HCT VFR BLD AUTO: 37.5 % (ref 34–46.6)
HGB BLD-MCNC: 12.1 G/DL (ref 12–15.9)
MCH RBC QN AUTO: 29.2 PG (ref 26.6–33)
MCHC RBC AUTO-ENTMCNC: 32.3 G/DL (ref 31.5–35.7)
MCV RBC AUTO: 90.4 FL (ref 79–97)
PLATELET # BLD AUTO: 198 10*3/MM3 (ref 140–450)
PMV BLD AUTO: 9.4 FL (ref 6–12)
RBC # BLD AUTO: 4.15 10*6/MM3 (ref 3.77–5.28)
WBC # BLD AUTO: 14.12 10*3/MM3 (ref 3.4–10.8)

## 2021-10-30 PROCEDURE — A9270 NON-COVERED ITEM OR SERVICE: HCPCS | Performed by: INTERNAL MEDICINE

## 2021-10-30 PROCEDURE — 63710000001 APIXABAN 2.5 MG TABLET: Performed by: INTERNAL MEDICINE

## 2021-10-30 PROCEDURE — 99024 POSTOP FOLLOW-UP VISIT: CPT | Performed by: ORTHOPAEDIC SURGERY

## 2021-10-30 PROCEDURE — 97110 THERAPEUTIC EXERCISES: CPT

## 2021-10-30 PROCEDURE — 63710000001 AZELASTINE 0.1 % SOLUTION 30 ML BOTTLE: Performed by: INTERNAL MEDICINE

## 2021-10-30 PROCEDURE — 94799 UNLISTED PULMONARY SVC/PX: CPT

## 2021-10-30 PROCEDURE — 0 CEFAZOLIN IN DEXTROSE 2-4 GM/100ML-% SOLUTION: Performed by: ORTHOPAEDIC SURGERY

## 2021-10-30 PROCEDURE — 63710000001 ONDANSETRON PER 8 MG: Performed by: INTERNAL MEDICINE

## 2021-10-30 PROCEDURE — 97165 OT EVAL LOW COMPLEX 30 MIN: CPT

## 2021-10-30 PROCEDURE — 63710000001 HYDROXYCHLOROQUINE 200 MG TABLET: Performed by: INTERNAL MEDICINE

## 2021-10-30 PROCEDURE — 25010000002 HYDROMORPHONE PER 4 MG: Performed by: INTERNAL MEDICINE

## 2021-10-30 PROCEDURE — 63710000001 OXYCODONE 5 MG TABLET: Performed by: ORTHOPAEDIC SURGERY

## 2021-10-30 PROCEDURE — 97535 SELF CARE MNGMENT TRAINING: CPT

## 2021-10-30 PROCEDURE — 63710000001 OXYCODONE 5 MG TABLET: Performed by: INTERNAL MEDICINE

## 2021-10-30 PROCEDURE — 97116 GAIT TRAINING THERAPY: CPT

## 2021-10-30 PROCEDURE — A9270 NON-COVERED ITEM OR SERVICE: HCPCS | Performed by: ORTHOPAEDIC SURGERY

## 2021-10-30 PROCEDURE — 85027 COMPLETE CBC AUTOMATED: CPT | Performed by: ORTHOPAEDIC SURGERY

## 2021-10-30 PROCEDURE — 63710000001 ACETAMINOPHEN 500 MG TABLET: Performed by: INTERNAL MEDICINE

## 2021-10-30 PROCEDURE — 63710000001 FLUTICASONE 50 MCG/ACT SUSPENSION 16 G BOTTLE: Performed by: INTERNAL MEDICINE

## 2021-10-30 PROCEDURE — 63710000001 CHLORPHENIRAMINE 4 MG TABLET: Performed by: INTERNAL MEDICINE

## 2021-10-30 RX ORDER — DOCUSATE SODIUM 100 MG/1
100 CAPSULE, LIQUID FILLED ORAL 2 TIMES DAILY
Qty: 30 CAPSULE | Refills: 0 | Status: SHIPPED | OUTPATIENT
Start: 2021-10-30 | End: 2021-11-15

## 2021-10-30 RX ORDER — HYDROMORPHONE HYDROCHLORIDE 1 MG/ML
0.5 INJECTION, SOLUTION INTRAMUSCULAR; INTRAVENOUS; SUBCUTANEOUS EVERY 4 HOURS PRN
Status: DISCONTINUED | OUTPATIENT
Start: 2021-10-30 | End: 2021-11-01 | Stop reason: HOSPADM

## 2021-10-30 RX ORDER — ONDANSETRON 4 MG/1
4 TABLET, FILM COATED ORAL EVERY 8 HOURS PRN
Qty: 20 TABLET | Refills: 0 | Status: SHIPPED | OUTPATIENT
Start: 2021-10-30 | End: 2021-12-06

## 2021-10-30 RX ORDER — CHLORPHENIRAMINE MALEATE 4 MG/1
4 TABLET ORAL DAILY
Status: DISCONTINUED | OUTPATIENT
Start: 2021-10-30 | End: 2021-11-01 | Stop reason: HOSPADM

## 2021-10-30 RX ORDER — HYDROXYCHLOROQUINE SULFATE 200 MG/1
200 TABLET, FILM COATED ORAL EVERY 12 HOURS
Status: DISCONTINUED | OUTPATIENT
Start: 2021-10-30 | End: 2021-11-01 | Stop reason: HOSPADM

## 2021-10-30 RX ORDER — AZELASTINE 1 MG/ML
1 SPRAY, METERED NASAL 2 TIMES DAILY
Status: DISCONTINUED | OUTPATIENT
Start: 2021-10-30 | End: 2021-11-01 | Stop reason: HOSPADM

## 2021-10-30 RX ORDER — FLUTICASONE PROPIONATE 50 MCG
1 SPRAY, SUSPENSION (ML) NASAL 2 TIMES DAILY
Status: DISCONTINUED | OUTPATIENT
Start: 2021-10-30 | End: 2021-11-01 | Stop reason: HOSPADM

## 2021-10-30 RX ORDER — ACETAMINOPHEN 500 MG
1000 TABLET ORAL EVERY 4 HOURS PRN
Status: DISCONTINUED | OUTPATIENT
Start: 2021-10-30 | End: 2021-11-01

## 2021-10-30 RX ORDER — OXYCODONE HYDROCHLORIDE 5 MG/1
5 TABLET ORAL ONCE
Status: COMPLETED | OUTPATIENT
Start: 2021-10-30 | End: 2021-10-30

## 2021-10-30 RX ORDER — ONDANSETRON 4 MG/1
4 TABLET, FILM COATED ORAL EVERY 6 HOURS PRN
Status: DISCONTINUED | OUTPATIENT
Start: 2021-10-30 | End: 2021-11-01 | Stop reason: HOSPADM

## 2021-10-30 RX ORDER — OXYCODONE HYDROCHLORIDE 5 MG/1
5 TABLET ORAL EVERY 4 HOURS PRN
Qty: 40 TABLET | Refills: 0 | Status: SHIPPED | OUTPATIENT
Start: 2021-10-30 | End: 2021-11-15

## 2021-10-30 RX ADMIN — FLUTICASONE PROPIONATE 1 SPRAY: 50 SPRAY, METERED NASAL at 15:31

## 2021-10-30 RX ADMIN — HYDROMORPHONE HYDROCHLORIDE 0.5 MG: 1 INJECTION, SOLUTION INTRAMUSCULAR; INTRAVENOUS; SUBCUTANEOUS at 21:41

## 2021-10-30 RX ADMIN — ACETAMINOPHEN 1000 MG: 500 TABLET, FILM COATED ORAL at 13:35

## 2021-10-30 RX ADMIN — APIXABAN 2.5 MG: 2.5 TABLET, FILM COATED ORAL at 20:16

## 2021-10-30 RX ADMIN — ACETAMINOPHEN 1000 MG: 500 TABLET, FILM COATED ORAL at 17:33

## 2021-10-30 RX ADMIN — ONDANSETRON HYDROCHLORIDE 4 MG: 4 TABLET, FILM COATED ORAL at 10:30

## 2021-10-30 RX ADMIN — OXYCODONE 5 MG: 5 TABLET ORAL at 11:20

## 2021-10-30 RX ADMIN — AZELASTINE HYDROCHLORIDE 1 SPRAY: 137 SPRAY, METERED NASAL at 15:32

## 2021-10-30 RX ADMIN — HYDROXYCHLOROQUINE SULFATE 200 MG: 200 TABLET ORAL at 15:31

## 2021-10-30 RX ADMIN — HYDROMORPHONE HYDROCHLORIDE 0.5 MG: 1 INJECTION, SOLUTION INTRAMUSCULAR; INTRAVENOUS; SUBCUTANEOUS at 12:29

## 2021-10-30 RX ADMIN — SODIUM CHLORIDE, PRESERVATIVE FREE 3 ML: 5 INJECTION INTRAVENOUS at 08:37

## 2021-10-30 RX ADMIN — OXYCODONE 5 MG: 5 TABLET ORAL at 15:31

## 2021-10-30 RX ADMIN — OXYCODONE 5 MG: 5 TABLET ORAL at 08:36

## 2021-10-30 RX ADMIN — BUDESONIDE AND FORMOTEROL FUMARATE DIHYDRATE 2 PUFF: 80; 4.5 AEROSOL RESPIRATORY (INHALATION) at 18:56

## 2021-10-30 RX ADMIN — BUDESONIDE AND FORMOTEROL FUMARATE DIHYDRATE 2 PUFF: 80; 4.5 AEROSOL RESPIRATORY (INHALATION) at 08:00

## 2021-10-30 RX ADMIN — OXYCODONE 5 MG: 5 TABLET ORAL at 20:16

## 2021-10-30 RX ADMIN — CEFAZOLIN SODIUM 2 G: 2 INJECTION, SOLUTION INTRAVENOUS at 02:00

## 2021-10-30 RX ADMIN — APIXABAN 2.5 MG: 2.5 TABLET, FILM COATED ORAL at 08:37

## 2021-10-30 RX ADMIN — OXYCODONE 5 MG: 5 TABLET ORAL at 02:32

## 2021-10-30 RX ADMIN — FLUTICASONE PROPIONATE 1 SPRAY: 50 SPRAY, METERED NASAL at 20:16

## 2021-10-30 RX ADMIN — AZELASTINE HYDROCHLORIDE 1 SPRAY: 137 SPRAY, METERED NASAL at 20:16

## 2021-10-30 RX ADMIN — CHLORPHENIRAMINE MALEATE 4 MG: 4 TABLET ORAL at 15:31

## 2021-10-30 RX ADMIN — SODIUM CHLORIDE, PRESERVATIVE FREE 3 ML: 5 INJECTION INTRAVENOUS at 20:16

## 2021-10-31 LAB
ANION GAP SERPL CALCULATED.3IONS-SCNC: 8 MMOL/L (ref 5–15)
BUN SERPL-MCNC: 10 MG/DL (ref 8–23)
BUN/CREAT SERPL: 11.6 (ref 7–25)
CALCIUM SPEC-SCNC: 8.4 MG/DL (ref 8.6–10.5)
CHLORIDE SERPL-SCNC: 104 MMOL/L (ref 98–107)
CO2 SERPL-SCNC: 26 MMOL/L (ref 22–29)
CREAT SERPL-MCNC: 0.86 MG/DL (ref 0.57–1)
DEPRECATED RDW RBC AUTO: 44.5 FL (ref 37–54)
ERYTHROCYTE [DISTWIDTH] IN BLOOD BY AUTOMATED COUNT: 12.9 % (ref 12.3–15.4)
GFR SERPL CREATININE-BSD FRML MDRD: 65 ML/MIN/1.73
GLUCOSE SERPL-MCNC: 118 MG/DL (ref 65–99)
HCT VFR BLD AUTO: 35.4 % (ref 34–46.6)
HGB BLD-MCNC: 10.9 G/DL (ref 12–15.9)
MCH RBC QN AUTO: 29 PG (ref 26.6–33)
MCHC RBC AUTO-ENTMCNC: 30.8 G/DL (ref 31.5–35.7)
MCV RBC AUTO: 94.1 FL (ref 79–97)
PLATELET # BLD AUTO: 167 10*3/MM3 (ref 140–450)
PMV BLD AUTO: 9.4 FL (ref 6–12)
POTASSIUM SERPL-SCNC: 3.9 MMOL/L (ref 3.5–5.2)
RBC # BLD AUTO: 3.76 10*6/MM3 (ref 3.77–5.28)
SODIUM SERPL-SCNC: 138 MMOL/L (ref 136–145)
WBC # BLD AUTO: 11.13 10*3/MM3 (ref 3.4–10.8)

## 2021-10-31 PROCEDURE — A9270 NON-COVERED ITEM OR SERVICE: HCPCS | Performed by: INTERNAL MEDICINE

## 2021-10-31 PROCEDURE — 94799 UNLISTED PULMONARY SVC/PX: CPT

## 2021-10-31 PROCEDURE — 63710000001 CHLORPHENIRAMINE 4 MG TABLET: Performed by: INTERNAL MEDICINE

## 2021-10-31 PROCEDURE — 63710000001 GABAPENTIN 100 MG CAPSULE: Performed by: INTERNAL MEDICINE

## 2021-10-31 PROCEDURE — 85027 COMPLETE CBC AUTOMATED: CPT | Performed by: ORTHOPAEDIC SURGERY

## 2021-10-31 PROCEDURE — 63710000001 AZELASTINE 0.1 % SOLUTION 30 ML BOTTLE: Performed by: INTERNAL MEDICINE

## 2021-10-31 PROCEDURE — 25010000002 KETOROLAC TROMETHAMINE PER 15 MG: Performed by: INTERNAL MEDICINE

## 2021-10-31 PROCEDURE — 63710000001 OXYCODONE 5 MG TABLET: Performed by: ORTHOPAEDIC SURGERY

## 2021-10-31 PROCEDURE — 63710000001 ACETAMINOPHEN 500 MG TABLET: Performed by: INTERNAL MEDICINE

## 2021-10-31 PROCEDURE — 63710000001 HYDROXYCHLOROQUINE 200 MG TABLET: Performed by: INTERNAL MEDICINE

## 2021-10-31 PROCEDURE — A9270 NON-COVERED ITEM OR SERVICE: HCPCS | Performed by: ORTHOPAEDIC SURGERY

## 2021-10-31 PROCEDURE — 63710000001 MELATONIN 5 MG TABLET: Performed by: INTERNAL MEDICINE

## 2021-10-31 PROCEDURE — 97116 GAIT TRAINING THERAPY: CPT

## 2021-10-31 PROCEDURE — 80048 BASIC METABOLIC PNL TOTAL CA: CPT | Performed by: INTERNAL MEDICINE

## 2021-10-31 PROCEDURE — 63710000001 MELOXICAM 7.5 MG TABLET: Performed by: INTERNAL MEDICINE

## 2021-10-31 PROCEDURE — 97110 THERAPEUTIC EXERCISES: CPT

## 2021-10-31 PROCEDURE — 63710000001 APIXABAN 2.5 MG TABLET: Performed by: INTERNAL MEDICINE

## 2021-10-31 RX ORDER — KETOROLAC TROMETHAMINE 15 MG/ML
15 INJECTION, SOLUTION INTRAMUSCULAR; INTRAVENOUS EVERY 6 HOURS PRN
Status: DISCONTINUED | OUTPATIENT
Start: 2021-10-31 | End: 2021-11-01 | Stop reason: HOSPADM

## 2021-10-31 RX ORDER — MELOXICAM 7.5 MG/1
7.5 TABLET ORAL DAILY
Status: DISCONTINUED | OUTPATIENT
Start: 2021-10-31 | End: 2021-11-01 | Stop reason: HOSPADM

## 2021-10-31 RX ORDER — ACETAMINOPHEN 500 MG
1000 TABLET ORAL EVERY 8 HOURS
Qty: 42 TABLET | Refills: 0
Start: 2021-10-31 | End: 2021-11-07

## 2021-10-31 RX ORDER — CHOLECALCIFEROL (VITAMIN D3) 125 MCG
5 CAPSULE ORAL NIGHTLY
Status: DISCONTINUED | OUTPATIENT
Start: 2021-10-31 | End: 2021-11-01 | Stop reason: HOSPADM

## 2021-10-31 RX ORDER — MELOXICAM 7.5 MG/1
7.5 TABLET ORAL DAILY
Qty: 10 TABLET | Refills: 0 | Status: SHIPPED | OUTPATIENT
Start: 2021-10-31 | End: 2022-01-14

## 2021-10-31 RX ORDER — GABAPENTIN 100 MG/1
100 CAPSULE ORAL EVERY 8 HOURS SCHEDULED
Status: DISCONTINUED | OUTPATIENT
Start: 2021-10-31 | End: 2021-11-01 | Stop reason: HOSPADM

## 2021-10-31 RX ADMIN — SODIUM CHLORIDE, PRESERVATIVE FREE 3 ML: 5 INJECTION INTRAVENOUS at 08:15

## 2021-10-31 RX ADMIN — OXYCODONE 5 MG: 5 TABLET ORAL at 09:49

## 2021-10-31 RX ADMIN — FLUTICASONE PROPIONATE 1 SPRAY: 50 SPRAY, METERED NASAL at 20:00

## 2021-10-31 RX ADMIN — CHLORPHENIRAMINE MALEATE 4 MG: 4 TABLET ORAL at 08:14

## 2021-10-31 RX ADMIN — BUDESONIDE AND FORMOTEROL FUMARATE DIHYDRATE 2 PUFF: 80; 4.5 AEROSOL RESPIRATORY (INHALATION) at 19:20

## 2021-10-31 RX ADMIN — APIXABAN 2.5 MG: 2.5 TABLET, FILM COATED ORAL at 08:13

## 2021-10-31 RX ADMIN — OXYCODONE 5 MG: 5 TABLET ORAL at 04:43

## 2021-10-31 RX ADMIN — AZELASTINE HYDROCHLORIDE 1 SPRAY: 137 SPRAY, METERED NASAL at 08:14

## 2021-10-31 RX ADMIN — OXYCODONE 5 MG: 5 TABLET ORAL at 15:57

## 2021-10-31 RX ADMIN — GABAPENTIN 100 MG: 100 CAPSULE ORAL at 20:00

## 2021-10-31 RX ADMIN — AZELASTINE HYDROCHLORIDE 1 SPRAY: 137 SPRAY, METERED NASAL at 20:00

## 2021-10-31 RX ADMIN — HYDROXYCHLOROQUINE SULFATE 200 MG: 200 TABLET ORAL at 01:04

## 2021-10-31 RX ADMIN — FLUTICASONE PROPIONATE 1 SPRAY: 50 SPRAY, METERED NASAL at 08:15

## 2021-10-31 RX ADMIN — Medication 5 MG: at 20:00

## 2021-10-31 RX ADMIN — APIXABAN 2.5 MG: 2.5 TABLET, FILM COATED ORAL at 20:00

## 2021-10-31 RX ADMIN — OXYCODONE 5 MG: 5 TABLET ORAL at 00:18

## 2021-10-31 RX ADMIN — KETOROLAC TROMETHAMINE 15 MG: 15 INJECTION, SOLUTION INTRAMUSCULAR; INTRAVENOUS at 15:57

## 2021-10-31 RX ADMIN — ACETAMINOPHEN 1000 MG: 500 TABLET, FILM COATED ORAL at 01:03

## 2021-10-31 RX ADMIN — HYDROXYCHLOROQUINE SULFATE 200 MG: 200 TABLET ORAL at 15:20

## 2021-10-31 RX ADMIN — GABAPENTIN 100 MG: 100 CAPSULE ORAL at 15:57

## 2021-10-31 RX ADMIN — MELOXICAM 7.5 MG: 7.5 TABLET ORAL at 15:57

## 2021-11-01 VITALS
RESPIRATION RATE: 16 BRPM | DIASTOLIC BLOOD PRESSURE: 61 MMHG | WEIGHT: 185 LBS | SYSTOLIC BLOOD PRESSURE: 118 MMHG | TEMPERATURE: 98.3 F | BODY MASS INDEX: 31.58 KG/M2 | OXYGEN SATURATION: 97 % | HEART RATE: 74 BPM | HEIGHT: 64 IN

## 2021-11-01 LAB
ANION GAP SERPL CALCULATED.3IONS-SCNC: 10 MMOL/L (ref 5–15)
BUN SERPL-MCNC: 10 MG/DL (ref 8–23)
BUN/CREAT SERPL: 11.1 (ref 7–25)
CALCIUM SPEC-SCNC: 8.6 MG/DL (ref 8.6–10.5)
CHLORIDE SERPL-SCNC: 101 MMOL/L (ref 98–107)
CO2 SERPL-SCNC: 26 MMOL/L (ref 22–29)
CREAT SERPL-MCNC: 0.9 MG/DL (ref 0.57–1)
DEPRECATED RDW RBC AUTO: 41.6 FL (ref 37–54)
ERYTHROCYTE [DISTWIDTH] IN BLOOD BY AUTOMATED COUNT: 12.7 % (ref 12.3–15.4)
GFR SERPL CREATININE-BSD FRML MDRD: 62 ML/MIN/1.73
GLUCOSE SERPL-MCNC: 189 MG/DL (ref 65–99)
HCT VFR BLD AUTO: 35.2 % (ref 34–46.6)
HGB BLD-MCNC: 11.2 G/DL (ref 12–15.9)
MCH RBC QN AUTO: 28.9 PG (ref 26.6–33)
MCHC RBC AUTO-ENTMCNC: 31.8 G/DL (ref 31.5–35.7)
MCV RBC AUTO: 90.7 FL (ref 79–97)
PLATELET # BLD AUTO: 191 10*3/MM3 (ref 140–450)
PMV BLD AUTO: 9.6 FL (ref 6–12)
POTASSIUM SERPL-SCNC: 4.1 MMOL/L (ref 3.5–5.2)
RBC # BLD AUTO: 3.88 10*6/MM3 (ref 3.77–5.28)
SODIUM SERPL-SCNC: 137 MMOL/L (ref 136–145)
WBC # BLD AUTO: 10.55 10*3/MM3 (ref 3.4–10.8)

## 2021-11-01 PROCEDURE — 97116 GAIT TRAINING THERAPY: CPT

## 2021-11-01 PROCEDURE — 63710000001 MELOXICAM 7.5 MG TABLET: Performed by: INTERNAL MEDICINE

## 2021-11-01 PROCEDURE — A9270 NON-COVERED ITEM OR SERVICE: HCPCS | Performed by: INTERNAL MEDICINE

## 2021-11-01 PROCEDURE — 97110 THERAPEUTIC EXERCISES: CPT

## 2021-11-01 PROCEDURE — 85027 COMPLETE CBC AUTOMATED: CPT | Performed by: INTERNAL MEDICINE

## 2021-11-01 PROCEDURE — 63710000001 ACETAMINOPHEN 500 MG TABLET: Performed by: INTERNAL MEDICINE

## 2021-11-01 PROCEDURE — 63710000001 GABAPENTIN 100 MG CAPSULE: Performed by: INTERNAL MEDICINE

## 2021-11-01 PROCEDURE — 63710000001 HYDROXYCHLOROQUINE 200 MG TABLET: Performed by: INTERNAL MEDICINE

## 2021-11-01 PROCEDURE — 94799 UNLISTED PULMONARY SVC/PX: CPT

## 2021-11-01 PROCEDURE — 63710000001 OXYCODONE 5 MG TABLET: Performed by: ORTHOPAEDIC SURGERY

## 2021-11-01 PROCEDURE — 99024 POSTOP FOLLOW-UP VISIT: CPT | Performed by: ORTHOPAEDIC SURGERY

## 2021-11-01 PROCEDURE — 63710000001 CHLORPHENIRAMINE 4 MG TABLET: Performed by: INTERNAL MEDICINE

## 2021-11-01 PROCEDURE — 80048 BASIC METABOLIC PNL TOTAL CA: CPT | Performed by: INTERNAL MEDICINE

## 2021-11-01 PROCEDURE — A9270 NON-COVERED ITEM OR SERVICE: HCPCS | Performed by: ORTHOPAEDIC SURGERY

## 2021-11-01 PROCEDURE — 63710000001 APIXABAN 2.5 MG TABLET: Performed by: INTERNAL MEDICINE

## 2021-11-01 RX ORDER — ACETAMINOPHEN 500 MG
1000 TABLET ORAL EVERY 8 HOURS
Status: DISCONTINUED | OUTPATIENT
Start: 2021-11-01 | End: 2021-11-01 | Stop reason: HOSPADM

## 2021-11-01 RX ADMIN — SODIUM CHLORIDE, PRESERVATIVE FREE 3 ML: 5 INJECTION INTRAVENOUS at 10:58

## 2021-11-01 RX ADMIN — BUDESONIDE AND FORMOTEROL FUMARATE DIHYDRATE 2 PUFF: 80; 4.5 AEROSOL RESPIRATORY (INHALATION) at 08:38

## 2021-11-01 RX ADMIN — HYDROXYCHLOROQUINE SULFATE 200 MG: 200 TABLET ORAL at 13:47

## 2021-11-01 RX ADMIN — MELOXICAM 7.5 MG: 7.5 TABLET ORAL at 08:38

## 2021-11-01 RX ADMIN — AZELASTINE HYDROCHLORIDE 1 SPRAY: 137 SPRAY, METERED NASAL at 08:38

## 2021-11-01 RX ADMIN — APIXABAN 2.5 MG: 2.5 TABLET, FILM COATED ORAL at 08:38

## 2021-11-01 RX ADMIN — CHLORPHENIRAMINE MALEATE 4 MG: 4 TABLET ORAL at 10:57

## 2021-11-01 RX ADMIN — HYDROXYCHLOROQUINE SULFATE 200 MG: 200 TABLET ORAL at 02:46

## 2021-11-01 RX ADMIN — OXYCODONE 5 MG: 5 TABLET ORAL at 13:47

## 2021-11-01 RX ADMIN — ACETAMINOPHEN 1000 MG: 500 TABLET, FILM COATED ORAL at 08:38

## 2021-11-01 RX ADMIN — GABAPENTIN 100 MG: 100 CAPSULE ORAL at 13:47

## 2021-11-01 RX ADMIN — FLUTICASONE PROPIONATE 1 SPRAY: 50 SPRAY, METERED NASAL at 08:38

## 2021-11-01 NOTE — PROGRESS NOTES
"Orthopaedic Surgery Progress Note     LOS: 0 days   Patient Care Team:  Ania Martel MD as PCP - General (Internal Medicine)  Oscar Arcos MD as Consulting Physician (Gastroenterology)  Emeka Douglas MD as Consulting Physician (Orthopedic Surgery)  Valdez Vega OD (Optometry)  Pinky Thakur MD as Consulting Physician (Rheumatology)    POD 3    Subjective     Interval History:   Patient Reports: She feels better today and ready to go home    Objective     Vital Signs:  Temp (24hrs), Av °F (37.2 °C), Min:98.8 °F (37.1 °C), Max:99.2 °F (37.3 °C)    /63 (BP Location: Right arm, Patient Position: Lying)   Pulse 76   Temp 99.2 °F (37.3 °C) (Oral)   Resp 18   Ht 162.6 cm (64\")   Wt 83.9 kg (185 lb)   LMP  (LMP Unknown)   SpO2 97%   Breastfeeding No   BMI 31.76 kg/m²     Labs:  Lab Results (last 24 hours)     Procedure Component Value Units Date/Time    Basic Metabolic Panel [[id number]]  (Abnormal) Collected: 21    Specimen: Blood Updated: 21     Glucose 189 mg/dL      BUN 10 mg/dL      Creatinine 0.90 mg/dL      Sodium 137 mmol/L      Potassium 4.1 mmol/L      Chloride 101 mmol/L      CO2 26.0 mmol/L      Calcium 8.6 mg/dL      eGFR Non African Amer 62 mL/min/1.73      BUN/Creatinine Ratio 11.1     Anion Gap 10.0 mmol/L     Narrative:      GFR Normal >60  Chronic Kidney Disease <60  Kidney Failure <15      CBC (No Diff) [[id number]]  (Abnormal) Collected: 21    Specimen: Blood Updated: 21     WBC 10.55 10*3/mm3      RBC 3.88 10*6/mm3      Hemoglobin 11.2 g/dL      Hematocrit 35.2 %      MCV 90.7 fL      MCH 28.9 pg      MCHC 31.8 g/dL      RDW 12.7 %      RDW-SD 41.6 fl      MPV 9.6 fL      Platelets 191 10*3/mm3           Imaging:  XR Knee 1 or 2 View Right  Narrative: EXAMINATION: XR KNEE 1 OR 2 VW RIGHT- 10/29/2021     INDICATION: post-op      COMPARISON: NONE     FINDINGS: 2 views of the right knee reveal patient to be status " post  total right knee arthroplasty. Post surgical changes seen in the soft  tissues. No fracture or dislocation.        Impression: No acute fracture or malalignment. Patient status post total  right knee arthroplasty with no hardware complication.     D: 10/29/2021  E: 10/29/2021     This report was finalized on 11/1/2021 9:39 AM by Dr. Lupe Palencia MD.          Physical Exam:  Right knee dressing clean dry intact.  Foot dorsiflexion intact    Assessment/Plan   Postop day 3 status post right total knee  Doing well and ready to go home today.    She will follow-up with me in a week.    [facility] has been arranged.    Continue DVT prophylaxis  Emeka Douglas MD  11/01/21  12:03 EDT

## 2021-11-01 NOTE — THERAPY DISCHARGE NOTE
Patient Name: Barbara Coelho  : 1950    MRN: [id number]                              Today's Date: 2021       Admit Date: 10/29/2021    Visit Dx:     ICD-10-CM ICD-9-CM   1. S/P total knee arthroplasty, right  Z96.651 V43.65   2. Primary osteoarthritis of right knee  M17.11 715.16     Patient Active Problem List   Diagnosis   • Arthritis   • Dysphagia   • Constipation   • Screening for cardiovascular condition   • Skin lesions (Recent Bx results pending)   • Right shoulder pain   • Chronic allergic rhinitis due to food   • GERD (gastroesophageal reflux disease)   • Urinary incontinence   • Symptomatic cholelithiasis   • DDD (degenerative disc disease), lumbar   • Depression   • Mixed connective tissue disease (HCC)   • Bilateral pleural effusions (Exudate on Right with high WBC)   • Autoimmune Pleuritis   • History of asthma   • CARITO on CPAP   • CARITO (obstructive sleep apnea)   • Osteoarthritis of right knee   • S/P total knee arthroplasty, right   • Postoperative pain     Past Medical History:   Diagnosis Date   • Allergic rhinitis     on allergy shots   • Asthma     ALLERGIC   • Back pain     Low   • Bladder infection     Recurrent Bladder Infections   • Diverticulitis    • GERD (gastroesophageal reflux disease)     EGD  [facility] - reflux, HH. no barretts   • Hearing loss    • History of DVT of lower extremity     left leg   • Knee pain, right    • Mixed connective tissue disease (HCC) 2019    Dr. Thakur   • CARITO (obstructive sleep apnea) 2018   • Osteopenia 2018    calculated frax - 11/3%   • Pinched nerve in neck    • Rheumatoid arthritis ([facility]) 2018   • Staph infection    • Urinary incontinence    • Wears contact lenses    • Wears prescription eyeglasses      Past Surgical History:   Procedure Laterality Date   • BACK SURGERY      Back (L5/S1)   • BREAST EXCISIONAL BIOPSY Left    • CHOLECYSTECTOMY WITH INTRAOPERATIVE CHOLANGIOGRAM N/A 2018    Procedure: CHOLECYSTECTOMY  LAPAROSCOPIC INTRAOPERATIVE CHOLANGIOGRAM;  Surgeon: Lit Morelos MD;  Location: [facility] OR;  Service: General   • COLONOSCOPY  2016   • GASTRIC BYPASS      HIGH GASTRIC BYPASS, 1980's   • OTHER SURGICAL HISTORY  1985    Stomach Stabled   • REPLACEMENT TOTAL KNEE Left 2012   • SINUS SURGERY      x3   • TOTAL KNEE ARTHROPLASTY Right 10/29/2021    Procedure: TOTAL KNEE ARTHROPLASTY RIGHT;  Surgeon: Emeka Douglas MD;  Location: [facility] OR;  Service: Orthopedics;  Laterality: Right;   • TUBAL ABDOMINAL LIGATION  1980   • WISDOM TOOTH EXTRACTION        General Information     Row Name 11/01/21 0905          Physical Therapy Time and Intention    Document Type therapy note (daily note); discharge treatment; discharge evaluation/summary  -LR     Mode of Treatment physical therapy; individual therapy  -LR     Row Name 11/01/21 0905          General Information    Patient Profile Reviewed yes  -LR     Existing Precautions/Restrictions fall  -LR     Barriers to Rehab previous functional deficit  -LR     Row Name 11/01/21 0905          Cognition    Orientation Status (Cognition) oriented x 4  -LR     Row Name 11/01/21 0905          Safety Issues, Functional Mobility    Safety Issues Affecting Function (Mobility) safety precaution awareness; safety precautions follow-through/compliance; sequencing abilities; awareness of need for assistance; insight into deficits/self-awareness; positioning of assistive device  -LR     Impairments Affecting Function (Mobility) strength; pain; endurance/activity tolerance; range of motion (ROM)  -LR           User Key  (r) = Recorded By, (t) = Taken By, (c) = Cosigned By    Initials Name Provider Type    LR Katerine Call, PT Physical Therapist               Mobility     Row Name 11/01/21 0905          Bed Mobility    Bed Mobility supine-sit  -LR     Sit-Supine Clinch (Bed Mobility) verbal cues; supervision  -LR     Assistive Device (Bed Mobility) head of bed elevated; bed  rails  -LR     Comment (Bed Mobility) Verbal cues to move LEs towards EOB and to push up from bed to scoot hips out to get feet on floor. Denied dizziness upon sitting up.  -LR     Row Name 11/01/21 0905          Transfers    Comment (Transfers) Verbal cues to push up from bed to stand and to reach back for chair to lower into sitting. Verbal cues to step R LE out before t/f for comfort.  -LR     Row Name 11/01/21 0905          Bed-Chair Transfer    Bed-Chair [place] (Transfers) not tested  -LR     Row Name 11/01/21 0905          Sit-Stand Transfer    Sit-Stand [place] (Transfers) verbal cues; contact guard  -LR     Assistive Device (Sit-Stand Transfers) walker, front-wheeled  -LR     Row Name 11/01/21 0905          Gait/Stairs (Locomotion)    [place] Level (Gait) verbal cues; contact guard  -LR     Assistive Device (Gait) walker, front-wheeled  -LR     Distance in Feet (Gait) 200  -LR     Deviations/Abnormal Patterns (Gait) bilateral deviations; nina decreased; gait speed decreased; stride length decreased; right sided deviations; antalgic  -LR     Bilateral Gait Deviations forward flexed posture; heel strike decreased  -LR     Right Sided Gait Deviations weight shift ability decreased  -LR     [place] Level (Stairs) verbal cues; contact guard  -LR     Handrail Location (Stairs) right side (ascending)  -LR     Number of Steps (Stairs) 5  -LR     Ascending Technique (Stairs) step-to-step  -LR     Descending Technique (Stairs) step-to-step  -LR     Comment (Gait/Stairs) Patient initiated gait with step to gait pattern at slow pace. Progressed nearly to step through gait pattern with cues for correct sequencing of steps, increased R LE weight bearing/stance phase, decreased UE weight bearing, increased R knee flexion during swing phase. Gait limited by pain. Verbal cues to climb steps one at a time and to step up with strong LE first and down with weak LE first. Cues for correct placement and  sequencing of SPC. No LOB/unsteadiness with stairs.  -     Row Name 11/01/21 0905          Mobility    Extremity Weight-bearing Status right lower extremity  -LR     Right Lower Extremity (Weight-bearing Status) weight-bearing as tolerated (WBAT)  -           User Key  (r) = Recorded By, (t) = Taken By, (c) = Cosigned By    Initials Name Provider Type    LR Katerine Call, PT Physical Therapist               Obj/Interventions     Row Name 11/01/21 0905          Range of Motion Comprehensive    Comment, General Range of Motion 20-75 degrees R knee, lacking 20 degrees extension AROM, 75 degrees flexion AAROM in sitting  -     Row Name 11/01/21 0905          Motor Skills    Therapeutic Exercise ankle; knee; other (see comments)  cues for technique; CGA for R SLR, R heel slides, R LAQ, R SAQ  -     Row Name 11/01/21 0905          Knee (Therapeutic Exercise)    Knee (Therapeutic Exercise) AROM (active range of motion); strengthening exercise; isometric exercises  -     Knee AROM (Therapeutic Exercise) right; heel slides; sitting; supine; 10 repetitions; 5 repetitions  -     Knee Isometrics (Therapeutic Exercise) right; quad sets; 5 repetitions; 10 repetitions  -LR     Knee Strengthening (Therapeutic Exercise) right; SLR (straight leg raise); SAQ (short arc quad); LAQ (long arc quad); sitting; 5 repetitions; 10 repetitions  -     Row Name 11/01/21 0905          Ankle (Therapeutic Exercise)    Ankle (Therapeutic Exercise) AROM (active range of motion)  -     Ankle AROM (Therapeutic Exercise) bilateral; plantarflexion; dorsiflexion; supine; 10 repetitions; 5 repetitions  -     Row Name 11/01/21 0905          Balance    Balance Assessment sitting static balance; sitting dynamic balance; standing static balance; standing dynamic balance  -     Static Sitting Balance WFL; unsupported; sitting, edge of bed  -     Dynamic Sitting Balance WFL; unsupported; sitting, edge of bed  -     Static  Standing Balance WFL; supported; standing  -LR     Dynamic Standing Balance WFL; supported; standing  -LR           User Key  (r) = Recorded By, (t) = Taken By, (c) = Cosigned By    Initials Name Provider Type    Katerine Delcid, PT Physical Therapist               Goals/Plan     Row Name 11/01/21 0905          Bed Mobility Goal 1 (PT)    Activity/Assistive Device (Bed Mobility Goal 1, PT) bed mobility activities, all  -LR     [place] Level/Cues Needed (Bed Mobility Goal 1, PT) independent  -LR     Time Frame (Bed Mobility Goal 1, PT) long term goal (LTG); 3 days  -LR     Progress/Outcomes (Bed Mobility Goal 1, PT) good progress toward goal; goal not met; discharged from [facility] 11/01/21 0905          Transfer Goal 1 (PT)    Activity/Assistive Device (Transfer Goal 1, PT) sit-to-stand/stand-to-sit; walker, rolling  -LR     [place] Level/Cues Needed (Transfer Goal 1, PT) modified independence  -LR     Time Frame (Transfer Goal 1, PT) long term goal (LTG); 3 days  -LR     Progress/Outcome (Transfer Goal 1, PT) good progress toward goal; goal not met; discharged from [facility] 11/01/21 0905          Gait Training Goal 1 (PT)    Activity/Assistive Device (Gait Training Goal 1, PT) gait (walking locomotion); walker, rolling  -LR     [place] Level (Gait Training Goal 1, PT) supervision required  -LR     Distance (Gait Training Goal 1, PT) 250 feet  -LR     Time Frame (Gait Training Goal 1, PT) long term goal (LTG); 3 days  -LR     Progress/Outcome (Gait Training Goal 1, PT) good progress toward goal; goal not met; discharged from [facility] 11/01/21 0905          ROM Goal 1 (PT)    ROM Goal 1 (PT) 0-110  -LR     Time Frame (ROM Goal 1, PT) long-term goal (LTG); 3 days  -LR     Progress/Outcome (ROM Goal 1, PT) progress slower than expected; goal not met; discharged from [facility] 11/01/21 0905          Stairs Goal 1 (PT)     Activity/Assistive Device (Stairs Goal 1, PT) stairs, all skills  -LR     [place] Level/Cues Needed (Stairs Goal 1, PT) supervision required  -LR     Number of Stairs (Stairs Goal 1, PT) 3  -LR     Time Frame (Stairs Goal 1, PT) long term goal (LTG); 3 days  -LR     Progress/Outcome (Stairs Goal 1, PT) good progress toward goal; goal partially met; discharged from facility  -LR     Row Name 11/01/21 0905          Patient Education Goal (PT)    Activity (Patient Education Goal, PT) Verbalize HEP without cues  -LR     [place]/Cues/Accuracy (Memory Goal 2, PT) demonstrates adequately; verbalizes understanding  -LR     Time Frame (Patient Education Goal, PT) long term goal (LTG); 3 days  -LR     Progress/Outcome (Patient Education Goal, PT) goal met  -LR           User Key  (r) = Recorded By, (t) = Taken By, (c) = Cosigned By    Initials Name Provider Type    LR Katerine Call, PT Physical Therapist               Clinical Impression     Row Name 11/01/21 0905          Pain    Additional Documentation Pain Scale: Numbers Pre/Post-Treatment (Group)  -LR     Row Name 11/01/21 0905          Pain Scale: Numbers Pre/Post-Treatment    Pretreatment Pain Rating 3/10  -LR     Posttreatment Pain Rating 4/10  -LR     Pain Location - Side Right  -LR     Pain Location - Orientation posterior  -LR     Pain Location knee  -LR     Pain Intervention(s) Ambulation/increased activity; Repositioned  -LR     Row Name 11/01/21 0905          Plan of Care Review    Plan of Care Reviewed With patient  -LR     Progress improving  -LR     Outcome Summary Patient ambulated 200 feet with RW and step through gait pattern, CGA, limited by pain. ROM improved today, 20-75 degrees AAROM R knee. Patient climbed 5 stairs with one handrail and SPC with CGA with no difficulty. Patient has been d/c home with family and HHPT.  -LR     Row Name 11/01/21 0905          Therapy Assessment/Plan (PT)    Rehab Potential (PT) good, to achieve stated  therapy goals  -LR     Criteria for Skilled Interventions Met (PT) yes; meets criteria; skilled treatment is necessary  -LR     Row Name 11/01/21 0905          Positioning and Restraints    Pre-Treatment Position in bed  -LR     Post Treatment Position chair  -LR     In Chair notified nsg; reclined; sitting; call light within reach; encouraged to call for assist; exit alarm on; with family/caregiver; legs elevated; waffle cushion  -LR           User Key  (r) = Recorded By, (t) = Taken By, (c) = Cosigned By    Initials Name Provider Type    Katerine Delcid, PT Physical Therapist               Outcome Measures     Row Name 11/01/21 0905 11/01/21 0838       How much help from another person do you currently need...    Turning from your back to your side while in flat bed without using bedrails? 3  -LR 3  -AH    Moving from lying on back to sitting on the side of a flat bed without bedrails? 3  -LR 3  -AH    Moving to and from a bed to a chair (including a wheelchair)? 3  -LR 3  -AH    Standing up from a chair using your arms (e.g., wheelchair, bedside chair)? 3  -LR 3  -AH    Climbing 3-5 steps with a railing? 3  -LR 2  -AH    To walk in hospital room? 3  -LR 3  -AH    AM-PAC 6 Clicks Score (PT) 18  -LR 17  -AH    Row Name 11/01/21 0905          Functional Assessment    Outcome Measure Options AM-PAC 6 Clicks Basic Mobility (PT)  -LR           User Key  (r) = Recorded By, (t) = Taken By, (c) = Cosigned By    Initials Name Provider Type    Katerine Delcid, PT Physical Therapist    Danyell Atkinson RN Registered Nurse              Physical Therapy Education                 Title: PT OT SLP Therapies (Done)     Topic: Physical Therapy (Done)     Point: Mobility training (Done)     Learning Progress Summary           Patient Acceptance, E,D,H, VU,DU by BLANE at 11/1/2021 0905    Comment: Educated on precautions, HEP, safety with mobility, correct supine to sit t/f technique, correct sit<->stand t/f  technique, correct gait mechanics, correct stair training technique, and correct car t/f technique.    Acceptance, E,D, VU,NR by LR at 10/31/2021 1546    Comment: Educated on benefits of mobility, HEP, precautions, weight bearing status, correct supine<->sit t/f technique, correct sit<->stand t/f technique, correct gait mechanics, safety with mobility, and progression of POC.    Acceptance, E,D,H, VU,NR by LR at 10/31/2021 1026    Comment: Educated on precautions, weight bearing status, benefits of mobility, correct sit to supine t/f technique, correct gait mechanics, correct sit<->stand t/f technique, safety with mobility, HEP, and progression of POC.    Acceptance, E,D, VU,NR by LR at 10/30/2021 1443    Comment: Educated on precautions, weight bearing status, benefits of mobility, correct supine<->sit t/f technique, correct sit<->stand t/f technique, correct gait mechanics, LE HEP, and progression of POC.    Acceptance, E,D,H, VU,DU by LR at 10/30/2021 0940    Comment: Issued and reviewed written/illustrated HEP. Educated on precautions, weight bearing status, safety with mobility, correct sit<->stand t/f technique, correct gait mechanics, correct stair training technique, and correct car t/f technique.    Acceptance, E,TB, VU by ES at 10/29/2021 1614   Family Acceptance, E,D, VU,NR by LR at 10/31/2021 1546    Comment: Educated on benefits of mobility, HEP, precautions, weight bearing status, correct supine<->sit t/f technique, correct sit<->stand t/f technique, correct gait mechanics, safety with mobility, and progression of POC.    Acceptance, E,D,H, VU,NR by LR at 10/31/2021 1026    Comment: Educated on precautions, weight bearing status, benefits of mobility, correct sit to supine t/f technique, correct gait mechanics, correct sit<->stand t/f technique, safety with mobility, HEP, and progression of POC.    Acceptance, E,D, VU,NR by LR at 10/30/2021 1443    Comment: Educated on precautions, weight bearing  status, benefits of mobility, correct supine<->sit t/f technique, correct sit<->stand t/f technique, correct gait mechanics, LE HEP, and progression of POC.    Acceptance, E,D,H, VU,DU by LR at 10/30/2021 0940    Comment: Issued and reviewed written/illustrated HEP. Educated on precautions, weight bearing status, safety with mobility, correct sit<->stand t/f technique, correct gait mechanics, correct stair training technique, and correct car t/f technique.                   Point: Home exercise program (Done)     Learning Progress Summary           Patient Acceptance, E,D,H, VU,DU by LR at 11/1/2021 0905    Comment: Educated on precautions, HEP, safety with mobility, correct supine to sit t/f technique, correct sit<->stand t/f technique, correct gait mechanics, correct stair training technique, and correct car t/f technique.    Acceptance, E,D, VU,NR by LR at 10/31/2021 1546    Comment: Educated on benefits of mobility, HEP, precautions, weight bearing status, correct supine<->sit t/f technique, correct sit<->stand t/f technique, correct gait mechanics, safety with mobility, and progression of POC.    Acceptance, E,D,H, VU,NR by LR at 10/31/2021 1026    Comment: Educated on precautions, weight bearing status, benefits of mobility, correct sit to supine t/f technique, correct gait mechanics, correct sit<->stand t/f technique, safety with mobility, HEP, and progression of POC.    Acceptance, E,D, VU,NR by LR at 10/30/2021 1443    Comment: Educated on precautions, weight bearing status, benefits of mobility, correct supine<->sit t/f technique, correct sit<->stand t/f technique, correct gait mechanics, LE HEP, and progression of POC.    Acceptance, E,D,H, VU,DU by LR at 10/30/2021 0940    Comment: Issued and reviewed written/illustrated HEP. Educated on precautions, weight bearing status, safety with mobility, correct sit<->stand t/f technique, correct gait mechanics, correct stair training technique, and correct car  t/f technique.    Acceptance, E,TB, VU by ES at 10/29/2021 1614   Family Acceptance, E,D, VU,NR by LR at 10/31/2021 1546    Comment: Educated on benefits of mobility, HEP, precautions, weight bearing status, correct supine<->sit t/f technique, correct sit<->stand t/f technique, correct gait mechanics, safety with mobility, and progression of POC.    Acceptance, E,D,H, VU,NR by LR at 10/31/2021 1026    Comment: Educated on precautions, weight bearing status, benefits of mobility, correct sit to supine t/f technique, correct gait mechanics, correct sit<->stand t/f technique, safety with mobility, HEP, and progression of POC.    Acceptance, E,D, VU,NR by LR at 10/30/2021 1443    Comment: Educated on precautions, weight bearing status, benefits of mobility, correct supine<->sit t/f technique, correct sit<->stand t/f technique, correct gait mechanics, LE HEP, and progression of POC.    Acceptance, E,D,H, VU,DU by LR at 10/30/2021 0940    Comment: Issued and reviewed written/illustrated HEP. Educated on precautions, weight bearing status, safety with mobility, correct sit<->stand t/f technique, correct gait mechanics, correct stair training technique, and correct car t/f technique.                   Point: Body mechanics (Done)     Learning Progress Summary           Patient Acceptance, E,D,H, VU,DU by LR at 11/1/2021 0905    Comment: Educated on precautions, HEP, safety with mobility, correct supine to sit t/f technique, correct sit<->stand t/f technique, correct gait mechanics, correct stair training technique, and correct car t/f technique.    Acceptance, E,D, VU,NR by LR at 10/31/2021 1546    Comment: Educated on benefits of mobility, HEP, precautions, weight bearing status, correct supine<->sit t/f technique, correct sit<->stand t/f technique, correct gait mechanics, safety with mobility, and progression of POC.    Acceptance, E,D,H, VU,NR by LR at 10/31/2021 1026    Comment: Educated on precautions, weight bearing  status, benefits of mobility, correct sit to supine t/f technique, correct gait mechanics, correct sit<->stand t/f technique, safety with mobility, HEP, and progression of POC.    Acceptance, E,D, VU,NR by LR at 10/30/2021 1443    Comment: Educated on precautions, weight bearing status, benefits of mobility, correct supine<->sit t/f technique, correct sit<->stand t/f technique, correct gait mechanics, LE HEP, and progression of POC.    Acceptance, E,D,H, VU,DU by LR at 10/30/2021 0940    Comment: Issued and reviewed written/illustrated HEP. Educated on precautions, weight bearing status, safety with mobility, correct sit<->stand t/f technique, correct gait mechanics, correct stair training technique, and correct car t/f technique.    Acceptance, E,TB, VU by ES at 10/29/2021 1614   Family Acceptance, E,D, VU,NR by LR at 10/31/2021 1546    Comment: Educated on benefits of mobility, HEP, precautions, weight bearing status, correct supine<->sit t/f technique, correct sit<->stand t/f technique, correct gait mechanics, safety with mobility, and progression of POC.    Acceptance, E,D,H, VU,NR by LR at 10/31/2021 1026    Comment: Educated on precautions, weight bearing status, benefits of mobility, correct sit to supine t/f technique, correct gait mechanics, correct sit<->stand t/f technique, safety with mobility, HEP, and progression of POC.    Acceptance, E,D, VU,NR by LR at 10/30/2021 1443    Comment: Educated on precautions, weight bearing status, benefits of mobility, correct supine<->sit t/f technique, correct sit<->stand t/f technique, correct gait mechanics, LE HEP, and progression of POC.    Acceptance, E,D,H, VU,DU by LR at 10/30/2021 0940    Comment: Issued and reviewed written/illustrated HEP. Educated on precautions, weight bearing status, safety with mobility, correct sit<->stand t/f technique, correct gait mechanics, correct stair training technique, and correct car t/f technique.                   Point:  Precautions (Done)     Learning Progress Summary           Patient Acceptance, E,D,H, VU,DU by LR at 11/1/2021 0905    Comment: Educated on precautions, HEP, safety with mobility, correct supine to sit t/f technique, correct sit<->stand t/f technique, correct gait mechanics, correct stair training technique, and correct car t/f technique.    Acceptance, E,D, VU,NR by LR at 10/31/2021 1546    Comment: Educated on benefits of mobility, HEP, precautions, weight bearing status, correct supine<->sit t/f technique, correct sit<->stand t/f technique, correct gait mechanics, safety with mobility, and progression of POC.    Acceptance, E,D,H, VU,NR by LR at 10/31/2021 1026    Comment: Educated on precautions, weight bearing status, benefits of mobility, correct sit to supine t/f technique, correct gait mechanics, correct sit<->stand t/f technique, safety with mobility, HEP, and progression of POC.    Acceptance, E,D, VU,NR by LR at 10/30/2021 1443    Comment: Educated on precautions, weight bearing status, benefits of mobility, correct supine<->sit t/f technique, correct sit<->stand t/f technique, correct gait mechanics, LE HEP, and progression of POC.    Acceptance, E,D,H, VU,DU by LR at 10/30/2021 0940    Comment: Issued and reviewed written/illustrated HEP. Educated on precautions, weight bearing status, safety with mobility, correct sit<->stand t/f technique, correct gait mechanics, correct stair training technique, and correct car t/f technique.    Acceptance, E,TB, VU by ES at 10/29/2021 1614   Family Acceptance, E,D, VU,NR by LR at 10/31/2021 1546    Comment: Educated on benefits of mobility, HEP, precautions, weight bearing status, correct supine<->sit t/f technique, correct sit<->stand t/f technique, correct gait mechanics, safety with mobility, and progression of POC.    Acceptance, E,D,H, VU,NR by LR at 10/31/2021 1026    Comment: Educated on precautions, weight bearing status, benefits of mobility, correct sit to  supine t/f technique, correct gait mechanics, correct sit<->stand t/f technique, safety with mobility, HEP, and progression of POC.    Acceptance, E,D, VU,NR by LR at 10/30/2021 1443    Comment: Educated on precautions, weight bearing status, benefits of mobility, correct supine<->sit t/f technique, correct sit<->stand t/f technique, correct gait mechanics, LE HEP, and progression of POC.    Acceptance, E,D,H, VU,DU by LR at 10/30/2021 0940    Comment: Issued and reviewed written/illustrated HEP. Educated on precautions, weight bearing status, safety with mobility, correct sit<->stand t/f technique, correct gait mechanics, correct stair training technique, and correct car t/f technique.                               User Key     Initials Effective Dates Name Provider Type Discipline     06/16/21 -  Katerine Call, PT Physical Therapist PT    ES 06/16/21 -  Jasmina Shirley, PT Physical Therapist PT              PT Recommendation and Plan     Plan of Care Reviewed With: patient  Progress: improving  Outcome Summary: Patient ambulated 200 feet with RW and step through gait pattern, CGA, limited by pain. ROM improved today, 20-75 degrees AAROM R knee. Patient climbed 5 stairs with one handrail and SPC with CGA with no difficulty. Patient has been d/c home with family and HHPT.     Time Calculation:    PT Charges     Row Name 11/01/21 0905             Time Calculation    Start Time 0905  -LR      PT Received On 11/01/21  -      PT Goal Re-Cert Due Date 11/08/21  -LR              Timed Charges    48368 - PT Therapeutic Exercise Minutes 10  -LR      50807 - Gait Training Minutes  10  -LR      57741 - PT Therapeutic Activity Minutes 5  -LR              Total Minutes    Timed Charges Total Minutes 25  -LR       Total Minutes 25  -LR            User Key  (r) = Recorded By, (t) = Taken By, (c) = Cosigned By    Initials Name Provider Type    LR Katerine Call, PT Physical Therapist              Therapy  Charges for Today     Code Description Service Date Service Provider Modifiers Qty    42647528325 HC PT THER PROC EA 15 MIN 10/31/2021 Katerine Call, PT GP 1    81153472134 HC GAIT TRAINING EA 15 MIN 10/31/2021 Katerine Call, PT GP 1    82945126530 HC PT THER SUPP EA 15 MIN 10/31/2021 Katerine Call, PT GP 1    88326619551 HC PT THER PROC EA 15 MIN 10/31/2021 Katerine Call, PT GP 1    43157613023 HC GAIT TRAINING EA 15 MIN 10/31/2021 Katerine Call, PT GP 1    88383667057 HC PT THER PROC EA 15 MIN 11/1/2021 Katerine Call, PT GP 1    00374434406 HC GAIT TRAINING EA 15 MIN 11/1/2021 Katerine Call, PT GP 1          PT G-Codes  Outcome Measure Options: AM-PAC 6 Clicks Basic Mobility (PT)  AM-PAC 6 Clicks Score (PT): 18  AM-PAC 6 Clicks Score (OT): 17    PT Discharge Summary  Anticipated Discharge Disposition (PT): home with assist, home with home health  Reason for Discharge: Discharge from facility  Outcomes Achieved: Patient able to partially acheive established goals  Discharge Destination: Home with assist, Home with home health    Katerine Call, PT  11/1/2021

## 2021-11-01 NOTE — DISCHARGE INSTR - ACTIVITY
Weight bearing as tolerated to Left leg.  Use ice packs for pain relief and swelling.  Use incentive spirometer.

## 2021-11-01 NOTE — DISCHARGE SUMMARY
Patient Name: Barbara Coelho  MRN: [id number]  : 1950  DOS: 2021    Attending: Emeka Douglas MD    Primary Care Provider: Ania Martel MD    Date of Admission:.10/29/2021  7:22 AM    Date of Discharge:  2021    Discharge Diagnosis:   S/P total knee arthroplasty, right    GERD (gastroesophageal reflux disease)    Depression    CARITO on CPAP    Osteoarthritis of right knee    Postoperative pain      Hospital Course    At admit:  Patient is a pleasant 71 y.o. female presented for scheduled surgery by Dr. Douglas.     Per his note ( The patient has a long history of progressive knee pain, arthritis, and degeneration resulting in deformity in the right knee from predominantly medial wear and bone loss. Non-operative treatment and conservative therapeutic measures have been attempted, but have not improved or controlled the symptoms and pain that occurs during normal daily activities. Knee motion has also become limited and is restricting the patient. Total knee arthroplasty was recommended.)     Patient underwent right total knee arthroplasty under spinal anesthesia, tolerated surgery well, is being admitted for further management.     Seen in PACU, doing fairly well with good pain control, no complains of nausea, vomiting, or shortness of breath.     She has history of DVT for which she received anticoagulation previously.     After admit:    Patient was provided pain medications as needed for pain control, along with adductor canal nerve block infusion of Ropivacaine.      Adjustments were made to pain medications to optimize postop pain management. Risks and benefits of opiate medications discussed with patient. ETHAN report was reviewed.    She was seen by PT and OT and has progressed well over her stay.    Patient used an IS for atelectasis prophylaxis and Eliquis along with mechanicals for DVT prophylaxis.    Home medications were resumed as appropriate, and labs were monitored and  "remained fairly stable.     With the progress she has made, Ms. Coelho is ready for DC home today.      Peripheral nerve block after was discontinued prior to discharge    Discussed with patient and her daughter regarding plan and they show understanding and agreement.    Patient will have HHPT following discharge.        Procedures Performed  Procedure(s):  TOTAL KNEE ARTHROPLASTY RIGHT       Pertinent Test Results:    I reviewed the patient's new clinical results.   Results from last 7 days   Lab Units 21  0851 10/31/21  0751 10/30/21  0914   WBC 10*3/mm3 10.55 11.13* 14.12*   HEMOGLOBIN g/dL 11.2* 10.9* 12.1   HEMATOCRIT % 35.2 35.4 37.5   PLATELETS 10*3/mm3 191 167 198     Results from last 7 days   Lab Units 21  0851 10/31/21  0751   SODIUM mmol/L 137 138   POTASSIUM mmol/L 4.1 3.9   CHLORIDE mmol/L 101 104   CO2 mmol/L 26.0 26.0   BUN mg/dL 10 10   CREATININE mg/dL 0.90 0.86   CALCIUM mg/dL 8.6 8.4*   GLUCOSE mg/dL 189* 118*     I reviewed the patient's new imaging including images and reports.      Physical therapy  Progress: improving  Outcome Summary: Patient ambulated 200 feet with RW and step through gait pattern, CGA, limited by pain. ROM improved today, 20-75 degrees AAROM R knee. Patient climbed 5 stairs with one handrail and SPC with CGA with no difficulty. Patient has been d/c home with family and HHPT.                  Discharge Assessment:       Visit Vitals  /61 (BP Location: Right arm, Patient Position: Sitting)   Pulse 74   Temp 98.3 °F (36.8 °C) (Oral)   Resp 16   Ht 162.6 cm (64\")   Wt 83.9 kg (185 lb)   LMP  (LMP Unknown)   SpO2 97%   Breastfeeding No   BMI 31.76 kg/m²     Temp (24hrs), Av.8 °F (37.1 °C), Min:98.3 °F (36.8 °C), Max:99.2 °F (37.3 °C)      General Appearance:    Alert, cooperative, in no acute distress   Lungs:     Clear to auscultation,respirations regular, even and                   unlabored    Heart:    Regular rhythm and normal rate, normal S1 and S2 "   Abdomen:     Normal bowel sounds, no masses, no organomegaly, soft        non-tender, non-distended, no guarding, no rebound                 tenderness   Extremities:   CDI dressing surgical knee . ACB catheter is out   Pulses:   Pulses palpable and equal bilaterally   Skin:   No bleeding, bruising or rash   Neurologic:   Cranial nerves 2 - 12 grossly intact, sensation intact, Flexion and dorsiflexion intact bilateral feet.         Discharge Disposition: Home    Discharge Medications     Discharge Medications      New Medications      Instructions Start Date   acetaminophen 500 MG tablet  Commonly known as: TYLENOL   1,000 mg, Oral, Every 8 Hours, Take every 8 hours  as needed after 1 week      Eliquis 2.5 MG tablet tablet  Generic drug: apixaban   2.5 mg, Oral, Every 12 Hours Scheduled      meloxicam 7.5 MG tablet  Commonly known as: MOBIC   7.5 mg, Oral, Daily      ondansetron 4 MG tablet  Commonly known as: Zofran   4 mg, Oral, Every 8 Hours PRN      oxyCODONE 5 MG immediate release tablet  Commonly known as: Roxicodone   5 mg, Oral, Every 4 Hours PRN      Stool Softener 100 MG capsule  Generic drug: docusate sodium   100 mg, Oral, 2 Times Daily         Changes to Medications      Instructions Start Date   Azelastine-Fluticasone 137-50 MCG/ACT suspension  What changed:   · how much to take  · how to take this  · when to take this   1 spray twice daily         Continue These Medications      Instructions Start Date   albuterol sulfate  (90 Base) MCG/ACT inhaler  Commonly known as: Ventolin HFA   2 puffs, Inhalation, Every 4 Hours PRN      Biotin 1 MG capsule   1 capsule, Oral, Daily      desloratadine 5 MG tablet  Commonly known as: CLARINEX   TAKE 1 TABLET BY MOUTH EVERY DAY      fluticasone-salmeterol 500-50 MCG/DOSE DISKUS  Commonly known as: Wixela Inhub   1 puff, Inhalation, 2 Times Daily - RT      hydroxychloroquine 200 MG tablet  Commonly known as: Plaquenil   200 mg, Oral, 2 Times Daily       VITAMIN D (CHOLECALCIFEROL) PO   1 capsule, Oral, Daily         Stop These Medications    chlorhexidine 4 % external liquid  Commonly known as: HIBICLENS     mupirocin 2 % ointment  Commonly known as: BACTROBAN            Discharge Diet:   Diet Instructions    Resume regular diet           Activity at Discharge:   Activity Instructions    Weight bearing as tolerated to right leg.  Use ice packs for pain relief and swelling.  Use incentive spirometer.           Follow-up Appointments  Emeka Douglas MD per his orders        Dragon disclaimer:  Part of this encounter note is an electronic transcription/translation of spoken language to printed text. The electronic translation of spoken language may permit erroneous, or at times, nonsensical words or phrases to be inadvertently transcribed; Although I have reviewed the note for such errors, some may still exist.       Arnulfo Richard MD  11/01/21  13:11 EDT

## 2021-11-01 NOTE — PLAN OF CARE
Problem: Adult Inpatient Plan of Care  Goal: Plan of Care Review  Recent Flowsheet Documentation  Taken 11/1/2021 0905 by Katerine Call, PT  Progress: improving  Plan of Care Reviewed With: patient  Outcome Summary: Patient ambulated 200 feet with RW and step through gait pattern, CGA, limited by pain. ROM improved today, 20-75 degrees AAROM R knee. Patient climbed 5 stairs with one handrail and SPC with CGA with no difficulty. Patient has been d/c home with family and HHPT.   Goal Outcome Evaluation:  Plan of Care Reviewed With: patient        Progress: improving  Outcome Summary: Patient ambulated 200 feet with RW and step through gait pattern, CGA, limited by pain. ROM improved today, 20-75 degrees AAROM R knee. Patient climbed 5 stairs with one handrail and SPC with CGA with no difficulty. Patient has been d/c home with family and HHPT.

## 2021-11-01 NOTE — PLAN OF CARE
No c/o pain/discomfort have been reported on the shift. Patient is currently resting in bed without sx/si of pain/acute distress. Elastic bandage CDI. VSS.  UOP ADQ. Will cont to mx. Call light in reach,

## 2021-11-01 NOTE — CASE MANAGEMENT/SOCIAL WORK
Case Management Discharge Note      Final Note: Case mgt f/u. Her d/c plan is to go home today. Daughter plans to stay with her. [facility] out[facility] has been arranged. They will see her for home visits until ready to transition to outpt visits. She has equipment needed. No other d/c needs identified         Selected Continued Care - Admitted Since 10/29/2021     Destination    No services have been selected for the patient.              Durable Medical Equipment Coordination complete.    Service Provider Selected Services Address Phone Fax Patient Preferred    [facility]  Durable Medical Equipment [address] [facility] [address] [address] [phone]           Dialysis/Infusion    No services have been selected for the patient.              Home Medical Care    No services have been selected for the patient.              Therapy Coordination complete.    Service Provider Selected Services Address Phone Fax Patient Preferred    [facility]  [facility] [address] [place] [id number] [phone] --          Community Resources    No services have been selected for the patient.              Community & DME    No services have been selected for the patient.                       Final Discharge Disposition Code: 01 - home or self-care

## 2021-11-03 ENCOUNTER — OFFICE VISIT (OUTPATIENT)
Dept: ORTHOPEDIC SURGERY | Facility: CLINIC | Age: 71
End: 2021-11-03

## 2021-11-03 VITALS — TEMPERATURE: 97.1 F

## 2021-11-03 DIAGNOSIS — Z96.651 STATUS POST TOTAL RIGHT KNEE REPLACEMENT: ICD-10-CM

## 2021-11-03 DIAGNOSIS — Z09 SURGERY FOLLOW-UP: Primary | ICD-10-CM

## 2021-11-03 PROCEDURE — 99024 POSTOP FOLLOW-UP VISIT: CPT | Performed by: ORTHOPAEDIC SURGERY

## 2021-11-03 RX ORDER — CHLORHEXIDINE GLUCONATE 4 %
LIQUID (ML) TOPICAL
COMMUNITY
Start: 2021-09-22 | End: 2021-12-21

## 2021-11-03 NOTE — PROGRESS NOTES
Chief Complaint   Patient presents with   • Post-op Follow-up     5 days s/p Total Knee Arthroplasty Right 10/29/21           HPI  She doing well with no complaints.  She is getting home physical therapy from [facility].      Vitals:    11/03/21 1111   Temp: 97.1 °F (36.2 °C)         Physical Exam:  Knee incision looks good.  Range of motion 10 to 80 degrees.  Negative Homans.  Minimal swelling.      X-RAY REPORT:  Imaging Results (Last 7 Days)     Procedure Component Value Units Date/Time    XR Knee 3+ View With [place] Right [[id number]] Resulted: 11/03/21 1117     Updated: 11/03/21 1118    Narrative:      TKA X-Ray  Indication: status-post TKA     AP, Lateral, and Sunrise views of Right knee     Findings:  No signs of fracture  No signs of loosening  No change compared to prior study  Components are well aligned                    ICD-10-CM ICD-9-CM   1. Surgery follow-up  Z09 V67.00   2. Status post total right knee replacement  Z96.651 V43.65       Orders Placed This Encounter   Procedures   • XR Knee 3+ View With [place] Right       She will continue physical therapy with chronic.  She is doing well.  Follow-up in a month.      Emeka Douglas M.D., [facility]  Orthopedic Surgeon  Fellowship Trained Sports Medicine  [facility]  Orthopedics and Sports Medicine  [address] [place] [address], [address]  [place] [address]      EMR Dragon/Transcription disclaimer:  Much of this encounter note is an electronic transcription of spoken language to printed text. Electronic transcription of spoken language may permit erroneous, or at times, nonsensical words or phrases to be inadvertently transcribed. Although I have reviewed the note for such errors, some may still exist.

## 2021-11-11 ENCOUNTER — TRANSCRIBE ORDERS (OUTPATIENT)
Dept: ADMINISTRATIVE | Facility: HOSPITAL | Age: 71
End: 2021-11-11

## 2021-11-11 DIAGNOSIS — Z12.31 VISIT FOR SCREENING MAMMOGRAM: Primary | ICD-10-CM

## 2021-11-15 ENCOUNTER — OFFICE VISIT (OUTPATIENT)
Dept: INTERNAL MEDICINE | Facility: CLINIC | Age: 71
End: 2021-11-15

## 2021-11-15 ENCOUNTER — TELEPHONE (OUTPATIENT)
Dept: ORTHOPEDIC SURGERY | Facility: CLINIC | Age: 71
End: 2021-11-15

## 2021-11-15 ENCOUNTER — OFFICE VISIT (OUTPATIENT)
Dept: ORTHOPEDIC SURGERY | Facility: CLINIC | Age: 71
End: 2021-11-15

## 2021-11-15 VITALS — TEMPERATURE: 97.1 F

## 2021-11-15 VITALS
RESPIRATION RATE: 16 BRPM | WEIGHT: 178 LBS | SYSTOLIC BLOOD PRESSURE: 136 MMHG | HEIGHT: 64 IN | HEART RATE: 64 BPM | BODY MASS INDEX: 30.39 KG/M2 | DIASTOLIC BLOOD PRESSURE: 78 MMHG

## 2021-11-15 DIAGNOSIS — F41.9 ANXIETY: Primary | ICD-10-CM

## 2021-11-15 DIAGNOSIS — G57.81 SAPHENOUS NEURITIS, RIGHT: ICD-10-CM

## 2021-11-15 DIAGNOSIS — Z96.651 STATUS POST RIGHT KNEE REPLACEMENT: ICD-10-CM

## 2021-11-15 DIAGNOSIS — G89.18 POST-OPERATIVE PAIN: ICD-10-CM

## 2021-11-15 DIAGNOSIS — Z96.651 STATUS POST TOTAL RIGHT KNEE REPLACEMENT: Primary | ICD-10-CM

## 2021-11-15 PROCEDURE — 99024 POSTOP FOLLOW-UP VISIT: CPT | Performed by: PHYSICIAN ASSISTANT

## 2021-11-15 PROCEDURE — 99214 OFFICE O/P EST MOD 30 MIN: CPT | Performed by: STUDENT IN AN ORGANIZED HEALTH CARE EDUCATION/TRAINING PROGRAM

## 2021-11-15 RX ORDER — HYDROXYZINE HYDROCHLORIDE 25 MG/1
25 TABLET, FILM COATED ORAL NIGHTLY PRN
Qty: 30 TABLET | Refills: 0 | Status: SHIPPED | OUTPATIENT
Start: 2021-11-15 | End: 2021-12-08

## 2021-11-15 RX ORDER — GABAPENTIN 100 MG/1
100 CAPSULE ORAL 3 TIMES DAILY
Qty: 90 CAPSULE | Refills: 1 | Status: SHIPPED | OUTPATIENT
Start: 2021-11-15 | End: 2023-03-30 | Stop reason: SDUPTHER

## 2021-11-15 RX ORDER — MIRTAZAPINE 7.5 MG/1
7.5 TABLET, FILM COATED ORAL NIGHTLY
Qty: 30 TABLET | Refills: 0 | Status: SHIPPED | OUTPATIENT
Start: 2021-11-15 | End: 2021-12-09

## 2021-11-15 RX ORDER — HYDROCODONE BITARTRATE AND ACETAMINOPHEN 5; 325 MG/1; MG/1
1 TABLET ORAL EVERY 6 HOURS PRN
Qty: 10 TABLET | Refills: 0 | Status: SHIPPED | OUTPATIENT
Start: 2021-11-15 | End: 2021-12-06

## 2021-11-15 NOTE — PROGRESS NOTES
[facility] Note        Subjective     Post-op (2 weeks wound check - status post Right Total Knee Arthroplasty - 10/29/21- Dr. Douglas)       HPI    Barbara Coelho is a 71 y.o. female. Patient presents today (with daughter) noting increased pain to the knee with burning/aching sensation into her anterior tibia.  This began prior to Friday but has worsened since. She is crying, extremely tearful, rubbing her knee and rocking back and forth--because of the pain and not being able to sleep.  This has also made her very anxious.  Reports pain medication not helping. She did try gabapentin last night (100mg) without change in symptoms..     Attending outpatient PT. Doesn't have walker, left at home.    Does have history of low back issues, including surgery 20 years which could be contributing to pain.    No fever, chills, night sweats or other constitutional symptoms.           Objective      Physical Exam  Temp 97.1 °F (36.2 °C)   LMP  (LMP Unknown) Comment: LAST MAMMOGRAM     There is no height or weight on file to calculate BMI.        Ortho Exam  Integument:   Right knee: Incision is healing without signs of infection. No unusual redness or warmth.    Lower Extremities:   Right Knee:    Tenderness:  Diffuse/global tenderness    Effusion:  1+    Swellin+ with soft, nontender calf    Crepitus:  None    Range of motion:  Extension: 5°       Flexion: 90°    Instability:  No varus laxity, no valgus laxity, negative anterior drawer    Deformities:  None    Passive ROM knee--no pain.  Dorsiflexion and plantarflexion ankle intact  Neurovascular--grossly intact.      Imaging Reviewed:  No new imaging today.       Assessment:  1. Status post total right knee replacement    2. Saphenous neuritis, right    3. Post-operative pain        Plan:  Status post right TKA with saphenous neuritis, post-operative pain.  Restart gabapentin at 100 mg TID. Dr. Douglas provided prescription but will need to  contact PCP regarding gradual increase in dosage.   Needs to be using walker to assist with ambulation.  Continue with PT.  See PCP for sleep and anxiety issues/treatment.  Referral placed to pain management.  Keep follow up with Dr. Douglas as previously directed.  Questions and concerns answered.      Mercy Fitzgerald PA-C  11/15/21  21:01 EST      Dictated Utilizing Dragon Dictation.

## 2021-11-15 NOTE — TELEPHONE ENCOUNTER
PATIENT CALLED STATING THAT SHE HAS SURGERY ON HER RIGHT KNEE ON 10/29/2021 AND SHE KEEPS HAVING A PAIN IN HER KNEE, PATIENT STATED THAT IT FEELS LIKE SHE IS GETTING ELECTROCUTED. PATIENT WOULD LIKE A CALL BACK [phone].

## 2021-11-15 NOTE — PROGRESS NOTES
Chief Complaint  Anxiety (sleeping problems ) and Knee Injury (had a knee replacement 10/2021, the pain has kept her awake at night since the surgery has happened)    Barbara Coelho presents to [facility]      Subjective   Patient is a 71-year-old female who presents to clinic to discuss anxiety.  Patient status post right knee replacement done at the end of October.  Patient and patient's daughter report severe right knee pain after surgery.  Patient was seen by Ortho earlier today for this issue and they recommended to follow-up with primary care doctor to discuss anxiety and sleep related to her pain.  She plans on seeing pain clinic in the near future.  Patient reports that when she is experiencing pain that this gives her a lot of anxiety.  Patient becomes tearful and has difficulty with sleep.  Patient reports a very poor sleep over the last few weeks after surgery.  She has no previous psych pediatric diagnoses.  She does not report any thoughts of wanting to hurt herself or others.      The following portions of the patient's history were reviewed and updated as appropriate: allergies, current medications, past family history, past medical history, past social history, past surgical history and problem list.      Health Maintenance   Topic Date Due   • TDAP/TD VACCINES (1 - Tdap) Never done   • ZOSTER VACCINE (2 of 3) 03/12/2016   • Pneumococcal Vaccine 65+ (2 of 2 - PPSV23) 12/03/2020   • ANNUAL WELLNESS VISIT  07/26/2022   • LIPID PANEL  07/26/2022   • MAMMOGRAM  12/16/2022   • DXA SCAN  12/16/2022   • COLORECTAL CANCER SCREENING  01/01/2025   • HEPATITIS C SCREENING  Completed   • COVID-19 Vaccine  Completed   • INFLUENZA VACCINE  Completed         Procedures       Past Medical History:   Diagnosis Date   • Allergic rhinitis     on allergy shots   • Asthma     ALLERGIC   • Back pain     Low   • Bladder infection     Recurrent Bladder Infections   • Diverticulitis    • GERD  (gastroesophageal reflux disease)     EGD 12/16 [facility] - reflux, HH. no barretts   • Hearing loss    • History of DVT of lower extremity     left leg   • Knee pain, right    • Mixed connective tissue disease ([facility]) 01/2019    Dr. Thakur   • CARITO (obstructive sleep apnea) 2018   • Osteopenia 03/28/2018    calculated frax - 11/3%   • Pinched nerve in neck    • Rheumatoid arthritis ([facility]) 2018   • Staph infection    • Urinary incontinence    • Wears contact lenses    • Wears prescription eyeglasses       Allergies   Allergen Reactions   • Celebrex [Celecoxib] Hives     HIVES    • Sulfa Antibiotics Itching     ITCHING       Social History     Tobacco Use   • Smoking status: Never Smoker   • Smokeless tobacco: Never Used   Vaping Use   • Vaping Use: Never used   Substance Use Topics   • Alcohol use: Not Currently     Alcohol/week: 0.0 standard drinks     Comment: a week   • Drug use: No     Past Surgical History:   Procedure Laterality Date   • BACK SURGERY  1995    Back (L5/S1)   • BREAST EXCISIONAL BIOPSY Left 1985   • CHOLECYSTECTOMY WITH INTRAOPERATIVE CHOLANGIOGRAM N/A 4/26/2018    Procedure: CHOLECYSTECTOMY LAPAROSCOPIC INTRAOPERATIVE CHOLANGIOGRAM;  Surgeon: Lit Morelos MD;  Location:  [facility];  Service: General   • COLONOSCOPY  2016   • GASTRIC BYPASS      HIGH GASTRIC BYPASS, 1980's   • OTHER SURGICAL HISTORY  1985    Stomach Stabled   • REPLACEMENT TOTAL KNEE Left 2012   • SINUS SURGERY      x3   • TOTAL KNEE ARTHROPLASTY Right 10/29/2021    Procedure: TOTAL KNEE ARTHROPLASTY RIGHT;  Surgeon: Emeka Douglas MD;  Location:  [facility];  Service: Orthopedics;  Laterality: Right;   • TUBAL ABDOMINAL LIGATION  1980   • WISDOM TOOTH EXTRACTION        Family History   Problem Relation Age of Onset   • Cancer Other         BREAST, LUNG, OVARIAN, KIDNEY   • Kidney cancer Other    • Lung cancer Other         pGF as well   • Cancer Mother         kidney   • Cancer Father         lung   • Mental illness Father    •  Breast cancer Sister 30   • Connective Tissue Disease Sister    • Hypertension Sister         pulmonary hypertension   • Cancer Sister         Brest   • Cancer Paternal Grandmother         stomach   • Cancer Paternal Grandfather         lung   • Ovarian cancer Neg Hx          Current Outpatient Medications:   •  albuterol sulfate HFA (Ventolin HFA) 108 (90 Base) MCG/ACT inhaler, Inhale 2 puffs Every 4 (Four) Hours As Needed for Wheezing., Disp: 1 inhaler, Rfl: 3  •  apixaban (ELIQUIS) 2.5 MG tablet tablet, Take 1 tablet by mouth Every 12 (Twelve) Hours., Disp: 84 tablet, Rfl: 0  •  Azelastine-Fluticasone 137-50 MCG/ACT suspension, 1 spray twice daily (Patient taking differently: 1 spray into the nostril(s) as directed by provider 2 (two) times a day. 1 spray twice daily), Disp: 1 bottle, Rfl: 11  •  Biotin 1 MG capsule, Take 1 capsule by mouth Daily., Disp: , Rfl:   •  [employer] Antiseptic Skin Cleanser 4 % solution, , Disp: , Rfl:   •  desloratadine (CLARINEX) 5 MG tablet, TAKE 1 TABLET BY MOUTH EVERY DAY (Patient taking differently: Take 5 mg by mouth Daily.), Disp: 90 tablet, Rfl: 3  •  docusate sodium (COLACE) 100 MG capsule, Take 1 capsule by mouth 2 (Two) Times a Day for 15 days., Disp: 30 capsule, Rfl: 0  •  gabapentin (Neurontin) 100 MG capsule, Take 1 capsule by mouth 3 (Three) Times a Day., Disp: 90 capsule, Rfl: 1  •  hydroxychloroquine (PLAQUENIL) 200 MG tablet, Take 1 tablet by mouth 2 (Two) Times a Day., Disp: 60 tablet, Rfl: 0  •  meloxicam (MOBIC) 7.5 MG tablet, Take 1 tablet by mouth Daily., Disp: 10 tablet, Rfl: 0  •  ondansetron (Zofran) 4 MG tablet, Take 1 tablet by mouth Every 8 (Eight) Hours As Needed for Nausea or Vomiting for up to 20 doses., Disp: 20 tablet, Rfl: 0  •  VITAMIN D, CHOLECALCIFEROL, PO, Take 1 capsule by mouth Daily., Disp: , Rfl:   •  Wixela Inhub 500-50 MCG/DOSE DISKUS, TAKE 1 PUFF BY MOUTH TWICE A DAY, Disp: 180 each, Rfl: 1  •  HYDROcodone-acetaminophen (Norco) 5-325 MG per  "tablet, Take 1 tablet by mouth Every 6 (Six) Hours As Needed for Severe Pain ., Disp: 10 tablet, Rfl: 0  •  hydrOXYzine (ATARAX) 25 MG tablet, Take 1 tablet by mouth At Night As Needed for Anxiety., Disp: 30 tablet, Rfl: 0  •  mirtazapine (REMERON) 7.5 MG tablet, Take 1 tablet by mouth Every Night., Disp: 30 tablet, Rfl: 0  No current facility-administered medications for this visit.    Facility-Administered Medications Ordered in Other Visits:   •  Chlorhexidine Gluconate Cloth 2 % pads, , Apply externally, Once, Emeka Douglas MD  •  mupirocin (BACTROBAN) 2 % nasal ointment, , Nasal, BID, Emeka Douglas MD    Objective   Vital Signs  /78   Pulse 64   Resp 16   Ht 162.6 cm (64\")   Wt 80.7 kg (178 lb)   BMI 30.55 kg/m²   Body mass index is 30.55 kg/m².     Physical Exam  Vitals and nursing note reviewed.   Constitutional:       Appearance: Normal appearance.   Cardiovascular:      Rate and Rhythm: Normal rate and regular rhythm.      Heart sounds: Normal heart sounds.   Pulmonary:      Breath sounds: Normal breath sounds.   Abdominal:      General: Bowel sounds are normal.      Palpations: Abdomen is soft.   Musculoskeletal:      Right knee: Swelling present. Decreased range of motion. Tenderness present.      Comments: Surgical scar healing well with no signs of infection   Skin:     General: Skin is warm and dry.   Neurological:      Mental Status: She is alert and oriented to person, place, and time. Mental status is at baseline.   Psychiatric:         Attention and Perception: Attention normal.         Mood and Affect: Mood is anxious. Affect is tearful.         Speech: Speech normal.         Behavior: Behavior normal. Behavior is cooperative.         Thought Content: Thought content normal.         Cognition and Memory: Cognition normal.          Assessment and Plan  Diagnoses and all orders for this visit:    1. Anxiety (Primary)  Comments:  hydroxyzine prn for sleep and anxiety. Start " remeron 7.5mg nightly   Orders:  -     hydrOXYzine (ATARAX) 25 MG tablet; Take 1 tablet by mouth At Night As Needed for Anxiety.  Dispense: 30 tablet; Refill: 0  -     mirtazapine (REMERON) 7.5 MG tablet; Take 1 tablet by mouth Every Night.  Dispense: 30 tablet; Refill: 0    2. Status post right knee replacement  Comments:  Prescription sent for Norco until she will be seen by pain managment  Orders:  -     HYDROcodone-acetaminophen (Norco) 5-325 MG per tablet; Take 1 tablet by mouth Every 6 (Six) Hours As Needed for Severe Pain .  Dispense: 10 tablet; Refill: 0             Follow Up    Return in about 4 weeks (around 12/13/2021).    Patient was given instructions and counseling regarding her condition or for health maintenance advice. Please see specific information pulled into the AVS if appropriate.    Electronically signed by:   Lit Kilpatrick MD  11/15/2021

## 2021-11-15 NOTE — TELEPHONE ENCOUNTER
[facility],    I added Ms. Coelho on your schedule this morning.  She has a knot on her incision with increased redness and pain over the last 24 hours.  They are in for a 1010 slot but will be here closer to 1030 due to PT.    Thanks,    Michelle

## 2021-12-06 ENCOUNTER — OFFICE VISIT (OUTPATIENT)
Dept: ORTHOPEDIC SURGERY | Facility: CLINIC | Age: 71
End: 2021-12-06

## 2021-12-06 DIAGNOSIS — Z96.651 STATUS POST TOTAL RIGHT KNEE REPLACEMENT: Primary | ICD-10-CM

## 2021-12-06 PROCEDURE — 99024 POSTOP FOLLOW-UP VISIT: CPT | Performed by: ORTHOPAEDIC SURGERY

## 2021-12-06 RX ORDER — FAMOTIDINE 40 MG/1
TABLET, FILM COATED ORAL
COMMUNITY
Start: 2021-11-18 | End: 2023-03-30

## 2021-12-06 RX ORDER — PYRAZINAMIDE 500 MG/1
1-2 TABLET ORAL EVERY 4 HOURS PRN
Qty: 30 TABLET | Refills: 0 | Status: SHIPPED | OUTPATIENT
Start: 2021-12-06 | End: 2021-12-21

## 2021-12-06 NOTE — PROGRESS NOTES
Chief Complaint   Patient presents with   • Post-op Follow-up     3 week f/u status post Right Total Knee Arthroplasty - 10/29/21, saphenous neuritis, post-operative pain initiation of Gabapentin           HPI  She is doing much better today.  She is not crying on this visit.  She is happy.  She would like some Tylenol No. 3 with codeine.  That has helped her sleep at night.      There were no vitals filed for this visit.      Physical Exam:  Right knee looks good.  Range of motion 0 to 90 degrees using          ICD-10-CM ICD-9-CM   1. Status post total right knee replacement  Z96.651 V43.65       Orders Placed This Encounter   Procedures   • Ambulatory Referral to Physical Therapy     She will continue physical therapy and follow-up in 1 month.  She is doing much better.  I ordered Tylenol with codeine to take at night pain.  She may take ibuprofen now.        Emeka Douglas M.D., FAAOS  Orthopedic Surgeon  [facility]ellow[facility] Trained Sports Medicine  [facility]  Orthopedics and Sports Medicine  [address], [address]  [place] [address]      EMR Dragon/Transcription disclaimer:  Much of this encounter note is an electronic transcription of spoken language to printed text. Electronic transcription of spoken language may permit erroneous, or at times, nonsensical words or phrases to be inadvertently transcribed. Although I have reviewed the note for such errors, some may still exist.

## 2021-12-08 DIAGNOSIS — F41.9 ANXIETY: ICD-10-CM

## 2021-12-08 RX ORDER — HYDROXYZINE HYDROCHLORIDE 25 MG/1
25 TABLET, FILM COATED ORAL NIGHTLY PRN
Qty: 30 TABLET | Refills: 0 | Status: SHIPPED | OUTPATIENT
Start: 2021-12-08 | End: 2021-12-09

## 2021-12-08 NOTE — TELEPHONE ENCOUNTER
Rx Refill Note  Requested Prescriptions     Pending Prescriptions Disp Refills   • hydrOXYzine (ATARAX) 25 MG tablet [Pharmacy Med Name: HYDROXYZINE HCL 25 MG TABLET] 30 tablet 0     Sig: TAKE 1 TABLET BY MOUTH AT NIGHT AS NEEDED FOR ANXIETY.      Last office visit with prescribing clinician: 11/15/2021      Next office visit with prescribing clinician: Visit date not found     Last Fill Date: 11/15/2021  Quantity: 30  Refills: 0    April TRESA Sosa MA  12/08/21, 12:51 EST     Patient has an appointment tomorrow with you, okay for early refill?

## 2021-12-09 ENCOUNTER — OFFICE VISIT (OUTPATIENT)
Dept: INTERNAL MEDICINE | Facility: CLINIC | Age: 71
End: 2021-12-09

## 2021-12-09 VITALS
TEMPERATURE: 98 F | WEIGHT: 181.2 LBS | SYSTOLIC BLOOD PRESSURE: 124 MMHG | BODY MASS INDEX: 30.93 KG/M2 | OXYGEN SATURATION: 96 % | HEIGHT: 64 IN | DIASTOLIC BLOOD PRESSURE: 66 MMHG | HEART RATE: 88 BPM

## 2021-12-09 DIAGNOSIS — F51.01 PRIMARY INSOMNIA: Primary | ICD-10-CM

## 2021-12-09 DIAGNOSIS — F41.9 ANXIETY: ICD-10-CM

## 2021-12-09 DIAGNOSIS — Z96.651 S/P TOTAL KNEE ARTHROPLASTY, RIGHT: Chronic | ICD-10-CM

## 2021-12-09 PROBLEM — M17.11 OSTEOARTHRITIS OF RIGHT KNEE: Chronic | Status: ACTIVE | Noted: 2021-09-22

## 2021-12-09 PROBLEM — M35.1 MIXED CONNECTIVE TISSUE DISEASE: Chronic | Status: ACTIVE | Noted: 2019-01-01

## 2021-12-09 PROCEDURE — 99214 OFFICE O/P EST MOD 30 MIN: CPT | Performed by: INTERNAL MEDICINE

## 2021-12-09 RX ORDER — TRAZODONE HYDROCHLORIDE 50 MG/1
TABLET ORAL
Qty: 60 TABLET | Refills: 5 | Status: SHIPPED | OUTPATIENT
Start: 2021-12-09 | End: 2022-01-14

## 2021-12-09 RX ORDER — MIRTAZAPINE 7.5 MG/1
7.5 TABLET, FILM COATED ORAL NIGHTLY
Qty: 30 TABLET | Refills: 0 | OUTPATIENT
Start: 2021-12-09

## 2021-12-09 NOTE — TELEPHONE ENCOUNTER
Rx Refill Note  Requested Prescriptions     Pending Prescriptions Disp Refills   • mirtazapine (REMERON) 7.5 MG tablet 30 tablet 0     Sig: Take 1 tablet by mouth Every Night.      Last office visit with prescribing clinician: 7/26/2021      Next office visit with prescribing clinician: 12/9/2021     Last Fill Date: 11/15/2021  Quantity: 30  Refills: 0    April TRESA Sosa MA  12/09/21, 08:28 EST

## 2021-12-09 NOTE — PROGRESS NOTES
Chief Complaint  Anxiety (f/u), Insomnia (f/u still not sleeping great), and knee replacement (f/u right knee)    Subjective          Barbara Coelho presents to [facility]  History of Present Illness    Here for f/u on anxiety and insomnia. She was seen last month by Dr Kilpatrick w/ anxiety and insomnia. He started her on remeron 7.5 and hydroxyzine 25 qhs.she is still trouble falling asleep despite taking both of these. She doesn't feel as anxious, more just the insomnia. Caffeine -j ust coffee in am, none rest of day. Doesn't watch TV in her bedroom. Not a lot of screen time in the evenings. Her sister is w/ her today and she is on trazodone which has worked well, so barbara is wondering if this might be one to try    Situational; depression/anxiety - She had been on effexor for many years, but had weaned off earlier this year and she felt she was doing ok. The trial regarding her 's murder is finally over, and the defendant was found guilty and has been sentenced, so she finally has closure on this which is a relief    R TKR in 10/21 w/ Dr Douglas. Pt had severe pain afterwards and was felt to have saphenous neuritis. She was given norco 5 #10 by Dr Kilpatrick, and has also been started on gabapentin by ortho. She felt like the hydrocodone worked better than the tyl #3, but has only had one dose of the tyl #3 so far. Pain is over knee, then medailly and down leg  She is doing PT 3x/week  Will see Dr Mathews next week for the knee and her back      Current Outpatient Medications:   •  acetaminophen-codeine (TYLENOL/CODEINE #3) 300-30 MG per tablet, Take 1-2 tablets by mouth Every 4 (Four) Hours As Needed for Moderate Pain ., Disp: 30 tablet, Rfl: 0  •  albuterol sulfate HFA (Ventolin HFA) 108 (90 Base) MCG/ACT inhaler, Inhale 2 puffs Every 4 (Four) Hours As Needed for Wheezing., Disp: 1 inhaler, Rfl: 3  •  Azelastine-Fluticasone 137-50 MCG/ACT suspension, 1 spray twice daily  "(Patient taking differently: 1 spray into the nostril(s) as directed by provider 2 (two) times a day. 1 spray twice daily), Disp: 1 bottle, Rfl: 11  •  Biotin 1 MG capsule, Take 1 capsule by mouth Daily., Disp: , Rfl:   •  desloratadine (CLARINEX) 5 MG tablet, TAKE 1 TABLET BY MOUTH EVERY DAY (Patient taking differently: Take 5 mg by mouth Daily.), Disp: 90 tablet, Rfl: 3  •  famotidine (PEPCID) 40 MG tablet, , Disp: , Rfl:   •  gabapentin (Neurontin) 100 MG capsule, Take 1 capsule by mouth 3 (Three) Times a Day., Disp: 90 capsule, Rfl: 1  •  hydroxychloroquine (PLAQUENIL) 200 MG tablet, Take 1 tablet by mouth 2 (Two) Times a Day., Disp: 60 tablet, Rfl: 0  •  VITAMIN D, CHOLECALCIFEROL, PO, Take 1 capsule by mouth Daily., Disp: , Rfl:   •  Wixela Inhub 500-50 MCG/DOSE DISKUS, TAKE 1 PUFF BY MOUTH TWICE A DAY, Disp: 180 each, Rfl: 1  •  [employer] Antiseptic Skin Cleanser 4 % solution, , Disp: , Rfl:   •  meloxicam (MOBIC) 7.5 MG tablet, Take 1 tablet by mouth Daily., Disp: 10 tablet, Rfl: 0  •  traZODone (DESYREL) 50 MG tablet, 1-2 qhs as needed for sleep, Disp: 60 tablet, Rfl: 5  No current facility-administered medications for this visit.    Facility-Administered Medications Ordered in Other Visits:   •  Chlorhexidine Gluconate Cloth 2 % pads, , Apply externally, Once, Emeka Douglas MD  •  mupirocin (BACTROBAN) 2 % nasal ointment, , Nasal, BID, Emeka Douglas MD      Objective   Vital Signs:   /66 (BP Location: Left arm, Patient Position: Sitting)   Pulse 88   Temp 98 °F (36.7 °C) (Infrared)   Ht 162.6 cm (64\")   Wt 82.2 kg (181 lb 3.2 oz)   SpO2 96%   BMI 31.10 kg/m²       Physical exam  Constitutional: oriented to person, place, and time.  well-developed and well-nourished. No distress.   HENT:   Head: Normocephalic and atraumatic.   Eyes: Conjunctivae and EOM are normal.   Neurological:  alert and oriented to person, place, and time.   MS: right knee tender.   Skin: Skin is warm and dry. not " diaphoretic. Incision intact, no drainage, some warmth  Psychiatric:  normal mood and affect. behavior is normal. Judgment and thought content normal.      Result Review :   The following data was reviewed by: Ania Martel MD on 12/09/2021:  CMP    CMP 10/21/21 10/31/21 11/1/21   Glucose 174 (A) 118 (A) 189 (A)   BUN 16 10 10   Creatinine 1.20 (A) 0.86 0.90   eGFR Non  Am 44 (A) 65 62   Sodium 144 138 137   Potassium 4.5 3.9 4.1   Chloride 105 104 101   Calcium 9.4 8.4 (A) 8.6   (A) Abnormal value            CBC    CBC 10/30/21 10/31/21 11/1/21   WBC 14.12 (A) 11.13 (A) 10.55   RBC 4.15 3.76 (A) 3.88   Hemoglobin 12.1 10.9 (A) 11.2 (A)   Hematocrit 37.5 35.4 35.2   MCV 90.4 94.1 90.7   MCH 29.2 29.0 28.9   MCHC 32.3 30.8 (A) 31.8   RDW 12.6 12.9 12.7   Platelets 198 167 191   (A) Abnormal value                  Data reviewed: Consultant notes ortho notes          Assessment and Plan    Diagnoses and all orders for this visit:    1. Primary insomnia (Primary)- we will d/c the hydroxyzine and the mirtazapine and try trazodone since her sister does well w/ it. Call if any problems. Sleep hygiene reviewed.   -     traZODone (DESYREL) 50 MG tablet; 1-2 qhs as needed for sleep  Dispense: 60 tablet; Refill: 5    2. S/P total knee arthroplasty, right- pt has a saphenous neuritis. She will be seeing Dr mares in 2 weeks. She will continue the tyl #3 and gabapentin        Follow Up {Instructions Charge Capture  Follow-up Communications :23}  No follow-ups on file.  Patient was given instructions and counseling regarding her condition or for health maintenance advice. Please see specific information pulled into the AVS if appropriate.

## 2021-12-15 NOTE — PROGRESS NOTES
"Chief Complaint: \"Pain in my right knee. Pain in my lower back.\"        History of Present Illness:   Patient: Ms. Barbara Coelho, 71 y.o. female   Referring Physician: Mercy Fitzgerald PA-C  Reason for Referral: Consultation for chronic intractable right knee pain.   Pain History: Patient reports a history of right total knee arthroplasty on 10/29/2021 by Dr. Douglas.  Apparently, she developed saphenous neuritis postoperatively. She reports that she was feeling electrical shocks that made her scream. She has been treated with conservative measures including gabapentin and physical therapy. Patient no longer uses a walker to assist with ambulation. Patient is a very poor historian. Barbara Coelho underwent follow-up orthopedic surgical consultation with Mercy Fitzgerald PA-C on 11/15/2021, and was found not to be a surgical candidate. Patient reports increased right pain described as severe throbbing and aching sensation that radiates into the shin. On her last visit with orthopedics, it was noted that patient was crying, extremely tearful, rubbing her knee and rocking back and forth because of the severity of her pain and her inability to have restful sleep. As per orthopedics, the right knee incision healed well without evidence complication. It was noted that conservative measures including analgesics and physical therapy were not helping alleviate her pain. Patient also has a history of L5-S1 lumbar surgery in 1995. She has been experiencing more lower back pain recently. Patient has failed to obtain pain relief with conservative measures including oral analgesics, physical therapy, chiropractic therapy, to name a few. Pain has progressed in intensity over the past months.   Pain Description: Constant pain with intermittent exacerbation, described as aching and throbbing sensation.   Radiation of Pain: The pain lower back pain radiates into the right gluteal region. The right knee pain radiates into " the right shin  Pain intensity today: 4/10  Average pain intensity last week: 4/10  Pain intensity ranges from: 3/10 to 6/10  Aggravating factors: Pain increases with bending, twisting, standing, walking. Patient describes neurogenic claudication. Patient no longer uses a cane or walker  Alleviating factors: Pain decreases with sitting down, lying down   Associated Symptoms:   Patient denies numbness or weakness in the lower extremity.   Patient denies any new bladder or bowel problems.   Patient reports difficulties with her balance but denies recent falls.   Her right leg pain increases during the night    Review of previous therapies and additional medical records:  Barbara Coelho has already failed the following measures, including:   Conservative Measures: Oral analgesics, opioids, topical analgesics, ice, heat, chiropractic therapy, physical therapy   Interventional Measures: Knee injections prior to surgery  Surgical Measures:   10/29/2021: Right total knee arthroplasty by Dr. Douglas.    2012: Left total knee replacement   1995: L5-S1 lumbar surgery   Barbara Coelho underwent orthopedic surgical consultation with Mercy Fitzgerald PA-C on 11/15/2021, and was found not to be a surgical candidate.  Barbara Coelho presents with significant comorbidities including anxiety, depression, CARITO on CPAP, asthma, mixed connective tissue disease, GERD, history of bariatric surgery in 2016  In terms of current analgesics, Barbara Coelho takes: gabapentin, meloxicam. Patient also takes trazodone, Plaquenil  I have reviewed [facility] Report # [id number] consistent with medication reconciliation.  SOAPP: Low Risk     Global Pain Scale 12-21  2021          Pain 19          Feelings 12          Clinical outcomes 12          Activities 14          GPS Total: 57            Review of Diagnostic Studies:    Right knee x-rays 11/3/2021.  I have reviewed the images with the patient as well as the report.  No evidence of  hardware complication.  Alignment is appropriate    Review of Systems   HENT: Positive for hearing loss.    Respiratory: Positive for apnea.    Genitourinary: Positive for urgency.   Musculoskeletal: Positive for back pain and neck stiffness.   Allergic/Immunologic: Positive for environmental allergies and food allergies.   All other systems reviewed and are negative.        Patient Active Problem List   Diagnosis   • Arthritis   • Dysphagia   • Constipation   • Screening for cardiovascular condition   • Skin lesions (Recent Bx results pending)   • Right shoulder pain   • Chronic allergic rhinitis due to food   • GERD (gastroesophageal reflux disease)   • Urinary incontinence   • Symptomatic cholelithiasis   • DDD (degenerative disc disease), lumbar   • Depression   • Mixed connective tissue disease (HCC)   • Bilateral pleural effusions (Exudate on Right with high WBC)   • Autoimmune Pleuritis   • History of asthma   • CARITO on CPAP   • CARITO (obstructive sleep apnea)   • Osteoarthritis of right knee   • History of total right knee replacement   • Postoperative pain   • Anxiety   • Saphenous neuralgia, right   • Pain due to total right knee replacement (HCC)   • History of left knee replacement   • History of lumbar surgery   • Physical deconditioning   • Gait disturbance   • Spondylosis of lumbar region without myelopathy or radiculopathy       Past Medical History:   Diagnosis Date   • Allergic rhinitis     on allergy shots   • Asthma     ALLERGIC   • Back pain     Low   • Bladder infection     Recurrent Bladder Infections   • Diverticulitis    • GERD (gastroesophageal reflux disease)     EGD 12/16 Arcos - reflux, HH. no barretts   • Hearing loss    • History of DVT of lower extremity     left leg   • Knee pain, right    • Mixed connective tissue disease (HCC) 01/2019    Dr. Thakur   • CARITO (obstructive sleep apnea) 2018   • Osteopenia 03/28/2018    calculated frax - 11/3%   • Pinched nerve in neck    • Rheumatoid  arthritis (HCC) 2018   • Staph infection    • Urinary incontinence    • Wears contact lenses    • Wears prescription eyeglasses          Past Surgical History:   Procedure Laterality Date   • BACK SURGERY  1995    Back (L5/S1)   • BREAST EXCISIONAL BIOPSY Left 1985   • CHOLECYSTECTOMY WITH INTRAOPERATIVE CHOLANGIOGRAM N/A 4/26/2018    Procedure: CHOLECYSTECTOMY LAPAROSCOPIC INTRAOPERATIVE CHOLANGIOGRAM;  Surgeon: Lit Morelos MD;  Location:  [facility];  Service: General   • COLONOSCOPY  2016   • GASTRIC BYPASS      HIGH GASTRIC BYPASS, 1980's   • OTHER SURGICAL HISTORY  1985    Stomach Stabled   • REPLACEMENT TOTAL KNEE Left 2012   • SINUS SURGERY      x3   • TOTAL KNEE ARTHROPLASTY Right 10/29/2021    Procedure: TOTAL KNEE ARTHROPLASTY RIGHT;  Surgeon: Emeka Douglas MD;  Location:  [facility];  Service: Orthopedics;  Laterality: Right;   • TUBAL ABDOMINAL LIGATION  1980   • WISDOM TOOTH EXTRACTION           Family History   Problem Relation Age of Onset   • Cancer Other         BREAST, LUNG, OVARIAN, KIDNEY   • Kidney cancer Other    • Lung cancer Other         pGF as well   • Cancer Mother         kidney   • Cancer Father         lung   • Mental illness Father    • Breast cancer Sister 30   • Connective Tissue Disease Sister    • Hypertension Sister         pulmonary hypertension   • Cancer Sister         Brest   • Cancer Paternal Grandmother         stomach   • Cancer Paternal Grandfather         lung   • Ovarian cancer Neg Hx          Social History     Socioeconomic History   • Marital status:    Tobacco Use   • Smoking status: Never Smoker   • Smokeless tobacco: Never Used   Vaping Use   • Vaping Use: Never used   Substance and Sexual Activity   • Alcohol use: Not Currently     Alcohol/week: 0.0 standard drinks     Comment: a week   • Drug use: No   • Sexual activity: Defer          Current Outpatient Medications:   •  albuterol sulfate HFA (Ventolin HFA) 108 (90 Base) MCG/ACT inhaler, Inhale 2  puffs Every 4 (Four) Hours As Needed for Wheezing., Disp: 1 inhaler, Rfl: 3  •  Azelastine-Fluticasone 137-50 MCG/ACT suspension, 1 spray twice daily (Patient taking differently: 1 spray into the nostril(s) as directed by provider 2 (two) times a day. 1 spray twice daily), Disp: 1 bottle, Rfl: 11  •  Biotin 1 MG capsule, Take 1 capsule by mouth Daily., Disp: , Rfl:   •  desloratadine (CLARINEX) 5 MG tablet, TAKE 1 TABLET BY MOUTH EVERY DAY (Patient taking differently: Take 5 mg by mouth Daily.), Disp: 90 tablet, Rfl: 3  •  famotidine (PEPCID) 40 MG tablet, , Disp: , Rfl:   •  gabapentin (Neurontin) 100 MG capsule, Take 1 capsule by mouth 3 (Three) Times a Day., Disp: 90 capsule, Rfl: 1  •  hydroxychloroquine (PLAQUENIL) 200 MG tablet, Take 1 tablet by mouth 2 (Two) Times a Day., Disp: 60 tablet, Rfl: 0  •  meloxicam (MOBIC) 7.5 MG tablet, Take 1 tablet by mouth Daily., Disp: 10 tablet, Rfl: 0  •  traZODone (DESYREL) 50 MG tablet, 1-2 qhs as needed for sleep, Disp: 60 tablet, Rfl: 5  •  VITAMIN D, CHOLECALCIFEROL, PO, Take 1 capsule by mouth Daily., Disp: , Rfl:   •  Wixela Inhub 500-50 MCG/DOSE DISKUS, TAKE 1 PUFF BY MOUTH TWICE A DAY, Disp: 180 each, Rfl: 1  •  acetaminophen-codeine (TYLENOL/CODEINE #3) 300-30 MG per tablet, Take 1-2 tablets by mouth Every 4 (Four) Hours As Needed for Moderate Pain ., Disp: 30 tablet, Rfl: 0  •  [employer] Antiseptic Skin Cleanser 4 % solution, , Disp: , Rfl:   No current facility-administered medications for this visit.    Facility-Administered Medications Ordered in Other Visits:   •  Chlorhexidine Gluconate Cloth 2 % pads, , Apply externally, Once, Emeka Douglas MD  •  mupirocin (BACTROBAN) 2 % nasal ointment, , Nasal, BID, Emeka Douglas MD      Allergies   Allergen Reactions   • Celebrex [Celecoxib] Hives     HIVES    • Codeine Rash   • Oxycodone Rash   • Sulfa Antibiotics Itching     ITCHING          /79 (BP Location: Left arm, Patient Position: Sitting, Cuff Size:  "Adult)   Pulse 96   Temp 97.3 °F (36.3 °C)   Ht 165.1 cm (65\")   Wt 81.3 kg (179 lb 3.2 oz)   LMP  (LMP Unknown) Comment: LAST MAMMOGRAM 2020  SpO2 99%   BMI 29.82 kg/m²       Physical Exam:  Constitutional: Patient appears well-developed, well-nourished, well-hydrated  HEENT: Head: Normocephalic and atraumatic  Eyes: Conjunctivae and lids are normal  Pupils: Equal, round, reactive to light  Peripheral vascular exam: Posterior tibialis: right 2+ and left 2+. Dorsalis pedis: right 2+ and left 2+. No edema.   Musculoskeletal   Gait and station: Gait evaluation demonstrated an antalgic gait and some shuffling. Patient has difficulties walking on heels and toes due to right knee pain.   Lumbar spine: Passive and active range of motion are limited secondary to pain. Extension, flexion, lateral flexion, rotation of the lumbar spine increased and reproduced pain. Lumbar facet joint loading maneuvers are positive.  Bunny test and Gaenslen's test are negative   Piriformis maneuvers are negative   Palpation of the bilateral greater trochanter, unrevealing   Examination of the Iliotibial band: unrevealing   Hip joints: The range of motion of the hip joints is almost full and without pain   Right knee: Range of motion -5 to 90 degrees. No ACL, PCL, LCL or MCL laxity. Tenderness found in the anterior joint line, medially. No MCL and no LCL tenderness noted. Clicking  Left knee: Normal range of motion. No ACL, PCL, LCL or MCL laxity. No tenderness found. No MCL and no LCL tenderness noted. No crepitus.   Neurological:   Patient is alert and oriented to person, place, and time.   Speech: Normal.   Cortical function: Normal mental status.   Reflex Scores:  Right patellar: 0+  Left patellar: 0+  Right Achilles: 0+  Left Achilles: 0+  Motor strength: 5/5  Motor Tone: Normal  Involuntary movements: None.   Superficial/Primitive Reflexes: Primitive reflexes were absent.   Right Garcia: Absent  Left Garcia: Absent  Right " ankle clonus: Absent  Left ankle clonus: Absent   Babinsky: Absent  Long tract signs: Negative. Straight leg raising test: Negative.   Sensory exam: Intact to light touch, intact pain and temperature sensation, intact vibration sensation and normal proprioception.   Coordination: Normal finger to nose and heel to shin. Normal balance and negative Romberg's sign   Skin and subcutaneous tissue: Skin is warm and intact. No rash noted. No cyanosis.   Psychiatric: Judgment and insight: Normal. Recent and remote memory: Intact. Mood and affect: Normal.     ASSESSMENT:   1. Saphenous neuralgia, right    2. History of total right knee replacement    3. Pain due to total right knee replacement, sequela    4. History of left knee replacement    5. Spondylosis of lumbar region without myelopathy or radiculopathy    6. History of lumbar surgery    7. Mixed connective tissue disease (HCC)    8. CARITO on CPAP    9. Current moderate episode of major depressive disorder, unspecified whether recurrent (HCC)    10. Anxiety    11. Physical deconditioning    12. Gait disturbance          PLAN/MEDICAL DECISION MAKING:  Patient reports a history of right total knee arthroplasty on 10/29/2021 by Dr. Douglas.  Patient reports that she developed severe pain 2-3 days after surgery that were attributed to right saphenous neuritis. She reports that she was experiencing severe lancinating electrical shocks that made her scream. Barbara Coelho underwent follow-up orthopedic surgical consultation with Mercy Fitzgerald PA-C on 11/15/2021, and was found not to be a surgical candidate. Patient continues to experience severe right pain described as throbbing and aching sensation that radiates into the right shin. Pain continues to increase during the night interrupting her sleep. Her right knee incision is healed well without evidence of complication. Patient also has a history of L5-S1 lumbar surgery in 1995. She has been recently experiencing  more constant lower back pain.  I would consider also in the differential diagnosis of possible lumbar source of pain contributing to her right lower extremity pain. Patient has failed to obtain pain relief with conservative measures including oral analgesics, gabapentin, physical therapy, chiropractic therapy, to name a few. Patient has failed to obtain pain relief with conservative measures, as referenced above. I have reviewed all available patient's medical records as well as previous therapies as referenced above. I had a lengthy conversation with Ms. Barbara Coelho regarding her chronic pain condition and potential therapeutic options including risks, benefits, alternative therapies, to name a few. Therefore, I have proposed the following plan:  1. Diagnostic studies:  A. MRI with and without contrast of the lumbar spine (history of lumbar surgery/possible lumbar radicular pain)  B. Lumbar x-rays, full views including flexion and extension  C. Creatinine level  D. EMG/NCV of the bilateral lower extremities   2. Pharmacological measures: Reviewed and discussed;   A. Patient takes gabapentin, meloxicam. Patient also takes trazodone, Plaquenil  B, Trial with nortriptyline 10 mg 1-2 tablets at bedtime, #60, 1 refill  C. Trial with Rheumate one tablet once daily  D. Start pyridoxine 100 mg one tablet by mouth daily, #30, take for 30 days, no refills  E. Start alpha lipoid acid 1171-8254 mg per day divided into 3 doses  F. Trial with prilocaine 2%, lidocaine 10%, imipramine 3%, capsaicin 0.001% and mannitol 20% cream, apply 1 to 2 grams of cream to the affected areas every 4 to 6 hours as needed  3. Interventional pain management measures:   A.  Treatment of chronic right knee pain: Patient will be scheduled for diagnostic right saphenous nerve under ultrasound guidance.  If she experiences significant pain relief, then, we will proceed with therapeutic right saphenous nerve block by hydrodissection technique  under ultrasound guidance.  Otherwise, we may consider diagnostic right superior medial genicular nerve block, right superior lateral genicular nerve block and right inferior medial genicular nerve block. If patient experiences more than 50% pain relief along with significant improvement in the range of motion of the knee joint, then, patient will be scheduled for a second set of diagnostic blocks, to then, proceed with bipolar radiofrequency ablation of the right superomedial, superolateral and inferomedial genicular nerves.   B.  Treatment of chronic lower back pain: We will obtain MRI and x-rays of the lumbar spine.  I have briefly discussed the possibility of transforaminal epidural steroid injections versus diagnostic lumbar medial branch blocks x2 followed by lumbar RFTC depending on MRI and x-ray findings  4. Long-term rehabilitation efforts:  A. The patient does not have a history of falls. I did complete a risk assessment for falls  B. Patient will start a comprehensive physical therapy program at [facility] for core strengthening, gait and balance training, neurodynamics, ultrasound, ASTYM, myofascial release, cupping, dry needling and Alter-G   C. Start an exercise program such as water therapy  D. Contrast therapy: Apply ice-packs for 15-20 minutes, followed by heating pads for 15-20 minutes to affected area   5. The patient has been instructed to contact my office with any questions or difficulties. The patient understands the plan and agrees to proceed accordingly.      Patient Care Team:  Ania Martel MD as PCP - General (Internal Medicine)  Oscar Arcos MD as Consulting Physician (Gastroenterology)  Emeka Douglas MD as Consulting Physician (Orthopedic Surgery)  Valdez Vega OD (Optometry)  Pinky Thakur MD as Consulting Physician (Rheumatology)     No orders of the defined types were placed in this encounter.        Future Appointments   Date Time Provider  Department Center   1/4/2022  3:30 PM [facility] [place]   1/7/2022 10:20 AM Emeka Douglas MD [facility]   4/4/2022 10:45 AM Opal Alarcon APRN [facility]   8/1/2022  9:00 AM Ania Martel MD [facility]         Daren Mathews MD     Please note that portions of this note were completed with a voice recognition program.

## 2021-12-20 PROBLEM — R53.81 PHYSICAL DECONDITIONING: Status: ACTIVE | Noted: 2021-12-20

## 2021-12-20 PROBLEM — Z96.651 PAIN DUE TO TOTAL RIGHT KNEE REPLACEMENT (HCC): Status: ACTIVE | Noted: 2021-12-20

## 2021-12-20 PROBLEM — Z96.652 HISTORY OF LEFT KNEE REPLACEMENT: Status: ACTIVE | Noted: 2021-12-20

## 2021-12-20 PROBLEM — Z98.890 HISTORY OF LUMBAR SURGERY: Status: ACTIVE | Noted: 2021-12-20

## 2021-12-20 PROBLEM — R26.9 GAIT DISTURBANCE: Status: ACTIVE | Noted: 2021-12-20

## 2021-12-20 PROBLEM — G57.81 SAPHENOUS NEURALGIA, RIGHT: Status: ACTIVE | Noted: 2021-12-20

## 2021-12-20 PROBLEM — T84.84XA PAIN DUE TO TOTAL RIGHT KNEE REPLACEMENT: Status: ACTIVE | Noted: 2021-12-20

## 2021-12-21 ENCOUNTER — OFFICE VISIT (OUTPATIENT)
Dept: PAIN MEDICINE | Facility: CLINIC | Age: 71
End: 2021-12-21

## 2021-12-21 ENCOUNTER — PATIENT ROUNDING (BHMG ONLY) (OUTPATIENT)
Dept: PAIN MEDICINE | Facility: CLINIC | Age: 71
End: 2021-12-21

## 2021-12-21 VITALS
DIASTOLIC BLOOD PRESSURE: 79 MMHG | SYSTOLIC BLOOD PRESSURE: 136 MMHG | WEIGHT: 179.2 LBS | OXYGEN SATURATION: 99 % | HEIGHT: 65 IN | HEART RATE: 96 BPM | BODY MASS INDEX: 29.85 KG/M2 | TEMPERATURE: 97.3 F

## 2021-12-21 DIAGNOSIS — Z99.89 OSA ON CPAP: ICD-10-CM

## 2021-12-21 DIAGNOSIS — M47.816 SPONDYLOSIS OF LUMBAR REGION WITHOUT MYELOPATHY OR RADICULOPATHY: ICD-10-CM

## 2021-12-21 DIAGNOSIS — T84.84XS PAIN DUE TO TOTAL RIGHT KNEE REPLACEMENT, SEQUELA: ICD-10-CM

## 2021-12-21 DIAGNOSIS — R53.81 PHYSICAL DECONDITIONING: ICD-10-CM

## 2021-12-21 DIAGNOSIS — F32.1 CURRENT MODERATE EPISODE OF MAJOR DEPRESSIVE DISORDER, UNSPECIFIED WHETHER RECURRENT (HCC): ICD-10-CM

## 2021-12-21 DIAGNOSIS — Z98.890 HISTORY OF LUMBAR SURGERY: ICD-10-CM

## 2021-12-21 DIAGNOSIS — G47.33 OSA ON CPAP: ICD-10-CM

## 2021-12-21 DIAGNOSIS — G57.81 SAPHENOUS NEURALGIA, RIGHT: Primary | ICD-10-CM

## 2021-12-21 DIAGNOSIS — F41.9 ANXIETY: ICD-10-CM

## 2021-12-21 DIAGNOSIS — Z96.652 HISTORY OF LEFT KNEE REPLACEMENT: ICD-10-CM

## 2021-12-21 DIAGNOSIS — M35.1 MIXED CONNECTIVE TISSUE DISEASE (HCC): ICD-10-CM

## 2021-12-21 DIAGNOSIS — Z96.651 PAIN DUE TO TOTAL RIGHT KNEE REPLACEMENT, SEQUELA: ICD-10-CM

## 2021-12-21 DIAGNOSIS — G57.81 SAPHENOUS NEURALGIA, RIGHT: ICD-10-CM

## 2021-12-21 DIAGNOSIS — R26.9 GAIT DISTURBANCE: ICD-10-CM

## 2021-12-21 DIAGNOSIS — Z96.651 HISTORY OF TOTAL RIGHT KNEE REPLACEMENT: ICD-10-CM

## 2021-12-21 PROCEDURE — 99204 OFFICE O/P NEW MOD 45 MIN: CPT | Performed by: ANESTHESIOLOGY

## 2021-12-21 RX ORDER — MULTIVITAMIN WITH IRON
100 TABLET ORAL DAILY
Qty: 30 TABLET | Refills: 0 | Status: SHIPPED | OUTPATIENT
Start: 2021-12-21 | End: 2023-03-30

## 2021-12-21 RX ORDER — ST. JOHN'S WORT 300 MG
400 CAPSULE ORAL 3 TIMES DAILY
Qty: 180 CAPSULE | Refills: 1 | Status: SHIPPED | OUTPATIENT
Start: 2021-12-21 | End: 2022-03-15

## 2021-12-21 RX ORDER — ME-TETRAHYDROFOLATE/B12/HRB236 1-1-500 MG
1 CAPSULE ORAL DAILY
Qty: 90 CAPSULE | Refills: 0 | Status: SHIPPED | OUTPATIENT
Start: 2021-12-21 | End: 2023-03-30

## 2021-12-21 RX ORDER — NORTRIPTYLINE HYDROCHLORIDE 10 MG/1
CAPSULE ORAL
Qty: 60 CAPSULE | Refills: 1 | Status: SHIPPED | OUTPATIENT
Start: 2021-12-21 | End: 2022-02-18

## 2021-12-21 NOTE — PROGRESS NOTES
A MY-CHART MESSAGE HAS BEEN SENT TO THE PATIENT FOR [place] [facility] [place]

## 2021-12-27 ENCOUNTER — HOSPITAL ENCOUNTER (OUTPATIENT)
Dept: GENERAL RADIOLOGY | Facility: HOSPITAL | Age: 71
Discharge: HOME | End: 2021-12-27
Admitting: ANESTHESIOLOGY

## 2021-12-27 PROCEDURE — 72114 X-RAY EXAM L-S SPINE BENDING: CPT

## 2022-01-02 DIAGNOSIS — F41.9 ANXIETY: ICD-10-CM

## 2022-01-03 ENCOUNTER — TELEPHONE (OUTPATIENT)
Dept: PAIN MEDICINE | Facility: CLINIC | Age: 72
End: 2022-01-03

## 2022-01-03 RX ORDER — HYDROXYZINE HYDROCHLORIDE 25 MG/1
25 TABLET, FILM COATED ORAL NIGHTLY PRN
Qty: 30 TABLET | Refills: 0 | OUTPATIENT
Start: 2022-01-03

## 2022-01-03 NOTE — TELEPHONE ENCOUNTER
Spoke with pt and advised that we don't do the scheduling of the EMG and that it's okay for her to have it after her procedure. Advised pt that we will give her call once we're able to schedule. Pt understood.

## 2022-01-03 NOTE — TELEPHONE ENCOUNTER
HUB STAFF ATTEMPTED NON-CLINICAL WARM TRANSFER - NO ANSWER     Caller: Barbara Coelho    Relationship to patient: Self    Best call back number: [phone]     Chief complaint: WANTS TO HAVE NERVE TEST SOONER     Type of visit: EMG NCV NERVE TEST     Requested date: AVAILABLE MOST DAYS     If rescheduling, when is the original appointment: CURRENTLY SCHEDULED 02/16/22      Additional notes: PATIENT ALREADY HAD LUMBAR XR 12/27/21 & IS SCHEDULED FOR LUMBAR MRI 01/14/22, BUT UNABLE TO GET IN FOR NERVE TEST BEFORE 02/16/22 - PATIENT WAS TOLD BY PROCEDURE SCHEDULING DEPT THAT DR ODEN CAN REQUEST TO HAVE PATIENT GET NERVE TEST SOONER     PATIENT ALSO ASKED IF SHOULD SCHEDULE IN-[facility]FC FOLLOW UP APPT AFTER LUMBAR MRI & NERVE TEST DONE, OR SHOULD SHE CALL TO HAVE DR ODEN REVIEW RESULTS & CONTACT PATIENT TO DISCUSS RESULTS     PATIENT'S Best call back number: [phone] (PLEASE CALL TO DISCUSS RE/SCHEDULING SOONER)     THANKS

## 2022-01-04 ENCOUNTER — HOSPITAL ENCOUNTER (OUTPATIENT)
Dept: MAMMOGRAPHY | Facility: HOSPITAL | Age: 72
Discharge: HOME | End: 2022-01-04
Admitting: INTERNAL MEDICINE

## 2022-01-04 ENCOUNTER — APPOINTMENT (OUTPATIENT)
Dept: MAMMOGRAPHY | Facility: HOSPITAL | Age: 72
End: 2022-01-04

## 2022-01-04 DIAGNOSIS — Z12.31 VISIT FOR SCREENING MAMMOGRAM: ICD-10-CM

## 2022-01-04 DIAGNOSIS — G57.81 SAPHENOUS NEURALGIA, RIGHT: Primary | ICD-10-CM

## 2022-01-04 PROCEDURE — 77063 BREAST TOMOSYNTHESIS BI: CPT | Performed by: RADIOLOGY

## 2022-01-04 PROCEDURE — 77063 BREAST TOMOSYNTHESIS BI: CPT

## 2022-01-04 PROCEDURE — 77067 SCR MAMMO BI INCL CAD: CPT | Performed by: RADIOLOGY

## 2022-01-04 PROCEDURE — 77067 SCR MAMMO BI INCL CAD: CPT

## 2022-01-14 ENCOUNTER — OFFICE VISIT (OUTPATIENT)
Dept: ORTHOPEDIC SURGERY | Facility: CLINIC | Age: 72
End: 2022-01-14

## 2022-01-14 ENCOUNTER — HOSPITAL ENCOUNTER (OUTPATIENT)
Dept: MRI IMAGING | Facility: HOSPITAL | Age: 72
Discharge: HOME | End: 2022-01-14
Admitting: ANESTHESIOLOGY

## 2022-01-14 DIAGNOSIS — Z96.651 STATUS POST TOTAL RIGHT KNEE REPLACEMENT: Primary | ICD-10-CM

## 2022-01-14 DIAGNOSIS — G57.81 SAPHENOUS NEURALGIA, RIGHT: ICD-10-CM

## 2022-01-14 PROCEDURE — A9577 INJ MULTIHANCE: HCPCS | Performed by: ANESTHESIOLOGY

## 2022-01-14 PROCEDURE — 72158 MRI LUMBAR SPINE W/O & W/DYE: CPT

## 2022-01-14 PROCEDURE — 99024 POSTOP FOLLOW-UP VISIT: CPT | Performed by: ORTHOPAEDIC SURGERY

## 2022-01-14 PROCEDURE — 0 GADOBENATE DIMEGLUMINE 529 MG/ML SOLUTION: Performed by: ANESTHESIOLOGY

## 2022-01-14 RX ORDER — DIAPER,BRIEF,ADULT, DISPOSABLE
EACH MISCELLANEOUS
Qty: 30 TABLET | Refills: 0 | OUTPATIENT
Start: 2022-01-14

## 2022-01-14 RX ORDER — HYDROXYZINE HYDROCHLORIDE 25 MG/1
TABLET, FILM COATED ORAL
COMMUNITY
Start: 2022-01-03 | End: 2023-03-30

## 2022-01-14 RX ADMIN — GADOBENATE DIMEGLUMINE 15 ML: 529 INJECTION, SOLUTION INTRAVENOUS at 08:56

## 2022-01-14 NOTE — PROGRESS NOTES
Chief Complaint   Patient presents with   • Post-op Follow-up     1 month f/u, Total Knee Arthroplasty Right 10/29/21           HPI  She is not quite 3 months out from her right total knee arthroplasty.  Her knee is doing well.  She is pleasant and appears comfortable.  She sees Dr. Zelaya with pain management next week.  She had an MRI of her lumbar spine today.  She has complaints of lumbar radiculopathy.  She had x-rays of her back December 27 which showed according to the report she showed me some degenerative disc disease.  She would like a refill of Tylenol 3 but I told her that would be best to get with pain management now that she has started that.      There were no vitals filed for this visit.      Physical Exam:  Right knee and this incision looks well-healed.  She has the typical numbness on the lateral side of her vertical skin incision.  Range of motion and strength are good          ICD-10-CM ICD-9-CM   1. Status post total right knee replacement  Z96.651 V43.65     She will continue with pain management.  Dr. Zelaya. she will follow-up with me as needed.  I have seen before for the right shoulder and she says she may want injections in the left shoulder at some point in the past.        Emeka Douglas M.D., [facility]  Orthopedic Surgeon  Fellowship Trained Sports Medicine  [facility]  Orthopedics and Sports Medicine  [address], [address]  [place] [address]      EMR Dragon/Transcription disclaimer:  Much of this encounter note is an electronic transcription of spoken language to printed text. Electronic transcription of spoken language may permit erroneous, or at times, nonsensical words or phrases to be inadvertently transcribed. Although I have reviewed the note for such errors, some may still exist.

## 2022-01-18 DIAGNOSIS — G57.81 SAPHENOUS NEURALGIA, RIGHT: Primary | ICD-10-CM

## 2022-01-19 ENCOUNTER — OUTSIDE FACILITY SERVICE (OUTPATIENT)
Dept: PAIN MEDICINE | Facility: CLINIC | Age: 72
End: 2022-01-19

## 2022-01-19 DIAGNOSIS — G57.81 SAPHENOUS NEURALGIA, RIGHT: Primary | ICD-10-CM

## 2022-01-19 PROCEDURE — 99152 MOD SED SAME PHYS/QHP 5/>YRS: CPT | Performed by: ANESTHESIOLOGY

## 2022-01-19 PROCEDURE — 64450 NJX AA&/STRD OTHER PN/BRANCH: CPT | Performed by: ANESTHESIOLOGY

## 2022-01-19 PROCEDURE — 76942 ECHO GUIDE FOR BIOPSY: CPT | Performed by: ANESTHESIOLOGY

## 2022-01-20 ENCOUNTER — TELEPHONE (OUTPATIENT)
Dept: PAIN MEDICINE | Facility: CLINIC | Age: 72
End: 2022-01-20

## 2022-01-20 NOTE — TELEPHONE ENCOUNTER
Spoke with pt and advised of next procedure date. Advised also nothing to eat or drink the night prior after midnight, need a  and its over in the surgery center in the [address]. Advised to call office as needed.

## 2022-01-21 ENCOUNTER — APPOINTMENT (OUTPATIENT)
Dept: MAMMOGRAPHY | Facility: HOSPITAL | Age: 72
End: 2022-01-21

## 2022-01-26 PROCEDURE — U0004 COV-19 TEST NON-CDC HGH THRU: HCPCS | Performed by: FAMILY MEDICINE

## 2022-01-26 PROCEDURE — 87086 URINE CULTURE/COLONY COUNT: CPT | Performed by: FAMILY MEDICINE

## 2022-02-02 DIAGNOSIS — G57.81 SAPHENOUS NEURALGIA, RIGHT: Primary | ICD-10-CM

## 2022-02-07 ENCOUNTER — OUTSIDE FACILITY SERVICE (OUTPATIENT)
Dept: PAIN MEDICINE | Facility: CLINIC | Age: 72
End: 2022-02-07

## 2022-02-07 PROCEDURE — 99152 MOD SED SAME PHYS/QHP 5/>YRS: CPT | Performed by: ANESTHESIOLOGY

## 2022-02-07 PROCEDURE — 64450 NJX AA&/STRD OTHER PN/BRANCH: CPT | Performed by: ANESTHESIOLOGY

## 2022-02-07 PROCEDURE — 76942 ECHO GUIDE FOR BIOPSY: CPT | Performed by: ANESTHESIOLOGY

## 2022-02-08 ENCOUNTER — TELEPHONE (OUTPATIENT)
Dept: PAIN MEDICINE | Facility: CLINIC | Age: 72
End: 2022-02-08

## 2022-02-08 NOTE — TELEPHONE ENCOUNTER
Spoke with pt regarding yesterday’s procedure with Dr. Mathews. Pt stated they are doing well, with no complaints. Advised of f/u appointment. Pt understood, and no further needs expressed

## 2022-02-16 ENCOUNTER — APPOINTMENT (OUTPATIENT)
Dept: NEUROLOGY | Facility: HOSPITAL | Age: 72
End: 2022-02-16

## 2022-02-16 ENCOUNTER — HOSPITAL ENCOUNTER (OUTPATIENT)
Dept: NEUROLOGY | Facility: HOSPITAL | Age: 72
Discharge: HOME | End: 2022-02-16
Admitting: ANESTHESIOLOGY

## 2022-02-16 PROCEDURE — 95886 MUSC TEST DONE W/N TEST COMP: CPT

## 2022-02-16 PROCEDURE — 95886 MUSC TEST DONE W/N TEST COMP: CPT | Performed by: PSYCHIATRY & NEUROLOGY

## 2022-02-16 PROCEDURE — 95911 NRV CNDJ TEST 9-10 STUDIES: CPT | Performed by: PSYCHIATRY & NEUROLOGY

## 2022-02-16 PROCEDURE — 95911 NRV CNDJ TEST 9-10 STUDIES: CPT

## 2022-02-18 DIAGNOSIS — G57.81 SAPHENOUS NEURALGIA, RIGHT: ICD-10-CM

## 2022-02-18 RX ORDER — NORTRIPTYLINE HYDROCHLORIDE 10 MG/1
CAPSULE ORAL
Qty: 60 CAPSULE | Refills: 1 | Status: SHIPPED | OUTPATIENT
Start: 2022-02-18 | End: 2023-03-30

## 2022-03-15 DIAGNOSIS — G57.81 SAPHENOUS NEURALGIA, RIGHT: ICD-10-CM

## 2022-03-15 RX ORDER — CEPHRADINE 500 MG
CAPSULE ORAL
Qty: 180 EACH | Refills: 1 | Status: SHIPPED | OUTPATIENT
Start: 2022-03-15 | End: 2022-05-11

## 2022-03-17 NOTE — PROGRESS NOTES
"Chief Complaint: \"Pain in my right knee, back of my knee, and my right buttock.\"        History of Present Illness:   Patient: Ms. Barbara Coelho, 72 y.o. female originally referred by Mercy Fitzgerald PA-C in consultation for chronic intractable right knee pain.  Patient reports a history of right total knee arthroplasty on October 20 9021, by Dr. Douglas.  She developed saphenous neuritis postoperatively, and reported she was feeling electrical shock sensations that were severe.  We last saw her on February 7, 2022, when she underwent therapeutic right saphenous nerve block, from which she reports experiencing no pain relief, although she does report she no longer experiences the electrical shocking sensations as prior.  It appears as though she has several sources of pain. She has continued physical therapy.  She also has a history of lower back pain, and a history of L5-S1 lumbar spine surgery 1995.  She underwent MRI of the lumbar spine with and without contrast which revealed multilevel degenerative disc disease, facet hypertrophy, and areas of spinal canal stenosis.  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  She returns today for post procedure follow-up and evaluation.  Pain Description: Constant pain with intermittent exacerbation, described as aching and throbbing sensation.   Radiation of Pain: The pain lower back pain radiates into the right gluteal region, right posterior/lateral thigh, and posterior leg into the foot. The right knee pain radiates into the right shin and the posterior knee.  Pain intensity today: 3/10  Average pain intensity last week: 3/10  Pain intensity ranges from: 3/10 to 4/10  Aggravating factors: Pain increases with bending, twisting, standing, walking, sitting, lying flat.   Alleviating factors: Pain " decreases with sitting down, lying down   Associated Symptoms:   Patient denies numbness or weakness in the lower extremity.  She does experience burning in the right knee  Patient denies any new bladder or bowel problems.   Patient reports difficulties with her balance but denies recent falls.     Review of previous therapies and additional medical records:  Barbara Coelho has already failed the following measures, including:   Conservative Measures: Oral analgesics, opioids, topical analgesics, ice, heat, chiropractic therapy, physical therapy   Interventional Measures: Knee injections prior to surgery  Surgical Measures:   10/29/2021: Right total knee arthroplasty by Dr. Douglas.    2012: Left total knee replacement   1995: L5-S1 lumbar surgery   Barbara Coelho underwent orthopedic surgical consultation with Mercy Fitzgerald PA-C on 11/15/2021, and was found not to be a surgical candidate.  Barbara Coelho presents with significant comorbidities including anxiety, depression, CARITO on CPAP, asthma, mixed connective tissue disease, GERD, history of bariatric surgery in 2016  In terms of current analgesics, Barbara Coelho takes: gabapentin, meloxicam. Patient also takes trazodone, Plaquenil  I have reviewed Luis Manuel Report is consistent with medication reconciliation.  SOAPP: Low Risk     Global Pain Scale 12-21  2021 03-21 2022         Pain 19 9         Feelings 12 0         Clinical outcomes 12 5         Activities 14 1         GPS Total: 57 15           Review of New Diagnostic Studies:  MRI of the lumbar spine with and without contrast 1/14/2022: I have reviewed the imaging.  Vertebral body heights are well-maintained without evidence of malalignment.  Postsurgical changes noted at L5-S1 on the right consistent with laminectomy.  L1-L2: Broad-based disc bulge with mild ligamentum flavum thickening and facet hypertrophy producing mild right and mild to moderate left neuroforaminal stenosis.  L2-L3:  Moderate broad-based disc bulge with mild facet hypertrophy producing mild anterior thecal sac effacement and mild spinal canal stenosis with mild to moderate right and mild to moderate left neuroforaminal stenosis.  L3-L4: Mild to moderate broad-based disc bulge along with mild to moderate ligamentum flavum thickening and moderate facet hypertrophy producing moderate left central spinal canal stenosis with mild to moderate right and moderate left neuroforaminal stenosis.  L4-L5: Mild to moderate broad-based disc bulge with moderate ligamentum flavum thickening and facet hypertrophy with mild to moderate anterior thecal sac effacement and mild central spinal canal stenosis.  Moderate neuroforaminal stenosis.  L5-S1: Postsurgical changes from prior right laminectomy with recurrent right disc protrusion which possibly contacts the right-sided nerve roots, without any severe stenosis.  There is mild bilateral neuroforaminal stenosis and facet hypertrophy.  Lumbar spine x-rays with flexion-extension views 12/27/2021: I have reviewed the imaging.  Vertebral body heights are maintained.  Very minimal retrolisthesis of L2 on L3 without instability.  Advanced multilevel spondylitic changes with loss of disc space height and facet arthritis most advanced at L2-L3 and L4-L5  EMG/NCV of the bilateral lower extremities 2/16/2022: Mild right saphenous sensory neuropathy, acute on chronic common peroneal neuropathy in the right popliteal fossa, and a right S1 radiculopathy.    Review of Diagnostic Studies:    Right knee x-rays 11/3/2021.  I have reviewed the images with the patient as well as the report.  No evidence of hardware complication.  Alignment is appropriate    Review of Systems   HENT: Positive for postnasal drip.    Respiratory: Positive for apnea.    Endocrine: Positive for polyuria.   Genitourinary: Positive for frequency and urgency.   Musculoskeletal: Positive for back pain.   Allergic/Immunologic: Positive for  environmental allergies.   Neurological: Positive for dizziness.   All other systems reviewed and are negative.        Patient Active Problem List   Diagnosis   • Arthritis   • Dysphagia   • Constipation   • Screening for cardiovascular condition   • Skin lesions (Recent Bx results pending)   • Right shoulder pain   • Chronic allergic rhinitis due to food   • GERD (gastroesophageal reflux disease)   • Urinary incontinence   • Symptomatic cholelithiasis   • DDD (degenerative disc disease), lumbar   • Depression   • Mixed connective tissue disease (HCC)   • Bilateral pleural effusions (Exudate on Right with high WBC)   • Autoimmune Pleuritis   • History of asthma   • CARITO on CPAP   • CARITO (obstructive sleep apnea)   • Osteoarthritis of right knee   • History of total right knee replacement   • Postoperative pain   • Anxiety   • Saphenous neuralgia, right   • Pain due to total right knee replacement ([facility])   • History of left knee replacement   • History of lumbar surgery   • Physical deconditioning   • Gait disturbance   • Spondylosis of lumbar region without myelopathy or radiculopathy       Past Medical History:   Diagnosis Date   • Allergic rhinitis     on allergy shots   • Asthma     ALLERGIC   • Back pain     Low   • Bladder infection     Recurrent Bladder Infections   • Diverticulitis    • GERD (gastroesophageal reflux disease)     EGD 12/16 [facility] - reflux, HH. no barretts   • Hearing loss    • History of DVT of lower extremity     left leg   • Knee pain, right    • Mixed connective tissue disease ([facility]) 01/2019    Dr. Thakur   • CARITO (obstructive sleep apnea) 2018   • Osteopenia 03/28/2018    calculated frax - 11/3%   • Pinched nerve in neck    • Rheumatoid arthritis ([facility]) 2018   • Staph infection    • Urinary incontinence    • Wears contact lenses    • Wears prescription eyeglasses          Past Surgical History:   Procedure Laterality Date   • BACK SURGERY  1995    Back (L5/S1)   • BREAST EXCISIONAL BIOPSY Left  1985   • CHOLECYSTECTOMY WITH INTRAOPERATIVE CHOLANGIOGRAM N/A 4/26/2018    Procedure: CHOLECYSTECTOMY LAPAROSCOPIC INTRAOPERATIVE CHOLANGIOGRAM;  Surgeon: Lit Morelos MD;  Location:  [facility] OR;  Service: General   • COLONOSCOPY  2016   • GASTRIC BYPASS      HIGH GASTRIC BYPASS, 1980's   • OTHER SURGICAL HISTORY  1985    Stomach Stabled   • REPLACEMENT TOTAL KNEE Left 2012   • SINUS SURGERY      x3   • TOTAL KNEE ARTHROPLASTY Right 10/29/2021    Procedure: TOTAL KNEE ARTHROPLASTY RIGHT;  Surgeon: Emeka Douglas MD;  Location:  [facility] OR;  Service: Orthopedics;  Laterality: Right;   • TUBAL ABDOMINAL LIGATION  1980   • WISDOM TOOTH EXTRACTION           Family History   Problem Relation Age of Onset   • Cancer Other         BREAST, LUNG, OVARIAN, KIDNEY   • Kidney cancer Other    • Lung cancer Other         pGF as well   • Cancer Mother         kidney   • Cancer Father         lung   • Mental illness Father    • Breast cancer Sister 30   • Connective Tissue Disease Sister    • Hypertension Sister         pulmonary hypertension   • Cancer Sister         [place]   • Cancer Paternal Grandmother         stomach   • Cancer Paternal Grandfather         lung   • Ovarian cancer Neg Hx          Social History     Socioeconomic History   • Marital status:    Tobacco Use   • Smoking status: Never Smoker   • Smokeless tobacco: Never Used   Vaping Use   • Vaping Use: Never used   Substance and Sexual Activity   • Alcohol use: Not Currently     Alcohol/week: 0.0 standard drinks     Comment: a week   • Drug use: No   • Sexual activity: Defer          Current Outpatient Medications:   •  albuterol sulfate HFA (Ventolin HFA) 108 (90 Base) MCG/ACT inhaler, Inhale 2 puffs Every 4 (Four) Hours As Needed for Wheezing., Disp: 1 inhaler, Rfl: 3  •  Alpha-Lipoic Acid 200 MG capsule, TAKE 400 MG BY MOUTH 3 (THREE) TIMES A DAY., Disp: 180 each, Rfl: 1  •  Azelastine-Fluticasone 137-50 MCG/ACT suspension, , Disp: , Rfl:   •  Biotin  "1 MG capsule, Take 1 capsule by mouth Daily., Disp: , Rfl:   •  desloratadine (CLARINEX) 5 MG tablet, Take 5 mg by mouth Daily., Disp: , Rfl:   •  Dietary Management Product (Rheumate) capsule, Take 1 capsule by mouth Daily., Disp: 90 capsule, Rfl: 0  •  gabapentin (Neurontin) 100 MG capsule, Take 1 capsule by mouth 3 (Three) Times a Day., Disp: 90 capsule, Rfl: 1  •  Gel Base gel, prilocaine 2% lidocaine 10% imipramine 3% capsaicin 0.001% mannitol 20%, 1 to 2 grams of cream to the affected areas Q4-6PRN, Disp: 240 g, Rfl: 5  •  hydroxychloroquine (PLAQUENIL) 200 MG tablet, Take 1 tablet by mouth 2 (Two) Times a Day., Disp: 60 tablet, Rfl: 0  •  hydrOXYzine (ATARAX) 25 MG tablet, , Disp: , Rfl:   •  VITAMIN D, CHOLECALCIFEROL, PO, Take 1 capsule by mouth Daily., Disp: , Rfl:   •  Wixela Inhub 500-50 MCG/DOSE DISKUS, TAKE 1 PUFF BY MOUTH TWICE A DAY, Disp: 180 each, Rfl: 1  •  famotidine (PEPCID) 40 MG tablet, , Disp: , Rfl:   •  nitrofurantoin, macrocrystal-monohydrate, (MACROBID) 100 MG capsule, Take 1 capsule by mouth 2 (Two) Times a Day., Disp: 10 capsule, Rfl: 0  •  nortriptyline (PAMELOR) 10 MG capsule, TAKE 1 TO 2 CAPSULES BY MOUTH AT BEDTIME AS NEEDED FOR SLEEP, Disp: 60 capsule, Rfl: 1  •  traZODone (DESYREL) 50 MG tablet, , Disp: , Rfl:   •  vitamin B-6 (PYRIDOXINE) 100 MG tablet, Take 1 tablet by mouth Daily., Disp: 30 tablet, Rfl: 0  No current facility-administered medications for this visit.    Facility-Administered Medications Ordered in Other Visits:   •  Chlorhexidine Gluconate Cloth 2 % pads, , Apply externally, Once, Emeka Douglas MD  •  mupirocin (BACTROBAN) 2 % nasal ointment, , Nasal, BID, Emeka Douglas MD      Allergies   Allergen Reactions   • Celebrex [Celecoxib] Hives     HIVES    • Codeine Rash   • Oxycodone Rash   • Sulfa Antibiotics Itching     ITCHING          /68   Pulse 80   Temp 96.4 °F (35.8 °C)   Ht 165.1 cm (65\")   Wt 77.7 kg (171 lb 3.2 oz)   LMP  (LMP " Unknown) Comment: LAST MAMMOGRAM 2020  SpO2 98%   BMI 28.49 kg/m²       Physical Exam:  Constitutional: Patient appears well-developed, well-nourished, well-hydrated  HEENT: Head: Normocephalic and atraumatic  Eyes: Conjunctivae and lids are normal  Pupils: Equal, round, reactive to light  Peripheral vascular exam: Posterior tibialis: right 2+ and left 2+. Dorsalis pedis: right 2+ and left 2+. No edema.   Musculoskeletal   Gait and station: Gait evaluation demonstrated an antalgic gait   Lumbar spine: Passive and active range of motion are limited secondary to pain. Extension and rotation of the lumbar spine increased and reproduced pain. Lumbar facet joint loading maneuvers are positive.  Bunny test and Gaenslen's test are mildly positive on the right  Piriformis maneuvers are negative   Palpation of the bilateral greater trochanter, unrevealing   Examination of the Iliotibial band: unrevealing   Hip joints: The range of motion of the hip joints is almost full and without pain   Right knee: Range of motion -5 to 90 degrees. No ACL, PCL, LCL or MCL laxity. Tenderness found in the anterior joint line, medially. No MCL and no LCL tenderness noted. Clicking  Left knee: Normal range of motion. No ACL, PCL, LCL or MCL laxity. No tenderness found. No MCL and no LCL tenderness noted. No crepitus.   Neurological:   Patient is alert and oriented to person, place, and time.   Speech: Normal.   Cortical function: Normal mental status.   Reflex Scores:  Right patellar: 0+  Left patellar: 0+  Right Achilles: 0+  Left Achilles: 0+  Motor strength: 5/5  Motor Tone: Normal  Involuntary movements: None.   Superficial/Primitive Reflexes: Primitive reflexes were absent.   Right Garcia: Absent  Left Garcia: Absent  Right ankle clonus: Absent  Left ankle clonus: Absent   Babinsky: Absent  Long tract signs: Negative. Straight leg raising test: Negative.   Sensory exam: Intact to light touch, intact pain and temperature sensation,  intact vibration sensation and normal proprioception.   Coordination: Normal finger to nose and heel to shin. Normal balance and negative Romberg's sign   Skin and subcutaneous tissue: Skin is warm and intact. No rash noted. No cyanosis.   Psychiatric: Judgment and insight: Normal. Recent and remote memory: Intact. Mood and affect: Normal.     ASSESSMENT:   1. Chronic lumbar radiculopathy    2. Spondylosis of lumbar region without myelopathy or radiculopathy    3. History of lumbar surgery    4. Pain due to total right knee replacement, sequela    5. Saphenous neuralgia, right    6. History of total right knee replacement    7. Anxiety    8. Gait disturbance          PLAN/MEDICAL DECISION MAKING:  Patient reports a history of right total knee arthroplasty on October 20 9021, by Dr. Douglas.  She developed saphenous neuritis postoperatively, and reported she was feeling electrical shock sensations that were severe. Barbara Coelho underwent follow-up orthopedic surgical consultation with Mercy Fitzgerald PA-C on 11/15/2021, and was found not to be a surgical candidate. Patient continues to experience severe right knee pain described as throbbing and aching sensation that radiates into the right shin.  She does report since right saphenous nerve block, she no longer experiences electric shocking sensations.  Pain continues to increase during the night interrupting her sleep.  Patient also has a history of L5-S1 lumbar surgery in 1995. She has been recently experiencing more constant lower back pain. She underwent MRI of the lumbar spine with and without contrast which revealed multilevel degenerative disc disease, facet hypertrophy, and areas of spinal canal stenosis.  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  Patient has failed to obtain pain  relief with conservative measures including oral analgesics, gabapentin, physical therapy, chiropractic therapy, to name a few.  Upon physical examination, and subjective data, from patient it appears as though her pain remains multifactorial, a significant source is that of lumbar radiculopathy on the right side.  She does have elements of clinical lower back pain as well.  Patient has failed to obtain pain relief with conservative measures, as referenced above. I have reviewed all available patient's medical records as well as previous therapies as referenced above. I had a lengthy conversation with Ms. Barbara Coelho regarding her chronic pain condition and potential therapeutic options including risks, benefits, alternative therapies, to name a few. Therefore, I have proposed the following plan:  1. Pharmacological measures: Reviewed and discussed;   A. Patient takes gabapentin, meloxicam. Patient also takes trazodone, Plaquenil  B. Continue nortriptyline 10 mg 1-2 tablets at bedtime  C. Continue Rheumate one tablet once daily  D. Continue alpha lipoid acid 7243-3183 mg per day divided into 3 doses  E. Continue prilocaine 2%, lidocaine 10%, imipramine 3%, capsaicin 0.001% and mannitol 20% cream, apply 1 to 2 grams of cream to the affected areas every 4 to 6 hours as needed  3. Interventional pain management measures:   A.  Treatment of chronic right knee pain: None indicated at this time.  Depending on continued response to therapies, we may consider diagnostic right superior medial genicular nerve block, right superior lateral genicular nerve block and right inferior medial genicular nerve block. If patient experiences more than 50% pain relief along with significant improvement in the range of motion of the knee joint, then, patient will be scheduled for a second set of diagnostic blocks, to then, proceed with bipolar radiofrequency ablation of the right superomedial, superolateral and inferomedial genicular  nerves.   B.  Treatment of chronic lower back pain: Patient will be scheduled for diagnostic and therapeutic right L5-S1 and right S1 transforaminal epidural steroid injections.  We may repeat epidural pain on patient's outcome.  In terms of her mechanical low back pain we may consider diagnostic lumbar medial branch blocks x2 followed by lumbar RFTC depending on MRI and x-ray findings  4. Long-term rehabilitation efforts:  A. The patient does not have a history of falls. I did complete a risk assessment for falls  B. Patient will continue a comprehensive physical therapy program for core strengthening, gait and balance training, neurodynamics, ultrasound, ASTYM, myofascial release, cupping, dry needling and Alter-G   C. Start an exercise program such as water therapy  D. Contrast therapy: Apply ice-packs for 15-20 minutes, followed by heating pads for 15-20 minutes to affected area   5. The patient has been instructed to contact my office with any questions or difficulties. The patient understands the plan and agrees to proceed accordingly.      Patient Care Team:  Ania Martel MD as PCP - General (Internal Medicine)  Oscar Arcos MD as Consulting Physician (Gastroenterology)  Emeka Douglas MD as Consulting Physician (Orthopedic Surgery)  Valdez Vega OD (Optometry)  Pinky Thakur MD as Consulting Physician (Rheumatology)     No orders of the defined types were placed in this encounter.        Future Appointments   Date Time Provider Department Center   4/4/2022 10:45 AM Opal Alarcon APRN [facility]   4/13/2022 10:45 AM Daren Mathews MD [facility]   8/1/2022  9:00 AM Ania Martel MD [facility]   10/14/2022 10:30 AM Emeka Douglas MD [facility]         SULEMA Salgado     Please note that portions of this note were completed with a voice recognition program.

## 2022-03-21 ENCOUNTER — OFFICE VISIT (OUTPATIENT)
Dept: PAIN MEDICINE | Facility: CLINIC | Age: 72
End: 2022-03-21

## 2022-03-21 VITALS
SYSTOLIC BLOOD PRESSURE: 120 MMHG | OXYGEN SATURATION: 98 % | DIASTOLIC BLOOD PRESSURE: 68 MMHG | HEART RATE: 80 BPM | HEIGHT: 65 IN | WEIGHT: 171.2 LBS | TEMPERATURE: 96.4 F | BODY MASS INDEX: 28.52 KG/M2

## 2022-03-21 DIAGNOSIS — Z96.651 HISTORY OF TOTAL RIGHT KNEE REPLACEMENT: ICD-10-CM

## 2022-03-21 DIAGNOSIS — Z98.890 HISTORY OF LUMBAR SURGERY: ICD-10-CM

## 2022-03-21 DIAGNOSIS — M54.16 CHRONIC LUMBAR RADICULOPATHY: Primary | ICD-10-CM

## 2022-03-21 DIAGNOSIS — F41.9 ANXIETY: ICD-10-CM

## 2022-03-21 DIAGNOSIS — R26.9 GAIT DISTURBANCE: ICD-10-CM

## 2022-03-21 DIAGNOSIS — T84.84XS PAIN DUE TO TOTAL RIGHT KNEE REPLACEMENT, SEQUELA: ICD-10-CM

## 2022-03-21 DIAGNOSIS — M47.816 SPONDYLOSIS OF LUMBAR REGION WITHOUT MYELOPATHY OR RADICULOPATHY: ICD-10-CM

## 2022-03-21 DIAGNOSIS — M54.16 CHRONIC LUMBAR RADICULOPATHY: ICD-10-CM

## 2022-03-21 DIAGNOSIS — G57.81 SAPHENOUS NEURALGIA, RIGHT: ICD-10-CM

## 2022-03-21 DIAGNOSIS — Z96.651 PAIN DUE TO TOTAL RIGHT KNEE REPLACEMENT, SEQUELA: ICD-10-CM

## 2022-03-21 PROCEDURE — 99213 OFFICE O/P EST LOW 20 MIN: CPT | Performed by: NURSE PRACTITIONER

## 2022-03-21 RX ORDER — AZELASTINE HYDROCHLORIDE, FLUTICASONE PROPIONATE 137; 50 UG/1; UG/1
SPRAY, METERED NASAL
COMMUNITY
Start: 2022-03-16

## 2022-03-21 RX ORDER — DESLORATADINE 5 MG/1
5 TABLET ORAL DAILY
COMMUNITY
Start: 2022-02-11

## 2022-04-04 ENCOUNTER — OFFICE VISIT (OUTPATIENT)
Dept: SLEEP MEDICINE | Facility: HOSPITAL | Age: 72
End: 2022-04-04

## 2022-04-04 VITALS
DIASTOLIC BLOOD PRESSURE: 70 MMHG | HEIGHT: 65 IN | OXYGEN SATURATION: 97 % | BODY MASS INDEX: 28.59 KG/M2 | SYSTOLIC BLOOD PRESSURE: 127 MMHG | WEIGHT: 171.6 LBS | HEART RATE: 87 BPM

## 2022-04-04 DIAGNOSIS — G47.33 OSA (OBSTRUCTIVE SLEEP APNEA): Primary | ICD-10-CM

## 2022-04-04 PROCEDURE — 99213 OFFICE O/P EST LOW 20 MIN: CPT | Performed by: NURSE PRACTITIONER

## 2022-04-04 NOTE — PROGRESS NOTES
Chief Complaint:   Chief Complaint   Patient presents with   • Sleeping Problem       HPI:    Barbara Coelho is a 72 y.o. female here to establish care.  She has a past sleep patient of Dr. Petersen.  She does see Dr. Smita Martel as her PCP.  Patient has a history of constipation, dysphagia, GERD, urinary incontinence, anxiety, depression, mixed connective tissue disease, arthritis, spondylosis of lumbar region, saphenous neuralgia, DDD, asthma, and sleep apnea.    Before starting CPAP patient did have snoring, nonrestorative sleep and grinding of her teeth.  After starting CPAP greater than 5 years ago she is now asymptomatic.  Patient has no history of broken nose and no history of C HI.  Patient denies sleepwalking/talking, nightmares, hypnagogic hallucinations and sleep paralysis.    Patient keeps the same schedule weekend and weekday going to sleep at 11 PM and awakening at 7 AM.  She estimates getting 8 hours nightly.  It will take her 1 hour to go to sleep, she has tried many medications in the past and does not wish to try anything else at this time.  Patient is retired and does take a nap daily.  Patient has been Jameson score of 3/24.    Social history  72-year-old   female.  Her  was the  of a [employer] [address] [place] as he was closing 1 night he was shot and killed by a robbery gone wrong.  She then moved back to [place] to be closer to her daughter.  She is retired from the [employer].  Patient denies any tobacco use or illicit substance use.  Monthly or less she will have 1 to 2 glasses of wine.  She drinks 2 to 3 cups of regular coffee daily, 1 to 2 glasses of regular tea daily and has 0-1 diet pamela daily.    Past Medical History:   Diagnosis Date   • Allergic rhinitis     on allergy shots   • Asthma     ALLERGIC   • Back pain     Low   • Bladder infection     Recurrent Bladder Infections   • Diverticulitis    • GERD (gastroesophageal reflux  disease)     EGD 12/16 [facility] - reflux, HH. no barretts   • Hearing loss    • History of DVT of lower extremity     left leg   • Knee pain, right    • Mixed connective tissue disease (HCC) 01/2019    Dr. Thakur   • CARITO (obstructive sleep apnea) 2018   • Osteopenia 03/28/2018    calculated frax - 11/3%   • Pinched nerve in neck    • Rheumatoid arthritis (HCC) 2018   • Staph infection    • Urinary incontinence    • Wears contact lenses    • Wears prescription eyeglasses        Family History   Problem Relation Age of Onset   • Cancer Other         BREAST, LUNG, OVARIAN, KIDNEY   • Kidney cancer Other    • Lung cancer Other         pGF as well   • Cancer Mother         kidney   • Cancer Father         lung   • Mental illness Father    • Breast cancer Sister 30   • Connective Tissue Disease Sister    • Hypertension Sister         pulmonary hypertension   • Cancer Sister         Brest   • Cancer Paternal Grandmother         stomach   • Cancer Paternal Grandfather         lung   • Ovarian cancer Neg Hx      Past Surgical History:   Procedure Laterality Date   • BACK SURGERY  1995    Back (L5/S1)   • BREAST EXCISIONAL BIOPSY Left 1985   • CHOLECYSTECTOMY WITH INTRAOPERATIVE CHOLANGIOGRAM N/A 4/26/2018    Procedure: CHOLECYSTECTOMY LAPAROSCOPIC INTRAOPERATIVE CHOLANGIOGRAM;  Surgeon: Lit Morelos MD;  Location:  [facility] OR;  Service: General   • COLONOSCOPY  2016   • GASTRIC BYPASS      HIGH GASTRIC BYPASS, 1980's   • OTHER SURGICAL HISTORY  1985    Stomach Stabled   • REPLACEMENT TOTAL KNEE Left 2012   • SINUS SURGERY      x3   • TOTAL KNEE ARTHROPLASTY Right 10/29/2021    Procedure: TOTAL KNEE ARTHROPLASTY RIGHT;  Surgeon: Emeka Douglas MD;  Location:  [facility] OR;  Service: Orthopedics;  Laterality: Right;   • TUBAL ABDOMINAL LIGATION  1980   • WISDOM TOOTH EXTRACTION         Current medications are:   Current Outpatient Medications:   •  albuterol sulfate HFA (Ventolin HFA) 108 (90 Base) MCG/ACT inhaler, Inhale 2  puffs Every 4 (Four) Hours As Needed for Wheezing., Disp: 1 inhaler, Rfl: 3  •  Alpha-Lipoic Acid 200 MG capsule, TAKE 400 MG BY MOUTH 3 (THREE) TIMES A DAY., Disp: 180 each, Rfl: 1  •  Azelastine-Fluticasone 137-50 MCG/ACT suspension, , Disp: , Rfl:   •  Biotin 1 MG capsule, Take 1 capsule by mouth Daily., Disp: , Rfl:   •  desloratadine (CLARINEX) 5 MG tablet, Take 5 mg by mouth Daily., Disp: , Rfl:   •  Dietary Management Product (Rheumate) capsule, Take 1 capsule by mouth Daily., Disp: 90 capsule, Rfl: 0  •  famotidine (PEPCID) 40 MG tablet, , Disp: , Rfl:   •  gabapentin (Neurontin) 100 MG capsule, Take 1 capsule by mouth 3 (Three) Times a Day., Disp: 90 capsule, Rfl: 1  •  Gel Base gel, prilocaine 2% lidocaine 10% imipramine 3% capsaicin 0.001% mannitol 20%, 1 to 2 grams of cream to the affected areas Q4-6PRN, Disp: 240 g, Rfl: 5  •  hydroxychloroquine (PLAQUENIL) 200 MG tablet, Take 1 tablet by mouth 2 (Two) Times a Day., Disp: 60 tablet, Rfl: 0  •  hydrOXYzine (ATARAX) 25 MG tablet, , Disp: , Rfl:   •  nitrofurantoin, macrocrystal-monohydrate, (MACROBID) 100 MG capsule, Take 1 capsule by mouth 2 (Two) Times a Day., Disp: 10 capsule, Rfl: 0  •  nortriptyline (PAMELOR) 10 MG capsule, TAKE 1 TO 2 CAPSULES BY MOUTH AT BEDTIME AS NEEDED FOR SLEEP, Disp: 60 capsule, Rfl: 1  •  vitamin B-6 (PYRIDOXINE) 100 MG tablet, Take 1 tablet by mouth Daily., Disp: 30 tablet, Rfl: 0  •  VITAMIN D, CHOLECALCIFEROL, PO, Take 1 capsule by mouth Daily., Disp: , Rfl:   •  Wixela Inhub 500-50 MCG/DOSE DISKUS, TAKE 1 PUFF BY MOUTH TWICE A DAY, Disp: 180 each, Rfl: 1  No current facility-administered medications for this visit.    Facility-Administered Medications Ordered in Other Visits:   •  Chlorhexidine Gluconate Cloth 2 % pads, , Apply externally, Once, Emeka Douglas MD  •  mupirocin (BACTROBAN) 2 % nasal ointment, , Nasal, BID, Emeka Douglas MD.      The patient's relevant past medical, surgical, family and social  history were reviewed and updated in Epic as appropriate.       Review of Systems   HENT: Positive for drooling and postnasal drip.    Eyes: Positive for itching and visual disturbance.   Respiratory: Positive for apnea.    Gastrointestinal: Positive for constipation.   Genitourinary: Positive for flank pain and urgency.   Musculoskeletal: Positive for arthralgias, back pain, joint swelling and neck stiffness.   Allergic/Immunologic: Positive for environmental allergies and food allergies.   Hematological: Bruises/bleeds easily.   Psychiatric/Behavioral: Positive for sleep disturbance.   All other systems reviewed and are negative.        Objective:    Physical Exam  Constitutional:       Appearance: Normal appearance.   HENT:      Head: Normocephalic and atraumatic.      Mouth/Throat:      Mouth: Mucous membranes are moist.      Pharynx: Oropharynx is clear.      Comments: Mallampati 2 anatomy  Eyes:      Conjunctiva/sclera: Conjunctivae normal.   Cardiovascular:      Rate and Rhythm: Normal rate and regular rhythm.   Pulmonary:      Effort: Pulmonary effort is normal.      Breath sounds: Normal breath sounds.   Skin:     General: Skin is warm and dry.   Neurological:      Mental Status: She is alert and oriented to person, place, and time.   Psychiatric:         Mood and Affect: Mood normal.         Behavior: Behavior normal.         Thought Content: Thought content normal.         Judgment: Judgment normal.       90/90 days of use  Greater than 4-hour use 100%  95th percentile pressure 7.2  AHI 1.0  Settings 4-20    ASSESSMENT/PLAN    Diagnoses and all orders for this visit:    1. CARITO (obstructive sleep apnea) (Primary)  -     PAP Therapy            1. Counseled patient regarding multimodal approach with healthy nutrition, healthy sleep, regular physical activity, social activities, counseling, and medications. Encouraged to practice lateral sleep position. Avoid alcohol and sedatives close to  bedtime.  2.   Refill supplies x1 year.  Return to clinic 1 year or sooner symptoms warrant.  I have reviewed the results of my evaluation and impression and discussed my recommendations in detail with the patient.      Signed by  Opal Alarcon, SULEMA    April 4, 2022      CC: Ania Martel MD          No ref. provider found

## 2022-04-11 DIAGNOSIS — M54.16 CHRONIC LUMBAR RADICULOPATHY: Primary | ICD-10-CM

## 2022-04-13 ENCOUNTER — OUTSIDE FACILITY SERVICE (OUTPATIENT)
Dept: PAIN MEDICINE | Facility: CLINIC | Age: 72
End: 2022-04-13

## 2022-04-13 PROCEDURE — 99152 MOD SED SAME PHYS/QHP 5/>YRS: CPT | Performed by: ANESTHESIOLOGY

## 2022-04-13 PROCEDURE — 64483 NJX AA&/STRD TFRM EPI L/S 1: CPT | Performed by: ANESTHESIOLOGY

## 2022-04-13 PROCEDURE — 64484 NJX AA&/STRD TFRM EPI L/S EA: CPT | Performed by: ANESTHESIOLOGY

## 2022-04-14 ENCOUNTER — TELEPHONE (OUTPATIENT)
Dept: PAIN MEDICINE | Facility: CLINIC | Age: 72
End: 2022-04-14

## 2022-04-14 NOTE — TELEPHONE ENCOUNTER
Spoke with pt regarding yesterday’s procedure with Dr. Mathews. Pt stated they are doing well, with no complaints.  Advised that isn’t a follow up appointment scheduled, to call office as needed.

## 2022-05-11 DIAGNOSIS — G57.81 SAPHENOUS NEURALGIA, RIGHT: ICD-10-CM

## 2022-05-11 RX ORDER — CEPHRADINE 500 MG
CAPSULE ORAL
Qty: 180 EACH | Refills: 1 | Status: SHIPPED | OUTPATIENT
Start: 2022-05-11 | End: 2022-07-06

## 2022-05-15 NOTE — TELEPHONE ENCOUNTER
----- Message from Ania Martel MD sent at 6/10/2018  9:03 PM EDT -----  Can I talk w/ pt's allergist  About her- it is Hortensia Gonzales , APRN at [phone]   thanks  
Dr. Jaramillo's office will give her your number and ask her to give you a call before noon.  
I talked wByron Bazan and she is still waiting on some of the labs to return  
She is not in the office today, but will be there tomorrow.  Do you want me to have her call your cell phone tomorrow.  
Yes if she could in the morning. I have to be somewhere from 1-4:30 tomorrow, and I won't be able to  my phone or check messages during that period  
resilient/elastic

## 2022-06-08 ENCOUNTER — OFFICE VISIT (OUTPATIENT)
Dept: ORTHOPEDIC SURGERY | Facility: CLINIC | Age: 72
End: 2022-06-08

## 2022-06-08 VITALS
BODY MASS INDEX: 28.49 KG/M2 | SYSTOLIC BLOOD PRESSURE: 124 MMHG | HEIGHT: 65 IN | WEIGHT: 171 LBS | DIASTOLIC BLOOD PRESSURE: 74 MMHG

## 2022-06-08 DIAGNOSIS — M79.671 RIGHT FOOT PAIN: Primary | ICD-10-CM

## 2022-06-08 PROCEDURE — 99213 OFFICE O/P EST LOW 20 MIN: CPT | Performed by: ORTHOPAEDIC SURGERY

## 2022-06-08 RX ORDER — HYDROXYCHLOROQUINE SULFATE 200 MG/1
1 TABLET, FILM COATED ORAL EVERY 12 HOURS
COMMUNITY
Start: 2022-05-10 | End: 2023-03-30

## 2022-06-08 RX ORDER — NABUMETONE 500 MG/1
TABLET, FILM COATED ORAL
COMMUNITY
Start: 2022-05-10 | End: 2023-03-30 | Stop reason: SDUPTHER

## 2022-06-08 RX ORDER — NABUMETONE 500 MG/1
TABLET, FILM COATED ORAL
COMMUNITY
Start: 2022-05-10 | End: 2023-03-30

## 2022-06-08 NOTE — PROGRESS NOTES
NEW PATIENT    Patient: Barbara Coelho  : 1950    Primary Care Provider: Ania Martel MD    Requesting Provider: As above    Pain of the Right Foot      History    Chief Complaint: Right foot and ankle pain    History of Present Illness: This is a 72-year-old woman with multiple problems.  She has had neuritis, she has had chronic pain.  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  This was done 2022.  She is seen by Dr. Taylor.  She is followed by pain management.  She has had knee replacement with Dr. Douglas.  She is here noting pain in the right foot and ankle.  It occurs intermittently.  It varies in the area and intensity.  She reports sometimes it is medial sometimes lateral sometimes across the midfoot sometimes in the great toe, it is most reproducible across the TMT's.  She sees Dr. Thakur for mixed connective tissue disease.    Current Outpatient Medications on File Prior to Visit   Medication Sig Dispense Refill   • hydroxychloroquine (PLAQUENIL) 200 MG tablet Take 1 tablet by mouth Every 12 (Twelve) Hours.     • nabumetone (RELAFEN) 500 MG tablet take 2 tabs in the morning with food     • albuterol sulfate HFA (Ventolin HFA) 108 (90 Base) MCG/ACT inhaler Inhale 2 puffs Every 4 (Four) Hours As Needed for Wheezing. 1 inhaler 3   • Alpha-Lipoic Acid 200 MG capsule TAKE 2 CAPSULES BY MOUTH 3 TIMES A  each 1   • Azelastine-Fluticasone 137-50 MCG/ACT suspension      • Biotin 1 MG capsule Take 1 capsule by mouth Daily.     • desloratadine (CLARINEX) 5 MG tablet Take 5 mg by mouth Daily.     • Dietary Management Product (Rheumate) capsule Take 1 capsule by mouth Daily. 90 capsule 0   • famotidine (PEPCID) 40 MG tablet      • gabapentin (Neurontin) 100 MG capsule Take 1 capsule by mouth 3 (Three) Times a Day. 90 capsule 1   • Gel Base gel prilocaine 2% lidocaine 10% imipramine 3% capsaicin 0.001% mannitol 20%, 1 to 2  grams of cream to the affected areas Q4-6PRN 240 g 5   • hydroxychloroquine (PLAQUENIL) 200 MG tablet Take 1 tablet by mouth 2 (Two) Times a Day. 60 tablet 0   • hydrOXYzine (ATARAX) 25 MG tablet      • nabumetone (RELAFEN) 500 MG tablet TAKE 2 TABS IN THE MORNING WITH FOOD     • nitrofurantoin, macrocrystal-monohydrate, (MACROBID) 100 MG capsule Take 1 capsule by mouth 2 (Two) Times a Day. 10 capsule 0   • nortriptyline (PAMELOR) 10 MG capsule TAKE 1 TO 2 CAPSULES BY MOUTH AT BEDTIME AS NEEDED FOR SLEEP 60 capsule 1   • vitamin B-6 (PYRIDOXINE) 100 MG tablet Take 1 tablet by mouth Daily. 30 tablet 0   • VITAMIN D, CHOLECALCIFEROL, PO Take 1 capsule by mouth Daily.     • Wixela Inhub 500-50 MCG/DOSE DISKUS TAKE 1 PUFF BY MOUTH TWICE A  each 1     Current Facility-Administered Medications on File Prior to Visit   Medication Dose Route Frequency Provider Last Rate Last Admin   • Chlorhexidine Gluconate Cloth 2 % pads   Apply externally Once Emeka Douglas MD       • mupirocin (BACTROBAN) 2 % nasal ointment   Nasal BID Emeka Douglas MD          Allergies   Allergen Reactions   • Celebrex [Celecoxib] Hives     HIVES    • Codeine Rash   • Oxycodone Rash   • Sulfa Antibiotics Itching     ITCHING       Past Medical History:   Diagnosis Date   • Allergic rhinitis     on allergy shots   • Asthma     ALLERGIC   • Back pain     Low   • Bladder infection     Recurrent Bladder Infections   • Diverticulitis    • GERD (gastroesophageal reflux disease)     EGD 12/16 [facility] - oscar, HH. no barretts   • Hearing loss    • History of DVT of lower extremity     left leg   • Knee pain, right    • Mixed connective tissue disease ([facility]) 01/2019    Dr. Thakur   • CARITO (obstructive sleep apnea) 2018   • Osteopenia 03/28/2018    calculated frax - 11/3%   • Pinched nerve in neck    • Rheumatoid arthritis ([facility]) 2018   • Staph infection    • Urinary incontinence    • Wears contact lenses    • Wears prescription eyeglasses      Past  Surgical History:   Procedure Laterality Date   • BACK SURGERY  1995    Back (L5/S1)   • BREAST EXCISIONAL BIOPSY Left 1985   • CHOLECYSTECTOMY WITH INTRAOPERATIVE CHOLANGIOGRAM N/A 4/26/2018    Procedure: CHOLECYSTECTOMY LAPAROSCOPIC INTRAOPERATIVE CHOLANGIOGRAM;  Surgeon: Lit Morelos MD;  Location:  [facility] OR;  Service: General   • COLONOSCOPY  2016   • GASTRIC BYPASS      HIGH GASTRIC BYPASS, 1980's   • OTHER SURGICAL HISTORY  1985    Stomach Stabled   • REPLACEMENT TOTAL KNEE Left 2012   • SINUS SURGERY      x3   • TOTAL KNEE ARTHROPLASTY Right 10/29/2021    Procedure: TOTAL KNEE ARTHROPLASTY RIGHT;  Surgeon: Emeka Douglas MD;  Location:  [facility] OR;  Service: Orthopedics;  Laterality: Right;   • TUBAL ABDOMINAL LIGATION  1980   • WISDOM TOOTH EXTRACTION       Family History   Problem Relation Age of Onset   • Cancer Other         BREAST, LUNG, OVARIAN, KIDNEY   • Kidney cancer Other    • Lung cancer Other         pGF as well   • Cancer Mother         kidney   • Cancer Father         lung   • Mental illness Father    • Breast cancer Sister 30   • Connective Tissue Disease Sister    • Hypertension Sister         pulmonary hypertension   • Cancer Sister         Brest   • Cancer Paternal Grandmother         stomach   • Cancer Paternal Grandfather         lung   • Ovarian cancer Neg Hx       Social History     Socioeconomic History   • Marital status:    Tobacco Use   • Smoking status: Never Smoker   • Smokeless tobacco: Never Used   Vaping Use   • Vaping Use: Never used   Substance and Sexual Activity   • Alcohol use: Not Currently     Alcohol/week: 0.0 standard drinks     Comment: a week   • Drug use: No   • Sexual activity: Defer        Review of Systems   Constitutional: Negative.    HENT: Positive for dental problem, hearing loss and postnasal drip.    Eyes: Positive for itching.   Respiratory: Positive for apnea.    Cardiovascular: Negative.    Gastrointestinal: Positive for constipation.  "  Endocrine: Negative.    Genitourinary: Negative.    Musculoskeletal: Positive for arthralgias, back pain and neck pain.   Skin: Negative.    Allergic/Immunologic: Positive for environmental allergies and food allergies.   Neurological: Positive for light-headedness.   Hematological: Bruises/bleeds easily.   Psychiatric/Behavioral: Positive for decreased concentration and sleep disturbance.       The following portions of the patient's history were reviewed and updated as appropriate: allergies, current medications, past family history, past medical history, past social history, past surgical history and problem list.    Physical Exam:   /74   Ht 165.1 cm (65\")   Wt 77.6 kg (171 lb)   LMP  (LMP Unknown) Comment: LAST MAMMOGRAM 2020  BMI 28.46 kg/m²   GENERAL: Body habitus: overweight    Lower extremity edema: Right: trace; Left: trace    Varicose veins:  Right: mild; Left: mild    Gait: antalgic with the first few steps     Mental Status:  awake and alert; oriented to person, place, and time    Voice:  clear  SKIN:  Lower extremity: warm and dry    Hair Growth(lower extremity):  Right:normal; Left:  normal  NAILS: Toenails: thick  HEENT: Head: Normocephalic, atraumatic,  without obvious abnormality.  eye: normal external eye, no icterus  ears:normal external ears  PULM:  Repiratory effort normal  CV:  Dorsalis Pedis:  Right: 2+; Left:2+    Posterior Tibial: Right:2+; Left:2+    Capillary Refill:  Brisk  MSK:  Hand:moderate arthritis      Tibia:  Right:  tender over subcutaneous border; Left:  tender over subcutaneous border      Ankle:  Right: Mildly tender diffusely, normal range of motion; Left:  Similar mildly tender diffusely, normal range of motion      Foot:  Right:  Tender over the TMT's, moderate osteophytes; Left:  Slightly less tender over the TMT's, moderate osteophytes      NEURO: Sensation is intact to light touch bilaterally, intact to the [place] fiber bilaterally.  Motor function " is 5 out of 5 bilaterally, she can heel and toe walk, no signs of a foot drop         Medical Decision Making    Data Review:   ordered and reviewed x-rays today and reviewed outside records    Assessment and Plan/ Diagnosis/Treatment options:   1. Right foot pain  She has arthritis and quite a few areas of the foot and ankle.  She is actually also tender on the left side.  She has osteophytes in the ankle on the medial lateral aspect, narrowing in the midfoot, and in the forefoot.  I explained arthritis to her.  I explained cartilage loss.  I explained we do not have any way to put cartilage back in the joints.  The underlying saphenous and peroneal neuropathy probably adds to her pain.  As possibly why the right is worse than the left.  I recommend custom orthotics.  I recommend topical Voltaren gel.  I explained I do not have any surgical answer for her  - XR Foot 2 View Right  - XR Ankle 2 View Right            Part of this encounter note is an electronic transcription/translation of spoken language to printed text. The electronic translation of spoken language may permit erroneous, or at times, nonsensical words or phrases to be inadvertently transcribed; Although I have reviewed the note for such errors, some may still exist.          Jeana Adrina MD

## 2022-06-20 ENCOUNTER — OFFICE VISIT (OUTPATIENT)
Dept: ORTHOPEDIC SURGERY | Facility: CLINIC | Age: 72
End: 2022-06-20

## 2022-06-20 VITALS
WEIGHT: 171.08 LBS | BODY MASS INDEX: 28.5 KG/M2 | HEIGHT: 65 IN | DIASTOLIC BLOOD PRESSURE: 72 MMHG | SYSTOLIC BLOOD PRESSURE: 122 MMHG

## 2022-06-20 DIAGNOSIS — M70.51 PES ANSERINUS BURSITIS OF RIGHT KNEE: ICD-10-CM

## 2022-06-20 DIAGNOSIS — Z96.651 STATUS POST TOTAL RIGHT KNEE REPLACEMENT: Primary | ICD-10-CM

## 2022-06-20 PROCEDURE — 99214 OFFICE O/P EST MOD 30 MIN: CPT | Performed by: PHYSICIAN ASSISTANT

## 2022-06-20 NOTE — PROGRESS NOTES
"    [facility] Note        Subjective     CC: Follow-up (5 month f/u; 8 months s/p Total Knee Arthroplasty Right 10/29/21/)      HPI    Barbara Coelho is a 72 y.o. female. Patient presents for new issue of right knee pain and clicking in setting of right TKA 10/29/2021 (Dr. Douglas). Additionally she complains of stiffness at night. She's walks daily and has noticed anterior shin pain with medial knee/proximal tibia soft tissue swelling.    Pain scale: 2/10. Associated symptoms popping, grinding, stiffness and giving away. Pain worse with walking, prolonged sitting and sleeping. Ice and heat help. No reported numbness or tingling.     ROS:    Constiutional:Pt denies fever, chills, nausea, or vomiting.  MSK:as above        Objective      Past Medical History  Past Medical History:   Diagnosis Date   • Allergic rhinitis     on allergy shots   • Asthma     ALLERGIC   • Back pain     Low   • Bladder infection     Recurrent Bladder Infections   • Diverticulitis    • GERD (gastroesophageal reflux disease)     EGD 12/16 [facility] - reflux, HH. no barretts   • Hearing loss    • History of DVT of lower extremity     left leg   • Knee pain, right    • Mixed connective tissue disease (HCC) 01/2019    Dr. Thakur   • CARITO (obstructive sleep apnea) 2018   • Osteopenia 03/28/2018    calculated frax - 11/3%   • Pinched nerve in neck    • Rheumatoid arthritis ([facility]) 2018   • Staph infection    • Urinary incontinence    • Wears contact lenses    • Wears prescription eyeglasses          Physical Exam  /72   Ht 165.1 cm (65\")   Wt 77.6 kg (171 lb 1.2 oz)   LMP  (LMP Unknown) Comment: LAST MAMMOGRAM 2020  BMI 28.47 kg/m²     Body mass index is 28.47 kg/m².    Patient is well nourished and well developed.        Ortho Exam  Integument:   Right knee: Incision is well-healed with no signs of infection.    Lower Extremities:   Right Knee:    Tenderness:  Anterior to medial knee and medial proximal tibia tenderness " (over pes bursa area)    Effusion:  None    Swelling: Medial knee and pes bursa    Crepitus:  Positive click anterior medial knee with motion.    Range of motion:  Extension: 0°       Flexion: 120°    Instability:  No varus laxity, no valgus laxity, negative anterior drawer    Deformities:  None      Imaging/Labs/EMG Reviewed:  Ordered right knee plain films.  Imaging read/interpreted by Dr. Douglas.    TKA X-Ray  Indication: status-post TKA      AP, Lateral, and Sunrise views of Right knee     Findings:  No signs of fracture  No signs of loosening  No change compared to prior study  Components are well aligned      Assessment:  1. Status post total right knee replacement    2. Pes anserinus bursitis of right knee        Plan:  1. Status post right TKA with continued pain and now clicking to knee--no evidence of infection on exam. Patient concerned something is wrong with the knee as clicking just started.   2. Ordered inflammatory/infectious markers for further assessment: CBC, ESR, CRP.  3. Pes bursitis(new problem)--ordered Voltaren gel.  4. Follow up in 1 week with Dr. Douglas to review labs and discuss further evaluation/treatment if needed.  5. Questions and concerned answered.      Mercy Fitzgerald PA-C  06/21/22  20:56 EDT      Dictated Utilizing Dragon Dictation.

## 2022-06-21 LAB
BASOPHILS # BLD AUTO: 0.1 X10E3/UL (ref 0–0.2)
BASOPHILS NFR BLD AUTO: 0 %
CRP SERPL-MCNC: <1 MG/L (ref 0–10)
EOSINOPHIL # BLD AUTO: 0.3 X10E3/UL (ref 0–0.4)
EOSINOPHIL NFR BLD AUTO: 2 %
ERYTHROCYTE [DISTWIDTH] IN BLOOD BY AUTOMATED COUNT: 12.3 % (ref 11.7–15.4)
ERYTHROCYTE [SEDIMENTATION RATE] IN BLOOD BY WESTERGREN METHOD: 2 MM/HR (ref 0–40)
HCT VFR BLD AUTO: 40.5 % (ref 34–46.6)
HGB BLD-MCNC: 13.2 G/DL (ref 11.1–15.9)
IMM GRANULOCYTES # BLD AUTO: 0 X10E3/UL (ref 0–0.1)
IMM GRANULOCYTES NFR BLD AUTO: 0 %
LYMPHOCYTES # BLD AUTO: 9.6 X10E3/UL (ref 0.7–3.1)
LYMPHOCYTES NFR BLD AUTO: 75 %
MCH RBC QN AUTO: 28.8 PG (ref 26.6–33)
MCHC RBC AUTO-ENTMCNC: 32.6 G/DL (ref 31.5–35.7)
MCV RBC AUTO: 88 FL (ref 79–97)
MONOCYTES # BLD AUTO: 0.5 X10E3/UL (ref 0.1–0.9)
MONOCYTES NFR BLD AUTO: 4 %
NEUTROPHILS # BLD AUTO: 2.5 X10E3/UL (ref 1.4–7)
NEUTROPHILS NFR BLD AUTO: 19 %
PLATELET # BLD AUTO: 211 X10E3/UL (ref 150–450)
RBC # BLD AUTO: 4.58 X10E6/UL (ref 3.77–5.28)
WBC # BLD AUTO: 12.8 X10E3/UL (ref 3.4–10.8)

## 2022-06-27 ENCOUNTER — OFFICE VISIT (OUTPATIENT)
Dept: ORTHOPEDIC SURGERY | Facility: CLINIC | Age: 72
End: 2022-06-27

## 2022-06-27 VITALS
SYSTOLIC BLOOD PRESSURE: 110 MMHG | BODY MASS INDEX: 28.5 KG/M2 | HEIGHT: 65 IN | DIASTOLIC BLOOD PRESSURE: 70 MMHG | WEIGHT: 171.08 LBS

## 2022-06-27 DIAGNOSIS — Z96.651 STATUS POST TOTAL RIGHT KNEE REPLACEMENT: Primary | ICD-10-CM

## 2022-06-27 PROCEDURE — 99213 OFFICE O/P EST LOW 20 MIN: CPT | Performed by: ORTHOPAEDIC SURGERY

## 2022-06-27 NOTE — PROGRESS NOTES
[facility] [place]    Subjective     CC: Follow-up (1 week follow up -  s/p Total Knee Arthroplasty Right 10/29/21)      HPI    Barbara Coelho is a 72 y.o. female.  Her complaint with her knee is that she feels popping.  The right knee pops her left knee does not.  She also has back pain and would like to see a back doctor.  She has complaints of lumbar radiculopathy and has degenerative disc disease    Review of Systems   Constitutional: Negative.  Negative for chills, fatigue and fever.   HENT: Negative.  Negative for congestion and dental problem.    Eyes: Negative.  Negative for blurred vision.   Respiratory: Negative.  Negative for shortness of breath.    Cardiovascular: Negative.  Negative for leg swelling.   Gastrointestinal: Negative.  Negative for abdominal pain.   Endocrine: Negative.  Negative for polyuria.   Genitourinary: Negative.  Negative for difficulty urinating.   Musculoskeletal: Positive for arthralgias.   Skin: Negative.    Allergic/Immunologic: Negative.    Neurological: Negative.    Hematological: Negative.  Negative for adenopathy.   Psychiatric/Behavioral: Negative.  Negative for behavioral problems.       ROS:    Constiutional:Pt denies fever, chills, nausea, or vomiting.  MSK:as above      Objective      Past Medical History  Past Medical History:   Diagnosis Date   • Allergic rhinitis     on allergy shots   • Asthma     ALLERGIC   • Back pain     Low   • Bladder infection     Recurrent Bladder Infections   • Diverticulitis    • GERD (gastroesophageal reflux disease)     EGD 12/16 Arcos - oscar, HH. no barretts   • Hearing loss    • History of DVT of lower extremity     left leg   • Knee pain, right    • Mixed connective tissue disease (Formerly [facility]) 01/2019    Dr. Thakur   • CARITO (obstructive sleep apnea) 2018   • Osteopenia 03/28/2018    calculated frax - 11/3%   • Pinched nerve in neck    • Rheumatoid arthritis (Formerly [facility]) 2018   • Staph infection    • Urinary incontinence   "  • Wears contact lenses    • Wears prescription eyeglasses          Physical Exam  /70   Ht 165.1 cm (65\")   Wt 77.6 kg (171 lb 1.2 oz)   LMP  (LMP Unknown) Comment: LAST MAMMOGRAM 2020  BMI 28.47 kg/m²     Body mass index is 28.47 kg/m².    Patient is well nourished and well developed.        Ortho Exam  Right knee has full motion.  She does have some medial popping.  Her patella tracks well.  Negative Homans' sign    Imaging/Labs/EMG Reviewed:  Imaging Results (Last 24 Hours)     ** No results found for the last 24 hours. **        Her radiographs from last week show no problems with her total knee arthroplasty    Assessment:  1. Status post total right knee replacement        Plan:  1. Recommend over the counter anti-inflammatories for pain and/or swelling  2. I recommend observation for her knee.  She may have some scar tissue around her patella causing it to pop.  Labs were negative for infection.  She would like to see a spine specialist regarding her back.    Follow Up:   Return if symptoms worsen or fail to improve.      Medical Decision Making  Management Options : Low - OTC Drugs        Emeka Douglas M.D., FAAOS  Orthopedic Surgeon  Fellowship Trained Sports Medicine  [facility]  Orthopedics and Sports Medicine  [address], [address]  [place] [address]    EMR Dragon/Transcription disclaimer:  Much of this encounter note is an electronic transcription of spoken language to printed text. Electronic transcription of spoken language may permit erroneous, or at times, nonsensical words or phrases to be inadvertently transcribed. Although I have reviewed the note for such errors, some may still exist.  "

## 2022-07-06 DIAGNOSIS — G57.81 SAPHENOUS NEURALGIA, RIGHT: ICD-10-CM

## 2022-07-06 RX ORDER — CEPHRADINE 500 MG
CAPSULE ORAL
Qty: 180 EACH | Refills: 1 | Status: SHIPPED | OUTPATIENT
Start: 2022-07-06

## 2022-08-01 ENCOUNTER — LAB (OUTPATIENT)
Dept: LAB | Facility: HOSPITAL | Age: 72
End: 2022-08-01

## 2022-08-01 ENCOUNTER — OFFICE VISIT (OUTPATIENT)
Dept: INTERNAL MEDICINE | Facility: CLINIC | Age: 72
End: 2022-08-01

## 2022-08-01 VITALS
BODY MASS INDEX: 29.88 KG/M2 | SYSTOLIC BLOOD PRESSURE: 112 MMHG | WEIGHT: 175 LBS | DIASTOLIC BLOOD PRESSURE: 64 MMHG | TEMPERATURE: 97.1 F | OXYGEN SATURATION: 100 % | HEIGHT: 64 IN | HEART RATE: 70 BPM

## 2022-08-01 DIAGNOSIS — Z00.00 HEALTH CARE MAINTENANCE: ICD-10-CM

## 2022-08-01 DIAGNOSIS — Z23 NEED FOR COVID-19 VACCINE: Primary | ICD-10-CM

## 2022-08-01 DIAGNOSIS — M85.89 OSTEOPENIA OF MULTIPLE SITES: ICD-10-CM

## 2022-08-01 PROCEDURE — 99397 PER PM REEVAL EST PAT 65+ YR: CPT | Performed by: INTERNAL MEDICINE

## 2022-08-01 PROCEDURE — 80061 LIPID PANEL: CPT | Performed by: INTERNAL MEDICINE

## 2022-08-01 PROCEDURE — 0054A COVID-19 (PFIZER) 12+ YRS: CPT | Performed by: INTERNAL MEDICINE

## 2022-08-01 PROCEDURE — 1125F AMNT PAIN NOTED PAIN PRSNT: CPT | Performed by: INTERNAL MEDICINE

## 2022-08-01 PROCEDURE — 1170F FXNL STATUS ASSESSED: CPT | Performed by: INTERNAL MEDICINE

## 2022-08-01 PROCEDURE — 1160F RVW MEDS BY RX/DR IN RCRD: CPT | Performed by: INTERNAL MEDICINE

## 2022-08-01 PROCEDURE — 83036 HEMOGLOBIN GLYCOSYLATED A1C: CPT | Performed by: INTERNAL MEDICINE

## 2022-08-01 PROCEDURE — G0439 PPPS, SUBSEQ VISIT: HCPCS | Performed by: INTERNAL MEDICINE

## 2022-08-01 PROCEDURE — 82306 VITAMIN D 25 HYDROXY: CPT | Performed by: INTERNAL MEDICINE

## 2022-08-01 PROCEDURE — 91305 COVID-19 (PFIZER) 12+ YRS: CPT | Performed by: INTERNAL MEDICINE

## 2022-08-01 PROCEDURE — 80053 COMPREHEN METABOLIC PANEL: CPT | Performed by: INTERNAL MEDICINE

## 2022-08-01 RX ORDER — FLUTICASONE PROPIONATE AND SALMETEROL 500; 50 UG/1; UG/1
1 POWDER RESPIRATORY (INHALATION) 2 TIMES DAILY
Qty: 180 EACH | Refills: 1 | Status: SHIPPED | OUTPATIENT
Start: 2022-08-01 | End: 2023-03-30 | Stop reason: SDUPTHER

## 2022-08-01 NOTE — PROGRESS NOTES
The ABCs of the Annual Wellness Visit  Subsequent Medicare Wellness Visit    Chief Complaint   Patient presents with   • Medicare Wellness-subsequent     Wellness   • Annual Exam     Physical   • Insomnia     F/u discuss any meds except narcotics   • underarm pain     Has a place under her left arm that hurts.      Subjective    History of Present Illness:  Barbara Coelho is a 72 y.o. female who presents for a Subsequent Medicare Wellness Visit.    The following portions of the patient's history were reviewed and   updated as appropriate: allergies, current medications, past family history, past medical history, past social history, past surgical history and problem list.    Compared to one year ago, the patient feels her physical   health is the same.    Compared to one year ago, the patient feels her mental   health is the same.    Recent Hospitalizations:  She was admitted within the past 365 days at [facility] [place], operated by [facility] for right TKA 10/2021.       Current Medical Providers:  Patient Care Team:  Ania Martel MD as PCP - General (Internal Medicine)  Oscar Arcos MD as Consulting Physician (Gastroenterology)  Emeka Douglas MD as Consulting Physician (Orthopedic Surgery)  Valdez Vega OD (Optometry)  Pinky Thakur MD as Consulting Physician (Rheumatology)  Daren Mathews MD as Consulting Physician (Pain Medicine)  Opal Alarcon APRN as Nurse Practitioner (Nurse Practitioner)  Nagi Jaramillo MD as Consulting Physician (Allergy)  Dr. Miller (Dental General Practice)  Valdez Vega OD (Optometry)    Outpatient Medications Prior to Visit   Medication Sig Dispense Refill   • albuterol sulfate HFA (Ventolin HFA) 108 (90 Base) MCG/ACT inhaler Inhale 2 puffs Every 4 (Four) Hours As Needed for Wheezing. 1 inhaler 3   • Alpha-Lipoic Acid 200 MG capsule TAKE 2 CAPSULES BY MOUTH 3 TIMES A  each 1   • Azelastine-Fluticasone 137-50 MCG/ACT suspension      • Biotin 1 MG capsule Take 1  capsule by mouth Daily.     • Diclofenac Sodium (VOLTAREN) 1 % gel gel Apply 4 g topically to the appropriate area as directed 2 (Two) Times a Day. 150 g 5   • gabapentin (Neurontin) 100 MG capsule Take 1 capsule by mouth 3 (Three) Times a Day. 90 capsule 1   • Gel Base gel prilocaine 2% lidocaine 10% imipramine 3% capsaicin 0.001% mannitol 20%, 1 to 2 grams of cream to the affected areas Q4-6PRN 240 g 5   • hydroxychloroquine (PLAQUENIL) 200 MG tablet Take 1 tablet by mouth 2 (Two) Times a Day. 60 tablet 0   • VITAMIN D, CHOLECALCIFEROL, PO Take 1 capsule by mouth Daily.     • Wixela Inhub 500-50 MCG/DOSE DISKUS TAKE 1 PUFF BY MOUTH TWICE A  each 1   • desloratadine (CLARINEX) 5 MG tablet Take 5 mg by mouth Daily.     • Dietary Management Product (Rheumate) capsule Take 1 capsule by mouth Daily. 90 capsule 0   • famotidine (PEPCID) 40 MG tablet      • hydroxychloroquine (PLAQUENIL) 200 MG tablet Take 1 tablet by mouth Every 12 (Twelve) Hours.     • hydrOXYzine (ATARAX) 25 MG tablet      • nabumetone (RELAFEN) 500 MG tablet take 2 tabs in the morning with food     • nabumetone (RELAFEN) 500 MG tablet TAKE 2 TABS IN THE MORNING WITH FOOD     • nitrofurantoin, macrocrystal-monohydrate, (MACROBID) 100 MG capsule Take 1 capsule by mouth 2 (Two) Times a Day. 10 capsule 0   • nortriptyline (PAMELOR) 10 MG capsule TAKE 1 TO 2 CAPSULES BY MOUTH AT BEDTIME AS NEEDED FOR SLEEP 60 capsule 1   • vitamin B-6 (PYRIDOXINE) 100 MG tablet Take 1 tablet by mouth Daily. 30 tablet 0     Facility-Administered Medications Prior to Visit   Medication Dose Route Frequency Provider Last Rate Last Admin   • Chlorhexidine Gluconate Cloth 2 % pads   Apply externally Once Emeka Douglas MD       • mupirocin (BACTROBAN) 2 % nasal ointment   Nasal BID Emeka Douglas MD           No opioid medication identified on active medication list. I have reviewed chart for other potential  high risk medication/s and harmful drug interactions  "in the elderly.          Aspirin is not on active medication list.  Aspirin use is contraindicated for this patient due to: current NSAID therapy.  .    Patient Active Problem List   Diagnosis   • Arthritis   • Dysphagia   • Constipation   • Screening for cardiovascular condition   • Skin lesions (Recent Bx results pending)   • Right shoulder pain   • Chronic allergic rhinitis due to food   • GERD (gastroesophageal reflux disease)   • Urinary incontinence   • Symptomatic cholelithiasis   • DDD (degenerative disc disease), lumbar   • Depression   • Mixed connective tissue disease (HCC)   • Bilateral pleural effusions (Exudate on Right with high WBC)   • Autoimmune Pleuritis   • History of asthma   • CARITO on CPAP   • CARITO (obstructive sleep apnea)   • Osteoarthritis of right knee   • History of total right knee replacement   • Postoperative pain   • Anxiety   • Saphenous neuralgia, right   • Pain due to total right knee replacement (HCC)   • History of left knee replacement   • History of lumbar surgery   • Physical deconditioning   • Gait disturbance   • Spondylosis of lumbar region without myelopathy or radiculopathy     Advance Care Planning  Advance Directive is on file.  ACP discussion was held with the patient during this visit. Patient has an advance directive in EMR which is still valid.     Review of Systems   Eyes: Negative for visual disturbance.   Cardiovascular: Negative for leg swelling.   Genitourinary:        Urinary incontinence   Musculoskeletal: Negative for gait problem.   Neurological: Negative for weakness.   Psychiatric/Behavioral: Negative for behavioral problems. The patient is not nervous/anxious.         Objective    Vitals:    08/01/22 0851   BP: 112/64   BP Location: Right arm   Patient Position: Sitting   Pulse: 70   Temp: 97.1 °F (36.2 °C)   TempSrc: Infrared   SpO2: 100%   Weight: 79.4 kg (175 lb)   Height: 162.6 cm (64\")   PainSc:   4   PainLoc: Back  Comment: right leg     Estimated " "body mass index is 30.04 kg/m² as calculated from the following:    Height as of this encounter: 162.6 cm (64\").    Weight as of this encounter: 79.4 kg (175 lb).    BMI is >= 30 and <35. (Class 1 Obesity). The following options were offered after discussion;: exercise counseling/recommendations and nutrition counseling/recommendations      Does the patient have evidence of cognitive impairment? No    Physical Exam  Vitals and nursing note reviewed.   Constitutional:       Appearance: Normal appearance. She is well-developed and well-groomed.   HENT:      Head: Normocephalic and atraumatic.      Right Ear: Tympanic membrane, ear canal and external ear normal.      Left Ear: Tympanic membrane, ear canal and external ear normal.      Nose: Nose normal.      Mouth/Throat:      Mouth: Mucous membranes are moist.      Pharynx: Oropharynx is clear.   Eyes:      Extraocular Movements: Extraocular movements intact.      Conjunctiva/sclera: Conjunctivae normal.      Pupils: Pupils are equal, round, and reactive to light.   Cardiovascular:      Rate and Rhythm: Normal rate and regular rhythm.      Heart sounds: Normal heart sounds.   Pulmonary:      Effort: Pulmonary effort is normal.      Breath sounds: Normal breath sounds.   Chest:   Breasts:      Right: No swelling, bleeding, inverted nipple, mass, nipple discharge, skin change, tenderness, axillary adenopathy or supraclavicular adenopathy.      Left: Normal. No swelling, bleeding, inverted nipple, mass, nipple discharge, skin change, tenderness, axillary adenopathy or supraclavicular adenopathy.       Abdominal:      General: Abdomen is flat. Bowel sounds are normal.      Palpations: Abdomen is soft.   Musculoskeletal:         General: Normal range of motion.      Cervical back: Normal range of motion and neck supple.   Lymphadenopathy:      Upper Body:      Right upper body: No supraclavicular, axillary or pectoral adenopathy.      Left upper body: No supraclavicular, " axillary or pectoral adenopathy.   Skin:     General: Skin is warm and dry.   Neurological:      General: No focal deficit present.      Mental Status: She is alert and oriented to person, place, and time. Mental status is at baseline.      Cranial Nerves: No cranial nerve deficit.      Sensory: No sensory deficit.      Motor: No weakness.      Coordination: Coordination normal.      Gait: Gait normal.      Deep Tendon Reflexes: Reflexes normal.   Psychiatric:         Attention and Perception: Attention normal.         Mood and Affect: Mood normal.         Behavior: Behavior normal. Behavior is cooperative.         Thought Content: Thought content normal.         Judgment: Judgment normal.                 HEALTH RISK ASSESSMENT    Smoking Status:  Social History     Tobacco Use   Smoking Status Never Smoker   Smokeless Tobacco Never Used     Alcohol Consumption:  Social History     Substance and Sexual Activity   Alcohol Use Not Currently   • Alcohol/week: 0.0 standard drinks    Comment: a week     Fall Risk Screen:    ZAINAB Fall Risk Assessment was completed, and patient is at LOW risk for falls.Assessment completed on:8/1/2022    Depression Screening:  PHQ-2/PHQ-9 Depression Screening 8/1/2022   Retired PHQ-9 Total Score -   Retired Total Score -   Little Interest or Pleasure in Doing Things 0-->not at all   Feeling Down, Depressed or Hopeless 0-->not at all   PHQ-9: Brief Depression Severity Measure Score 0       Health Habits and Functional and Cognitive Screening:  Functional & Cognitive Status 8/1/2022   Do you have difficulty preparing food and eating? No   Do you have difficulty bathing yourself, getting dressed or grooming yourself? No   Do you have difficulty using the toilet? No   Do you have difficulty moving around from place to place? No   Do you have trouble with steps or getting out of a bed or a chair? No   Current Diet Well Balanced Diet   Dental Exam Up to date   Eye Exam Up to date   Exercise  (times per week) 4 times per week   Current Exercises Include Walking        Exercise Comment -   Current Exercise Activities Include -   Do you need help using the phone?  No   Are you deaf or do you have serious difficulty hearing?  Yes   Do you need help with transportation? No   Do you need help shopping? No   Do you need help preparing meals?  No   Do you need help with housework?  No   Do you need help with laundry? No   Do you need help taking your medications? No   Do you need help managing money? No   Do you ever drive or ride in a car without wearing a seat belt? No   Have you felt unusual stress, anger or loneliness in the last month? No   Who do you live with? Alone   If you need help, do you have trouble finding someone available to you? No   Have you been bothered in the last four weeks by sexual problems? No   Do you have difficulty concentrating, remembering or making decisions? No       Age-appropriate Screening Schedule:  Refer to the list below for future screening recommendations based on patient's age, sex and/or medical conditions. Orders for these recommended tests are listed in the plan section. The patient has been provided with a written plan.    Health Maintenance   Topic Date Due   • TDAP/TD VACCINES (1 - Tdap) Never done   • ZOSTER VACCINE (2 of 3) 03/12/2016   • LIPID PANEL  07/26/2022   • INFLUENZA VACCINE  10/01/2022   • DXA SCAN  12/16/2022   • MAMMOGRAM  01/04/2024              Assessment & Plan   CMS Preventative Services Quick Reference  Risk Factors Identified During Encounter  Chronic Pain   Hearing Problem  Immunizations Discussed/Encouraged (specific Immunizations; Tdap, influenza and COVID19  Obesity/Overweight   Urinary Incontinence  The above risks/problems have been discussed with the patient.  Follow up actions/plans if indicated are seen below in the Assessment/Plan Section.  Pertinent information has been shared with the patient in the After Visit Summary.    Diagnoses  and all orders for this visit:    1. Encounter for annual wellness exam in Medicare patient (Primary)  Regular exercise/healthy diet. BSE q month.caclium intake reviewed.  Living will -on file  Mammogram due 12/22  Colonoscopy due [id number]  Pneumovax - had in 2014  prevnar - had in 2014  covid vaccine -  2nd booster today  DEXA due 12/22 (osteopenia)  Shingles - shingrix discussed; she will check with her pharmacy to see if she is UTD  No further pap smears - over age 65 and no h/o abnormal pap smears    2. Routine general medical examination/high risk medications long term use/MCTD  -     CBC (No Diff); Future ordered by rheumatology  -     Lipid Panel - pending today  -     Comprehensive Metabolic Panel - pending today  -     Annual physical exam performed today        3. Moderate recurrent major depression (CMS/HCC)      - stable off effexor        4. Need for COVID-19 vaccine (Primary)  -     COVID-19 Vaccine (Pfizer) Gray Cap    5. Asthma/allergies  -     Fluticasone-Salmeterol (Wixela Inhub) 500-50 MCG/ACT DISKUS; Inhale 1 puff 2 (Two) Times a Day.  Dispense: 180 each; Refill: 1    6. MCTD  - follows with rheumatology    7. Chronic pain (back/knees)  - follows with pain management      F ollow Up:   1 year follow up    An After Visit Summary and PPPS were made available to the patient.        Time Spent-Use for E/M Coding (Optional):60 min

## 2022-08-02 LAB
25(OH)D3 SERPL-MCNC: 53.4 NG/ML (ref 30–100)
ALBUMIN SERPL-MCNC: 4.4 G/DL (ref 3.5–5.2)
ALBUMIN/GLOB SERPL: 2.6 G/DL
ALP SERPL-CCNC: 92 U/L (ref 39–117)
ALT SERPL W P-5'-P-CCNC: 11 U/L (ref 1–33)
ANION GAP SERPL CALCULATED.3IONS-SCNC: 12 MMOL/L (ref 5–15)
AST SERPL-CCNC: 17 U/L (ref 1–32)
BILIRUB SERPL-MCNC: 0.4 MG/DL (ref 0–1.2)
BUN SERPL-MCNC: 10 MG/DL (ref 8–23)
BUN/CREAT SERPL: 11.1 (ref 7–25)
CALCIUM SPEC-SCNC: 9.4 MG/DL (ref 8.6–10.5)
CHLORIDE SERPL-SCNC: 107 MMOL/L (ref 98–107)
CHOLEST SERPL-MCNC: 182 MG/DL (ref 0–200)
CO2 SERPL-SCNC: 26 MMOL/L (ref 22–29)
CREAT SERPL-MCNC: 0.9 MG/DL (ref 0.57–1)
EGFRCR SERPLBLD CKD-EPI 2021: 67.6 ML/MIN/1.73
GLOBULIN UR ELPH-MCNC: 1.7 GM/DL
GLUCOSE SERPL-MCNC: 90 MG/DL (ref 65–99)
HBA1C MFR BLD: 5.8 % (ref 4.8–5.6)
HDLC SERPL-MCNC: 79 MG/DL (ref 40–60)
LDLC SERPL CALC-MCNC: 88 MG/DL (ref 0–100)
LDLC/HDLC SERPL: 1.09 {RATIO}
POTASSIUM SERPL-SCNC: 4.2 MMOL/L (ref 3.5–5.2)
PROT SERPL-MCNC: 6.1 G/DL (ref 6–8.5)
SODIUM SERPL-SCNC: 145 MMOL/L (ref 136–145)
TRIGL SERPL-MCNC: 85 MG/DL (ref 0–150)
VLDLC SERPL-MCNC: 15 MG/DL (ref 5–40)

## 2022-08-24 PROCEDURE — U0004 COV-19 TEST NON-CDC HGH THRU: HCPCS | Performed by: NURSE PRACTITIONER

## 2022-08-24 PROCEDURE — 87086 URINE CULTURE/COLONY COUNT: CPT | Performed by: NURSE PRACTITIONER

## 2022-08-25 ENCOUNTER — TELEPHONE (OUTPATIENT)
Dept: INTERNAL MEDICINE | Facility: CLINIC | Age: 72
End: 2022-08-25

## 2022-08-25 NOTE — TELEPHONE ENCOUNTER
Caller: Barbara Coelho    Relationship: Self    Best call back number: [phone]    Caller requesting test results: SELF    What test was performed: COVID TEST    When was the test performed: 08/24/2022    Where was the test performed: URGENT [facility]ARE    Additional notes: PATIENT IS LEAVING FOR [facility] TOMORROW AND SAW THE TEST RESULTS HOWEVER DOES NOT KNOW HOW TO READ IT ALSO  HAS NOT CALLED PATIENT WITH RESULTS. PLEASE ADVISE AND CALL PATIENT BACK

## 2022-08-25 NOTE — TELEPHONE ENCOUNTER
She did test positive, so does need to isolate for 5 days. After 5 days, she can travel again if she continues to not have any symptoms, but would need to wear mask

## 2022-08-25 NOTE — TELEPHONE ENCOUNTER
I didn't realize she had symptoms. Day 0 is day symptoms started and should isolate for 5 days and can end isolation if symptoms are improving and no fever for 24 hours w/o taking fever reducing meds  If symptoms started Monday, that would be day 0, and day 5 would Saturday, so technically she is supposed to isolate through Saturday, but can leave home Sunday but should continue to wear mask for 5 more days.

## 2022-08-25 NOTE — TELEPHONE ENCOUNTER
Patient has been notified and verbalized understanding.  She leaves Saturday for [place].  Does her isolation start the day her symptoms started b/c they started 3 days ago.

## 2022-10-17 ENCOUNTER — OFFICE VISIT (OUTPATIENT)
Dept: ORTHOPEDIC SURGERY | Facility: CLINIC | Age: 72
End: 2022-10-17

## 2022-10-17 VITALS — SYSTOLIC BLOOD PRESSURE: 118 MMHG | HEIGHT: 65 IN | DIASTOLIC BLOOD PRESSURE: 77 MMHG | BODY MASS INDEX: 28.29 KG/M2

## 2022-10-17 DIAGNOSIS — Z96.651 STATUS POST TOTAL RIGHT KNEE REPLACEMENT: Primary | ICD-10-CM

## 2022-10-17 PROCEDURE — 99213 OFFICE O/P EST LOW 20 MIN: CPT | Performed by: ORTHOPAEDIC SURGERY

## 2022-10-17 ASSESSMENT — KOOS JR
KOOS JR SCORE: 65.994
KOOS JR SCORE: 8

## 2022-10-17 NOTE — PROGRESS NOTES
"      [facility] Note    Subjective     CC: Follow-up (1 yr f/u Total Knee Arthroplasty Right 10/29/21)      HPI    Barbara Coelho is a 72 y.o. female.  She is doing well.  No new complaints    Review of Systems   Musculoskeletal: Positive for arthralgias.   All other systems reviewed and are negative.      ROS:    Constiutional:Pt denies fever, chills, nausea, or vomiting.  MSK:as above      Objective      Past Medical History  Past Medical History:   Diagnosis Date   • Allergic rhinitis     on allergy shots   • Asthma     ALLERGIC   • Back pain     Low   • Bladder infection     Recurrent Bladder Infections   • Diverticulitis    • GERD (gastroesophageal reflux disease)     EGD 12/16 Arcos - reflux, HH. no barretts   • Hearing loss    • History of DVT of lower extremity     left leg   • Knee pain, right    • Mixed connective tissue disease ([facility]) 01/2019    Dr. Thakur   • CARITO (obstructive sleep apnea) 2018   • Osteopenia 03/28/2018    calculated frax - 11/3%   • Pinched nerve in neck    • Rheumatoid arthritis ([facility]) 2018   • Staph infection    • Urinary incontinence    • Wears contact lenses    • Wears prescription eyeglasses          Physical Exam  /77   Ht 165.1 cm (65\")   LMP  (LMP Unknown) Comment: LAST MAMMOGRAM 2020  BMI 28.29 kg/m²     Body mass index is 28.29 kg/m².    Patient is well nourished and well developed.        Ortho Exam  Her exam is unchanged.  She has normal gait    Imaging/Labs/EMG Reviewed:  Imaging Results (Last 24 Hours)     Procedure Component Value Units Date/Time    XR Knee 3+ View With [place] Right [[id number]] Resulted: 10/17/22 1036     Updated: 10/17/22 1037    Narrative:      TKA X-Ray  Indication: status-post TKA     AP, Lateral, and Sunrise views of Right knee     Findings:  No signs of fracture  No signs of loosening  No change compared to prior study  Components are well aligned              Assessment:  1. Status post total right knee replacement  "       Plan:  1. Recommend over the counter anti-inflammatories for pain and/or swelling  2. She will follow-up for her annual x-ray and evaluation of her knee placement.  She is doing well.    Follow Up:   Return if symptoms worsen or fail to improve.      Medical Decision Making  Management Options : Low - OTC Drugs        Emeka Douglas M.D., [facility]  Orthopedic Surgeon  Fellowship Trained Sports Medicine  [facility]  Orthopedics and Sports Medicine  [facility], [address]  [place] [address]    EMR Dragon/Transcription disclaimer:  Much of this encounter note is an electronic transcription of spoken language to printed text. Electronic transcription of spoken language may permit erroneous, or at times, nonsensical words or phrases to be inadvertently transcribed. Although I have reviewed the note for such errors, some may still exist.

## 2023-02-17 ENCOUNTER — TELEPHONE (OUTPATIENT)
Dept: INTERNAL MEDICINE | Facility: CLINIC | Age: 73
End: 2023-02-17
Payer: MEDICARE

## 2023-02-17 RX ORDER — TRAZODONE HYDROCHLORIDE 50 MG/1
50 TABLET ORAL NIGHTLY
Qty: 30 TABLET | Refills: 1 | Status: SHIPPED | OUTPATIENT
Start: 2023-02-17 | End: 2023-03-30

## 2023-02-17 NOTE — TELEPHONE ENCOUNTER
Patient has recently moved to [place], [place].  She is having trouble sleeping and wanted to know if Dr. Martel would send her in some trazadone.    LV: 8/1/22 w/ Dr. Maradiaga; 12/09/21 w/ Dr. Martel  Patient has not got a primary care provider yet.

## 2023-03-13 RX ORDER — TRAZODONE HYDROCHLORIDE 50 MG/1
TABLET ORAL
Qty: 30 TABLET | Refills: 1 | OUTPATIENT
Start: 2023-03-13

## 2023-03-13 NOTE — TELEPHONE ENCOUNTER
LV: 08/01/2022-Dr. Maradiaga  NV: due around 8/1/23-Medicare Wellness    Called and spoke with patient, she states that she has moved and will no longer be seeing Dr. Martel. She does not need refill to hold her over until appt with new PCP.

## 2023-03-30 ENCOUNTER — OFFICE VISIT (OUTPATIENT)
Dept: FAMILY MEDICINE CLINIC | Facility: CLINIC | Age: 73
End: 2023-03-30
Payer: MEDICARE

## 2023-03-30 VITALS
HEART RATE: 78 BPM | DIASTOLIC BLOOD PRESSURE: 80 MMHG | SYSTOLIC BLOOD PRESSURE: 122 MMHG | WEIGHT: 166 LBS | HEIGHT: 65 IN | OXYGEN SATURATION: 98 % | BODY MASS INDEX: 27.66 KG/M2

## 2023-03-30 DIAGNOSIS — J45.20 MILD INTERMITTENT ASTHMA WITHOUT COMPLICATION: ICD-10-CM

## 2023-03-30 DIAGNOSIS — R73.03 PREDIABETES: ICD-10-CM

## 2023-03-30 DIAGNOSIS — M47.816 SPONDYLOSIS OF LUMBAR REGION WITHOUT MYELOPATHY OR RADICULOPATHY: Primary | ICD-10-CM

## 2023-03-30 DIAGNOSIS — M85.80 OSTEOPENIA, UNSPECIFIED LOCATION: ICD-10-CM

## 2023-03-30 DIAGNOSIS — Z00.00 PREVENTATIVE HEALTH CARE: ICD-10-CM

## 2023-03-30 DIAGNOSIS — Z78.0 ASYMPTOMATIC MENOPAUSE: ICD-10-CM

## 2023-03-30 DIAGNOSIS — Z12.11 SCREENING FOR MALIGNANT NEOPLASM OF COLON: ICD-10-CM

## 2023-03-30 DIAGNOSIS — M51.36 DDD (DEGENERATIVE DISC DISEASE), LUMBAR: ICD-10-CM

## 2023-03-30 DIAGNOSIS — M06.9 RHEUMATOID ARTHRITIS INVOLVING BOTH KNEES, UNSPECIFIED WHETHER RHEUMATOID FACTOR PRESENT: ICD-10-CM

## 2023-03-30 DIAGNOSIS — G47.33 OSA (OBSTRUCTIVE SLEEP APNEA): ICD-10-CM

## 2023-03-30 DIAGNOSIS — Z12.31 ENCOUNTER FOR SCREENING MAMMOGRAM FOR MALIGNANT NEOPLASM OF BREAST: ICD-10-CM

## 2023-03-30 DIAGNOSIS — M35.1 MIXED CONNECTIVE TISSUE DISEASE: Chronic | ICD-10-CM

## 2023-03-30 DIAGNOSIS — Z96.651 STATUS POST TOTAL RIGHT KNEE REPLACEMENT: ICD-10-CM

## 2023-03-30 PROCEDURE — 85025 COMPLETE CBC W/AUTO DIFF WBC: CPT | Performed by: NURSE PRACTITIONER

## 2023-03-30 PROCEDURE — 80053 COMPREHEN METABOLIC PANEL: CPT | Performed by: NURSE PRACTITIONER

## 2023-03-30 PROCEDURE — 85007 BL SMEAR W/DIFF WBC COUNT: CPT | Performed by: NURSE PRACTITIONER

## 2023-03-30 PROCEDURE — 86140 C-REACTIVE PROTEIN: CPT | Performed by: NURSE PRACTITIONER

## 2023-03-30 RX ORDER — NABUMETONE 500 MG/1
1000 TABLET, FILM COATED ORAL DAILY
Qty: 180 TABLET | Refills: 1 | Status: SHIPPED | OUTPATIENT
Start: 2023-03-30

## 2023-03-30 RX ORDER — NIFEDIPINE 30 MG/1
30 TABLET, EXTENDED RELEASE ORAL DAILY
COMMUNITY
End: 2023-03-30 | Stop reason: SDUPTHER

## 2023-03-30 RX ORDER — FLUTICASONE PROPIONATE AND SALMETEROL 500; 50 UG/1; UG/1
1 POWDER RESPIRATORY (INHALATION) 2 TIMES DAILY
Qty: 180 EACH | Refills: 1 | Status: SHIPPED | OUTPATIENT
Start: 2023-03-30

## 2023-03-30 RX ORDER — TRAZODONE HYDROCHLORIDE 100 MG/1
100 TABLET ORAL NIGHTLY
Qty: 90 TABLET | Refills: 1 | Status: SHIPPED | OUTPATIENT
Start: 2023-03-30 | End: 2023-06-28

## 2023-03-30 RX ORDER — GABAPENTIN 100 MG/1
100 CAPSULE ORAL 3 TIMES DAILY
Qty: 90 CAPSULE | Refills: 1 | Status: SHIPPED | OUTPATIENT
Start: 2023-03-30

## 2023-03-30 RX ORDER — NIFEDIPINE 30 MG/1
30 TABLET, EXTENDED RELEASE ORAL DAILY
Qty: 90 TABLET | Refills: 1 | Status: SHIPPED | OUTPATIENT
Start: 2023-03-30

## 2023-03-30 RX ORDER — HYDROXYCHLOROQUINE SULFATE 200 MG/1
200 TABLET, FILM COATED ORAL 2 TIMES DAILY
Qty: 60 TABLET | Refills: 2 | Status: SHIPPED | OUTPATIENT
Start: 2023-03-30 | End: 2023-04-29

## 2023-03-30 RX ORDER — ALBUTEROL SULFATE 90 UG/1
2 AEROSOL, METERED RESPIRATORY (INHALATION) EVERY 4 HOURS PRN
Qty: 8 G | Refills: 1 | Status: SHIPPED | OUTPATIENT
Start: 2023-03-30

## 2023-03-30 NOTE — ASSESSMENT & PLAN NOTE
1.  Stable  2.  Continue Wixela 1 puff twice daily  3.  Albuterol as needed for dyspnea or wheezing

## 2023-03-30 NOTE — PROGRESS NOTES
Venipuncture Blood Specimen Collection  Venipuncture performed in the left arm by Bernie Gonzalez MA with good hemostasis. Patient tolerated the procedure well without complications.   03/30/23   Bernie Gonzalez MA

## 2023-03-30 NOTE — PROGRESS NOTES
"Chief Complaint  Establish Care (Would like a referral for colonoscopy and mammogram as well. Just moved from [place].) and Arthritis (Requesting referral to rheumatologist )    Subjective        Barbara Coelho presents to [facility]  History of Present Illness    Patient presents to establish care.     Patient has history of Asthma, stable on Wixela and Albuterol PRN. She also receives allergy shots, followed by [facility].    Patient has history DVT, not on any anti-coagulants. She reports 1 episode, not recurrent.     Patient has RA, taking Plaquenil and Relafen as prescribed. She was followed by Rheumatology in [place], needs local referral. Patient has had right TKR. She reports chronic back and bilateral knee pain. Patient did have epidural injections to her spine, previously followed by Pain Management. She also takes Gabapentin 100 mg TID.     Patient has history GERD, manages with diet.    Patient wears CPAP nightly for CARITO. She has no acute complaints.     Patient has history of Osteopenia, DEXA scan last completed in 12/2020. She takes supplemental Vitamin D.     Most recent labs revealed prediabetes with an A1c of 5.8%.  She is managing with diet and exercise.    Objective   Vital Signs:  /80 (BP Location: Left arm, Patient Position: Sitting, Cuff Size: Adult)   Pulse 78   Ht 165.1 cm (65\")   Wt 75.3 kg (166 lb)   SpO2 98%   BMI 27.62 kg/m²   Estimated body mass index is 27.62 kg/m² as calculated from the following:    Height as of this encounter: 165.1 cm (65\").    Weight as of this encounter: 75.3 kg (166 lb).       BMI is >= 25 and <30. (Overweight) The following options were offered after discussion;: exercise counseling/recommendations and nutrition counseling/recommendations      Physical Exam  Constitutional:       Appearance: Normal appearance.   HENT:      Head: Normocephalic.   Cardiovascular:      Rate and Rhythm: Normal rate and regular " rhythm.   Pulmonary:      Effort: Pulmonary effort is normal.      Breath sounds: Normal breath sounds.   Abdominal:      General: Abdomen is flat. Bowel sounds are normal.      Palpations: Abdomen is soft.   Musculoskeletal:         General: Normal range of motion.      Cervical back: Neck supple.      Right lower leg: No edema.      Left lower leg: No edema.   Skin:     General: Skin is warm and dry.   Neurological:      Mental Status: She is alert and oriented to person, place, and time.      Gait: Gait is intact.   Psychiatric:         Attention and Perception: Attention normal.         Mood and Affect: Mood normal.         Speech: Speech normal.        Result Review :    CMP    CMP 8/1/22   Glucose 90   BUN 10   Creatinine 0.90   eGFR 67.6   Sodium 145   Potassium 4.2   Chloride 107   Calcium 9.4   Total Protein 6.1   Albumin 4.40   Globulin 1.7   Total Bilirubin 0.4   Alkaline Phosphatase 92   AST (SGOT) 17   ALT (SGPT) 11   Albumin/Globulin Ratio 2.6   BUN/Creatinine Ratio 11.1   Anion Gap 12.0      Comments are available for some flowsheets but are not being displayed.           CBC    CBC 6/20/22   WBC 12.8 (A)   RBC 4.58   Hemoglobin 13.2   Hematocrit 40.5   MCV 88   MCH 28.8   MCHC 32.6   RDW 12.3   Platelets 211   (A) Abnormal value            Lipid Panel    Lipid Panel 8/1/22   Total Cholesterol 182   Triglycerides 85   HDL Cholesterol 79 (A)   VLDL Cholesterol 15   LDL Cholesterol  88   LDL/HDL Ratio 1.09   (A) Abnormal value                Most Recent A1C    HGBA1C Most Recent 8/1/22   Hemoglobin A1C 5.80 (A)   (A) Abnormal value                             Assessment and Plan   Diagnoses and all orders for this visit:    1. Spondylosis of lumbar region without myelopathy or radiculopathy (Primary)  -     Ambulatory Referral to Pain Management    2. Status post total right knee replacement  -     gabapentin (Neurontin) 100 MG capsule; Take 1 capsule by mouth 3 (Three) Times a Day.  Dispense: 90  capsule; Refill: 1    3. CARITO (obstructive sleep apnea)  Assessment & Plan:  1.  Continue use of CPAP      4. DDD (degenerative disc disease), lumbar  Assessment & Plan:  1.  Refer to local pain management  2.  Continue gabapentin as prescribed    Orders:  -     Ambulatory Referral to Pain Management    5. Mixed connective tissue disease (HCC)  Assessment & Plan:  1.  Continue Plaquenil and Relafen as prescribed  2.  Refer to Rheumatology    Orders:  -     Ambulatory Referral to Rheumatology  -     Ambulatory Referral to Pain Management    6. Screening for malignant neoplasm of colon  -     Ambulatory Referral For Screening Colonoscopy    7. Encounter for screening mammogram for malignant neoplasm of breast  -     Mammo Screening Digital Tomosynthesis Bilateral With CAD; Future    8. Rheumatoid arthritis involving both knees, unspecified whether rheumatoid factor present (HCC)  -     Ambulatory Referral to Pain Management  -     CBC & Differential  -     Comprehensive Metabolic Panel  -     C-reactive Protein    9. Asymptomatic menopause  -     DEXA Bone Density Axial; Future    10. Osteopenia, unspecified location  Assessment & Plan:  1.  Continue vitamin D supplementation  2.  DEXA scan ordered      11. Prediabetes  Assessment & Plan:  1.  Labs reviewed  2.  Continue to manage with diet and exercise          12. Mild intermittent asthma without complication  Assessment & Plan:  1.  Stable  2.  Continue Wixela 1 puff twice daily  3.  Albuterol as needed for dyspnea or wheezing          13. Preventative health care  Assessment & Plan:  1.  Mammogram ordered  2.  Referred for colonoscopy  3.  DEXA scan ordered      Other orders  -     NIFEdipine XL (PROCARDIA XL) 30 MG 24 hr tablet; Take 1 tablet by mouth Daily.  Dispense: 90 tablet; Refill: 1  -     traZODone (DESYREL) 100 MG tablet; Take 1 tablet by mouth Every Night for 90 days.  Dispense: 90 tablet; Refill: 1  -     albuterol sulfate HFA (Ventolin HFA) 108 (90  Base) MCG/ACT inhaler; Inhale 2 puffs Every 4 (Four) Hours As Needed for Wheezing.  Dispense: 8 g; Refill: 1  -     hydroxychloroquine (PLAQUENIL) 200 MG tablet; Take 1 tablet by mouth 2 (Two) Times a Day for 30 days. Indications: Rheumatoid Arthritis  Dispense: 60 tablet; Refill: 2  -     Fluticasone-Salmeterol (Wixela Inhub) 500-50 MCG/ACT DISKUS; Inhale 1 puff 2 (Two) Times a Day.  Dispense: 180 each; Refill: 1  -     nabumetone (RELAFEN) 500 MG tablet; Take 2 tablets by mouth Daily.  Dispense: 180 tablet; Refill: 1  -     Pneumococcal Conjugate Vaccine 20-Valent (PCV20)         I spent 35 minutes caring for Barbara on this date of service. This time includes time spent by me in the following activities:preparing for the visit, reviewing tests, obtaining and/or reviewing a separately obtained history, performing a medically appropriate examination and/or evaluation , counseling and educating the patient/family/caregiver, ordering medications, tests, or procedures, referring and communicating with other health care professionals , documenting information in the medical record and care coordination  Follow Up   Return in about 6 months (around 9/30/2023) for RA, Asthma.  Patient was given instructions and counseling regarding her condition or for health maintenance advice. Please see specific information pulled into the AVS if appropriate.

## 2023-03-31 LAB
ALBUMIN SERPL-MCNC: 4.3 G/DL (ref 3.5–5.2)
ALBUMIN/GLOB SERPL: 2 G/DL
ALP SERPL-CCNC: 88 U/L (ref 39–117)
ALT SERPL W P-5'-P-CCNC: 18 U/L (ref 1–33)
ANION GAP SERPL CALCULATED.3IONS-SCNC: 10.7 MMOL/L (ref 5–15)
AST SERPL-CCNC: 33 U/L (ref 1–32)
BILIRUB SERPL-MCNC: 0.5 MG/DL (ref 0–1.2)
BUN SERPL-MCNC: 22 MG/DL (ref 8–23)
BUN/CREAT SERPL: 20.8 (ref 7–25)
CALCIUM SPEC-SCNC: 9.7 MG/DL (ref 8.6–10.5)
CHLORIDE SERPL-SCNC: 105 MMOL/L (ref 98–107)
CO2 SERPL-SCNC: 25.3 MMOL/L (ref 22–29)
CREAT SERPL-MCNC: 1.06 MG/DL (ref 0.57–1)
CRP SERPL-MCNC: 0.32 MG/DL (ref 0–0.5)
DEPRECATED RDW RBC AUTO: 42 FL (ref 37–54)
EGFRCR SERPLBLD CKD-EPI 2021: 55.2 ML/MIN/1.73
EOSINOPHIL # BLD MANUAL: 0.47 10*3/MM3 (ref 0–0.4)
EOSINOPHIL NFR BLD MANUAL: 2 % (ref 0.3–6.2)
ERYTHROCYTE [DISTWIDTH] IN BLOOD BY AUTOMATED COUNT: 13.1 % (ref 12.3–15.4)
GLOBULIN UR ELPH-MCNC: 2.2 GM/DL
GLUCOSE SERPL-MCNC: 72 MG/DL (ref 65–99)
HCT VFR BLD AUTO: 38.9 % (ref 34–46.6)
HGB BLD-MCNC: 13.1 G/DL (ref 12–15.9)
LYMPHOCYTES # BLD MANUAL: 19.16 10*3/MM3 (ref 0.7–3.1)
LYMPHOCYTES NFR BLD MANUAL: 2 % (ref 5–12)
MCH RBC QN AUTO: 29.6 PG (ref 26.6–33)
MCHC RBC AUTO-ENTMCNC: 33.7 G/DL (ref 31.5–35.7)
MCV RBC AUTO: 87.8 FL (ref 79–97)
MONOCYTES # BLD: 0.47 10*3/MM3 (ref 0.1–0.9)
NEUTROPHILS # BLD AUTO: 3.55 10*3/MM3 (ref 1.7–7)
NEUTROPHILS NFR BLD MANUAL: 15 % (ref 42.7–76)
PLAT MORPH BLD: NORMAL
PLATELET # BLD AUTO: 199 10*3/MM3 (ref 140–450)
PMV BLD AUTO: 10.5 FL (ref 6–12)
POTASSIUM SERPL-SCNC: 4.3 MMOL/L (ref 3.5–5.2)
PROT SERPL-MCNC: 6.5 G/DL (ref 6–8.5)
RBC # BLD AUTO: 4.43 10*6/MM3 (ref 3.77–5.28)
RBC MORPH BLD: NORMAL
SODIUM SERPL-SCNC: 141 MMOL/L (ref 136–145)
VARIANT LYMPHS NFR BLD MANUAL: 81 % (ref 19.6–45.3)
WBC MORPH BLD: NORMAL
WBC NRBC COR # BLD: 23.65 10*3/MM3 (ref 3.4–10.8)

## 2023-03-31 NOTE — PROGRESS NOTES
Please let patient know that her white blood cell count is quite elevated.  She consistently runs a little high because of her autoimmune disease but this is higher than even her baseline.  The lymphocyte count is elevated which indicates a possible recent illness.  Again, this has been elevated in the past but it is higher than normal.  I would like to repeat a CBC with differential in 2 weeks.  Patient did not mention feeling ill yesterday, please verify with her that she does not have a fever or any other signs of illness.  This could be autoimmune activity but I want to monitor

## 2023-04-26 ENCOUNTER — OFFICE VISIT (OUTPATIENT)
Dept: PAIN MEDICINE | Facility: CLINIC | Age: 73
End: 2023-04-26
Payer: MEDICARE

## 2023-04-26 VITALS
RESPIRATION RATE: 16 BRPM | HEART RATE: 61 BPM | OXYGEN SATURATION: 100 % | DIASTOLIC BLOOD PRESSURE: 56 MMHG | SYSTOLIC BLOOD PRESSURE: 137 MMHG

## 2023-04-26 DIAGNOSIS — G89.4 CHRONIC PAIN SYNDROME: Primary | ICD-10-CM

## 2023-04-26 DIAGNOSIS — M47.817 LUMBOSACRAL SPONDYLOSIS WITHOUT MYELOPATHY: ICD-10-CM

## 2023-04-26 DIAGNOSIS — M54.16 LUMBAR RADICULITIS: ICD-10-CM

## 2023-04-26 RX ORDER — NIFEDIPINE 30 MG/1
1 TABLET, EXTENDED RELEASE ORAL EVERY 24 HOURS
COMMUNITY
Start: 2022-11-23

## 2023-04-26 RX ORDER — MECLIZINE HYDROCHLORIDE 25 MG/1
TABLET ORAL
COMMUNITY
Start: 2023-04-25

## 2023-04-26 RX ORDER — DIAZEPAM 10 MG/1
10 TABLET ORAL ONCE
Qty: 1 TABLET | Refills: 0 | Status: SHIPPED | OUTPATIENT
Start: 2023-04-26 | End: 2023-04-26

## 2023-04-26 RX ORDER — LEVOCETIRIZINE DIHYDROCHLORIDE 5 MG/1
TABLET, FILM COATED ORAL
COMMUNITY
Start: 2023-04-25

## 2023-04-26 NOTE — PROGRESS NOTES
Subjective   CC back pain, right leg pain  Barbara Coelho is a 73 y.o. female with chronic back pain, history of laminectomy, here for evaluation.  Referred by PCP.  Complains of worsening back pain radiating to right leg anterior thigh, knee and lower leg and foot constant but worse with standing walking or any activity.  Denies new weakness, saddle anesthesia, bladder or bowel incontinence.  Similar symptoms 6 months ago had good relief with LESI done in [place].  She has tried physical therapy, home exercise program in the past without relief.  Pain is interfering with ADL and sleep    Imaging reviewed  L-spine MRI 2022 Status post right L5 hemilaminectomy without evidence for hypertrophic scarring component. Noted right lateralizing paracentral protrusion of residual and/or recurrent disc bulge at the L5-S1 level which appears to contact the right-sided nerve roots. No severe stenosis of the spinal canal or neuroforamina at this level however.    Pain Assessment   Location of Pain: Lower Back, R Hip, L Hip, R Leg,  Description of Pain: Dull/Aching, Throbbing, Stabbing  Previous Pain Rating :5  Current Pain Ratin  Aggravating Factors: Activity  Alleviating Factors: Rest, Medication  Pain onset over 12 weeks  Interferes with ADL's.   [place] back pain disability scale on chart    PEG Assessment   What number best describes your pain on average in the past week?5  What number best describes how, during the past week, pain has interfered with your enjoyment of life?4  What number best describes how, during the past week, pain has interfered with your general activity? 10     The following portions of the patient's history were reviewed and updated as appropriate: allergies, current medications, past family history, past medical history, past social history, past surgical history and problem list.     has a past medical history of Allergic (), Allergic rhinitis, Asthma, Back pain, Bladder infection, Deep  vein thrombosis (1989), Diverticulitis, GERD (gastroesophageal reflux disease), Hearing loss, History of DVT of lower extremity, Knee pain, right, Leg pain, Low back pain (1990), Mixed connective tissue disease (01/2019), CARITO (obstructive sleep apnea) (2018), Osteopenia (03/28/2018), Pinched nerve in neck, Rheumatoid arthritis (2018), Staph infection, Urinary incontinence, Wears contact lenses, and Wears prescription eyeglasses.   has a past surgical history that includes Gastric bypass; Sinus surgery; Other surgical history (1985); Tubal ligation (1980); Back surgery (1995); Colonoscopy (2016); Replacement total knee (Left, 2012); Breast excisional biopsy (Left, 1985); cholecystectomy with intraoperative cholangiogram (N/A, 04/26/2018); [place] tooth extraction; Total knee arthroplasty (Right, 10/29/2021); Bariatric Surgery (1985); Breast surgery (1990); Cholecystectomy (2019); and Joint replacement (2012,2020).  family history includes Alcohol abuse in her father; Breast cancer (age of onset: 30) in her sister; Cancer in her father, mother, paternal grandfather, paternal grandmother, sister, and another family member; Connective Tissue Disease in her sister; Hearing loss in her mother; Hypertension in her sister; Kidney cancer in an other family member; Lung cancer in an other family member; Mental illness in her father; Other in her mother.  Social History     Tobacco Use   • Smoking status: Never     Passive exposure: Never   • Smokeless tobacco: Never   Substance Use Topics   • Alcohol use: Yes     Comment: occ       Review of Systems   Musculoskeletal: Positive for arthralgias and back pain.   All other systems reviewed and are negative.      Objective   Physical Exam  Vitals reviewed.   Constitutional:       General: She is not in acute distress.  Pulmonary:      Effort: Pulmonary effort is normal.   Musculoskeletal:      Lumbar back: Tenderness present. Decreased range of motion. Positive right straight leg  raise test.      Comments: Lumbar loading positive, pain on extension of low back past 5 degrees.  TTP on the lumbar facets noted.         /56   Pulse 61   Resp 16   LMP  (LMP Unknown) Comment: LAST MAMMOGRAM 2020  SpO2 100%     PHQ 9 on chart  Opioid risk tool low risk      Assessment & Plan   Diagnoses and all orders for this visit:    1. Chronic pain syndrome (Primary)  -     diazePAM (Valium) 10 MG tablet; Take 1 tablet by mouth 1 (One) Time for 1 dose. Take 30 min before procedure  Dispense: 1 tablet; Refill: 0    2. Lumbosacral spondylosis without myelopathy    3. Lumbar radiculitis  -     Epidural Block    Summary  Barbara Coelho is a 73 y.o. female with chronic back pain hx of laminectomy, here for evaluation.  Referred by PCP.  Complains of worsening back pain radiating to right leg anterior thigh, knee and lower leg and foot constant but worse with standing walking or any activity.  Denies new weakness, saddle anesthesia, bladder or bowel incontinence.  Similar symptoms 6 months ago had good relief with LESI done in [place].  She has tried physical therapy, home exercise program in the past without relief.  Pain is interfering with ADL and sleep    Worsening back pain from DDD spondylosis with right lower extremity radicular pain.  LESI with 80% relief lasted several months.  Symptoms have now returned and interfering with ADL  We will schedule for repeat LESI.  Risks and benefits discussed.  Valium prescribed for procedure anxiety.    Discussed risk of tolerance, dependence, respiratory depression, coma and death associated with use of oral opioids for treatment of chronic nonmalignant pain.         Note is dictated utilizing voice recognition software. Unfortunately this leads to occasional typographical errors. I apologize in advance if the situation occurs. If questions occur please do not hesitate to call our office.

## 2023-05-04 ENCOUNTER — CLINICAL SUPPORT (OUTPATIENT)
Dept: FAMILY MEDICINE CLINIC | Facility: CLINIC | Age: 73
End: 2023-05-04
Payer: MEDICARE

## 2023-05-04 DIAGNOSIS — D72.829 LEUKOCYTOSIS, UNSPECIFIED TYPE: Primary | ICD-10-CM

## 2023-05-04 PROCEDURE — 36415 COLL VENOUS BLD VENIPUNCTURE: CPT | Performed by: NURSE PRACTITIONER

## 2023-05-04 PROCEDURE — 85025 COMPLETE CBC W/AUTO DIFF WBC: CPT | Performed by: NURSE PRACTITIONER

## 2023-05-04 PROCEDURE — 85007 BL SMEAR W/DIFF WBC COUNT: CPT | Performed by: NURSE PRACTITIONER

## 2023-05-04 NOTE — PROGRESS NOTES
Venipuncture Blood Specimen Collection  Venipuncture performed in the left arm by Bernie Gonzalez MA with good hemostasis. Patient tolerated the procedure well without complications.   05/04/23   Bernie Gonzalez MA

## 2023-05-05 DIAGNOSIS — D72.829 LEUKOCYTOSIS, UNSPECIFIED TYPE: Primary | ICD-10-CM

## 2023-05-05 LAB
DEPRECATED RDW RBC AUTO: 38.6 FL (ref 37–54)
EOSINOPHIL # BLD MANUAL: 0.24 10*3/MM3 (ref 0–0.4)
EOSINOPHIL NFR BLD MANUAL: 1 % (ref 0.3–6.2)
ERYTHROCYTE [DISTWIDTH] IN BLOOD BY AUTOMATED COUNT: 12.6 % (ref 12.3–15.4)
HCT VFR BLD AUTO: 38.5 % (ref 34–46.6)
HGB BLD-MCNC: 13.1 G/DL (ref 12–15.9)
LYMPHOCYTES # BLD MANUAL: 21.75 10*3/MM3 (ref 0.7–3.1)
LYMPHOCYTES NFR BLD MANUAL: 1 % (ref 5–12)
MCH RBC QN AUTO: 29.1 PG (ref 26.6–33)
MCHC RBC AUTO-ENTMCNC: 34 G/DL (ref 31.5–35.7)
MCV RBC AUTO: 85.6 FL (ref 79–97)
MONOCYTES # BLD: 0.24 10*3/MM3 (ref 0.1–0.9)
NEUTROPHILS # BLD AUTO: 1.42 10*3/MM3 (ref 1.7–7)
NEUTROPHILS NFR BLD MANUAL: 6 % (ref 42.7–76)
NRBC BLD AUTO-RTO: 0.1 /100 WBC (ref 0–0.2)
PLAT MORPH BLD: NORMAL
PLATELET # BLD AUTO: 194 10*3/MM3 (ref 140–450)
PMV BLD AUTO: 10 FL (ref 6–12)
RBC # BLD AUTO: 4.5 10*6/MM3 (ref 3.77–5.28)
RBC MORPH BLD: NORMAL
SMUDGE CELLS BLD QL SMEAR: ABNORMAL
VARIANT LYMPHS NFR BLD MANUAL: 92 % (ref 19.6–45.3)
WBC NRBC COR # BLD: 23.64 10*3/MM3 (ref 3.4–10.8)

## 2023-05-05 NOTE — PROGRESS NOTES
There is been no change in her white blood cell count over the last month.  Because there are no signs or symptoms of infection and she is not on any steroids, I am referring her to hematology for further evaluation

## 2023-05-12 ENCOUNTER — HOSPITAL ENCOUNTER (OUTPATIENT)
Dept: BONE DENSITY | Facility: HOSPITAL | Age: 73
Discharge: HOME | End: 2023-05-12
Payer: MEDICARE

## 2023-05-12 ENCOUNTER — HOSPITAL ENCOUNTER (OUTPATIENT)
Dept: MAMMOGRAPHY | Facility: HOSPITAL | Age: 73
Discharge: HOME | End: 2023-05-12
Payer: MEDICARE

## 2023-05-12 DIAGNOSIS — Z12.31 ENCOUNTER FOR SCREENING MAMMOGRAM FOR MALIGNANT NEOPLASM OF BREAST: ICD-10-CM

## 2023-05-12 DIAGNOSIS — M81.0 AGE-RELATED OSTEOPOROSIS WITHOUT CURRENT PATHOLOGICAL FRACTURE: Primary | ICD-10-CM

## 2023-05-12 DIAGNOSIS — Z78.0 ASYMPTOMATIC MENOPAUSE: ICD-10-CM

## 2023-05-12 PROCEDURE — 77067 SCR MAMMO BI INCL CAD: CPT

## 2023-05-12 PROCEDURE — 77080 DXA BONE DENSITY AXIAL: CPT

## 2023-05-12 PROCEDURE — 77063 BREAST TOMOSYNTHESIS BI: CPT

## 2023-05-12 NOTE — PROGRESS NOTES
Please let patient know that her DEXA scan is now showing osteoporosis.  There are medications we can use to treat build bone.  Those medications are dosed weekly in pill form or we can arrange infusions every 6 months.  Continue vitamin D.  If patient is agreeable to either treatment, let me know

## 2023-05-15 ENCOUNTER — HOSPITAL ENCOUNTER (OUTPATIENT)
Dept: PAIN MEDICINE | Facility: HOSPITAL | Age: 73
Discharge: HOME | End: 2023-05-15
Payer: MEDICARE

## 2023-05-15 VITALS
HEART RATE: 59 BPM | WEIGHT: 170 LBS | TEMPERATURE: 97.6 F | HEIGHT: 63 IN | RESPIRATION RATE: 16 BRPM | BODY MASS INDEX: 30.12 KG/M2 | DIASTOLIC BLOOD PRESSURE: 74 MMHG | SYSTOLIC BLOOD PRESSURE: 117 MMHG | OXYGEN SATURATION: 98 %

## 2023-05-15 DIAGNOSIS — M54.16 LUMBAR RADICULITIS: ICD-10-CM

## 2023-05-15 DIAGNOSIS — R52 PAIN: ICD-10-CM

## 2023-05-15 PROCEDURE — 62323 NJX INTERLAMINAR LMBR/SAC: CPT | Performed by: ANESTHESIOLOGY

## 2023-05-15 PROCEDURE — 77003 FLUOROGUIDE FOR SPINE INJECT: CPT

## 2023-05-15 PROCEDURE — 25510000001 IOPAMIDOL 41 % SOLUTION: Performed by: ANESTHESIOLOGY

## 2023-05-15 PROCEDURE — 25010000002 METHYLPREDNISOLONE PER 40 MG: Performed by: ANESTHESIOLOGY

## 2023-05-15 RX ORDER — METHYLPREDNISOLONE ACETATE 40 MG/ML
40 INJECTION, SUSPENSION INTRA-ARTICULAR; INTRALESIONAL; INTRAMUSCULAR; SOFT TISSUE ONCE
Status: COMPLETED | OUTPATIENT
Start: 2023-05-15 | End: 2023-05-15

## 2023-05-15 RX ORDER — DIAZEPAM 10 MG/1
TABLET ORAL
COMMUNITY
Start: 2023-04-26

## 2023-05-15 RX ADMIN — IOPAMIDOL 3 ML: 408 INJECTION, SOLUTION INTRATHECAL at 08:51

## 2023-05-15 RX ADMIN — METHYLPREDNISOLONE ACETATE 40 MG: 40 INJECTION, SUSPENSION INTRA-ARTICULAR; INTRALESIONAL; INTRAMUSCULAR; INTRASYNOVIAL; SOFT TISSUE at 08:51

## 2023-05-15 NOTE — DISCHARGE INSTRUCTIONS

## 2023-05-15 NOTE — PROCEDURES
"Subjective   CC back pain  Barbara Coelho is a 73 y.o. female with lumbar radiculitis here for LESI.  No anticoagulation    Pain Assessment   Location of Pain: Lower Back, R Hip, L Hip,  Leg,   Description of Pain: Dull/Aching, Throbbing, Stabbing  Previous Pain Rating :5  Current Pain Ratin  Aggravating Factors: Activity  Alleviating Factors: Rest, Medication  Pain onset over 12 weeks  Pain interferes with ADL's    The following portions of the patient's history were reviewed and updated as appropriate: allergies, current medications, past family history, past medical history, past social history, past surgical history and problem list.      Review of Systems  As in HPI  Objective   Physical Exam  /74 (BP Location: Left arm, Patient Position: Sitting)   Pulse 59   Temp 97.6 °F (36.4 °C) (Oral)   Resp 16   Ht 160 cm (63\")   Wt 77.1 kg (170 lb)   LMP  (LMP Unknown) Comment: LAST MAMMOGRAM   SpO2 98%   BMI 30.11 kg/m²     Assessment & Plan    underwent LESI    RTC 4-6 weeks or as needed for repeat    DATE OF PROCEDURE: 5/15/2023    PREOPERATIVE DIAGNOSIS: lumbar DDD/radiculitis    POSTOPERATIVE DIAGNOSIS: same    PROCEDURE PERFORMED:  lumbar Epidural Steroid Injection    The patient presents with a history of   lumbar degenerative disc disease with  radiculitis. The patient presents today for a  lumbar epidural steroid injection at level  L5/S1.   The patient was seen in the preoperative area.  Patient's consent was obtained and updated.  Vitals were taken.  Patient was then brought to the procedure suite and placed in a prone position. The appropriate anatomic area was widely prepped with Chloraprep and draped in a sterile fashion. Noninvasive monitoring per routine anesthesia protocol was placed.  Under fluoroscopic guidance using  AP view a 20 gauge styleted tuohy needle was passed through skin anesthetized with 1% Lidocaine without epinephrine.  The needle was advanced using the continuous " loss of resistance to saline technique into the L5 epidural space. Needle tip placement in the epidural space was confirmed by loss of resistance and injection of 1 mL of  preservative free contrast.  Following this 8 mL of a solution containing  1 mL of 40 mg Depo-Medrol and 7 mL of preservative-free saline was carefully administered in the epidural space.  A sterile dressing was placed over the puncture site.    The patient tolerated the procedure with no complications . They were then brought to the post procedure area where they recovered nicely.

## 2023-05-16 ENCOUNTER — TELEPHONE (OUTPATIENT)
Dept: PAIN MEDICINE | Facility: HOSPITAL | Age: 73
End: 2023-05-16
Payer: MEDICARE

## 2023-05-18 DIAGNOSIS — Z96.651 STATUS POST TOTAL RIGHT KNEE REPLACEMENT: ICD-10-CM

## 2023-05-18 RX ORDER — GABAPENTIN 100 MG/1
CAPSULE ORAL
Qty: 90 CAPSULE | Refills: 1 | Status: SHIPPED | OUTPATIENT
Start: 2023-05-18

## 2023-06-01 RX ORDER — ALBUTEROL SULFATE 90 UG/1
AEROSOL, METERED RESPIRATORY (INHALATION)
Qty: 8 G | Refills: 1 | Status: SHIPPED | OUTPATIENT
Start: 2023-06-01

## 2023-06-02 NOTE — PROGRESS NOTES
Hematology/Oncology Outpatient Consultation    Patient name: Barbara Coelho  : 1950  MRN: [id number]  Primary Care Physician: Liane Tran APRN  Referring Physician: Liane Tran AP*  Reason For Consult:     Chief Complaint   Patient presents with    Appointment     Leukocytosis       History of Present Illness:    73-year-old female has referred secondary to leukocytosis with differential lymphocytosis.  Patient was noted in .  Patient denies any fevers or chills or upper respiratory tract infection.  Review of her CBC indicates that her white count has ranged around 23-24,000 normal hemoglobin and normal platelets but her differentials have been 8% neutrophils 85% lymphocytes with an ANC of 2.1 and absolute lymphocyte count of 20.  Back in 2022 her white count was 12.8, globin 13.2 platelets 211 and absolute lymphocyte count was 9600.  Patient states that she is fully active and denies any night sweats, weight loss or fatigue symptoms.    Patient has a history of mixed connective tissue disease.  Patient is now retired. Patient is . She has one child.    There is family hx of kidney cancer in her mother, breast cancer breast cancer 30,  father had lung cancer, paternal GM with stomach cancer, paternal GF with lung cancer    Patient does not smoke, drinks occasional etoh     Past Medical History:   Diagnosis Date    Allergic 1967    take allergy shots    Allergic rhinitis     on allergy shots    Asthma     ALLERGIC    Back pain     Low    Bladder infection     Recurrent Bladder Infections    Deep vein thrombosis     Diverticulitis     GERD (gastroesophageal reflux disease)     EGD  [facility] - reflux, HH. no barretts    Hearing loss     History of DVT of lower extremity     left leg    Knee pain, right     Leg pain     Low back pain     had back surgery    Mixed connective tissue disease 2019    Dr. Thakur    CARITO (obstructive sleep apnea) 2018    Osteopenia  03/28/2018    calculated frax - 11/3%    Pinched nerve in neck     Rheumatoid arthritis 2018    Staph infection     Urinary incontinence     Wears contact lenses     Wears prescription eyeglasses        Past Surgical History:   Procedure Laterality Date    BACK SURGERY  1995    Back (L5/S1)    BARIATRIC SURGERY  1985    gastric bypass    BREAST EXCISIONAL BIOPSY Left 1985    BREAST SURGERY  1990    lump removed    CHOLECYSTECTOMY  2019    CHOLECYSTECTOMY WITH INTRAOPERATIVE CHOLANGIOGRAM N/A 04/26/2018    Procedure: CHOLECYSTECTOMY LAPAROSCOPIC INTRAOPERATIVE CHOLANGIOGRAM;  Surgeon: Lit Morelos MD;  Location:  [facility];  Service: General    COLONOSCOPY  2016    GASTRIC BYPASS      HIGH GASTRIC BYPASS, 1980's    JOINT REPLACEMENT  2012,2020    left knee,right knee    OTHER SURGICAL HISTORY  1985    Stomach Stabled    REPLACEMENT TOTAL KNEE Left 2012    right  2021    SINUS SURGERY      x3    TOTAL KNEE ARTHROPLASTY Right 10/29/2021    Procedure: TOTAL KNEE ARTHROPLASTY RIGHT;  Surgeon: Emeka Douglas MD;  Location:  [facility];  Service: Orthopedics;  Laterality: Right;    TUBAL ABDOMINAL LIGATION  1980    WISDOM TOOTH EXTRACTION           Current Outpatient Medications:     albuterol sulfate  (90 Base) MCG/ACT inhaler, TAKE 2 PUFFS BY MOUTH EVERY 4 HOURS AS NEEDED FOR WHEEZE, Disp: 8 g, Rfl: 1    Azelastine-Fluticasone 137-50 MCG/ACT suspension, , Disp: , Rfl:     Biotin 1 MG capsule, Take 1 capsule by mouth Daily., Disp: , Rfl:     desloratadine (CLARINEX REDITAB) 5 MG disintegrating tablet, Take 1 tablet by mouth Daily., Disp: , Rfl:     Fluticasone-Salmeterol (Wixela Inhub) 500-50 MCG/ACT DISKUS, Inhale 1 puff 2 (Two) Times a Day., Disp: 180 each, Rfl: 1    gabapentin (NEURONTIN) 100 MG capsule, TAKE 1 CAPSULE BY MOUTH THREE TIMES A DAY, Disp: 90 capsule, Rfl: 1    hydroxychloroquine (PLAQUENIL) 200 MG tablet, Take 1 tablet by mouth Every 12 (Twelve) Hours., Disp: , Rfl:     levocetirizine (XYZAL)  5 MG tablet, , Disp: , Rfl:     meclizine (ANTIVERT) 25 MG tablet, , Disp: , Rfl:     nabumetone (RELAFEN) 500 MG tablet, Take 2 tablets by mouth Daily., Disp: 180 tablet, Rfl: 1    NIFEdipine XL (PROCARDIA XL) 30 MG 24 hr tablet, Take 1 tablet by mouth Daily., Disp: , Rfl:     Polyethylene Glycol 3350 (MIRALAX PO), Take  by mouth., Disp: , Rfl:     traZODone (DESYREL) 100 MG tablet, Take 1 tablet by mouth Every Night for 90 days., Disp: 90 tablet, Rfl: 1    VITAMIN D, CHOLECALCIFEROL, PO, Take 1 capsule by mouth Daily., Disp: , Rfl:     diazePAM (VALIUM) 10 MG tablet, TAKE 1 TABLET BY MOUTH 1 (ONE) TIME FOR 1 DOSE. TAKE 30 MIN BEFORE PROCEDURE, Disp: , Rfl:     NIFEdipine XL (PROCARDIA XL) 30 MG 24 hr tablet, Take 1 tablet by mouth Daily., Disp: 90 tablet, Rfl: 1  No current facility-administered medications for this visit.    Facility-Administered Medications Ordered in Other Visits:     Chlorhexidine Gluconate Cloth 2 % pads, , Apply externally, Once, Emeka Douglas MD    mupirocin (BACTROBAN) 2 % nasal ointment, , Nasal, BID, Emeka Douglas MD    Allergies   Allergen Reactions    Celebrex [Celecoxib] Hives     HIVES     Codeine Rash    Oxycodone Rash and Hives    Sulfa Antibiotics Itching     ITCHING        Immunization History   Administered Date(s) Administered    COVID-19 (PFIZER) Purple Cap Monovalent 03/02/2021, 03/24/2021, 10/16/2021    Covid-19 (Pfizer) Gray Cap Monovalent 08/01/2022    FLUAD TRI 65YR+ 11/26/2019    Fluad Quad 65+ 09/29/2020    Fluzone High Dose =>65 Years ([employer] ONLY) 11/01/2016, 10/27/2017, 10/19/2018    Fluzone High-Dose 65+yrs 10/09/2021    Pneumococcal Conjugate 13-Valent (PCV13) 12/03/2015    Pneumococcal Conjugate 20-Valent (PCV20) 03/30/2023    Pneumococcal Polysaccharide (PPSV23) 01/01/2014    Zostavax 01/16/2016    influenza Split 09/08/2021       Family History   Problem Relation Age of Onset    Cancer Other         BREAST, LUNG, OVARIAN, KIDNEY    Kidney cancer Other      Lung cancer Other         pGF as well    Cancer Mother         kidney    Hearing loss Mother     Other Mother         dementia    Cancer Father         lung    Mental illness Father     Alcohol abuse Father     Breast cancer Sister 30    Connective Tissue Disease Sister     Hypertension Sister         pulmonary hypertension    Cancer Sister         Brest    Cancer Paternal Grandmother         stomach    Cancer Paternal Grandfather         lung    Ovarian cancer Neg Hx        Cancer-related family history includes Breast cancer (age of onset: 30) in her sister; Cancer in her father, mother, paternal grandfather, paternal grandmother, sister, and another family member; Kidney cancer in an other family member; Lung cancer in an other family member. There is no history of Ovarian cancer.    Social History     Tobacco Use    Smoking status: Never     Passive exposure: Never    Smokeless tobacco: Never   Vaping Use    Vaping Use: Never used   Substance Use Topics    Alcohol use: Yes     Comment: occ    Drug use: No       ROS:    Review of Systems   Constitutional:  Negative for chills, fatigue and fever.   HENT:  Negative for congestion, drooling, ear discharge, rhinorrhea, sinus pressure and tinnitus.    Eyes:  Negative for photophobia, pain and discharge.   Respiratory:  Negative for apnea, choking and stridor.    Cardiovascular:  Negative for palpitations.   Gastrointestinal:  Negative for abdominal distention, abdominal pain and anal bleeding.   Endocrine: Negative for polydipsia and polyphagia.   Genitourinary:  Negative for decreased urine volume, flank pain and genital sores.   Musculoskeletal:  Negative for gait problem, neck pain and neck stiffness.   Skin:  Negative for color change, rash and wound.   Neurological:  Negative for tremors, seizures, syncope, facial asymmetry and speech difficulty.   Hematological:  Negative for adenopathy.   Psychiatric/Behavioral:  Negative for agitation, confusion,  "hallucinations and self-injury. The patient is not hyperactive.      Objective:    Vitals:    06/06/23 1000   BP: 130/75   Pulse: 61   Temp: 98.4 °F (36.9 °C)   TempSrc: Oral   SpO2: 98%   Weight: 80.6 kg (177 lb 12.8 oz)   Height: 160 cm (63\")   PainSc: 0-No pain     Body mass index is 31.5 kg/m².    ECOG  (0) Fully active, able to carry on all predisease performance without restriction    Physical Exam:  Physical Exam  Vitals and nursing note reviewed.   Constitutional:       General: She is not in acute distress.     Appearance: She is not diaphoretic.   HENT:      Head: Normocephalic and atraumatic.   Eyes:      General: No scleral icterus.        Right eye: No discharge.         Left eye: No discharge.      Conjunctiva/sclera: Conjunctivae normal.   Neck:      Thyroid: No thyromegaly.   Cardiovascular:      Rate and Rhythm: Normal rate and regular rhythm.      Heart sounds: Normal heart sounds.     No friction rub. No gallop.   Pulmonary:      Effort: Pulmonary effort is normal. No respiratory distress.      Breath sounds: No stridor. No wheezing.   Abdominal:      General: Bowel sounds are normal.      Palpations: Abdomen is soft. There is no mass.      Tenderness: There is no abdominal tenderness. There is no guarding or rebound.   Musculoskeletal:         General: No tenderness. Normal range of motion.      Cervical back: Normal range of motion and neck supple.   Lymphadenopathy:      Cervical: No cervical adenopathy.   Skin:     General: Skin is warm.      Findings: No erythema or rash.   Neurological:      Mental Status: She is alert and oriented to person, place, and time.      Motor: No abnormal muscle tone.   Psychiatric:         Behavior: Behavior normal.       RECENT LABS  WBC   Date Value Ref Range Status   06/06/2023 24.06 (H) 3.40 - 10.80 10*3/mm3 Final   06/20/2022 12.8 (H) 3.4 - 10.8 x10E3/uL Final     RBC   Date Value Ref Range Status   06/06/2023 4.21 3.77 - 5.28 10*6/mm3 Final   06/20/2022 " 4.58 3.77 - 5.28 x10E6/uL Final     Hemoglobin   Date Value Ref Range Status   06/06/2023 12.7 12.0 - 15.9 g/dL Final     Hematocrit   Date Value Ref Range Status   06/06/2023 38.0 34.0 - 46.6 % Final     MCV   Date Value Ref Range Status   06/06/2023 90.3 79.0 - 97.0 fL Final     MCH   Date Value Ref Range Status   06/06/2023 30.2 26.6 - 33.0 pg Final     MCHC   Date Value Ref Range Status   06/06/2023 33.4 31.5 - 35.7 g/dL Final     RDW   Date Value Ref Range Status   06/06/2023 13.1 12.3 - 15.4 % Final     RDW-SD   Date Value Ref Range Status   06/06/2023 42.3 37.0 - 54.0 fl Final     MPV   Date Value Ref Range Status   06/06/2023 9.4 6.0 - 12.0 fL Final     Platelets   Date Value Ref Range Status   06/06/2023 175 140 - 450 10*3/mm3 Final     Neutrophil %   Date Value Ref Range Status   06/06/2023 8.6 (L) 42.7 - 76.0 % Final     Lymphocyte %   Date Value Ref Range Status   06/06/2023 85.9 (H) 19.6 - 45.3 % Final     Monocyte %   Date Value Ref Range Status   06/06/2023 3.4 (L) 5.0 - 12.0 % Final     Eosinophil %   Date Value Ref Range Status   06/06/2023 1.9 0.3 - 6.2 % Final     Basophil %   Date Value Ref Range Status   06/06/2023 0.2 0.0 - 1.5 % Final     Immature Grans %   Date Value Ref Range Status   10/21/2021 0.2 0.0 - 0.5 % Final     Neutrophils, Absolute   Date Value Ref Range Status   06/06/2023 2.10 1.70 - 7.00 10*3/mm3 Final     Lymphocytes, Absolute   Date Value Ref Range Status   06/06/2023 20.66 (H) 0.70 - 3.10 10*3/mm3 Final     Monocytes, Absolute   Date Value Ref Range Status   06/06/2023 0.81 0.10 - 0.90 10*3/mm3 Final     Eosinophils, Absolute   Date Value Ref Range Status   06/06/2023 0.45 (H) 0.00 - 0.40 10*3/mm3 Final     Basophils, Absolute   Date Value Ref Range Status   06/06/2023 0.04 0.00 - 0.20 10*3/mm3 Final     Immature Grans, Absolute   Date Value Ref Range Status   10/21/2021 0.02 0.00 - 0.05 10*3/mm3 Final     nRBC   Date Value Ref Range Status   05/04/2023 0.1 0.0 - 0.2 /100  WBC Final       Lab Results   Component Value Date    GLUCOSE 72 03/30/2023    BUN 22 03/30/2023    CREATININE 1.06 (H) 03/30/2023    EGFRIFNONA 62 11/01/2021    BCR 20.8 03/30/2023    K 4.3 03/30/2023    CO2 25.3 03/30/2023    CALCIUM 9.7 03/30/2023    ALBUMIN 4.3 03/30/2023    AST 33 (H) 03/30/2023    ALT 18 03/30/2023         Assessment & Plan   Leukocytosis, unspecified type  - CBC & Differential      Leukocytosis with differential lymphocytosis since March 2022.  This could be secondary to underlying viral illness or lymphoproliferative disease.  She is not neutropenic and she does not have any other cytopenias.  I reviewed the differential diagnosis with patient in detail  Patient has mixed connective tissue disorder which may be contributing to her lymphocytosis.          Plans    Peripheral blood flow cytometry  CMP, LDH and uric acid levels.  Peripheral blood smear today  Follow-up 2 to 3 weeks from now    Patient verbalized understanding and is in agreement of the above plan.            I spent 45 total minutes, face-to-face, caring for Barbara today. 90% of this time involved counseling and/or coordination of care as documented within this note.

## 2023-06-06 ENCOUNTER — CONSULT (OUTPATIENT)
Dept: ONCOLOGY | Facility: CLINIC | Age: 73
End: 2023-06-06
Payer: MEDICARE

## 2023-06-06 ENCOUNTER — LAB (OUTPATIENT)
Dept: LAB | Facility: HOSPITAL | Age: 73
End: 2023-06-06
Payer: MEDICARE

## 2023-06-06 VITALS
WEIGHT: 177.8 LBS | HEIGHT: 63 IN | BODY MASS INDEX: 31.5 KG/M2 | SYSTOLIC BLOOD PRESSURE: 130 MMHG | OXYGEN SATURATION: 98 % | DIASTOLIC BLOOD PRESSURE: 75 MMHG | HEART RATE: 61 BPM | TEMPERATURE: 98.4 F

## 2023-06-06 DIAGNOSIS — D72.829 LEUKOCYTOSIS, UNSPECIFIED TYPE: Primary | ICD-10-CM

## 2023-06-06 DIAGNOSIS — D72.829 LEUKOCYTOSIS, UNSPECIFIED TYPE: ICD-10-CM

## 2023-06-06 LAB
ALBUMIN SERPL-MCNC: 4.3 G/DL (ref 3.5–5.2)
ALBUMIN/GLOB SERPL: 2 G/DL
ALP SERPL-CCNC: 99 U/L (ref 39–117)
ALT SERPL W P-5'-P-CCNC: 14 U/L (ref 1–33)
ANION GAP SERPL CALCULATED.3IONS-SCNC: 13 MMOL/L (ref 5–15)
AST SERPL-CCNC: 22 U/L (ref 1–32)
BASOPHILS # BLD AUTO: 0.04 10*3/MM3 (ref 0–0.2)
BASOPHILS NFR BLD AUTO: 0.2 % (ref 0–1.5)
BILIRUB SERPL-MCNC: 0.5 MG/DL (ref 0–1.2)
BUN SERPL-MCNC: 17 MG/DL (ref 8–23)
BUN/CREAT SERPL: 16.7 (ref 7–25)
CALCIUM SPEC-SCNC: 9.3 MG/DL (ref 8.6–10.5)
CHLORIDE SERPL-SCNC: 106 MMOL/L (ref 98–107)
CO2 SERPL-SCNC: 24 MMOL/L (ref 22–29)
CREAT SERPL-MCNC: 1.02 MG/DL (ref 0.57–1)
DEPRECATED RDW RBC AUTO: 42.3 FL (ref 37–54)
EGFRCR SERPLBLD CKD-EPI 2021: 57.8 ML/MIN/1.73
EOSINOPHIL # BLD AUTO: 0.45 10*3/MM3 (ref 0–0.4)
EOSINOPHIL NFR BLD AUTO: 1.9 % (ref 0.3–6.2)
ERYTHROCYTE [DISTWIDTH] IN BLOOD BY AUTOMATED COUNT: 13.1 % (ref 12.3–15.4)
GLOBULIN UR ELPH-MCNC: 2.1 GM/DL
GLUCOSE SERPL-MCNC: 86 MG/DL (ref 65–99)
HCT VFR BLD AUTO: 38 % (ref 34–46.6)
HGB BLD-MCNC: 12.7 G/DL (ref 12–15.9)
LDH SERPL-CCNC: 206 U/L (ref 135–214)
LYMPHOCYTES # BLD AUTO: 20.66 10*3/MM3 (ref 0.7–3.1)
LYMPHOCYTES NFR BLD AUTO: 85.9 % (ref 19.6–45.3)
MCH RBC QN AUTO: 30.2 PG (ref 26.6–33)
MCHC RBC AUTO-ENTMCNC: 33.4 G/DL (ref 31.5–35.7)
MCV RBC AUTO: 90.3 FL (ref 79–97)
MONOCYTES # BLD AUTO: 0.81 10*3/MM3 (ref 0.1–0.9)
MONOCYTES NFR BLD AUTO: 3.4 % (ref 5–12)
NEUTROPHILS NFR BLD AUTO: 2.1 10*3/MM3 (ref 1.7–7)
NEUTROPHILS NFR BLD AUTO: 8.6 % (ref 42.7–76)
PLATELET # BLD AUTO: 175 10*3/MM3 (ref 140–450)
PMV BLD AUTO: 9.4 FL (ref 6–12)
POTASSIUM SERPL-SCNC: 3.8 MMOL/L (ref 3.5–5.2)
PROT SERPL-MCNC: 6.4 G/DL (ref 6–8.5)
RBC # BLD AUTO: 4.21 10*6/MM3 (ref 3.77–5.28)
SODIUM SERPL-SCNC: 143 MMOL/L (ref 136–145)
URATE SERPL-MCNC: 6.8 MG/DL (ref 2.4–5.7)
WBC NRBC COR # BLD: 24.06 10*3/MM3 (ref 3.4–10.8)

## 2023-06-06 PROCEDURE — 36415 COLL VENOUS BLD VENIPUNCTURE: CPT | Performed by: INTERNAL MEDICINE

## 2023-06-06 PROCEDURE — 85025 COMPLETE CBC W/AUTO DIFF WBC: CPT

## 2023-06-06 PROCEDURE — 84550 ASSAY OF BLOOD/URIC ACID: CPT | Performed by: INTERNAL MEDICINE

## 2023-06-06 PROCEDURE — 83615 LACTATE (LD) (LDH) ENZYME: CPT | Performed by: INTERNAL MEDICINE

## 2023-06-06 PROCEDURE — 85007 BL SMEAR W/DIFF WBC COUNT: CPT | Performed by: INTERNAL MEDICINE

## 2023-06-06 PROCEDURE — 80053 COMPREHEN METABOLIC PANEL: CPT | Performed by: INTERNAL MEDICINE

## 2023-06-06 RX ORDER — DESLORATADINE 5 MG/1
1 TABLET, ORALLY DISINTEGRATING ORAL DAILY
COMMUNITY
Start: 2023-05-30

## 2023-06-06 RX ORDER — HYDROXYCHLOROQUINE SULFATE 200 MG/1
1 TABLET, FILM COATED ORAL EVERY 12 HOURS SCHEDULED
COMMUNITY
Start: 2023-05-09

## 2023-06-06 NOTE — LETTER
2023     SULEMA Thomas  [address]  [address]  [place] [address]    Patient: Barbara Coelho   YOB: 1950   Date of Visit: 2023       Dear SULEMA Castro:    Thank you for referring Barbara Coelho to me for evaluation. Below are the relevant portions of my assessment and plan of care.    If you have questions, please do not hesitate to call me. I look forward to following Barbara along with you.         Sincerely,        Felisha Echols MD        CC: No Recipients    Felisha Echols MD  23 [phone]  Sign when Signing Visit   Hematology/Oncology Outpatient Consultation    Patient name: Barbara Coelho  : 1950  MRN: [id number]  Primary Care Physician: Liane Tran APRN  Referring Physician: Liane Tran AP*  Reason For Consult:     Chief Complaint   Patient presents with   • Appointment     Leukocytosis       History of Present Illness:    73-year-old female has referred secondary to leukocytosis with differential lymphocytosis.  Patient was noted in .  Patient denies any fevers or chills or upper respiratory tract infection.  Review of her CBC indicates that her white count has ranged around 23-24,000 normal hemoglobin and normal platelets but her differentials have been 8% neutrophils 85% lymphocytes with an ANC of 2.1 and absolute lymphocyte count of 20.  Back in 2022 her white count was 12.8, globin 13.2 platelets 211 and absolute lymphocyte count was 9600.  Patient states that she is fully active and denies any night sweats, weight loss or fatigue symptoms.    Patient has a history of mixed connective tissue disease.  Patient is now retired. Patient is . She has one child.    There is family hx of kidney cancer in her mother, breast cancer breast cancer 30,  father had lung cancer, paternal GM with stomach cancer, paternal GF with lung cancer    Patient does not smoke, drinks occasional etoh     Past  Medical History:   Diagnosis Date   • Allergic 1967    take allergy shots   • Allergic rhinitis     on allergy shots   • Asthma     ALLERGIC   • Back pain     Low   • Bladder infection     Recurrent Bladder Infections   • Deep vein thrombosis 1989   • Diverticulitis    • GERD (gastroesophageal reflux disease)     EGD 12/16 [facility] - reflux, HH. no barretts   • Hearing loss    • History of DVT of lower extremity     left leg   • Knee pain, right    • Leg pain    • Low back pain 1990    had back surgery   • Mixed connective tissue disease 01/2019    Dr. Thakur   • CARITO (obstructive sleep apnea) 2018   • Osteopenia 03/28/2018    calculated frax - 11/3%   • Pinched nerve in neck    • Rheumatoid arthritis 2018   • Staph infection    • Urinary incontinence    • Wears contact lenses    • Wears prescription eyeglasses        Past Surgical History:   Procedure Laterality Date   • BACK SURGERY  1995    Back (L5/S1)   • BARIATRIC SURGERY  1985    gastric bypass   • BREAST EXCISIONAL BIOPSY Left 1985   • BREAST SURGERY  1990    lump removed   • CHOLECYSTECTOMY  2019   • CHOLECYSTECTOMY WITH INTRAOPERATIVE CHOLANGIOGRAM N/A 04/26/2018    Procedure: CHOLECYSTECTOMY LAPAROSCOPIC INTRAOPERATIVE CHOLANGIOGRAM;  Surgeon: Lit Morelos MD;  Location:  [facility];  Service: General   • COLONOSCOPY  2016   • GASTRIC BYPASS      HIGH GASTRIC BYPASS, 1980's   • JOINT REPLACEMENT  2012,2020    left knee,right knee   • OTHER SURGICAL HISTORY  1985    Stomach Stabled   • REPLACEMENT TOTAL KNEE Left 2012    right  2021   • SINUS SURGERY      x3   • TOTAL KNEE ARTHROPLASTY Right 10/29/2021    Procedure: TOTAL KNEE ARTHROPLASTY RIGHT;  Surgeon: Emeka Dogulas MD;  Location:  [facility];  Service: Orthopedics;  Laterality: Right;   • TUBAL ABDOMINAL LIGATION  1980   • WISDOM TOOTH EXTRACTION           Current Outpatient Medications:   •  albuterol sulfate  (90 Base) MCG/ACT inhaler, TAKE 2 PUFFS BY MOUTH EVERY 4 HOURS AS NEEDED FOR  WHEEZE, Disp: 8 g, Rfl: 1  •  Azelastine-Fluticasone 137-50 MCG/ACT suspension, , Disp: , Rfl:   •  Biotin 1 MG capsule, Take 1 capsule by mouth Daily., Disp: , Rfl:   •  desloratadine (CLARINEX REDITAB) 5 MG disintegrating tablet, Take 1 tablet by mouth Daily., Disp: , Rfl:   •  Fluticasone-Salmeterol (Wixela Inhub) 500-50 MCG/ACT DISKUS, Inhale 1 puff 2 (Two) Times a Day., Disp: 180 each, Rfl: 1  •  gabapentin (NEURONTIN) 100 MG capsule, TAKE 1 CAPSULE BY MOUTH THREE TIMES A DAY, Disp: 90 capsule, Rfl: 1  •  hydroxychloroquine (PLAQUENIL) 200 MG tablet, Take 1 tablet by mouth Every 12 (Twelve) Hours., Disp: , Rfl:   •  levocetirizine (XYZAL) 5 MG tablet, , Disp: , Rfl:   •  meclizine (ANTIVERT) 25 MG tablet, , Disp: , Rfl:   •  nabumetone (RELAFEN) 500 MG tablet, Take 2 tablets by mouth Daily., Disp: 180 tablet, Rfl: 1  •  NIFEdipine XL (PROCARDIA XL) 30 MG 24 hr tablet, Take 1 tablet by mouth Daily., Disp: , Rfl:   •  Polyethylene Glycol 3350 (MIRALAX PO), Take  by mouth., Disp: , Rfl:   •  traZODone (DESYREL) 100 MG tablet, Take 1 tablet by mouth Every Night for 90 days., Disp: 90 tablet, Rfl: 1  •  VITAMIN D, CHOLECALCIFEROL, PO, Take 1 capsule by mouth Daily., Disp: , Rfl:   •  diazePAM (VALIUM) 10 MG tablet, TAKE 1 TABLET BY MOUTH 1 (ONE) TIME FOR 1 DOSE. TAKE 30 MIN BEFORE PROCEDURE, Disp: , Rfl:   •  NIFEdipine XL (PROCARDIA XL) 30 MG 24 hr tablet, Take 1 tablet by mouth Daily., Disp: 90 tablet, Rfl: 1  No current facility-administered medications for this visit.    Facility-Administered Medications Ordered in Other Visits:   •  Chlorhexidine Gluconate Cloth 2 % pads, , Apply externally, Once, Emeka Douglas MD  •  mupirocin (BACTROBAN) 2 % nasal ointment, , Nasal, BID, Emeka Douglas MD    Allergies   Allergen Reactions   • Celebrex [Celecoxib] Hives     HIVES    • Codeine Rash   • Oxycodone Rash and Hives   • Sulfa Antibiotics Itching     ITCHING        Immunization History   Administered Date(s)  Administered   • COVID-19 (PFIZER) Purple Cap Monovalent 03/02/2021, 03/24/2021, 10/16/2021   • Covid-19 (Pfizer) Gray Cap Monovalent 08/01/2022   • FLUAD TRI 65YR+ 11/26/2019   • Fluad Quad 65+ 09/29/2020   • Fluzone High Dose =>65 Years ([employer] ONLY) 11/01/2016, 10/27/2017, 10/19/2018   • Fluzone High-Dose 65+yrs 10/09/2021   • Pneumococcal Conjugate 13-Valent (PCV13) 12/03/2015   • Pneumococcal Conjugate 20-Valent (PCV20) 03/30/2023   • Pneumococcal Polysaccharide (PPSV23) 01/01/2014   • Zostavax 01/16/2016   • influenza Split 09/08/2021       Family History   Problem Relation Age of Onset   • Cancer Other         BREAST, LUNG, OVARIAN, KIDNEY   • Kidney cancer Other    • Lung cancer Other         pGF as well   • Cancer Mother         kidney   • Hearing loss Mother    • Other Mother         dementia   • Cancer Father         lung   • Mental illness Father    • Alcohol abuse Father    • Breast cancer Sister 30   • Connective Tissue Disease Sister    • Hypertension Sister         pulmonary hypertension   • Cancer Sister         [place]rest   • Cancer Paternal Grandmother         stomach   • Cancer Paternal Grandfather         lung   • Ovarian cancer Neg Hx        Cancer-related family history includes Breast cancer (age of onset: 30) in her sister; Cancer in her father, mother, paternal grandfather, paternal grandmother, sister, and another family member; Kidney cancer in an other family member; Lung cancer in an other family member. There is no history of Ovarian cancer.    Social History     Tobacco Use   • Smoking status: Never     Passive exposure: Never   • Smokeless tobacco: Never   Vaping Use   • Vaping Use: Never used   Substance Use Topics   • Alcohol use: Yes     Comment: occ   • Drug use: No       ROS:    Review of Systems   Constitutional:  Negative for chills, fatigue and fever.   HENT:  Negative for congestion, drooling, ear discharge, rhinorrhea, sinus pressure and tinnitus.    Eyes:  Negative for  "photophobia, pain and discharge.   Respiratory:  Negative for apnea, choking and stridor.    Cardiovascular:  Negative for palpitations.   Gastrointestinal:  Negative for abdominal distention, abdominal pain and anal bleeding.   Endocrine: Negative for polydipsia and polyphagia.   Genitourinary:  Negative for decreased urine volume, flank pain and genital sores.   Musculoskeletal:  Negative for gait problem, neck pain and neck stiffness.   Skin:  Negative for color change, rash and wound.   Neurological:  Negative for tremors, seizures, syncope, facial asymmetry and speech difficulty.   Hematological:  Negative for adenopathy.   Psychiatric/Behavioral:  Negative for agitation, confusion, hallucinations and self-injury. The patient is not hyperactive.      Objective:    Vitals:    06/06/23 1000   BP: 130/75   Pulse: 61   Temp: 98.4 °F (36.9 °C)   TempSrc: Oral   SpO2: 98%   Weight: 80.6 kg (177 lb 12.8 oz)   Height: 160 cm (63\")   PainSc: 0-No pain     Body mass index is 31.5 kg/m².    ECOG  (0) Fully active, able to carry on all predisease performance without restriction    Physical Exam:  Physical Exam  Vitals and nursing note reviewed.   Constitutional:       General: She is not in acute distress.     Appearance: She is not diaphoretic.   HENT:      Head: Normocephalic and atraumatic.   Eyes:      General: No scleral icterus.        Right eye: No discharge.         Left eye: No discharge.      Conjunctiva/sclera: Conjunctivae normal.   Neck:      Thyroid: No thyromegaly.   Cardiovascular:      Rate and Rhythm: Normal rate and regular rhythm.      Heart sounds: Normal heart sounds.     No friction rub. No gallop.   Pulmonary:      Effort: Pulmonary effort is normal. No respiratory distress.      Breath sounds: No stridor. No wheezing.   Abdominal:      General: Bowel sounds are normal.      Palpations: Abdomen is soft. There is no mass.      Tenderness: There is no abdominal tenderness. There is no guarding or " rebound.   Musculoskeletal:         General: No tenderness. Normal range of motion.      Cervical back: Normal range of motion and neck supple.   Lymphadenopathy:      Cervical: No cervical adenopathy.   Skin:     General: Skin is warm.      Findings: No erythema or rash.   Neurological:      Mental Status: She is alert and oriented to person, place, and time.      Motor: No abnormal muscle tone.   Psychiatric:         Behavior: Behavior normal.       RECENT LABS  WBC   Date Value Ref Range Status   06/06/2023 24.06 (H) 3.40 - 10.80 10*3/mm3 Final   06/20/2022 12.8 (H) 3.4 - 10.8 x10E3/uL Final     RBC   Date Value Ref Range Status   06/06/2023 4.21 3.77 - 5.28 10*6/mm3 Final   06/20/2022 4.58 3.77 - 5.28 x10E6/uL Final     Hemoglobin   Date Value Ref Range Status   06/06/2023 12.7 12.0 - 15.9 g/dL Final     Hematocrit   Date Value Ref Range Status   06/06/2023 38.0 34.0 - 46.6 % Final     MCV   Date Value Ref Range Status   06/06/2023 90.3 79.0 - 97.0 fL Final     MCH   Date Value Ref Range Status   06/06/2023 30.2 26.6 - 33.0 pg Final     MCHC   Date Value Ref Range Status   06/06/2023 33.4 31.5 - 35.7 g/dL Final     RDW   Date Value Ref Range Status   06/06/2023 13.1 12.3 - 15.4 % Final     RDW-SD   Date Value Ref Range Status   06/06/2023 42.3 37.0 - 54.0 fl Final     MPV   Date Value Ref Range Status   06/06/2023 9.4 6.0 - 12.0 fL Final     Platelets   Date Value Ref Range Status   06/06/2023 175 140 - 450 10*3/mm3 Final     Neutrophil %   Date Value Ref Range Status   06/06/2023 8.6 (L) 42.7 - 76.0 % Final     Lymphocyte %   Date Value Ref Range Status   06/06/2023 85.9 (H) 19.6 - 45.3 % Final     Monocyte %   Date Value Ref Range Status   06/06/2023 3.4 (L) 5.0 - 12.0 % Final     Eosinophil %   Date Value Ref Range Status   06/06/2023 1.9 0.3 - 6.2 % Final     Basophil %   Date Value Ref Range Status   06/06/2023 0.2 0.0 - 1.5 % Final     Immature Grans %   Date Value Ref Range Status   10/21/2021 0.2 0.0 -  0.5 % Final     Neutrophils, Absolute   Date Value Ref Range Status   06/06/2023 2.10 1.70 - 7.00 10*3/mm3 Final     Lymphocytes, Absolute   Date Value Ref Range Status   06/06/2023 20.66 (H) 0.70 - 3.10 10*3/mm3 Final     Monocytes, Absolute   Date Value Ref Range Status   06/06/2023 0.81 0.10 - 0.90 10*3/mm3 Final     Eosinophils, Absolute   Date Value Ref Range Status   06/06/2023 0.45 (H) 0.00 - 0.40 10*3/mm3 Final     Basophils, Absolute   Date Value Ref Range Status   06/06/2023 0.04 0.00 - 0.20 10*3/mm3 Final     Immature Grans, Absolute   Date Value Ref Range Status   10/21/2021 0.02 0.00 - 0.05 10*3/mm3 Final     nRBC   Date Value Ref Range Status   05/04/2023 0.1 0.0 - 0.2 /100 WBC Final       Lab Results   Component Value Date    GLUCOSE 72 03/30/2023    BUN 22 03/30/2023    CREATININE 1.06 (H) 03/30/2023    EGFRIFNONA 62 11/01/2021    BCR 20.8 03/30/2023    K 4.3 03/30/2023    CO2 25.3 03/30/2023    CALCIUM 9.7 03/30/2023    ALBUMIN 4.3 03/30/2023    AST 33 (H) 03/30/2023    ALT 18 03/30/2023         Assessment & Plan   Leukocytosis, unspecified type  - CBC & Differential      Leukocytosis with differential lymphocytosis since March 2022.  This could be secondary to underlying viral illness or lymphoproliferative disease.  She is not neutropenic and she does not have any other cytopenias.  I reviewed the differential diagnosis with patient in detail  Patient has mixed connective tissue disorder which may be contributing to her lymphocytosis.          Plans    Peripheral blood flow cytometry  CMP, LDH and uric acid levels.  Peripheral blood smear today  Follow-up 2 to 3 weeks from now    Patient verbalized understanding and is in agreement of the above plan.            I spent 45 total minutes, face-to-face, caring for Barbara today. 90% of this time involved counseling and/or coordination of care as documented within this note.

## 2023-06-07 LAB
ANISOCYTOSIS BLD QL: ABNORMAL
LAB AP CASE REPORT: NORMAL
LYMPHOCYTES # BLD MANUAL: ABNORMAL 10*3/UL
NEUTROPHILS # BLD AUTO: ABNORMAL 10*3/UL
NEUTROPHILS NFR BLD MANUAL: 5 % (ref 42.7–76)
PATH REPORT.FINAL DX SPEC: NORMAL
PATHOLOGY REVIEW: YES
POIKILOCYTOSIS BLD QL SMEAR: ABNORMAL
SMALL PLATELETS BLD QL SMEAR: ABNORMAL
SMUDGE CELLS BLD QL SMEAR: ABNORMAL
VARIANT LYMPHS NFR BLD MANUAL: 95 % (ref 19.6–45.3)

## 2023-06-08 ENCOUNTER — PATIENT ROUNDING (BHMG ONLY) (OUTPATIENT)
Dept: ONCOLOGY | Facility: CLINIC | Age: 73
End: 2023-06-08
Payer: MEDICARE

## 2023-06-08 NOTE — PROGRESS NOTES
June 8, 2023    Hello, may I speak with Barbara Coelho?    My name is Sarah Valenzuela      I am  with [facility] [address] [place] [address] [phone].    Before we get started may I verify your date of birth? 1950    I am calling to officially welcome you to our practice and ask about your recent visit. Is this a good time to talk? no    Tell me about your visit with us. What things went well?  A My Chart message was sent to the patient.        We're always looking for ways to make our patients' experiences even better. Do you have recommendations on ways we may improve?  no    Overall were you satisfied with your first visit to our practice? yes       I appreciate you taking the time to speak with me today. Is there anything else I can do for you? no      Thank you, and have a great day.

## 2023-06-09 LAB
ABNORMAL WBC NFR SPEC FC: NORMAL %
ANNOTATION COMMENT IMP: NORMAL
ASSESSMENT OF LEUKOCYTES: NORMAL
CLINICAL INFO: NORMAL
GATING STRATEGY: NORMAL
IMMUNOPHENOTYPING STUDY: NORMAL
LABORATORY COMMENT REPORT: NORMAL
PATH INTERP SPEC-IMP: NORMAL
PATHOLOGIST NAME: NORMAL
SPECIMEN SOURCE: NORMAL
VIABLE CELLS NFR SPEC: NORMAL %

## 2023-06-12 LAB
CELLS ANALYZED: NORMAL
CELLS COUNTED: NORMAL
CLINICAL CYTOGENETICIST SPEC: NORMAL
GENETIC DISEASES BLD/T FISH: NORMAL
NARRATIVE DIAGNOSTIC REPORT-IMP: NORMAL
SPECIMEN SOURCE: NORMAL

## 2023-06-20 LAB — DIAGNOSTIC IMP SPEC-IMP: NORMAL

## 2023-06-22 ENCOUNTER — OFFICE (OUTPATIENT)
Dept: URBAN - METROPOLITAN AREA CLINIC 64 | Facility: CLINIC | Age: 73
End: 2023-06-22

## 2023-06-22 VITALS
WEIGHT: 178 LBS | SYSTOLIC BLOOD PRESSURE: 119 MMHG | DIASTOLIC BLOOD PRESSURE: 74 MMHG | HEART RATE: 67 BPM | HEIGHT: 63 IN

## 2023-06-22 DIAGNOSIS — R10.32 LEFT LOWER QUADRANT PAIN: ICD-10-CM

## 2023-06-22 DIAGNOSIS — R19.5 OTHER FECAL ABNORMALITIES: ICD-10-CM

## 2023-06-22 DIAGNOSIS — R10.31 RIGHT LOWER QUADRANT PAIN: ICD-10-CM

## 2023-06-22 DIAGNOSIS — R10.13 EPIGASTRIC PAIN: ICD-10-CM

## 2023-06-22 DIAGNOSIS — R14.0 ABDOMINAL DISTENSION (GASEOUS): ICD-10-CM

## 2023-06-22 DIAGNOSIS — K59.00 CONSTIPATION, UNSPECIFIED: ICD-10-CM

## 2023-06-22 PROCEDURE — 99204 OFFICE O/P NEW MOD 45 MIN: CPT | Performed by: INTERNAL MEDICINE

## 2023-06-22 RX ORDER — PANTOPRAZOLE SODIUM 40 MG/1
TABLET, DELAYED RELEASE ORAL
Qty: 90 | Refills: 0 | Status: COMPLETED
End: 2024-03-21

## 2023-06-27 PROBLEM — R10.13 EPIGASTRIC PAIN: Status: ACTIVE | Noted: 2023-06-27

## 2023-06-27 PROBLEM — J90 PLEURAL EFFUSION: Status: RESOLVED | Noted: 2019-07-01 | Resolved: 2023-06-27

## 2023-06-27 PROBLEM — K64.9 HEMORRHOIDS: Status: ACTIVE | Noted: 2023-06-27

## 2023-06-27 PROBLEM — J30.2 SEASONAL ALLERGIC RHINITIS: Status: ACTIVE | Noted: 2023-06-27

## 2023-06-27 PROBLEM — L98.9 SKIN LESIONS: Status: RESOLVED | Noted: 2017-02-17 | Resolved: 2023-06-27

## 2023-06-27 PROBLEM — K80.20 SYMPTOMATIC CHOLELITHIASIS: Status: RESOLVED | Noted: 2018-04-26 | Resolved: 2023-06-27

## 2023-06-27 PROBLEM — M81.0 OSTEOPOROSIS: Status: ACTIVE | Noted: 2023-06-27

## 2023-06-27 PROBLEM — R10.32 LEFT LOWER QUADRANT PAIN: Status: ACTIVE | Noted: 2023-06-27

## 2023-06-27 PROBLEM — M54.9 BACK PAIN: Status: ACTIVE | Noted: 2023-06-27

## 2023-06-27 PROBLEM — B35.1 ONYCHOMYCOSIS: Status: ACTIVE | Noted: 2023-06-27

## 2023-06-27 PROBLEM — R76.8 POSITIVE ANTINUCLEAR ANTIBODY: Status: ACTIVE | Noted: 2023-06-27

## 2023-06-27 PROBLEM — J11.1 INFLUENZA: Status: ACTIVE | Noted: 2023-06-27

## 2023-06-27 PROBLEM — R19.5 OTHER FECAL ABNORMALITIES: Status: ACTIVE | Noted: 2023-06-27

## 2023-06-27 PROBLEM — M25.511 RIGHT SHOULDER PAIN: Status: RESOLVED | Noted: 2017-08-14 | Resolved: 2023-06-27

## 2023-06-27 PROBLEM — R14.0 ABDOMINAL DISTENSION (GASEOUS): Status: ACTIVE | Noted: 2023-06-27

## 2023-06-28 LAB — SPECIMEN STATUS: NORMAL

## 2023-07-07 ENCOUNTER — OFFICE (OUTPATIENT)
Dept: URBAN - METROPOLITAN AREA PATHOLOGY 4 | Facility: PATHOLOGY | Age: 73
End: 2023-07-07
Payer: COMMERCIAL

## 2023-07-07 ENCOUNTER — ON CAMPUS - OUTPATIENT (OUTPATIENT)
Dept: URBAN - METROPOLITAN AREA HOSPITAL 2 | Facility: HOSPITAL | Age: 73
End: 2023-07-07
Payer: COMMERCIAL

## 2023-07-07 VITALS
DIASTOLIC BLOOD PRESSURE: 68 MMHG | HEIGHT: 63 IN | SYSTOLIC BLOOD PRESSURE: 120 MMHG | SYSTOLIC BLOOD PRESSURE: 113 MMHG | SYSTOLIC BLOOD PRESSURE: 123 MMHG | DIASTOLIC BLOOD PRESSURE: 78 MMHG | DIASTOLIC BLOOD PRESSURE: 96 MMHG | RESPIRATION RATE: 18 BRPM | HEART RATE: 64 BPM | DIASTOLIC BLOOD PRESSURE: 65 MMHG | DIASTOLIC BLOOD PRESSURE: 71 MMHG | HEART RATE: 69 BPM | SYSTOLIC BLOOD PRESSURE: 146 MMHG | HEART RATE: 79 BPM | HEART RATE: 77 BPM | SYSTOLIC BLOOD PRESSURE: 142 MMHG | SYSTOLIC BLOOD PRESSURE: 115 MMHG | HEART RATE: 78 BPM | DIASTOLIC BLOOD PRESSURE: 79 MMHG | DIASTOLIC BLOOD PRESSURE: 72 MMHG | HEART RATE: 73 BPM | SYSTOLIC BLOOD PRESSURE: 147 MMHG | HEART RATE: 76 BPM | SYSTOLIC BLOOD PRESSURE: 118 MMHG | OXYGEN SATURATION: 100 % | SYSTOLIC BLOOD PRESSURE: 122 MMHG | RESPIRATION RATE: 16 BRPM | SYSTOLIC BLOOD PRESSURE: 111 MMHG | DIASTOLIC BLOOD PRESSURE: 75 MMHG | HEART RATE: 72 BPM | OXYGEN SATURATION: 98 % | RESPIRATION RATE: 15 BRPM | DIASTOLIC BLOOD PRESSURE: 70 MMHG | DIASTOLIC BLOOD PRESSURE: 64 MMHG | WEIGHT: 174 LBS | OXYGEN SATURATION: 97 % | SYSTOLIC BLOOD PRESSURE: 136 MMHG | OXYGEN SATURATION: 99 % | DIASTOLIC BLOOD PRESSURE: 82 MMHG

## 2023-07-07 DIAGNOSIS — D12.4 BENIGN NEOPLASM OF DESCENDING COLON: ICD-10-CM

## 2023-07-07 DIAGNOSIS — D12.3 BENIGN NEOPLASM OF TRANSVERSE COLON: ICD-10-CM

## 2023-07-07 DIAGNOSIS — K31.9 DISEASE OF STOMACH AND DUODENUM, UNSPECIFIED: ICD-10-CM

## 2023-07-07 DIAGNOSIS — K31.89 OTHER DISEASES OF STOMACH AND DUODENUM: ICD-10-CM

## 2023-07-07 DIAGNOSIS — K21.00 GASTRO-ESOPHAGEAL REFLUX DISEASE WITH ESOPHAGITIS, WITHOUT B: ICD-10-CM

## 2023-07-07 DIAGNOSIS — R19.5 OTHER FECAL ABNORMALITIES: ICD-10-CM

## 2023-07-07 DIAGNOSIS — R10.13 EPIGASTRIC PAIN: ICD-10-CM

## 2023-07-07 DIAGNOSIS — K57.30 DIVERTICULOSIS OF LARGE INTESTINE WITHOUT PERFORATION OR ABS: ICD-10-CM

## 2023-07-07 DIAGNOSIS — R10.31 RIGHT LOWER QUADRANT PAIN: ICD-10-CM

## 2023-07-07 DIAGNOSIS — R10.32 LEFT LOWER QUADRANT PAIN: ICD-10-CM

## 2023-07-07 DIAGNOSIS — B37.81 CANDIDAL ESOPHAGITIS: ICD-10-CM

## 2023-07-07 DIAGNOSIS — K64.0 FIRST DEGREE HEMORRHOIDS: ICD-10-CM

## 2023-07-07 PROBLEM — K63.5 POLYP OF COLON: Status: ACTIVE | Noted: 2023-07-07

## 2023-07-07 PROBLEM — K22.89 OTHER SPECIFIED DISEASE OF ESOPHAGUS: Status: ACTIVE | Noted: 2023-07-07

## 2023-07-07 LAB
GI HISTOLOGY: A. SELECT: (no result)
GI HISTOLOGY: B. UNSPECIFIED: (no result)
GI HISTOLOGY: C. UNSPECIFIED: (no result)
GI HISTOLOGY: D. UNSPECIFIED: (no result)
GI HISTOLOGY: PDF REPORT: (no result)

## 2023-07-07 PROCEDURE — 88305 TISSUE EXAM BY PATHOLOGIST: CPT | Performed by: INTERNAL MEDICINE

## 2023-07-07 PROCEDURE — 43239 EGD BIOPSY SINGLE/MULTIPLE: CPT | Performed by: INTERNAL MEDICINE

## 2023-07-07 PROCEDURE — 45380 COLONOSCOPY AND BIOPSY: CPT | Mod: 59 | Performed by: INTERNAL MEDICINE

## 2023-07-07 PROCEDURE — 45385 COLONOSCOPY W/LESION REMOVAL: CPT | Performed by: INTERNAL MEDICINE

## 2023-07-21 PROBLEM — E55.9 VITAMIN D DEFICIENCY: Status: ACTIVE | Noted: 2023-07-21

## 2023-07-21 PROBLEM — I73.00 RAYNAUD'S DISEASE WITHOUT GANGRENE: Status: ACTIVE | Noted: 2023-07-21

## 2023-07-24 NOTE — PROGRESS NOTES
Hematology/Oncology Outpatient Follow Up    PATIENT NAME:Barbara Coelho  :1950  MRN: [id number]  PRIMARY CARE PHYSICIAN: Liane Tran APRN  REFERRING PHYSICIAN: Liane Tran AP*    Chief Complaint   Patient presents with    Follow-up     Leukocytosis, unspecified type        HISTORY OF PRESENT ILLNESS:     73-year-old female has referred secondary to leukocytosis with differential lymphocytosis.  Patient was noted in .  Patient denies any fevers or chills or upper respiratory tract infection.  Review of her CBC indicates that her white count has ranged around 23-24,000 normal hemoglobin and normal platelets but her differentials have been 8% neutrophils 85% lymphocytes with an ANC of 2.1 and absolute lymphocyte count of 20.  Back in 2022 her white count was 12.8, Hemoglobin 13.2 platelets 211 and absolute lymphocyte count was 9600.  Patient states that she is fully active and denies any night sweats, weight loss or fatigue symptoms.     Patient has a history of mixed connective tissue disease.  Patient is now retired. Patient is . She has one child.     There is family hx of kidney cancer in her mother, breast cancer breast cancer 30,  father had lung cancer, paternal GM with stomach cancer, paternal GF with lung cancer     Patient does not smoke, drinks occasional etoh     2023: Patient had peripheral blood flow cytometry which showed chronic lymphocytic leukemia B-cell, CD38 negative and CD20 positive.  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  CLL panel by FISH was normal.  Chemistry panel BUN was 17 creatinine was 1, LDH was 206 normal uric acid was 6.8 normal is up to 5.7.  Peripheral smear showed numerous smudge cells  7/3/2023: Patient had CT scan of the chest, abdomen and pelvis, no significant lymphadenopathy patient had CT scan of the chest, abdomen and pelvis to suggest lymphomatous  process, supraumbilical midline ventral hernia containing omental fat without evidence of incarceration.  Past Medical History:   Diagnosis Date    Allergic 1967    take allergy shots    Allergic rhinitis     on allergy shots    Asthma     ALLERGIC    Back pain     Low    Bladder infection     Recurrent Bladder Infections    CLL (chronic lymphocytic leukemia)     Deep vein thrombosis 1989    Diverticulitis     Elevated laboratory test result 05/2023    elevated wbc/  elevated lymphs/ gfr and creatine    GERD (gastroesophageal reflux disease)     EGD 12/16 [place] - reflux, HH. no barretts    Hearing loss     History of DVT of lower extremity     left leg    Knee pain, right     Leg pain     Low back pain 1990    had back surgery    Mixed connective tissue disease 01/2019    Dr. Thakur    CARITO (obstructive sleep apnea) 2018    Osteopenia 03/28/2018    calculated frax - 11/3%    Pinched nerve in neck     Rheumatoid arthritis 2018    Staph infection     Urinary incontinence     Wears contact lenses     Wears prescription eyeglasses        Past Surgical History:   Procedure Laterality Date    BACK SURGERY  1995    Back (L5/S1)    BARIATRIC SURGERY  1985    gastric bypass    BREAST EXCISIONAL BIOPSY Left 1985    BREAST SURGERY  1990    lump removed    CHOLECYSTECTOMY  2019    CHOLECYSTECTOMY WITH INTRAOPERATIVE CHOLANGIOGRAM N/A 04/26/2018    Procedure: CHOLECYSTECTOMY LAPAROSCOPIC INTRAOPERATIVE CHOLANGIOGRAM;  Surgeon: iLt Morelos MD;  Location:  [facility];  Service: General    COLONOSCOPY  2016    GASTRIC BYPASS      HIGH GASTRIC BYPASS, 1980's    JOINT REPLACEMENT  2012,2020    left knee,right knee    OTHER SURGICAL HISTORY  1985    Stomach Stabled    REPLACEMENT TOTAL KNEE Left 2012    right  2021    SINUS SURGERY      x3    TOTAL KNEE ARTHROPLASTY Right 10/29/2021    Procedure: TOTAL KNEE ARTHROPLASTY RIGHT;  Surgeon: Emeka Douglas MD;  Location:  [facility];  Service: Orthopedics;  Laterality: Right;    TUBAL  ABDOMINAL LIGATION  1980    WISDOM TOOTH EXTRACTION           Current Outpatient Medications:     albuterol sulfate  (90 Base) MCG/ACT inhaler, TAKE 2 PUFFS BY MOUTH EVERY 4 HOURS AS NEEDED FOR WHEEZE, Disp: 8 g, Rfl: 1    Azelastine-Fluticasone 137-50 MCG/ACT suspension, , Disp: , Rfl:     baclofen (LIORESAL) 10 MG tablet, , Disp: , Rfl:     Biotin 1 MG capsule, Take 1 capsule by mouth Daily., Disp: , Rfl:     desloratadine (CLARINEX REDITAB) 5 MG disintegrating tablet, Take 1 tablet by mouth Daily., Disp: , Rfl:     Fluticasone-Salmeterol (Wixela Inhub) 500-50 MCG/ACT DISKUS, Inhale 1 puff 2 (Two) Times a Day., Disp: 180 each, Rfl: 1    gabapentin (NEURONTIN) 100 MG capsule, Take 3 capsules by mouth 2 (Two) Times a Day As Needed (pain)., Disp: 180 capsule, Rfl: 2    Gas Relief Extra Strength 125 MG chewable tablet, TAKE ONE TABLET BY MOUTH THREE TIMES A DAY AFTER MEALS FOR 30 DAYS, Disp: , Rfl:     hydroxychloroquine (PLAQUENIL) 200 MG tablet, Take 1 tablet by mouth Every 12 (Twelve) Hours., Disp: , Rfl:     levocetirizine (XYZAL) 5 MG tablet, , Disp: , Rfl:     meclizine (ANTIVERT) 25 MG tablet, , Disp: , Rfl:     NIFEdipine XL (PROCARDIA XL) 30 MG 24 hr tablet, Take 1 tablet by mouth Daily., Disp: , Rfl:     nystatin (MYCOSTATIN) 100,000 unit/mL suspension, TAKE 5MLS BY MOUTH 4 TIMES DAILY AS DIRECTED FOR 14 DAYS, Disp: , Rfl:     pantoprazole (PROTONIX) 40 MG EC tablet, , Disp: , Rfl:     Polyethylene Glycol 3350 (MIRALAX PO), Take  by mouth., Disp: , Rfl:     traZODone (DESYREL) 100 MG tablet, Take 1 tablet by mouth Every Night for 90 days., Disp: 90 tablet, Rfl: 1    VITAMIN D, CHOLECALCIFEROL, PO, Take 1 capsule by mouth Daily., Disp: , Rfl:   No current facility-administered medications for this visit.    Facility-Administered Medications Ordered in Other Visits:     Chlorhexidine Gluconate Cloth 2 % pads, , Apply externally, Once, Emeka Douglas MD    mupirocin (BACTROBAN) 2 % nasal ointment, ,  Nasal, BID, Emeka Douglas MD    Allergies   Allergen Reactions    Celebrex [Celecoxib] Hives     HIVES     Codeine Rash    Oxycodone Rash and Hives    Sulfa Antibiotics Itching     ITCHING        Family History   Problem Relation Age of Onset    Cancer Other         BREAST, LUNG, OVARIAN, KIDNEY    Kidney cancer Other     Lung cancer Other         pGF as well    Cancer Mother         kidney    Hearing loss Mother     Other Mother         dementia    Cancer Father         lung    Mental illness Father     Alcohol abuse Father     Breast cancer Sister 30    Connective Tissue Disease Sister     Hypertension Sister         pulmonary hypertension    Cancer Sister         [place]    Cancer Paternal Grandmother         stomach    Cancer Paternal Grandfather         lung    Ovarian cancer Neg Hx        Cancer-related family history includes Breast cancer (age of onset: 30) in her sister; Cancer in her father, mother, paternal grandfather, paternal grandmother, sister, and another family member; Kidney cancer in an other family member; Lung cancer in an other family member. There is no history of Ovarian cancer.    Social History     Tobacco Use    Smoking status: Never     Passive exposure: Never    Smokeless tobacco: Never   Vaping Use    Vaping Use: Never used   Substance Use Topics    Alcohol use: Yes     Comment: occ    Drug use: Defer         I have reviewed and confirmed the accuracy of the patient's history: Chief complaint, HPI, ROS, and Subjective as entered by the MA/LPN/RN. Felisha Echols MD 07/25/23         SUBJECTIVE:      Patient denies any new issues          REVIEW OF SYSTEMS:  Review of Systems   Constitutional:  Negative for chills, fatigue and fever.   HENT:  Negative for congestion, drooling, ear discharge, rhinorrhea, sinus pressure and tinnitus.    Eyes:  Negative for photophobia, pain and discharge.   Respiratory:  Negative for apnea, choking and stridor.    Cardiovascular:  Negative for  "palpitations.   Gastrointestinal:  Negative for abdominal distention, abdominal pain and anal bleeding.   Endocrine: Negative for polydipsia and polyphagia.   Genitourinary:  Negative for decreased urine volume, flank pain and genital sores.   Musculoskeletal:  Negative for gait problem, neck pain and neck stiffness.        Bilateral lower extremity swelling   Skin:  Negative for color change, rash and wound.   Neurological:  Negative for tremors, seizures, syncope, facial asymmetry and speech difficulty.   Hematological:  Negative for adenopathy.   Psychiatric/Behavioral:  Negative for agitation, confusion, hallucinations and self-injury. The patient is not hyperactive.      OBJECTIVE:    Vitals:    07/25/23 1037   BP: 121/68   Pulse: 61   Resp: 18   Temp: 98 °F (36.7 °C)   TempSrc: Infrared   SpO2: 98%   Weight: 83 kg (183 lb)   Height: 160 cm (63\")   PainSc: 0-No pain     Body mass index is 32.42 kg/m².    ECOG  (0) Fully active, able to carry on all predisease performance without restriction    Physical Exam  Vitals and nursing note reviewed.   Constitutional:       General: She is not in acute distress.     Appearance: She is not diaphoretic.   HENT:      Head: Normocephalic and atraumatic.   Eyes:      General: No scleral icterus.        Right eye: No discharge.         Left eye: No discharge.      Conjunctiva/sclera: Conjunctivae normal.   Neck:      Thyroid: No thyromegaly.   Cardiovascular:      Rate and Rhythm: Normal rate and regular rhythm.      Heart sounds: Normal heart sounds.     No friction rub. No gallop.   Pulmonary:      Effort: Pulmonary effort is normal. No respiratory distress.      Breath sounds: No stridor. No wheezing.   Abdominal:      General: Bowel sounds are normal.      Palpations: Abdomen is soft. There is no mass.      Tenderness: There is no abdominal tenderness. There is no guarding or rebound.   Musculoskeletal:         General: No tenderness. Normal range of motion.      Cervical " back: Normal range of motion and neck supple.      Right lower leg: Edema present.      Left lower leg: Edema present.   Lymphadenopathy:      Cervical: No cervical adenopathy.   Skin:     General: Skin is warm.      Findings: No erythema or rash.   Neurological:      Mental Status: She is alert and oriented to person, place, and time.      Motor: No abnormal muscle tone.   Psychiatric:         Behavior: Behavior normal.       I have reexamined the patient and the results are consistent with the previously documented exam. Felisha Leah Echols MD        RECENT LABS    WBC   Date Value Ref Range Status   07/25/2023 22.76 (H) 3.40 - 10.80 10*3/mm3 Final   06/20/2022 12.8 (H) 3.4 - 10.8 x10E3/uL Final     RBC   Date Value Ref Range Status   07/25/2023 4.37 3.77 - 5.28 10*6/mm3 Final   06/20/2022 4.58 3.77 - 5.28 x10E6/uL Final     Hemoglobin   Date Value Ref Range Status   07/25/2023 12.8 12.0 - 15.9 g/dL Final     Hematocrit   Date Value Ref Range Status   07/25/2023 40.8 34.0 - 46.6 % Final     MCV   Date Value Ref Range Status   07/25/2023 93.4 79.0 - 97.0 fL Final     MCH   Date Value Ref Range Status   07/25/2023 29.3 26.6 - 33.0 pg Final     MCHC   Date Value Ref Range Status   07/25/2023 31.4 (L) 31.5 - 35.7 g/dL Final     RDW   Date Value Ref Range Status   07/25/2023 12.9 12.3 - 15.4 % Final     RDW-SD   Date Value Ref Range Status   07/25/2023 42.8 37.0 - 54.0 fl Final     MPV   Date Value Ref Range Status   07/25/2023 9.1 6.0 - 12.0 fL Final     Platelets   Date Value Ref Range Status   07/25/2023 165 140 - 450 10*3/mm3 Final     Neutrophil %   Date Value Ref Range Status   07/25/2023 12.9 (L) 42.7 - 76.0 % Final     Lymphocyte %   Date Value Ref Range Status   07/25/2023 81.4 (H) 19.6 - 45.3 % Final     Monocyte %   Date Value Ref Range Status   07/25/2023 3.9 (L) 5.0 - 12.0 % Final     Eosinophil %   Date Value Ref Range Status   07/25/2023 1.7 0.3 - 6.2 % Final     Basophil %   Date Value Ref Range  Status   07/25/2023 0.1 0.0 - 1.5 % Final     Immature Grans %   Date Value Ref Range Status   10/21/2021 0.2 0.0 - 0.5 % Final     Neutrophils, Absolute   Date Value Ref Range Status   07/25/2023 2.93 1.70 - 7.00 10*3/mm3 Final     Lymphocytes, Absolute   Date Value Ref Range Status   07/25/2023 18.52 (H) 0.70 - 3.10 10*3/mm3 Final     Monocytes, Absolute   Date Value Ref Range Status   07/25/2023 0.89 0.10 - 0.90 10*3/mm3 Final     Eosinophils, Absolute   Date Value Ref Range Status   07/25/2023 0.39 0.00 - 0.40 10*3/mm3 Final     Basophils, Absolute   Date Value Ref Range Status   07/25/2023 0.03 0.00 - 0.20 10*3/mm3 Final     Immature Grans, Absolute   Date Value Ref Range Status   10/21/2021 0.02 0.00 - 0.05 10*3/mm3 Final     nRBC   Date Value Ref Range Status   05/04/2023 0.1 0.0 - 0.2 /100 WBC Final       Lab Results   Component Value Date    GLUCOSE 86 06/06/2023    BUN 17 06/06/2023    CREATININE 1.02 (H) 06/06/2023    EGFRIFNONA 62 11/01/2021    BCR 16.7 06/06/2023    K 3.8 06/06/2023    CO2 24.0 06/06/2023    CALCIUM 9.3 06/06/2023    ALBUMIN 4.3 06/06/2023    AST 22 06/06/2023    ALT 14 06/06/2023         Assessment & Plan     Leukocytosis, unspecified type  - CBC & Differential      ASSESSMENT:    Leukocytosis, unspecified type  - CBC & Differential        CLL/SLL resenting as leukocytosis with differential lymphocytosis since March 2022.  Reviewed the diagnosis in detail with patient and her daughter who is present today.  CT scans chest abdomen and pelvis do not show any evidence of lymphomatous process.  She has clinical stage 0 CLL  Patient has mixed connective tissue disorder which may be contributing to her lymphocytosis.  Bilateral lower extremity swelling rule out DVTs.  Plans were negative  Abdominal hernia: Declines surgical evaluation     Discussion       CLL, we discussed that indications for treatment would include but not limited to, pancytopenia, systemic symptoms including fatigue,  night sweats and weight loss.  Other indications treatment will include recurrent infections, organ dysfunction due to massive lymphadenopathy, doubling  of leukocytosis in less than 6 months.  We discussed that CLL is not curable but can respond to chemo immunotherapy and some molecular targeted agents which can result in remissions but this disease is faulted by multiple relapses over the course of time.  We also discussed that median survivorship with low-grade lymphoma in general is estimated anywhere from 10-12 years.  I also explained that this disease is heterogeneous.  Some patients may succumb to the illness within a few days following diagnosis while some may have it for decades.  We discussed that lymphoproliferative diseases in general can affect the immune system so there is risk of [place] infections.  When the bone marrow is heavily involved there is risk of pancytopenia  which can result in blood, platelet transfusions and also predisposition to infections if the white count is affected.  There is also a risk of transformation to high-grade lymphoma which can result in significant symptoms and would require systemic chemo immunotherapy.  The risk of transformation varies with different types of lymphoma.  Also is not dependent on prior treatment or worsen by conservative management.      Plans     Reviewed CT scan chest abdomen and pelvis with contrast  Doppler was negative  HIV was negative  For now continue surveillance blood work  Reviewed the symptoms in detail with patient  Uric acid was normal  Complete her age-appropriate immunizations including pneumococcal vaccine.  Patient follow-up with her PCP  Follow-up 4 months  All questions answered       Patient verbalized understanding and is in agreement of the above plan.           I spent 30 total minutes, face-to-face, caring for Barbara khan. 90% of this time involved counseling and/or coordination of care as documented within this note.

## 2023-07-25 ENCOUNTER — LAB (OUTPATIENT)
Dept: LAB | Facility: HOSPITAL | Age: 73
End: 2023-07-25
Payer: MEDICARE

## 2023-07-25 ENCOUNTER — OFFICE VISIT (OUTPATIENT)
Dept: ONCOLOGY | Facility: CLINIC | Age: 73
End: 2023-07-25
Payer: MEDICARE

## 2023-07-25 VITALS
HEART RATE: 61 BPM | SYSTOLIC BLOOD PRESSURE: 121 MMHG | RESPIRATION RATE: 18 BRPM | DIASTOLIC BLOOD PRESSURE: 68 MMHG | OXYGEN SATURATION: 98 % | HEIGHT: 63 IN | TEMPERATURE: 98 F | BODY MASS INDEX: 32.43 KG/M2 | WEIGHT: 183 LBS

## 2023-07-25 DIAGNOSIS — D72.829 LEUKOCYTOSIS, UNSPECIFIED TYPE: Primary | ICD-10-CM

## 2023-07-25 LAB
BASOPHILS # BLD AUTO: 0.03 10*3/MM3 (ref 0–0.2)
BASOPHILS NFR BLD AUTO: 0.1 % (ref 0–1.5)
DEPRECATED RDW RBC AUTO: 42.8 FL (ref 37–54)
EOSINOPHIL # BLD AUTO: 0.39 10*3/MM3 (ref 0–0.4)
EOSINOPHIL NFR BLD AUTO: 1.7 % (ref 0.3–6.2)
ERYTHROCYTE [DISTWIDTH] IN BLOOD BY AUTOMATED COUNT: 12.9 % (ref 12.3–15.4)
HCT VFR BLD AUTO: 40.8 % (ref 34–46.6)
HGB BLD-MCNC: 12.8 G/DL (ref 12–15.9)
HOLD SPECIMEN: NORMAL
HOLD SPECIMEN: NORMAL
LYMPHOCYTES # BLD AUTO: 18.52 10*3/MM3 (ref 0.7–3.1)
LYMPHOCYTES NFR BLD AUTO: 81.4 % (ref 19.6–45.3)
MCH RBC QN AUTO: 29.3 PG (ref 26.6–33)
MCHC RBC AUTO-ENTMCNC: 31.4 G/DL (ref 31.5–35.7)
MCV RBC AUTO: 93.4 FL (ref 79–97)
MONOCYTES # BLD AUTO: 0.89 10*3/MM3 (ref 0.1–0.9)
MONOCYTES NFR BLD AUTO: 3.9 % (ref 5–12)
NEUTROPHILS NFR BLD AUTO: 12.9 % (ref 42.7–76)
NEUTROPHILS NFR BLD AUTO: 2.93 10*3/MM3 (ref 1.7–7)
PLATELET # BLD AUTO: 165 10*3/MM3 (ref 140–450)
PMV BLD AUTO: 9.1 FL (ref 6–12)
RBC # BLD AUTO: 4.37 10*6/MM3 (ref 3.77–5.28)
WBC NRBC COR # BLD: 22.76 10*3/MM3 (ref 3.4–10.8)

## 2023-07-25 PROCEDURE — 85025 COMPLETE CBC W/AUTO DIFF WBC: CPT

## 2023-07-25 PROCEDURE — 36415 COLL VENOUS BLD VENIPUNCTURE: CPT

## 2023-09-19 ENCOUNTER — OFFICE VISIT (OUTPATIENT)
Dept: PAIN MEDICINE | Facility: CLINIC | Age: 73
End: 2023-09-19
Payer: MEDICARE

## 2023-09-19 ENCOUNTER — HOSPITAL ENCOUNTER (OUTPATIENT)
Dept: GENERAL RADIOLOGY | Facility: HOSPITAL | Age: 73
Discharge: HOME | End: 2023-09-19
Payer: MEDICARE

## 2023-09-19 VITALS
HEART RATE: 70 BPM | OXYGEN SATURATION: 96 % | RESPIRATION RATE: 16 BRPM | DIASTOLIC BLOOD PRESSURE: 75 MMHG | SYSTOLIC BLOOD PRESSURE: 144 MMHG

## 2023-09-19 DIAGNOSIS — G89.4 CHRONIC PAIN SYNDROME: Primary | ICD-10-CM

## 2023-09-19 DIAGNOSIS — M54.16 LUMBAR RADICULITIS: ICD-10-CM

## 2023-09-19 DIAGNOSIS — M47.817 LUMBOSACRAL SPONDYLOSIS WITHOUT MYELOPATHY: ICD-10-CM

## 2023-09-19 DIAGNOSIS — M79.672 LEFT FOOT PAIN: ICD-10-CM

## 2023-09-19 PROCEDURE — 73630 X-RAY EXAM OF FOOT: CPT

## 2023-09-19 RX ORDER — NABUMETONE 500 MG/1
2 TABLET, FILM COATED ORAL DAILY
COMMUNITY
Start: 2023-08-11

## 2023-09-19 RX ORDER — GABAPENTIN 100 MG/1
300 CAPSULE ORAL 2 TIMES DAILY PRN
Qty: 180 CAPSULE | Refills: 2 | Status: SHIPPED | OUTPATIENT
Start: 2023-09-19

## 2023-09-20 NOTE — PROGRESS NOTES
Subjective   CC back pain, right leg pain  Barbara Coelho is a 73 y.o. female with chronic back pain, history of laminectomy, here for follow-up.    Complains of left foot pain.  Denies any injury.  Has noticed some swelling in the morning and tenderness around arch and metatarsal tarsal junction.  She continues to have good relief with gabapentin.  Better at night with the increase to 200 mg last visit.  Chronic back pain radiating to right leg anterior thigh, knee and lower leg and foot constant but worse with standing walking or any activity.  Denies new weakness, saddle anesthesia, bladder or bowel incontinence.  Similar symptoms 6 months ago had good relief with LESI done in [place].  She has tried physical therapy, home exercise program in the past without relief.  Pain is interfering with ADL and sleep    Imaging reviewed  L-spine MRI 2022 Status post right L5 hemilaminectomy without evidence for hypertrophic scarring component. Noted right lateralizing paracentral protrusion of residual and/or recurrent disc bulge at the L5-S1 level which appears to contact the right-sided nerve roots. No severe stenosis of the spinal canal or neuroforamina at this level however.    Pain Assessment   Location of Pain: Lower Back, R Hip, L Hip, R Leg,  Description of Pain: Dull/Aching, Throbbing, Stabbing  Previous Pain Rating :8  Current Pain Ratin  Aggravating Factors: Activity  Alleviating Factors: Rest, Medication  Pain onset over 12 weeks  Interferes with ADL's.   [place] back pain disability scale on chart    PEG Assessment   What number best describes your pain on average in the past week?5  What number best describes how, during the past week, pain has interfered with your enjoyment of life?4  What number best describes how, during the past week, pain has interfered with your general activity? 10     The following portions of the patient's history were reviewed and updated as appropriate: allergies, current  medications, past family history, past medical history, past social history, past surgical history and problem list.     has a past medical history of Allergic (1967), Allergic rhinitis, Asthma, Back pain, Bladder infection, CLL (chronic lymphocytic leukemia), Deep vein thrombosis (1989), Diverticulitis, Elevated laboratory test result (05/2023), GERD (gastroesophageal reflux disease), Hearing loss, History of DVT of lower extremity, Knee pain, right, Leg pain, Low back pain (1990), Mixed connective tissue disease (01/2019), CARITO (obstructive sleep apnea) (2018), Osteopenia (03/28/2018), Pinched nerve in neck, Rheumatoid arthritis (2018), Staph infection, Urinary incontinence, Wears contact lenses, and Wears prescription eyeglasses.   has a past surgical history that includes Gastric bypass; Sinus surgery; Other surgical history (1985); Tubal ligation (1980); Back surgery (1995); Colonoscopy (2016); Replacement total knee (Left, 2012); Breast excisional biopsy (Left, 1985); cholecystectomy with intraoperative cholangiogram (N/A, 04/26/2018); [place] tooth extraction; Total knee arthroplasty (Right, 10/29/2021); Bariatric Surgery (1985); Breast surgery (1990); Cholecystectomy (2019); and Joint replacement (2012,2020).  family history includes Alcohol abuse in her father; Breast cancer (age of onset: 30) in her sister; Cancer in her father, mother, paternal grandfather, paternal grandmother, sister, and another family member; Connective Tissue Disease in her sister; Hearing loss in her mother; Hypertension in her sister; Kidney cancer in an other family member; Lung cancer in an other family member; Mental illness in her father; Other in her mother.  Social History     Tobacco Use    Smoking status: Never     Passive exposure: Never    Smokeless tobacco: Never   Substance Use Topics    Alcohol use: Yes     Comment: occ       Review of Systems   Musculoskeletal:  Positive for arthralgias and back pain.   All other  systems reviewed and are negative.    Objective   Physical Exam  Vitals reviewed.   Constitutional:       General: She is not in acute distress.  Pulmonary:      Effort: Pulmonary effort is normal.   Musculoskeletal:      Lumbar back: Tenderness present. Decreased range of motion. Positive right straight leg raise test.      Comments: Lumbar loading positive, pain on extension of low back past 5 degrees.  TTP on the lumbar facets noted.       /75   Pulse 70   Resp 16   LMP  (LMP Unknown) Comment: LAST MAMMOGRAM 2020  SpO2 96%     PHQ 9 on chart  Opioid risk tool low risk      Assessment & Plan   Diagnoses and all orders for this visit:    1. Chronic pain syndrome (Primary)  -     gabapentin (NEURONTIN) 100 MG capsule; Take 3 capsules by mouth 2 (Two) Times a Day As Needed (pain).  Dispense: 180 capsule; Refill: 2    2. Lumbosacral spondylosis without myelopathy    3. Lumbar radiculitis  -     gabapentin (NEURONTIN) 100 MG capsule; Take 3 capsules by mouth 2 (Two) Times a Day As Needed (pain).  Dispense: 180 capsule; Refill: 2    4. Left foot pain  -     Ambulatory Referral to Podiatry  -     XR Foot 3+ View Left; Future    Summary  Barbara Coelho is a 73 y.o. female with chronic back pain hx of laminectomy, here for follow-up.  Referred by PCP.  Chronic pain from lumbar DDD spondylosis with radicular pain.  Chronic polyarthralgia.  She has tried physical therapy, home exercise program in the past without relief.  Pain is interfering with ADL and sleep  70% relief of LESI.  Repeat as needed.    Complains of left foot pain.  Denies any injury.  Has noticed some swelling in the morning and tenderness around arch and metatarsal tarsal junction.  She continues to have good relief with gabapentin.  Better at night with the increase to 200 mg last visit.  Denies any side effects.    We will continue gabapentin with 100 mg in the morning and 200 mg at bedtime.  Left foot x-ray ordered for further evaluation.   Referral to podiatry.    RTC f6 months      Note is dictated utilizing voice recognition software. Unfortunately this leads to occasional typographical errors. I apologize in advance if the situation occurs. If questions occur please do not hesitate to call our office.

## 2023-09-21 ENCOUNTER — OFFICE (OUTPATIENT)
Dept: URBAN - METROPOLITAN AREA CLINIC 64 | Facility: CLINIC | Age: 73
End: 2023-09-21
Payer: COMMERCIAL

## 2023-09-21 VITALS
HEART RATE: 65 BPM | WEIGHT: 182 LBS | SYSTOLIC BLOOD PRESSURE: 117 MMHG | DIASTOLIC BLOOD PRESSURE: 70 MMHG | HEIGHT: 63 IN

## 2023-09-21 DIAGNOSIS — B37.81 CANDIDAL ESOPHAGITIS: ICD-10-CM

## 2023-09-21 DIAGNOSIS — K59.00 CONSTIPATION, UNSPECIFIED: ICD-10-CM

## 2023-09-21 DIAGNOSIS — R10.13 EPIGASTRIC PAIN: ICD-10-CM

## 2023-09-21 DIAGNOSIS — R11.0 NAUSEA: ICD-10-CM

## 2023-09-21 PROCEDURE — 99213 OFFICE O/P EST LOW 20 MIN: CPT

## 2023-09-22 ENCOUNTER — TELEPHONE (OUTPATIENT)
Dept: FAMILY MEDICINE CLINIC | Facility: CLINIC | Age: 73
End: 2023-09-22

## 2023-09-22 NOTE — TELEPHONE ENCOUNTER
Caller: [facility]    Relationship: Other    Best call back number: [phone]    What is the best time to reach you: ANY    Who are you requesting to speak with (clinical staff, provider,  specific staff member): CLINICAL      What was the call regarding: [facility] CALLED FOR AN ACTIVE LIST OF MEDICATIONS  PLEASE FAX TO: [phone]

## 2023-09-25 DIAGNOSIS — F41.9 ANXIETY: Primary | Chronic | ICD-10-CM

## 2023-09-25 RX ORDER — TRAZODONE HYDROCHLORIDE 100 MG/1
100 TABLET ORAL NIGHTLY
Qty: 90 TABLET | Refills: 1 | Status: SHIPPED | OUTPATIENT
Start: 2023-09-25 | End: 2023-12-24

## 2023-09-28 ENCOUNTER — TELEPHONE (OUTPATIENT)
Dept: FAMILY MEDICINE CLINIC | Facility: CLINIC | Age: 73
End: 2023-09-28
Payer: MEDICARE

## 2023-09-28 NOTE — TELEPHONE ENCOUNTER
Hub staff attempted to follow warm transfer process and was unsuccessful     Caller: Barbara Coelho    Relationship to patient: Self    Best call back number: [phone]     Patient is needing: PATIENT REQUESTING HER CANCELLED APPOINTMENT ON 10/11/23 AT 8:30 AM WITH JUNIOR LINDO.  SHE DOES NOT WANT TO SEE ANOTHER PROVIDER.

## 2023-09-29 NOTE — TELEPHONE ENCOUNTER
Moved appt back to original day and time with Liane. Left message to let patient know this was changed per her request.

## 2023-10-11 ENCOUNTER — OFFICE VISIT (OUTPATIENT)
Dept: FAMILY MEDICINE CLINIC | Facility: CLINIC | Age: 73
End: 2023-10-11
Payer: MEDICARE

## 2023-10-11 VITALS
SYSTOLIC BLOOD PRESSURE: 124 MMHG | BODY MASS INDEX: 32.07 KG/M2 | HEART RATE: 72 BPM | HEIGHT: 63 IN | WEIGHT: 181 LBS | OXYGEN SATURATION: 99 % | DIASTOLIC BLOOD PRESSURE: 80 MMHG

## 2023-10-11 DIAGNOSIS — G47.33 OSA (OBSTRUCTIVE SLEEP APNEA): ICD-10-CM

## 2023-10-11 DIAGNOSIS — C91.10 CLL (CHRONIC LYMPHOCYTIC LEUKEMIA): ICD-10-CM

## 2023-10-11 DIAGNOSIS — R73.03 PREDIABETES: ICD-10-CM

## 2023-10-11 DIAGNOSIS — M06.9 RHEUMATOID ARTHRITIS INVOLVING BOTH KNEES, UNSPECIFIED WHETHER RHEUMATOID FACTOR PRESENT: ICD-10-CM

## 2023-10-11 DIAGNOSIS — M51.36 DDD (DEGENERATIVE DISC DISEASE), LUMBAR: ICD-10-CM

## 2023-10-11 DIAGNOSIS — J45.20 MILD INTERMITTENT ASTHMA WITHOUT COMPLICATION: ICD-10-CM

## 2023-10-11 DIAGNOSIS — J30.1 SEASONAL ALLERGIC RHINITIS DUE TO POLLEN: ICD-10-CM

## 2023-10-11 DIAGNOSIS — M81.0 AGE-RELATED OSTEOPOROSIS WITHOUT CURRENT PATHOLOGICAL FRACTURE: ICD-10-CM

## 2023-10-11 DIAGNOSIS — K59.00 CONSTIPATION, UNSPECIFIED CONSTIPATION TYPE: ICD-10-CM

## 2023-10-11 DIAGNOSIS — Z00.00 PREVENTATIVE HEALTH CARE: Primary | ICD-10-CM

## 2023-10-11 PROCEDURE — 1160F RVW MEDS BY RX/DR IN RCRD: CPT | Performed by: NURSE PRACTITIONER

## 2023-10-11 PROCEDURE — 1159F MED LIST DOCD IN RCRD: CPT | Performed by: NURSE PRACTITIONER

## 2023-10-11 PROCEDURE — G0439 PPPS, SUBSEQ VISIT: HCPCS | Performed by: NURSE PRACTITIONER

## 2023-10-11 PROCEDURE — 1170F FXNL STATUS ASSESSED: CPT | Performed by: NURSE PRACTITIONER

## 2023-10-11 NOTE — ASSESSMENT & PLAN NOTE
DEXA reviewed  Reclast vs Prolia recommended - patient is to notify Endo of choice and they are arranging treatment

## 2023-10-11 NOTE — PROGRESS NOTES
The ABCs of the Annual Wellness Visit  Subsequent Medicare Wellness Visit    Subjective      Barbara Coelho is a 73 y.o. female who presents for a Subsequent Medicare Wellness Visit.    The following portions of the patient's history were reviewed and   updated as appropriate: allergies, current medications, past family history, past medical history, past social history, past surgical history, and problem list.    Compared to one year ago, the patient feels her physical   health is worse.    Compared to one year ago, the patient feels her mental   health is the same.    Recent Hospitalizations:  She was not admitted to the hospital during the last year.       Current Medical Providers:  Patient Care Team:  Liane Tran APRN as PCP - General (Nurse Practitioner)  Oscar Arcos MD as Consulting Physician (Gastroenterology)  Emeka Douglas MD as Consulting Physician (Orthopedic Surgery)  Valdez Vega OD (Optometry)  Pinky Thakur MD as Consulting Physician (Rheumatology)  Daren Mathews MD as Consulting Physician (Pain Medicine)  Opal Alarcon APRN as Nurse Practitioner (Nurse Practitioner)  Nagi Jaramillo MD as Consulting Physician (Allergy)  Dr. Miller (Dental General Practice)  Valdez Vega OD (Optometry)  Felisha Echols MD as Consulting Physician (Hematology and Oncology)    Outpatient Medications Prior to Visit   Medication Sig Dispense Refill    albuterol sulfate  (90 Base) MCG/ACT inhaler TAKE 2 PUFFS BY MOUTH EVERY 4 HOURS AS NEEDED FOR WHEEZE 8 g 1    Azelastine-Fluticasone 137-50 MCG/ACT suspension       baclofen (LIORESAL) 10 MG tablet       Biotin 1 MG capsule Take 1 capsule by mouth Daily.      desloratadine (CLARINEX REDITAB) 5 MG disintegrating tablet Take 1 tablet by mouth Daily.      Fluticasone-Salmeterol (Wixela Inhub) 500-50 MCG/ACT DISKUS Inhale 1 puff 2 (Two) Times a Day. 180 each 1    gabapentin (NEURONTIN) 100 MG capsule Take 3  capsules by mouth 2 (Two) Times a Day As Needed (pain). 180 capsule 2    Gas Relief Extra Strength 125 MG chewable tablet TAKE ONE TABLET BY MOUTH THREE TIMES A DAY AFTER MEALS FOR 30 DAYS      hydroxychloroquine (PLAQUENIL) 200 MG tablet Take 1 tablet by mouth Every 12 (Twelve) Hours.      levocetirizine (XYZAL) 5 MG tablet       meclizine (ANTIVERT) 25 MG tablet       NIFEdipine XL (PROCARDIA XL) 30 MG 24 hr tablet Take 1 tablet by mouth Daily.      nystatin (MYCOSTATIN) 100,000 unit/mL suspension TAKE 5MLS BY MOUTH 4 TIMES DAILY AS DIRECTED FOR 14 DAYS      Polyethylene Glycol 3350 (MIRALAX PO) Take  by mouth.      traZODone (DESYREL) 100 MG tablet TAKE 1 TABLET BY MOUTH EVERY NIGHT FOR 90 DAYS. 90 tablet 1    VITAMIN D, CHOLECALCIFEROL, PO Take 1 capsule by mouth Daily.      nabumetone (RELAFEN) 500 MG tablet Take 2 tablets by mouth Daily. (Patient not taking: Reported on 10/11/2023)      pantoprazole (PROTONIX) 40 MG EC tablet  (Patient not taking: Reported on 10/11/2023)       Facility-Administered Medications Prior to Visit   Medication Dose Route Frequency Provider Last Rate Last Admin    Chlorhexidine Gluconate Cloth 2 % pads   Apply externally Once Emeka Douglas MD        mupirocin (BACTROBAN) 2 % nasal ointment   Nasal BID Emeka Douglas MD           No opioid medication identified on active medication list. I have reviewed chart for other potential  high risk medication/s and harmful drug interactions in the elderly.        Aspirin is not on active medication list.  Aspirin use is not indicated based on review of current medical condition/s. Risk of harm outweighs potential benefits.  .    Patient Active Problem List   Diagnosis    Constipation    Screening for cardiovascular condition    Chronic allergic rhinitis due to food    GERD (gastroesophageal reflux disease)    DDD (degenerative disc disease), lumbar    Depression    Mixed connective tissue disease    Autoimmune Pleuritis    History of  "asthma    CARITO on CPAP    CARITO (obstructive sleep apnea)    Osteoarthritis of right knee    History of total right knee replacement    Postoperative pain    Anxiety    Saphenous neuralgia, right    Pain due to total right knee replacement    History of left knee replacement    History of lumbar surgery    Physical deconditioning    Gait disturbance    Spondylosis of lumbar region without myelopathy or radiculopathy    Status post total right knee replacement    Screening for malignant neoplasm of colon    Encounter for screening mammogram for malignant neoplasm of breast    Rheumatoid arthritis involving both knees    Asymptomatic menopause    Osteopenia    Prediabetes    Mild intermittent asthma without complication    Preventative health care    Abdominal distension (gaseous)    Back pain    Epigastric pain    Hemorrhoids    Influenza    Left lower quadrant pain    Onychomycosis    Osteoporosis    Other fecal abnormalities    Positive antinuclear antibody    Seasonal allergic rhinitis    Vitamin D deficiency    Raynaud's disease without gangrene    CLL (chronic lymphocytic leukemia)     Advance Care Planning   Advance Care Planning     Advance Directive is on file.  ACP discussion was held with the patient during this visit. Patient has an advance directive in EMR which is still valid.      Objective    Vitals:    10/11/23 0831   BP: 124/80   BP Location: Left arm   Patient Position: Sitting   Cuff Size: Adult   Pulse: 72   SpO2: 99%   Weight: 82.1 kg (181 lb)   Height: 160 cm (63\")   PainSc:   4   PainLoc: Foot     Estimated body mass index is 32.06 kg/mý as calculated from the following:    Height as of this encounter: 160 cm (63\").    Weight as of this encounter: 82.1 kg (181 lb).           Does the patient have evidence of cognitive impairment?   No    ATTENTION  What is the year: correct  What is the month of the year: correct  What is the day of the week?: correct  What is the date?: incorrect  MEMORY  Repeat " address three times, only score third attempt: [address] [place], [place]: 6  HOW MANY ANIMALS DID THE PATIENT NAME  Verbal Fluency -- Animal Names (0-25): 17-21  CLOCK DRAWING  Clock Drawing: All Correct  MEMORY RECALL  Tell me what you remember about that name and address we were repeating at the beginnin  ACE TOTAL SCORE  Total ACE Score - <25/30 strongly suggests cognitive impairment; <21/30 almost certainly shows dementia: 26              HEALTH RISK ASSESSMENT    Smoking Status:  Social History     Tobacco Use   Smoking Status Never    Passive exposure: Never   Smokeless Tobacco Never     Alcohol Consumption:  Social History     Substance and Sexual Activity   Alcohol Use Yes    Comment: occ     Fall Risk Screen:    STEADI Fall Risk Assessment was completed, and patient is at MODERATE risk for falls. Assessment completed on:10/11/2023    Depression Screening:      10/11/2023     8:00 AM   PHQ-2/PHQ-9 Depression Screening   Little Interest or Pleasure in Doing Things 0-->not at all   Feeling Down, Depressed or Hopeless 0-->not at all   PHQ-9: Brief Depression Severity Measure Score 0       Health Habits and Functional and Cognitive Screening:      10/11/2023     8:00 AM   Functional & Cognitive Status   Do you have difficulty preparing food and eating? No   Do you have difficulty bathing yourself, getting dressed or grooming yourself? No   Do you have difficulty using the toilet? No   Do you have difficulty moving around from place to place? No   Do you have trouble with steps or getting out of a bed or a chair? No   Do you need help using the phone?  No   Are you deaf or do you have serious difficulty hearing?  No   Do you need help to go to places out of walking distance? No   Do you need help shopping? No   Do you need help preparing meals?  No   Do you need help with housework?  No   Do you need help with laundry? No   Do you need help taking your medications? No   Do you need  help managing money? No   Do you ever drive or ride in a car without wearing a seat belt? No   Have you felt unusual stress, anger or loneliness in the last month? No   Who do you live with? Alone   If you need help, do you have trouble finding someone available to you? Yes   Have you been bothered in the last four weeks by sexual problems? No   Do you have difficulty concentrating, remembering or making decisions? No       Age-appropriate Screening Schedule:  Refer to the list below for future screening recommendations based on patient's age, sex and/or medical conditions. Orders for these recommended tests are listed in the plan section. The patient has been provided with a written plan.    Health Maintenance   Topic Date Due    TDAP/TD VACCINES (1 - Tdap) Never done    LIPID PANEL  08/01/2023    COVID-19 Vaccine (6 - 2023-24 season) 09/01/2023    ZOSTER VACCINE (3 of 3) 10/25/2023    BMI FOLLOWUP  03/30/2024    ANNUAL WELLNESS VISIT  10/11/2024    MAMMOGRAM  05/12/2025    DXA SCAN  05/12/2025    COLORECTAL CANCER SCREENING  07/07/2033    HEPATITIS C SCREENING  Completed    INFLUENZA VACCINE  Completed    Pneumococcal Vaccine 65+  Completed                  CMS Preventative Services Quick Reference  Risk Factors Identified During Encounter:    Fall Risk-High or Moderate: Discussed Fall Prevention in the home  Hearing Problem:  Patient wants to wait on ENT referral  Immunizations Discussed/Encouraged: Tdap, Influenza, Prevnar 20 (Pneumococcal 20-valent conjugate), Shingrix, and COVID19  Dental Screening Recommended  Vision Screening Recommended    The above risks/problems have been discussed with the patient.  Pertinent information has been shared with the patient in the After Visit Summary.    Diagnoses and all orders for this visit:    1. Preventative health care (Primary)  -     Lipid Panel; Future    2. Prediabetes  -     Hemoglobin A1c; Future    3. Mild intermittent asthma without complication  Assessment &  Plan:  Continue Wixela         4. CARITO (obstructive sleep apnea)    5. DDD (degenerative disc disease), lumbar  Assessment & Plan:  Followed by Pain Management      6. Rheumatoid arthritis involving both knees, unspecified whether rheumatoid factor present  Assessment & Plan:  Plaquenil per Rheumatology      7. Age-related osteoporosis without current pathological fracture  Assessment & Plan:  DEXA reviewed  Reclast vs Prolia recommended - patient is to notify Endo of choice and they are arranging treatment      8. Seasonal allergic rhinitis due to pollen  Assessment & Plan:  Continue Zyrtec and allergy injections per Allergist      9. Constipation, unspecified constipation type  Assessment & Plan:  Continue Miralax PRN      10. CLL (chronic lymphocytic leukemia)  Assessment & Plan:  Reviewed Oncology note, follow up as instructed      Other orders  -     Tdap Vaccine Greater Than or Equal To 8yo IM        Follow Up:   Next Medicare Wellness visit to be scheduled in 1 year.      An After Visit Summary and PPPS were made available to the patient.

## 2023-11-06 DIAGNOSIS — M54.16 LUMBAR RADICULITIS: ICD-10-CM

## 2023-11-06 DIAGNOSIS — G89.4 CHRONIC PAIN SYNDROME: ICD-10-CM

## 2023-11-07 RX ORDER — AZELASTINE HYDROCHLORIDE, FLUTICASONE PROPIONATE 137; 50 UG/1; UG/1
1 SPRAY, METERED NASAL 2 TIMES DAILY
Qty: 1 G | Refills: 2 | Status: SHIPPED | OUTPATIENT
Start: 2023-11-07 | End: 2023-12-07

## 2023-11-07 RX ORDER — GABAPENTIN 100 MG/1
300 CAPSULE ORAL 2 TIMES DAILY PRN
Qty: 180 CAPSULE | Refills: 2 | Status: SHIPPED | OUTPATIENT
Start: 2023-11-07

## 2023-11-07 RX ORDER — HYDROXYCHLOROQUINE SULFATE 200 MG/1
200 TABLET, FILM COATED ORAL EVERY 12 HOURS SCHEDULED
Qty: 60 TABLET | Refills: 1 | Status: SHIPPED | OUTPATIENT
Start: 2023-11-07 | End: 2023-12-07

## 2023-11-07 RX ORDER — DESLORATADINE 5 MG/1
5 TABLET, ORALLY DISINTEGRATING ORAL DAILY
Qty: 90 TABLET | Refills: 0 | Status: SHIPPED | OUTPATIENT
Start: 2023-11-07

## 2023-11-07 RX ORDER — NIFEDIPINE 30 MG/1
30 TABLET, EXTENDED RELEASE ORAL EVERY 24 HOURS
Qty: 90 TABLET | Refills: 0 | Status: SHIPPED | OUTPATIENT
Start: 2023-11-07

## 2023-11-10 ENCOUNTER — LAB (OUTPATIENT)
Dept: LAB | Facility: HOSPITAL | Age: 73
End: 2023-11-10
Payer: MEDICARE

## 2023-11-10 DIAGNOSIS — M35.1 MIXED CONNECTIVE TISSUE DISEASE: ICD-10-CM

## 2023-11-10 DIAGNOSIS — I73.00 RAYNAUD'S DISEASE WITHOUT GANGRENE: ICD-10-CM

## 2023-11-10 DIAGNOSIS — M81.0 OSTEOPOROSIS WITHOUT CURRENT PATHOLOGICAL FRACTURE, UNSPECIFIED OSTEOPOROSIS TYPE: ICD-10-CM

## 2023-11-10 DIAGNOSIS — E55.9 VITAMIN D DEFICIENCY: ICD-10-CM

## 2023-11-10 DIAGNOSIS — N18.31 STAGE 3A CHRONIC KIDNEY DISEASE: ICD-10-CM

## 2023-11-10 DIAGNOSIS — N17.9 AKI (ACUTE KIDNEY INJURY): ICD-10-CM

## 2023-11-10 LAB
25(OH)D3 SERPL-MCNC: 33.1 NG/ML (ref 30–100)
ALBUMIN SERPL-MCNC: 4.2 G/DL (ref 3.5–5.2)
ALBUMIN/GLOB SERPL: 2.2 G/DL
ALP SERPL-CCNC: 82 U/L (ref 39–117)
ALT SERPL W P-5'-P-CCNC: 12 U/L (ref 1–33)
ANION GAP SERPL CALCULATED.3IONS-SCNC: 8.7 MMOL/L (ref 5–15)
AST SERPL-CCNC: 17 U/L (ref 1–32)
BILIRUB SERPL-MCNC: 0.3 MG/DL (ref 0–1.2)
BUN SERPL-MCNC: 14 MG/DL (ref 8–23)
BUN/CREAT SERPL: 13 (ref 7–25)
CALCIUM SPEC-SCNC: 9.3 MG/DL (ref 8.6–10.5)
CHLORIDE SERPL-SCNC: 104 MMOL/L (ref 98–107)
CO2 SERPL-SCNC: 27.3 MMOL/L (ref 22–29)
CREAT SERPL-MCNC: 1.08 MG/DL (ref 0.57–1)
EGFRCR SERPLBLD CKD-EPI 2021: 54 ML/MIN/1.73
GLOBULIN UR ELPH-MCNC: 1.9 GM/DL
GLUCOSE SERPL-MCNC: 88 MG/DL (ref 65–99)
POTASSIUM SERPL-SCNC: 4.2 MMOL/L (ref 3.5–5.2)
PROT SERPL-MCNC: 6.1 G/DL (ref 6–8.5)
SODIUM SERPL-SCNC: 140 MMOL/L (ref 136–145)
T4 FREE SERPL-MCNC: 1.23 NG/DL (ref 0.93–1.7)
TSH SERPL DL<=0.05 MIU/L-ACNC: 1.89 UIU/ML (ref 0.27–4.2)

## 2023-11-10 PROCEDURE — 36415 COLL VENOUS BLD VENIPUNCTURE: CPT

## 2023-11-10 PROCEDURE — 84439 ASSAY OF FREE THYROXINE: CPT

## 2023-11-10 PROCEDURE — 80053 COMPREHEN METABOLIC PANEL: CPT

## 2023-11-10 PROCEDURE — 84443 ASSAY THYROID STIM HORMONE: CPT

## 2023-11-10 PROCEDURE — 82306 VITAMIN D 25 HYDROXY: CPT

## 2023-11-13 RX ORDER — FLUTICASONE PROPIONATE AND SALMETEROL 500; 50 UG/1; UG/1
1 POWDER RESPIRATORY (INHALATION) 2 TIMES DAILY
Qty: 180 EACH | Refills: 1 | Status: SHIPPED | OUTPATIENT
Start: 2023-11-13

## 2023-11-17 ENCOUNTER — OFFICE VISIT (OUTPATIENT)
Dept: ENDOCRINOLOGY | Facility: CLINIC | Age: 73
End: 2023-11-17
Payer: MEDICARE

## 2023-11-17 ENCOUNTER — TRANSCRIBE ORDERS (OUTPATIENT)
Dept: ADMINISTRATIVE | Facility: HOSPITAL | Age: 73
End: 2023-11-17
Payer: MEDICARE

## 2023-11-17 VITALS
OXYGEN SATURATION: 98 % | HEART RATE: 77 BPM | WEIGHT: 181 LBS | HEIGHT: 63 IN | DIASTOLIC BLOOD PRESSURE: 64 MMHG | BODY MASS INDEX: 32.07 KG/M2 | SYSTOLIC BLOOD PRESSURE: 124 MMHG

## 2023-11-17 DIAGNOSIS — N18.31 STAGE 3A CHRONIC KIDNEY DISEASE: ICD-10-CM

## 2023-11-17 DIAGNOSIS — I73.00 RAYNAUD'S DISEASE WITHOUT GANGRENE: ICD-10-CM

## 2023-11-17 DIAGNOSIS — M35.1 MIXED CONNECTIVE TISSUE DISEASE: ICD-10-CM

## 2023-11-17 DIAGNOSIS — K21.9 GASTROESOPHAGEAL REFLUX DISEASE WITHOUT ESOPHAGITIS: ICD-10-CM

## 2023-11-17 DIAGNOSIS — M81.0 OSTEOPOROSIS, POST-MENOPAUSAL: Primary | ICD-10-CM

## 2023-11-17 DIAGNOSIS — E55.9 VITAMIN D DEFICIENCY: ICD-10-CM

## 2023-11-17 DIAGNOSIS — M81.0 OSTEOPOROSIS WITHOUT CURRENT PATHOLOGICAL FRACTURE, UNSPECIFIED OSTEOPOROSIS TYPE: Primary | ICD-10-CM

## 2023-11-17 RX ORDER — ZOLEDRONIC ACID 5 MG/100ML
5 INJECTION, SOLUTION INTRAVENOUS ONCE
Qty: 100 ML | Refills: 0 | Status: SHIPPED | OUTPATIENT
Start: 2023-11-17 | End: 2023-11-17

## 2023-11-17 NOTE — PATIENT INSTRUCTIONS
Please,    - Continue calcium supplementation: Can take either calcium carbonate or calcium citrate  Calcium carbonate -600 mg 1 pill twice a day with meal  Calcium citrate-600 mg 1 pill twice a day with/without meal  (Can choose to be on either of those supplement but do not take both of them)    - Increase your vitamin D supplementation to 5000 units daily with meal.    - Please plan for Reclast therapy infusion once a year.    Follow-up in my clinic back again in 1 year time with repeat blood work.  We will plan to do your Reclast therapy for 2 to 3 years time and plan your repeat DEXA scan after 2 doses of Reclast.    Follow up in my clinic back again in 1 year time.    Thank you for your visit today.    If you have any questions or concerns please feel free to reach out of the office.

## 2023-11-17 NOTE — PROGRESS NOTES
-----------------------------------------------------------------  ENDOCRINE CLINIC NOTE  -----------------------------------------------------------------        PATIENT NAME: Barbara Coelho  PATIENT : 1950 AGE: 73 y.o.  MRN NUMBER: [id number]  PRIMARY CARE: Liane Tran APRN    ==========================================================================    CHIEF COMPLAINT: Osteoporosis   DATE OF SERVICE: 23    ==========================================================================    HPI / SUBJECTIVE    73 y.o. female is seen in the clinic today for evaluation of osteoporosis.  Last visit was 2023.  Patient reports that she was initially diagnosed with osteopenia around 15 years ago and had a DEXA scan in May 2023 which showed osteoporosis.  Patient denied any previous exposure to any therapy.  Currently on calcium and vitamin D supplementation.  Patient is independent for ADLs and IADLs.  Menarche at age 15 and patient have 1 biological daughter.  Natural menopause at the age 38.  Was on hormone replacement therapy till age 54.  Denies any underlying history of high calcium or nephrolithiasis.  No underlying history of smoking or alcoholism.  Do have a history significant for mixed connective tissue disorder and Raynaud's disease but never been on chronic steroid therapy.  No underlying history of thyroid disorder.  Family history significant for osteoporosis, younger sister.  Completed dental implant and as per patient she spoke with her dentist and she was cleared for bisphosphonate therapy.  History significant for acid reflux and currently on medical management.  Vitamin D 2000 units daily.    ==========================================================================                                                PAST MEDICAL HISTORY    Past Medical History:   Diagnosis Date    Allergic 1967    take allergy shots    Allergic rhinitis     on allergy shots    Asthma      ALLERGIC    Back pain     Low    Bladder infection     Recurrent Bladder Infections    CLL (chronic lymphocytic leukemia)     Deep vein thrombosis 1989    Diverticulitis     Elevated laboratory test result 05/2023    elevated wbc/  elevated lymphs/ gfr and creatine    GERD (gastroesophageal reflux disease)     EGD 12/16 [facility] - reflux, HH. no barretts    Hearing loss     History of DVT of lower extremity     left leg    Knee pain, right     Leg pain     Low back pain 1990    had back surgery    Mixed connective tissue disease 01/2019    Dr. Thakur    CARITO (obstructive sleep apnea) 2018    Osteopenia 03/28/2018    calculated frax - 11/3%    Pinched nerve in neck     [facility] health care 03/30/2023    Rheumatoid arthritis 2018    Staph infection     Urinary incontinence     Wears contact lenses     Wears prescription eyeglasses        ==========================================================================    PAST SURGICAL HISTORY    Past Surgical History:   Procedure Laterality Date    BACK SURGERY  1995    Back (L5/S1)    BARIATRIC SURGERY  1985    gastric bypass    BREAST EXCISIONAL BIOPSY Left 1985    BREAST SURGERY  1990    lump removed    CHOLECYSTECTOMY  2019    CHOLECYSTECTOMY WITH INTRAOPERATIVE CHOLANGIOGRAM N/A 04/26/2018    Procedure: CHOLECYSTECTOMY LAPAROSCOPIC INTRAOPERATIVE CHOLANGIOGRAM;  Surgeon: Lit Morelos MD;  Location:  [facility] OR;  Service: General    COLONOSCOPY  2016    GASTRIC BYPASS      HIGH GASTRIC BYPASS, 1980's    JOINT REPLACEMENT  2012,2020    left knee,right knee    OTHER SURGICAL HISTORY  1985    Stomach Stabled    REPLACEMENT TOTAL KNEE Left 2012    right  2021    SINUS SURGERY      x3    TOTAL KNEE ARTHROPLASTY Right 10/29/2021    Procedure: TOTAL KNEE ARTHROPLASTY RIGHT;  Surgeon: Emeka Douglas MD;  Location:  [facility];  Service: Orthopedics;  Laterality: Right;    TUBAL ABDOMINAL LIGATION  1980    WISDOM TOOTH EXTRACTION          ==========================================================================    FAMILY HISTORY    Family History   Problem Relation Age of Onset    Cancer Other         BREAST, LUNG, OVARIAN, KIDNEY    Kidney cancer Other     Lung cancer Other         pGF as well    Cancer Mother         kidney    Hearing loss Mother     Other Mother         dementia    Cancer Father         lung    Mental illness Father     Alcohol abuse Father     Breast cancer Sister 30    Connective Tissue Disease Sister     Hypertension Sister         pulmonary hypertension    Cancer Sister         Brest    Cancer Paternal Grandmother         stomach    Cancer Paternal Grandfather         lung    Ovarian cancer Neg Hx        ==========================================================================    SOCIAL HISTORY    Social History     Socioeconomic History    Marital status:     Number of children: 1    Years of education: 12    Highest education level: Some college, no degree   Tobacco Use    Smoking status: Never     Passive exposure: Never    Smokeless tobacco: Never   Vaping Use    Vaping Use: Never used   Substance and Sexual Activity    Alcohol use: Yes     Comment: occ    Drug use: Defer    Sexual activity: Not Currently     Partners: Male       ==========================================================================    MEDICATIONS      Current Outpatient Medications:     albuterol sulfate  (90 Base) MCG/ACT inhaler, TAKE 2 PUFFS BY MOUTH EVERY 4 HOURS AS NEEDED FOR WHEEZE, Disp: 8 g, Rfl: 1    Azelastine-Fluticasone 137-50 MCG/ACT suspension, 1 spray into the nostril(s) as directed by provider 2 (Two) Times a Day for 30 days., Disp: 1 g, Rfl: 2    Biotin 1 MG capsule, Take 1 capsule by mouth Daily., Disp: , Rfl:     desloratadine (CLARINEX REDITAB) 5 MG disintegrating tablet, Place 1 tablet on the tongue Daily., Disp: 90 tablet, Rfl: 0    Fluticasone-Salmeterol (ADVAIR/WIXELA) 500-50 MCG/ACT DISKUS, INHALE 1 PUFF  TWICE A DAY, Disp: 180 each, Rfl: 1    gabapentin (NEURONTIN) 100 MG capsule, Take 3 capsules by mouth 2 (Two) Times a Day As Needed (pain)., Disp: 180 capsule, Rfl: 2    Gas Relief Extra Strength 125 MG chewable tablet, TAKE ONE TABLET BY MOUTH THREE TIMES A DAY AFTER MEALS FOR 30 DAYS, Disp: , Rfl:     hydroxychloroquine (PLAQUENIL) 200 MG tablet, Take 1 tablet by mouth Every 12 (Twelve) Hours for 30 days. Indications: Rheumatoid Arthritis, Disp: 60 tablet, Rfl: 1    NIFEdipine XL (PROCARDIA XL) 30 MG 24 hr tablet, Take 1 tablet by mouth Daily., Disp: 90 tablet, Rfl: 0    traZODone (DESYREL) 100 MG tablet, TAKE 1 TABLET BY MOUTH EVERY NIGHT FOR 90 DAYS., Disp: 90 tablet, Rfl: 1    VITAMIN D, CHOLECALCIFEROL, PO, Take 1 capsule by mouth Daily., Disp: , Rfl:     baclofen (LIORESAL) 10 MG tablet, , Disp: , Rfl:     levocetirizine (XYZAL) 5 MG tablet, , Disp: , Rfl:     meclizine (ANTIVERT) 25 MG tablet, , Disp: , Rfl:     nystatin (MYCOSTATIN) 100,000 unit/mL suspension, TAKE 5MLS BY MOUTH 4 TIMES DAILY AS DIRECTED FOR 14 DAYS (Patient not taking: Reported on 11/17/2023), Disp: , Rfl:     Polyethylene Glycol 3350 (MIRALAX PO), Take  by mouth. (Patient not taking: Reported on 11/17/2023), Disp: , Rfl:   No current facility-administered medications for this visit.    Facility-Administered Medications Ordered in Other Visits:     Chlorhexidine Gluconate Cloth 2 % pads, , Apply externally, Once, Emeka Douglas MD    mupirocin (BACTROBAN) 2 % nasal ointment, , Nasal, BID, Emeka Douglas MD    ==========================================================================    ALLERGIES    Allergies   Allergen Reactions    Celebrex [Celecoxib] Hives     HIVES     Codeine Rash    Oxycodone Rash and Hives    Sulfa Antibiotics Itching     ITCHING        ==========================================================================    OBJECTIVE    Vitals:    11/17/23 1021   BP: 124/64   Pulse: 77   SpO2: 98%     Body mass index  is 32.06 kg/m².     General: Alert, cooperative, no acute distress  Lungs: Clear to auscultation bilaterally, respirations unlabored  Heart: Regular rate and rhythm, S1 and S2 normal, no murmur, rub or gallop  Abdomen: Soft, NT, ND and Bowel sounds Positive  Extremities:  Extremities normal, atraumatic, no cyanosis or edema    ==========================================================================    LAB EVALUATION    Lab Results   Component Value Date    GLUCOSE 88 11/10/2023    BUN 14 11/10/2023    CREATININE 1.08 (H) 11/10/2023    EGFRIFNONA 62 11/01/2021    BCR 13.0 11/10/2023    K 4.2 11/10/2023    CO2 27.3 11/10/2023    CALCIUM 9.3 11/10/2023    ALBUMIN 4.2 11/10/2023    AST 17 11/10/2023    ALT 12 11/10/2023    CHOL 182 08/01/2022    TRIG 85 08/01/2022    HDL 79 (H) 08/01/2022    LDL 88 08/01/2022     Lab Results   Component Value Date    HGBA1C 5.80 (H) 08/01/2022    HGBA1C 5.50 10/21/2021     Lab Results   Component Value Date    CREATININE 1.08 (H) 11/10/2023     Lab Results   Component Value Date    TSH 1.890 11/10/2023    FREET4 1.23 11/10/2023       ==========================================================================    ASSESSMENT AND PLAN    Assessment  #Age-related osteoporosis without pathological fracture  #Vitamin D deficiency  #CKD IIIa  #Mixed connective tissue disorder  #Raynaud's disease  #GERD    Plan:  -Discussed with patient about different therapeutic options  - DEXA scan from May 2023 confirm the diagnosis of osteoporosis  - Patient kidney function has been stable with GFR around 55  - Discussed with patient about different therapeutic options that includes Reclast versus Prolia  - After detailed discussion plan is to start therapy on Reclast once a year  - Patient will require calcium and vitamin D supplementation, increase vitamin D supplementation to 5000 units every day  - Patient to have follow-up again with my clinic in 1 year time  - Discussed with patient about possible  "side effect of flulike symptoms after Reclast therapy to which she verbalized understanding and will take Tylenol therapy for conservative management  - On patient request provided handout again about Reclast therapy to be reviewed by patient and daughter    Return to clinic: one year time    Entire assessment and plan was discussed and counseled the patient in detail to which patient verbalized understanding and agreed with care.  Answered all queries and concerns.    This note was created using voice recognition software and is inherently subject to errors including those of syntax and \"sound-alike\" substitutions which may escape proofreading.  In such instances, original meaning may be extrapolated by contextual derivation.    ==========================================================================    INFORMATION PROVIDED TO PATIENT    Patient Instructions   Please,    - Continue calcium supplementation: Can take either calcium carbonate or calcium citrate  Calcium carbonate -600 mg 1 pill twice a day with meal  Calcium citrate-600 mg 1 pill twice a day with/without meal  (Can choose to be on either of those supplement but do not take both of them)    - Increase your vitamin D supplementation to 5000 units daily with meal.    - Please plan for Reclast therapy infusion once a year.    Follow-up in my clinic back again in 1 year time with repeat blood work.  We will plan to do your Reclast therapy for 2 to 3 years time and plan your repeat DEXA scan after 2 doses of Reclast.    Follow up in my clinic back again in 1 year time.    Thank you for your visit today.    If you have any questions or concerns please feel free to reach out of the office.       ==========================================================================  Jonathan Jarrett MD  Department of Endocrine, Diabetes and Metabolism  [facility], [place]  ==========================================================================    "

## 2023-11-22 NOTE — PROGRESS NOTES
Hematology/Oncology Outpatient Follow Up    PATIENT NAME:Barbara Coelho  :1950  MRN: [id number]  PRIMARY CARE PHYSICIAN: Liane Tran APRN  REFERRING PHYSICIAN: No ref. provider found    Chief Complaint   Patient presents with    Follow-up     4 month follow up  Leukocytosis, unspecified type        HISTORY OF PRESENT ILLNESS:     73-year-old female has referred secondary to leukocytosis with differential lymphocytosis.  Patient was noted in .  Patient denies any fevers or chills or upper respiratory tract infection.  Review of her CBC indicates that her white count has ranged around 23-24,000 normal hemoglobin and normal platelets but her differentials have been 8% neutrophils 85% lymphocytes with an ANC of 2.1 and absolute lymphocyte count of 20.  Back in 2022 her white count was 12.8, Hemoglobin 13.2 platelets 211 and absolute lymphocyte count was 9600.  Patient states that she is fully active and denies any night sweats, weight loss or fatigue symptoms.     Patient has a history of mixed connective tissue disease.  Patient is now retired. Patient is . She has one child.     There is family hx of kidney cancer in her mother, breast cancer breast cancer 30,  father had lung cancer, paternal GM with stomach cancer, paternal GF with lung cancer     Patient does not smoke, drinks occasional etoh     2023: Patient had peripheral blood flow cytometry which showed chronic lymphocytic leukemia B-cell, CD38 negative and CD20 positive.  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  CLL panel by FISH was normal.  Chemistry panel BUN was 17 creatinine was 1, LDH was 206 normal uric acid was 6.8 normal is up to 5.7.  Peripheral smear showed numerous smudge cells  7/3/2023: Patient had CT scan of the chest, abdomen and pelvis, no significant lymphadenopathy patient had CT scan of the chest, abdomen and pelvis to suggest  lymphomatous process, supraumbilical midline ventral hernia containing omental fat without evidence of incarceration.  Past Medical History:   Diagnosis Date    Allergic 1967    take allergy shots    Allergic rhinitis     on allergy shots    Asthma     ALLERGIC    Back pain     Low    Bladder infection     Recurrent Bladder Infections    CLL (chronic lymphocytic leukemia)     Deep vein thrombosis 1989    Diverticulitis     Elevated laboratory test result 05/2023    elevated wbc/  elevated lymphs/ gfr and creatine    GERD (gastroesophageal reflux disease)     EGD 12/16 [facility] - reflux, HH. no barretts    Hearing loss     History of DVT of lower extremity     left leg    Knee pain, right     Leg pain     Low back pain 1990    had back surgery    Mixed connective tissue disease 01/2019    Dr. Thakur    CARITO (obstructive sleep apnea) 2018    Osteopenia 03/28/2018    calculated frax - 11/3%    Pinched nerve in neck     [facility] 03/30/2023    Rheumatoid arthritis 2018    Staph infection     Urinary incontinence     Wears contact lenses     Wears prescription eyeglasses        Past Surgical History:   Procedure Laterality Date    BACK SURGERY  1995    Back (L5/S1)    BARIATRIC SURGERY  1985    gastric bypass    BREAST EXCISIONAL BIOPSY Left 1985    BREAST SURGERY  1990    lump removed    CHOLECYSTECTOMY  2019    CHOLECYSTECTOMY WITH INTRAOPERATIVE CHOLANGIOGRAM N/A 04/26/2018    Procedure: CHOLECYSTECTOMY LAPAROSCOPIC INTRAOPERATIVE CHOLANGIOGRAM;  Surgeon: Lit Morelos MD;  Location:  [facility];  Service: General    COLONOSCOPY  2016    GASTRIC BYPASS      HIGH GASTRIC BYPASS, 1980's    JOINT REPLACEMENT  2012,2020    left knee,right knee    OTHER SURGICAL HISTORY  1985    Stomach Stabled    REPLACEMENT TOTAL KNEE Left 2012    right  2021    SINUS SURGERY      x3    TOTAL KNEE ARTHROPLASTY Right 10/29/2021    Procedure: TOTAL KNEE ARTHROPLASTY RIGHT;  Surgeon: Emeka Douglas MD;  Location:  [facility];   Service: Orthopedics;  Laterality: Right;    TUBAL ABDOMINAL LIGATION  1980    WISDOM TOOTH EXTRACTION           Current Outpatient Medications:     albuterol sulfate  (90 Base) MCG/ACT inhaler, TAKE 2 PUFFS BY MOUTH EVERY 4 HOURS AS NEEDED FOR WHEEZE, Disp: 8 g, Rfl: 1    Azelastine-Fluticasone 137-50 MCG/ACT suspension, 1 spray into the nostril(s) as directed by provider 2 (Two) Times a Day for 30 days., Disp: 1 g, Rfl: 2    Biotin 1 MG capsule, Take 1 capsule by mouth Daily., Disp: , Rfl:     desloratadine (CLARINEX REDITAB) 5 MG disintegrating tablet, Place 1 tablet on the tongue Daily., Disp: 90 tablet, Rfl: 0    Fluticasone-Salmeterol (ADVAIR/WIXELA) 500-50 MCG/ACT DISKUS, INHALE 1 PUFF TWICE A DAY, Disp: 180 each, Rfl: 1    gabapentin (NEURONTIN) 100 MG capsule, Take 3 capsules by mouth 2 (Two) Times a Day As Needed (pain)., Disp: 180 capsule, Rfl: 2    hydroxychloroquine (PLAQUENIL) 200 MG tablet, Take 1 tablet by mouth Every 12 (Twelve) Hours for 30 days. Indications: Rheumatoid Arthritis, Disp: 60 tablet, Rfl: 1    meclizine (ANTIVERT) 25 MG tablet, , Disp: , Rfl:     NIFEdipine XL (PROCARDIA XL) 30 MG 24 hr tablet, Take 1 tablet by mouth Daily., Disp: 90 tablet, Rfl: 0    traZODone (DESYREL) 100 MG tablet, TAKE 1 TABLET BY MOUTH EVERY NIGHT FOR 90 DAYS., Disp: 90 tablet, Rfl: 1    VITAMIN D, CHOLECALCIFEROL, PO, Take 1 capsule by mouth Daily., Disp: , Rfl:     Gas Relief Extra Strength 125 MG chewable tablet, TAKE ONE TABLET BY MOUTH THREE TIMES A DAY AFTER MEALS FOR 30 DAYS (Patient not taking: Reported on 11/29/2023), Disp: , Rfl:     levocetirizine (XYZAL) 5 MG tablet, , Disp: , Rfl:     nystatin (MYCOSTATIN) 100,000 unit/mL suspension, TAKE 5MLS BY MOUTH 4 TIMES DAILY AS DIRECTED FOR 14 DAYS (Patient not taking: Reported on 11/17/2023), Disp: , Rfl:     Polyethylene Glycol 3350 (MIRALAX PO), Take  by mouth. (Patient not taking: Reported on 11/17/2023), Disp: , Rfl:     zoledronic acid (RECLAST)  5 MG/100ML solution infusion, Infuse 100 mL into a venous catheter 1 (One) Time for 1 dose., Disp: 100 mL, Rfl: 0  No current facility-administered medications for this visit.    Facility-Administered Medications Ordered in Other Visits:     Chlorhexidine Gluconate Cloth 2 % pads, , Apply externally, Once, Emeka Douglas MD    mupirocin (BACTROBAN) 2 % nasal ointment, , Nasal, BID, Emeka Douglas MD    Allergies   Allergen Reactions    Celebrex [Celecoxib] Hives     HIVES     Codeine Rash    Oxycodone Rash and Hives    Sulfa Antibiotics Itching     ITCHING        Family History   Problem Relation Age of Onset    Cancer Other         BREAST, LUNG, OVARIAN, KIDNEY    Kidney cancer Other     Lung cancer Other         pGF as well    Cancer Mother         kidney    Hearing loss Mother     Other Mother         dementia    Cancer Father         lung    Mental illness Father     Alcohol abuse Father     Breast cancer Sister 30    Connective Tissue Disease Sister     Hypertension Sister         pulmonary hypertension    Cancer Sister         Brest    Cancer Paternal Grandmother         stomach    Cancer Paternal Grandfather         lung    Ovarian cancer Neg Hx        Cancer-related family history includes Breast cancer (age of onset: 30) in her sister; Cancer in her father, mother, paternal grandfather, paternal grandmother, sister, and another family member; Kidney cancer in an other family member; Lung cancer in an other family member. There is no history of Ovarian cancer.    Social History     Tobacco Use    Smoking status: Never     Passive exposure: Never    Smokeless tobacco: Never   Vaping Use    Vaping Use: Never used   Substance Use Topics    Alcohol use: Yes     Comment: occ    Drug use: Never         I have reviewed and confirmed the accuracy of the patient's history: Chief complaint, HPI, ROS, and Subjective as entered by the MA/BONN/RN. Danyell Zamorano, APRN 11/29/23         SUBJECTIVE:  Barbara mesa  "here today for her routine follow-up.  She reports that she is doing well.  She does note that she has had more fatigue than her usual since the prior visit.  She states that some days if she exerts that she will require a nap.  She denies any fever, night sweats, unexplained weight loss, swollen lymph nodes, or frequent infections.  No signs or symptoms of bleeding or excessive bruising.          REVIEW OF SYSTEMS:  Review of Systems   Constitutional:  Positive for fatigue. Negative for chills and fever.   HENT:  Negative for congestion, drooling, ear discharge, rhinorrhea, sinus pressure and tinnitus.    Eyes:  Negative for photophobia, pain and discharge.   Respiratory:  Negative for apnea, choking and stridor.    Cardiovascular:  Negative for palpitations.   Gastrointestinal:  Negative for abdominal distention, abdominal pain and anal bleeding.   Endocrine: Negative for polydipsia and polyphagia.   Genitourinary:  Negative for decreased urine volume, flank pain and genital sores.   Musculoskeletal:  Negative for gait problem, neck pain and neck stiffness.        Bilateral lower extremity swelling   Skin:  Negative for color change, rash and wound.   Neurological:  Negative for tremors, seizures, syncope, facial asymmetry and speech difficulty.   Hematological:  Negative for adenopathy.   Psychiatric/Behavioral:  Negative for agitation, confusion, hallucinations and self-injury. The patient is not hyperactive.        OBJECTIVE:    Vitals:    11/29/23 1029   BP: 114/75   Pulse: 74   SpO2: 97%   Weight: 81.6 kg (180 lb)   Height: 160 cm (63\")   PainSc: 0-No pain       Body mass index is 31.89 kg/m².    ECOG  (0) Fully active, able to carry on all predisease performance without restriction    Physical Exam  Vitals and nursing note reviewed.   Constitutional:       General: She is not in acute distress.     Appearance: She is not diaphoretic.   HENT:      Head: Normocephalic and atraumatic.   Eyes:      General: No " scleral icterus.        Right eye: No discharge.         Left eye: No discharge.      Conjunctiva/sclera: Conjunctivae normal.   Neck:      Thyroid: No thyromegaly.   Cardiovascular:      Rate and Rhythm: Normal rate and regular rhythm.      Heart sounds: Normal heart sounds.      No friction rub. No gallop.   Pulmonary:      Effort: Pulmonary effort is normal. No respiratory distress.      Breath sounds: No stridor. No wheezing.   Abdominal:      General: Bowel sounds are normal.      Palpations: Abdomen is soft. There is no mass.      Tenderness: There is no abdominal tenderness. There is no guarding or rebound.   Musculoskeletal:         General: No tenderness. Normal range of motion.      Cervical back: Normal range of motion and neck supple.      Right lower leg: Edema present.      Left lower leg: Edema present.   Lymphadenopathy:      Cervical: No cervical adenopathy.   Skin:     General: Skin is warm.      Findings: No erythema or rash.   Neurological:      Mental Status: She is alert and oriented to person, place, and time.      Motor: No abnormal muscle tone.   Psychiatric:         Behavior: Behavior normal.         I have reexamined the patient and the results are consistent with the previously documented exam. SULEMA Luke        RECENT LABS    WBC   Date Value Ref Range Status   11/29/2023 33.73 (C) 3.40 - 10.80 10*3/mm3 Final   06/20/2022 12.8 (H) 3.4 - 10.8 x10E3/uL Final     RBC   Date Value Ref Range Status   11/29/2023 4.87 3.77 - 5.28 10*6/mm3 Final   06/20/2022 4.58 3.77 - 5.28 x10E6/uL Final     Hemoglobin   Date Value Ref Range Status   11/29/2023 14.1 12.0 - 15.9 g/dL Final     Hematocrit   Date Value Ref Range Status   11/29/2023 44.5 34.0 - 46.6 % Final     MCV   Date Value Ref Range Status   11/29/2023 91.4 79.0 - 97.0 fL Final     MCH   Date Value Ref Range Status   11/29/2023 29.0 26.6 - 33.0 pg Final     MCHC   Date Value Ref Range Status   11/29/2023 31.7 31.5 - 35.7 g/dL  Final     RDW   Date Value Ref Range Status   11/29/2023 13.9 12.3 - 15.4 % Final     RDW-SD   Date Value Ref Range Status   11/29/2023 45.5 37.0 - 54.0 fl Final     MPV   Date Value Ref Range Status   11/29/2023 9.4 6.0 - 12.0 fL Final     Platelets   Date Value Ref Range Status   11/29/2023 178 140 - 450 10*3/mm3 Final     Neutrophil %   Date Value Ref Range Status   11/29/2023 9.3 (L) 42.7 - 76.0 % Final     Lymphocyte %   Date Value Ref Range Status   11/29/2023 89.5 (H) 19.6 - 45.3 % Final     Monocyte %   Date Value Ref Range Status   11/29/2023 0.0 (L) 5.0 - 12.0 % Final     Eosinophil %   Date Value Ref Range Status   11/29/2023 1.0 0.3 - 6.2 % Final     Basophil %   Date Value Ref Range Status   11/29/2023 0.2 0.0 - 1.5 % Final     Immature Grans %   Date Value Ref Range Status   10/21/2021 0.2 0.0 - 0.5 % Final     Neutrophils, Absolute   Date Value Ref Range Status   11/29/2023 3.13 1.70 - 7.00 10*3/mm3 Final     Lymphocytes, Absolute   Date Value Ref Range Status   11/29/2023 30.18 (H) 0.70 - 3.10 10*3/mm3 Final     Monocytes, Absolute   Date Value Ref Range Status   11/29/2023 0.01 (L) 0.10 - 0.90 10*3/mm3 Final     Eosinophils, Absolute   Date Value Ref Range Status   11/29/2023 0.34 0.00 - 0.40 10*3/mm3 Final     Basophils, Absolute   Date Value Ref Range Status   11/29/2023 0.07 0.00 - 0.20 10*3/mm3 Final     Immature Grans, Absolute   Date Value Ref Range Status   10/21/2021 0.02 0.00 - 0.05 10*3/mm3 Final     nRBC   Date Value Ref Range Status   05/04/2023 0.1 0.0 - 0.2 /100 WBC Final       Lab Results   Component Value Date    GLUCOSE 88 11/10/2023    BUN 14 11/10/2023    CREATININE 1.08 (H) 11/10/2023    EGFRIFNONA 62 11/01/2021    BCR 13.0 11/10/2023    K 4.2 11/10/2023    CO2 27.3 11/10/2023    CALCIUM 9.3 11/10/2023    ALBUMIN 4.2 11/10/2023    AST 17 11/10/2023    ALT 12 11/10/2023         Assessment & Plan     CLL (chronic lymphocytic leukemia)  - CBC & Differential  - Comprehensive  Metabolic Panel  - Lactate Dehydrogenase  - Uric Acid        ASSESSMENT:    Leukocytosis, unspecified type  - CBC & Differential        CLL/SLL resenting as leukocytosis with differential lymphocytosis since March 2022.  Reviewed the diagnosis in detail with patient and her daughter who is present today.  CT scans chest abdomen and pelvis do not show any evidence of lymphomatous process.  She has clinical stage 0 CLL  Patient has mixed connective tissue disorder which may be contributing to her lymphocytosis.  Bilateral lower extremity swelling rule out DVTs.  Ultrasound was negative.  Abdominal hernia: Declines surgical evaluation.     Discussion       CLL, we discussed that indications for treatment would include but not limited to, pancytopenia, systemic symptoms including fatigue, night sweats and weight loss.  Other indications treatment will include recurrent infections, organ dysfunction due to massive lymphadenopathy, doubling  of leukocytosis in less than 6 months.  We discussed that CLL is not curable but can respond to chemo immunotherapy and some molecular targeted agents which can result in remissions but this disease is faulted by multiple relapses over the course of time.  We also discussed that median survivorship with low-grade lymphoma in general is estimated anywhere from 10-12 years.  I also explained that this disease is heterogeneous.  Some patients may succumb to the illness within a few days following diagnosis while some may have it for decades.  We discussed that lymphoproliferative diseases in general can affect the immune system so there is risk of Landon infections.  When the bone marrow is heavily involved there is risk of pancytopenia  which can result in blood, platelet transfusions and also predisposition to infections if the white count is affected.  There is also a risk of transformation to high-grade lymphoma which can result in significant symptoms and would require systemic chemo  immunotherapy.  The risk of transformation varies with different types of lymphoma.  Also is not dependent on prior treatment or worsen by conservative management.      Plans     CBC today reviewed with the patient.  Has had some progression in her leukocytosis and lymphocytosis which was discussed with Dr. Echols.  Check CMP, LDH, uric acid today  Reviewed CT scan chest abdomen and pelvis with contrast  Doppler formed for bilateral lower extremity edema was negative  Previous uric acid and HIV tests were negative   For now continue surveillance blood work  Complete her age-appropriate immunizations including pneumococcal vaccine.  Patient follow-up with her PCP  Will see again in 3 months with Dr. Echols and sooner if needed  Reviewed signs and symptoms to monitor for between appointments and to call with questions or concerns  All questions answered       Patient verbalized understanding and is in agreement of the above plan.           I spent 30 total minutes, face-to-face, caring for Barbara khan. 90% of this time involved counseling and/or coordination of care as documented within this note.

## 2023-11-29 ENCOUNTER — OFFICE VISIT (OUTPATIENT)
Dept: ONCOLOGY | Facility: CLINIC | Age: 73
End: 2023-11-29
Payer: MEDICARE

## 2023-11-29 ENCOUNTER — LAB (OUTPATIENT)
Dept: LAB | Facility: HOSPITAL | Age: 73
End: 2023-11-29
Payer: MEDICARE

## 2023-11-29 VITALS
SYSTOLIC BLOOD PRESSURE: 114 MMHG | OXYGEN SATURATION: 97 % | WEIGHT: 180 LBS | HEART RATE: 74 BPM | HEIGHT: 63 IN | BODY MASS INDEX: 31.89 KG/M2 | DIASTOLIC BLOOD PRESSURE: 75 MMHG

## 2023-11-29 DIAGNOSIS — R73.03 PREDIABETES: ICD-10-CM

## 2023-11-29 DIAGNOSIS — C91.10 CLL (CHRONIC LYMPHOCYTIC LEUKEMIA): Primary | ICD-10-CM

## 2023-11-29 DIAGNOSIS — Z00.00 PREVENTATIVE HEALTH CARE: ICD-10-CM

## 2023-11-29 DIAGNOSIS — C91.10 LEUKEMIA, LYMPHOCYTIC, CHRONIC: Primary | ICD-10-CM

## 2023-11-29 DIAGNOSIS — C91.10 CLL (CHRONIC LYMPHOCYTIC LEUKEMIA): ICD-10-CM

## 2023-11-29 LAB
ALBUMIN SERPL-MCNC: 4.4 G/DL (ref 3.5–5.2)
ALBUMIN/GLOB SERPL: 1.9 G/DL
ALP SERPL-CCNC: 99 U/L (ref 39–117)
ALT SERPL W P-5'-P-CCNC: 11 U/L (ref 1–33)
ANION GAP SERPL CALCULATED.3IONS-SCNC: 10 MMOL/L (ref 5–15)
AST SERPL-CCNC: 19 U/L (ref 1–32)
BASOPHILS # BLD AUTO: 0.07 10*3/MM3 (ref 0–0.2)
BASOPHILS NFR BLD AUTO: 0.2 % (ref 0–1.5)
BILIRUB SERPL-MCNC: 0.5 MG/DL (ref 0–1.2)
BUN SERPL-MCNC: 20 MG/DL (ref 8–23)
BUN/CREAT SERPL: 18.2 (ref 7–25)
CALCIUM SPEC-SCNC: 9.6 MG/DL (ref 8.6–10.5)
CHLORIDE SERPL-SCNC: 101 MMOL/L (ref 98–107)
CHOLEST SERPL-MCNC: 194 MG/DL (ref 0–200)
CO2 SERPL-SCNC: 28 MMOL/L (ref 22–29)
CREAT SERPL-MCNC: 1.1 MG/DL (ref 0.57–1)
DEPRECATED RDW RBC AUTO: 45.5 FL (ref 37–54)
EGFRCR SERPLBLD CKD-EPI 2021: 52.8 ML/MIN/1.73
EOSINOPHIL # BLD AUTO: 0.34 10*3/MM3 (ref 0–0.4)
EOSINOPHIL NFR BLD AUTO: 1 % (ref 0.3–6.2)
ERYTHROCYTE [DISTWIDTH] IN BLOOD BY AUTOMATED COUNT: 13.9 % (ref 12.3–15.4)
GLOBULIN UR ELPH-MCNC: 2.3 GM/DL
GLUCOSE SERPL-MCNC: 105 MG/DL (ref 65–99)
HBA1C MFR BLD: 5.8 % (ref 4.8–5.6)
HCT VFR BLD AUTO: 44.5 % (ref 34–46.6)
HDLC SERPL-MCNC: 78 MG/DL (ref 40–60)
HGB BLD-MCNC: 14.1 G/DL (ref 12–15.9)
HOLD SPECIMEN: NORMAL
HOLD SPECIMEN: NORMAL
LDH SERPL-CCNC: 170 U/L (ref 135–214)
LDLC SERPL CALC-MCNC: 107 MG/DL (ref 0–100)
LDLC/HDLC SERPL: 1.36 {RATIO}
LYMPHOCYTES # BLD AUTO: 30.18 10*3/MM3 (ref 0.7–3.1)
LYMPHOCYTES NFR BLD AUTO: 89.5 % (ref 19.6–45.3)
MCH RBC QN AUTO: 29 PG (ref 26.6–33)
MCHC RBC AUTO-ENTMCNC: 31.7 G/DL (ref 31.5–35.7)
MCV RBC AUTO: 91.4 FL (ref 79–97)
MONOCYTES # BLD AUTO: 0.01 10*3/MM3 (ref 0.1–0.9)
MONOCYTES NFR BLD AUTO: 0 % (ref 5–12)
NEUTROPHILS NFR BLD AUTO: 3.13 10*3/MM3 (ref 1.7–7)
NEUTROPHILS NFR BLD AUTO: 9.3 % (ref 42.7–76)
PLATELET # BLD AUTO: 178 10*3/MM3 (ref 140–450)
PMV BLD AUTO: 9.4 FL (ref 6–12)
POTASSIUM SERPL-SCNC: 4.3 MMOL/L (ref 3.5–5.2)
PROT SERPL-MCNC: 6.7 G/DL (ref 6–8.5)
RBC # BLD AUTO: 4.87 10*6/MM3 (ref 3.77–5.28)
SODIUM SERPL-SCNC: 139 MMOL/L (ref 136–145)
TRIGL SERPL-MCNC: 48 MG/DL (ref 0–150)
URATE SERPL-MCNC: 5.6 MG/DL (ref 2.4–5.7)
VLDLC SERPL-MCNC: 9 MG/DL (ref 5–40)
WBC NRBC COR # BLD AUTO: 33.73 10*3/MM3 (ref 3.4–10.8)

## 2023-11-29 PROCEDURE — 80061 LIPID PANEL: CPT | Performed by: NURSE PRACTITIONER

## 2023-11-29 PROCEDURE — 85025 COMPLETE CBC W/AUTO DIFF WBC: CPT | Performed by: NURSE PRACTITIONER

## 2023-11-29 PROCEDURE — 83036 HEMOGLOBIN GLYCOSYLATED A1C: CPT | Performed by: NURSE PRACTITIONER

## 2023-11-29 PROCEDURE — 84550 ASSAY OF BLOOD/URIC ACID: CPT | Performed by: NURSE PRACTITIONER

## 2023-11-29 PROCEDURE — 36415 COLL VENOUS BLD VENIPUNCTURE: CPT

## 2023-11-29 PROCEDURE — 80053 COMPREHEN METABOLIC PANEL: CPT | Performed by: NURSE PRACTITIONER

## 2023-11-29 PROCEDURE — 83615 LACTATE (LD) (LDH) ENZYME: CPT | Performed by: NURSE PRACTITIONER

## 2023-12-04 RX ORDER — ZOLEDRONIC ACID 5 MG/100ML
5 INJECTION, SOLUTION INTRAVENOUS ONCE
Status: CANCELLED | OUTPATIENT
Start: 2023-12-07

## 2023-12-04 RX ORDER — SODIUM CHLORIDE 9 MG/ML
250 INJECTION, SOLUTION INTRAVENOUS ONCE
Status: CANCELLED | OUTPATIENT
Start: 2023-12-07

## 2023-12-07 ENCOUNTER — OFFICE VISIT (OUTPATIENT)
Dept: PODIATRY | Facility: CLINIC | Age: 73
End: 2023-12-07
Payer: MEDICARE

## 2023-12-07 ENCOUNTER — HOSPITAL ENCOUNTER (OUTPATIENT)
Dept: ONCOLOGY | Facility: HOSPITAL | Age: 73
Discharge: HOME | End: 2023-12-07
Admitting: INTERNAL MEDICINE
Payer: MEDICARE

## 2023-12-07 VITALS
OXYGEN SATURATION: 95 % | HEART RATE: 80 BPM | WEIGHT: 179 LBS | SYSTOLIC BLOOD PRESSURE: 149 MMHG | DIASTOLIC BLOOD PRESSURE: 74 MMHG | BODY MASS INDEX: 31.71 KG/M2 | HEIGHT: 63 IN | RESPIRATION RATE: 16 BRPM

## 2023-12-07 VITALS — RESPIRATION RATE: 16 BRPM | WEIGHT: 180 LBS | BODY MASS INDEX: 31.89 KG/M2 | HEIGHT: 63 IN

## 2023-12-07 DIAGNOSIS — M81.8 OTHER OSTEOPOROSIS, UNSPECIFIED PATHOLOGICAL FRACTURE PRESENCE: Primary | ICD-10-CM

## 2023-12-07 DIAGNOSIS — M79.672 LEFT FOOT PAIN: Primary | ICD-10-CM

## 2023-12-07 DIAGNOSIS — M25.375 FOOT JOINT INSTABILITY, LEFT: ICD-10-CM

## 2023-12-07 DIAGNOSIS — M19.072 ARTHRITIS OF LEFT FOOT: ICD-10-CM

## 2023-12-07 PROCEDURE — 25810000003 SODIUM CHLORIDE 0.9 % SOLUTION: Performed by: INTERNAL MEDICINE

## 2023-12-07 PROCEDURE — 96374 THER/PROPH/DIAG INJ IV PUSH: CPT

## 2023-12-07 PROCEDURE — 96365 THER/PROPH/DIAG IV INF INIT: CPT

## 2023-12-07 PROCEDURE — 25010000002 ZOLEDRONIC ACID 5 MG/100ML SOLUTION: Performed by: INTERNAL MEDICINE

## 2023-12-07 RX ORDER — SODIUM CHLORIDE 9 MG/ML
250 INJECTION, SOLUTION INTRAVENOUS ONCE
Status: CANCELLED | OUTPATIENT
Start: 2023-12-07

## 2023-12-07 RX ORDER — SODIUM CHLORIDE 9 MG/ML
250 INJECTION, SOLUTION INTRAVENOUS ONCE
Status: COMPLETED | OUTPATIENT
Start: 2023-12-07 | End: 2023-12-07

## 2023-12-07 RX ORDER — ZOLEDRONIC ACID 5 MG/100ML
5 INJECTION, SOLUTION INTRAVENOUS ONCE
Status: CANCELLED | OUTPATIENT
Start: 2023-12-07

## 2023-12-07 RX ORDER — ZOLEDRONIC ACID 5 MG/100ML
5 INJECTION, SOLUTION INTRAVENOUS ONCE
Status: COMPLETED | OUTPATIENT
Start: 2023-12-07 | End: 2023-12-07

## 2023-12-07 RX ADMIN — SODIUM CHLORIDE 250 ML: 9 INJECTION, SOLUTION INTRAVENOUS at 13:50

## 2023-12-07 RX ADMIN — ZOLEDRONIC ACID 5 MG: 0.05 INJECTION, SOLUTION INTRAVENOUS at 13:50

## 2023-12-07 NOTE — PROGRESS NOTES
12/07/2023  Foot and Ankle Surgery - New Patient   Provider: Dr. Terry Malloy DPM  Location: [facility]    Subjective:  Barbara Coelho is a 73 y.o. female.     Chief Complaint   Patient presents with    Left Foot - Pain    Establish Care     SARAY Tran APRN 10/11/2023       HPI: The patient is a 73-year-old female who presents to the clinic for left foot pain.    She reports she has been experiencing pain in her left foot for several months. She states she was at her pain management clinic and believed her pain was coming from her back. She notes she had an x-ray of her left foot in 09/19/2023. The patient reports she has been using Voltaren gel on her left foot which has helped improve her pain. She states she can hardly ambulate for long periods of time. She notes she has been fitted for arch supports before. She states she likes to ambulate; however, she has not been able to ambulate since the middle of summer.    Additionally, the patient reports she is scheduled to begin infusions for Reclast today for osteoporosis, which is prescribed by Dr. Jarrett.    Allergies   Allergen Reactions    Celebrex [Celecoxib] Hives     HIVES     Codeine Rash    Oxycodone Rash and Hives    Sulfa Antibiotics Itching     ITCHING        Past Medical History:   Diagnosis Date    Allergic 1967    take allergy shots    Allergic rhinitis     on allergy shots    Asthma     ALLERGIC    Back pain     Low    Bladder infection     Recurrent Bladder Infections    CLL (chronic lymphocytic leukemia)     Deep vein thrombosis 1989    Difficulty walking     Diverticulitis     Elevated laboratory test result 05/2023    elevated wbc/  elevated lymphs/ gfr and creatine    GERD (gastroesophageal reflux disease)     EGD 12/16 [facility] - reflux, HH. no barretts    Hammer toe     Hearing loss     High arches     History of DVT of lower extremity     left leg    Ingrown toenail     Knee pain, right     Leg pain     Low back pain 1990    had  back surgery    Mixed connective tissue disease 01/2019    Dr. Thakur    CARITO (obstructive sleep apnea) 2018    Osteopenia 03/28/2018    calculated frax - 11/3%    Pinched nerve in neck     Preventative health care 03/30/2023    Rheumatoid arthritis 2018    Staph infection     Urinary incontinence     Wears contact lenses     Wears prescription eyeglasses        Past Surgical History:   Procedure Laterality Date    BACK SURGERY  1995    Back (L5/S1)    BARIATRIC SURGERY  1985    gastric bypass    BREAST EXCISIONAL BIOPSY Left 1985    BREAST SURGERY  1990    lump removed    CHOLECYSTECTOMY  2019    CHOLECYSTECTOMY WITH INTRAOPERATIVE CHOLANGIOGRAM N/A 04/26/2018    Procedure: CHOLECYSTECTOMY LAPAROSCOPIC INTRAOPERATIVE CHOLANGIOGRAM;  Surgeon: Lit Morelos MD;  Location:  [facility];  Service: General    COLONOSCOPY  2016    GASTRIC BYPASS      HIGH GASTRIC BYPASS, 1980's    JOINT REPLACEMENT  2012,2020    left knee,right knee    OTHER SURGICAL HISTORY  1985    Stomach Stabled    REPLACEMENT TOTAL KNEE Left 2012    right  2021    SINUS SURGERY      x3    TOTAL KNEE ARTHROPLASTY Right 10/29/2021    Procedure: TOTAL KNEE ARTHROPLASTY RIGHT;  Surgeon: Emeka Douglas MD;  Location:  [facility];  Service: Orthopedics;  Laterality: Right;    TUBAL ABDOMINAL LIGATION  1980    WISDOM TOOTH EXTRACTION         Family History   Problem Relation Age of Onset    Cancer Other         BREAST, LUNG, OVARIAN, KIDNEY    Kidney cancer Other     Lung cancer Other         pGF as well    Cancer Mother         kidney    Hearing loss Mother     Other Mother         dementia    Cancer Father         lung    Mental illness Father     Alcohol abuse Father     Breast cancer Sister 30    Connective Tissue Disease Sister     Hypertension Sister         in lungs    Diabetes Sister     Osteoporosis Sister         all 3 of my sister has osyeoporosis    Cancer Sister         Brest    Cancer Paternal Grandmother         stomach    Cancer Paternal  Grandfather         lung    Ovarian cancer Neg Hx        Social History     Socioeconomic History    Marital status:     Number of children: 1    Years of education: 12    Highest education level: Some college, no degree   Tobacco Use    Smoking status: Never     Passive exposure: Never    Smokeless tobacco: Never   Vaping Use    Vaping Use: Never used   Substance and Sexual Activity    Alcohol use: Yes     Comment: occasional    Drug use: Never    Sexual activity: Not Currently     Partners: Male        Current Outpatient Medications on File Prior to Visit   Medication Sig Dispense Refill    albuterol sulfate  (90 Base) MCG/ACT inhaler TAKE 2 PUFFS BY MOUTH EVERY 4 HOURS AS NEEDED FOR WHEEZE 8 g 1    [] Azelastine-Fluticasone 137-50 MCG/ACT suspension 1 spray into the nostril(s) as directed by provider 2 (Two) Times a Day for 30 days. 1 g 2    Biotin 1 MG capsule Take 1 capsule by mouth Daily.      desloratadine (CLARINEX REDITAB) 5 MG disintegrating tablet Place 1 tablet on the tongue Daily. 90 tablet 0    Fluticasone-Salmeterol (ADVAIR/WIXELA) 500-50 MCG/ACT DISKUS INHALE 1 PUFF TWICE A  each 1    gabapentin (NEURONTIN) 100 MG capsule Take 3 capsules by mouth 2 (Two) Times a Day As Needed (pain). 180 capsule 2    [] hydroxychloroquine (PLAQUENIL) 200 MG tablet Take 1 tablet by mouth Every 12 (Twelve) Hours for 30 days. Indications: Rheumatoid Arthritis 60 tablet 1    meclizine (ANTIVERT) 25 MG tablet       NIFEdipine XL (PROCARDIA XL) 30 MG 24 hr tablet Take 1 tablet by mouth Daily. 90 tablet 0    Polyethylene Glycol 3350 (MIRALAX PO) Take  by mouth.      traZODone (DESYREL) 100 MG tablet TAKE 1 TABLET BY MOUTH EVERY NIGHT FOR 90 DAYS. 90 tablet 1    VITAMIN D, CHOLECALCIFEROL, PO Take 1 capsule by mouth Daily.      zoledronic acid (RECLAST) 5 MG/100ML solution infusion Infuse 100 mL into a venous catheter 1 (One) Time for 1 dose. 100 mL 0     Current Facility-Administered  "Medications on File Prior to Visit   Medication Dose Route Frequency Provider Last Rate Last Admin    Chlorhexidine Gluconate Cloth 2 % pads   Apply externally Once Emeka Douglas MD        mupirocin (BACTROBAN) 2 % nasal ointment   Nasal BID Emeka Douglas MD           Review of Systems:  General: Denies fever, chills, fatigue, and weakness.  Eyes: Denies vision loss, blurry vision, and excessive redness.  ENT: Denies hearing issues and difficulty swallowing.  Cardiovascular: Denies palpitations, chest pain, or syncopal episodes.  Respiratory: Denies shortness of breath, wheezing, and coughing.  GI: Denies abdominal pain, nausea, and vomiting.   : Denies frequency, hematuria, and urgency.  Musculoskeletal: Denies muscle cramps, joint pains, and stiffness.  Derm: Denies rash, open wounds, or suspicious lesions.  Neuro: Denies headaches, numbness, loss of coordination, and tremors.  Psych: Denies anxiety and depression.  Endocrine: Denies temperature intolerance and changes in appetite.  Heme: Denies bleeding disorders or abnormal bruising.     Objective   Resp 16   Ht 160 cm (63\")   Wt 81.6 kg (180 lb)   LMP  (LMP Unknown) Comment: LAST MAMMOGRAM 2020  BMI 31.89 kg/m²     Foot/Ankle Exam    GENERAL  Orientation:  AAOx3  Affect:  appropriate    VASCULAR     Right Foot Vascularity   Normal vascular exam    Dorsalis pedis:  2+  Posterior tibial:  2+  Skin temperature:  warm  Edema grading:  None  CFT:  < 3 seconds  Pedal hair growth:  Present  Varicosities:  none     Left Foot Vascularity   Normal vascular exam    Dorsalis pedis:  2+  Posterior tibial:  2+  Skin temperature:  warm  Edema grading:  None  CFT:  < 3 seconds  Pedal hair growth:  Present  Varicosities:  none     NEUROLOGIC     Right Foot Neurologic   Light touch sensation: normal  Hot/Cold sensation: normal  Achilles reflex:  2+     Left Foot Neurologic   Light touch sensation: normal  Hot/Cold sensation:  normal  Achilles reflex:  " 2+    MUSCULOSKELETAL     Right Foot Musculoskeletal   Arch:  Normal     Left Foot Musculoskeletal   Arch:  Normal    MUSCLE STRENGTH     Right Foot Muscle Strength   Normal strength    Foot dorsiflexion:  5  Foot plantar flexion:  5  Foot inversion:  5  Foot eversion:  5     Left Foot Muscle Strength   Normal strength    Foot dorsiflexion:  5  Foot plantar flexion:  5  Foot inversion:  5  Foot eversion:  5    DERMATOLOGIC      Right Foot Dermatologic   Skin  Right foot skin is intact.   Nails comment:  Nails 1-5     Left Foot Dermatologic   Skin  Left foot skin is intact.   Nails comment:  Nails 1-5    TESTS     Right Foot Tests   Anterior drawer: negative  Varus tilt: negative     Left Foot Tests   Anterior drawer: negative  Varus tilt: negative     Left foot additional comments: Moderate discomfort and swelling involving the medial midfoot region. Discomfort with palpation. Moderate soft tissue rigidity and equinus contracture. Pes planus foot structure with stance. No open wounds or signs of infection.      Assessment & Plan   Diagnoses and all orders for this visit:    1. Left foot pain (Primary)  -     XR Foot 3+ View Left    2. Arthritis of left foot    3. Foot joint instability, left      Patient presents to the office today for evaluation of left foot pain. Imaging was reviewed with the patient, which reveals no fractures. I explained that she has a moderate amount of arthritis in her left foot. I recommend over-the-counter arch supports. I will provide her with a walking boot to offload her body weight. We discussed low-impact exercises such as biking, elliptical, swimming, and water aerobics. We discussed avoidance of ambulating on uneven terrain and high-impact activities. We discussed steroid injections or surgery if failure to improve.  Will order a weightbearing x-ray of left foot. The patient will return to the office in 3 weeks for reevaluation.  May consider proceeding with an MRI if symptoms  remain.  Will hold off on Medrol Dosepak as patient will be receiving Reclast infusion in the near future. Greater than 30 minutes was spent before, during, and after evaluation for patient care.    Orders Placed This Encounter   Procedures    XR Foot 3+ View Left     Order Specific Question:   Reason for Exam:     Answer:   lt foot pain     Order Specific Question:   Does this patient have a diabetic monitoring/medication delivering device on?     Answer:   No     Order Specific Question:   Release to patient     Answer:   Routine Release [[id number]]        Note is dictated utilizing voice recognition software. Unfortunately this leads to occasional typographical errors. I apologize in advance if the situation occurs. If questions occur please do not hesitate to call our office.    Transcribed from ambient dictation for T Terry Malloy DPM by Cesar Viera.  12/07/23   12:12 EST    Patient or patient representative verbalized consent to the visit recording.  I have personally performed the services described in this document as transcribed by the above individual, and it is both accurate and complete.

## 2024-01-04 ENCOUNTER — OFFICE VISIT (OUTPATIENT)
Dept: PODIATRY | Facility: CLINIC | Age: 74
End: 2024-01-04
Payer: MEDICARE

## 2024-01-04 VITALS — HEIGHT: 63 IN | WEIGHT: 179.01 LBS | BODY MASS INDEX: 31.72 KG/M2

## 2024-01-04 DIAGNOSIS — L60.0 INGROWN LEFT GREATER TOENAIL: ICD-10-CM

## 2024-01-04 DIAGNOSIS — M25.375 FOOT JOINT INSTABILITY, LEFT: ICD-10-CM

## 2024-01-04 DIAGNOSIS — M19.072 ARTHRITIS OF LEFT FOOT: ICD-10-CM

## 2024-01-04 DIAGNOSIS — M79.672 LEFT FOOT PAIN: Primary | ICD-10-CM

## 2024-01-04 NOTE — PROGRESS NOTES
01/04/2024  Foot and Ankle Surgery - Established Patient/Follow-up  Provider: Dr. Terry Malloy, SINDY  Location: [facility]    Subjective:  Barbara Coelho is a 73 y.o. female.     Chief Complaint   Patient presents with    Left Foot - Pain    Follow-up     Liane Tran 10/11/2023       HPI: The patient is a 73-year-old female who presents to the clinic for a follow-up regarding her left foot.    She was last seen in 12/2023 for left foot pain. She reports she spoke to her primary care physician and was prescribed Reclast; however, she is unable to have steroids. She states she believes she has an ingrown toenail. She notes she has been wearing the inserts. She reports she has not used the boot as she has not been anywhere to walk. She denies wearing the boot. She reports she has improved approximately 15 to 20 percent since her last visit. She denies pain to touch; however, notes it is sore. She states most of the time she is sitting on her buttocks. She inquires if her toenail will grow back.    Allergies   Allergen Reactions    Celebrex [Celecoxib] Hives     HIVES     Codeine Rash    Oxycodone Rash and Hives    Sulfa Antibiotics Itching     ITCHING        Current Outpatient Medications on File Prior to Visit   Medication Sig Dispense Refill    albuterol sulfate  (90 Base) MCG/ACT inhaler TAKE 2 PUFFS BY MOUTH EVERY 4 HOURS AS NEEDED FOR WHEEZE 8 g 1    Biotin 1 MG capsule Take 1 capsule by mouth Daily.      desloratadine (CLARINEX REDITAB) 5 MG disintegrating tablet Place 1 tablet on the tongue Daily. 90 tablet 0    Fluticasone-Salmeterol (ADVAIR/WIXELA) 500-50 MCG/ACT DISKUS INHALE 1 PUFF TWICE A  each 1    gabapentin (NEURONTIN) 100 MG capsule Take 3 capsules by mouth 2 (Two) Times a Day As Needed (pain). 180 capsule 2    meclizine (ANTIVERT) 25 MG tablet       NIFEdipine XL (PROCARDIA XL) 30 MG 24 hr tablet Take 1 tablet by mouth Daily. 90 tablet 0    Polyethylene Glycol 3350  "(MIRALAX PO) Take  by mouth.      VITAMIN D, CHOLECALCIFEROL, PO Take 1 capsule by mouth Daily.      traZODone (DESYREL) 100 MG tablet TAKE 1 TABLET BY MOUTH EVERY NIGHT FOR 90 DAYS. 90 tablet 1    zoledronic acid (RECLAST) 5 MG/100ML solution infusion Infuse 100 mL into a venous catheter 1 (One) Time for 1 dose. 100 mL 0     Current Facility-Administered Medications on File Prior to Visit   Medication Dose Route Frequency Provider Last Rate Last Admin    Chlorhexidine Gluconate Cloth 2 % pads   Apply externally Once Emeka Douglas MD        mupirocin (BACTROBAN) 2 % nasal ointment   Nasal BID Emeka Douglas MD           Objective   Ht 160 cm (62.99\")   Wt 81.2 kg (179 lb 0.2 oz)   LMP  (LMP Unknown) Comment: LAST MAMMOGRAM 2020  BMI 31.72 kg/m²     Foot/Ankle Exam  GENERAL  Orientation:  AAOx3  Affect:  appropriate    VASCULAR     Right Foot Vascularity   Normal vascular exam    Dorsalis pedis:  2+  Posterior tibial:  2+  Skin temperature:  warm  Edema grading:  None  CFT:  < 3 seconds  Pedal hair growth:  Present  Varicosities:  none     Left Foot Vascularity   Normal vascular exam    Dorsalis pedis:  2+  Posterior tibial:  2+  Skin temperature:  warm  Edema grading:  None  CFT:  < 3 seconds  Pedal hair growth:  Present  Varicosities:  none     NEUROLOGIC     Right Foot Neurologic   Light touch sensation: normal  Hot/Cold sensation: normal  Achilles reflex:  2+     Left Foot Neurologic   Light touch sensation: normal  Hot/Cold sensation:  normal  Achilles reflex:  2+    MUSCULOSKELETAL     Right Foot Musculoskeletal   Arch:  Normal     Left Foot Musculoskeletal   Arch:  Normal    MUSCLE STRENGTH     Right Foot Muscle Strength   Normal strength    Foot dorsiflexion:  5  Foot plantar flexion:  5  Foot inversion:  5  Foot eversion:  5     Left Foot Muscle Strength   Normal strength    Foot dorsiflexion:  5  Foot plantar flexion:  5  Foot inversion:  5  Foot eversion:  5    DERMATOLOGIC      Right Foot " Dermatologic   Skin  Right foot skin is intact.   Nails comment:  Nails 1-5     Left Foot Dermatologic   Skin  Left foot skin is intact.   Nails comment:  Nails 1-5    TESTS     Right Foot Tests   Anterior drawer: negative  Varus tilt: negative     Left Foot Tests   Anterior drawer: negative  Varus tilt: negative     Left foot additional comments: Moderate discomfort and swelling involving the medial midfoot region. Discomfort with palpation. Moderate soft tissue rigidity and equinus contracture. Pes planus foot structure with stance. No open wounds or signs of infection.    01/04/2024: Continued discomfort which is less involving the dorsal medial aspect of the left foot. No progressive deformity. Less swelling and erythema today. Discomfort involving the medial border of the left hallux consistent with ingrown nail. No evidence of infection or massive inflammation or discomfort with palpation.    Assessment & Plan   Diagnoses and all orders for this visit:    1. Left foot pain (Primary)    2. Arthritis of left foot    3. Foot joint instability, left    4. Ingrown left greater toenail      Patient returns for follow-up regarding her left foot. Imaging was independently reviewed showing significant arthritis. I would like her to continue wearing the boot for the next 2 weeks. After that, she can wear the insert in her regular shoe and then progress with a regular shoe. I have asked her to avoid ambulating barefoot around the house, as well as avoidance of wearing flip flops or house slippers. I have asked her to wear tennis shoes with arch supports when she is around the house. I have asked her to perform Epsom salt soaks to draw out inflammation. The patient will return to the office in 4 weeks for reevaluation. Greater than 20 minutes was spent before, during, and after evaluation for patient care.    No orders of the defined types were placed in this encounter.         Note is dictated utilizing voice recognition  software. Unfortunately this leads to occasional typographical errors. I apologize in advance if the situation occurs. If questions occur please do not hesitate to call our office.    Transcribed from ambient dictation for BETO Malloy DPM by Radha Roca.  01/04/24   11:11 EST    Patient or patient representative verbalized consent to the visit recording.  I have personally performed the services described in this document as transcribed by the above individual, and it is both accurate and complete.

## 2024-01-10 ENCOUNTER — TELEPHONE (OUTPATIENT)
Dept: PAIN MEDICINE | Facility: CLINIC | Age: 74
End: 2024-01-10
Payer: MEDICARE

## 2024-01-10 DIAGNOSIS — M54.16 LUMBAR RADICULITIS: Primary | ICD-10-CM

## 2024-01-10 NOTE — TELEPHONE ENCOUNTER
Caller: Barbara Coelho    Relationship to patient: Self    Best call back number: [phone]    Chief complaint: LOWER BACK PAIN     Type of visit: INJECTION     Requested date: ASAP      If rescheduling, when is the original appointment: N/A      Additional notes:PLEASE CALL FOR SCHEDULING. OKAY TO LEAVE A VOICEMAIL.

## 2024-01-29 ENCOUNTER — HOSPITAL ENCOUNTER (OUTPATIENT)
Dept: PAIN MEDICINE | Facility: HOSPITAL | Age: 74
Discharge: HOME | End: 2024-01-29
Payer: MEDICARE

## 2024-01-29 VITALS
WEIGHT: 179 LBS | HEART RATE: 72 BPM | HEIGHT: 63 IN | DIASTOLIC BLOOD PRESSURE: 75 MMHG | OXYGEN SATURATION: 96 % | TEMPERATURE: 97.1 F | BODY MASS INDEX: 31.71 KG/M2 | SYSTOLIC BLOOD PRESSURE: 121 MMHG | RESPIRATION RATE: 16 BRPM

## 2024-01-29 DIAGNOSIS — M54.16 LUMBAR RADICULITIS: Primary | ICD-10-CM

## 2024-01-29 DIAGNOSIS — R52 PAIN: ICD-10-CM

## 2024-01-29 PROCEDURE — 77003 FLUOROGUIDE FOR SPINE INJECT: CPT

## 2024-01-29 PROCEDURE — 25010000002 METHYLPREDNISOLONE PER 40 MG: Performed by: ANESTHESIOLOGY

## 2024-01-29 PROCEDURE — 25510000001 IOPAMIDOL 41 % SOLUTION: Performed by: ANESTHESIOLOGY

## 2024-01-29 RX ORDER — METHYLPREDNISOLONE ACETATE 40 MG/ML
40 INJECTION, SUSPENSION INTRA-ARTICULAR; INTRALESIONAL; INTRAMUSCULAR; SOFT TISSUE ONCE
Status: COMPLETED | OUTPATIENT
Start: 2024-01-29 | End: 2024-01-29

## 2024-01-29 RX ORDER — IOPAMIDOL 408 MG/ML
3 INJECTION, SOLUTION INTRATHECAL
Status: COMPLETED | OUTPATIENT
Start: 2024-01-29 | End: 2024-01-29

## 2024-01-29 RX ADMIN — IOPAMIDOL 3 ML: 408 INJECTION, SOLUTION INTRATHECAL at 08:52

## 2024-01-29 RX ADMIN — METHYLPREDNISOLONE ACETATE 40 MG: 40 INJECTION, SUSPENSION INTRA-ARTICULAR; INTRALESIONAL; INTRAMUSCULAR; INTRASYNOVIAL; SOFT TISSUE at 08:52

## 2024-01-29 NOTE — DISCHARGE INSTRUCTIONS

## 2024-01-30 ENCOUNTER — TELEPHONE (OUTPATIENT)
Dept: PAIN MEDICINE | Facility: HOSPITAL | Age: 74
End: 2024-01-30
Payer: MEDICARE

## 2024-01-30 ENCOUNTER — OFFICE VISIT (OUTPATIENT)
Dept: PODIATRY | Facility: CLINIC | Age: 74
End: 2024-01-30
Payer: MEDICARE

## 2024-01-30 VITALS — BODY MASS INDEX: 31.71 KG/M2 | HEIGHT: 63 IN | WEIGHT: 179 LBS | RESPIRATION RATE: 20 BRPM

## 2024-01-30 DIAGNOSIS — M79.672 LEFT FOOT PAIN: Primary | ICD-10-CM

## 2024-01-30 DIAGNOSIS — M19.072 ARTHRITIS OF LEFT FOOT: ICD-10-CM

## 2024-01-30 DIAGNOSIS — M25.375 FOOT JOINT INSTABILITY, LEFT: ICD-10-CM

## 2024-01-30 RX ORDER — PANTOPRAZOLE SODIUM 40 MG/1
40 TABLET, DELAYED RELEASE ORAL EVERY MORNING
COMMUNITY
Start: 2023-12-20

## 2024-01-30 RX ORDER — AZELASTINE HYDROCHLORIDE 137 UG/1
SPRAY, METERED NASAL
COMMUNITY
Start: 2024-01-22

## 2024-01-30 NOTE — PROGRESS NOTES
01/30/2024  Foot and Ankle Surgery - Established Patient/Follow-up  Provider: Dr. Terry Malloy DPM  Location: [facility]    Subjective:  Barbara Coelho is a 73 y.o. female.     Chief Complaint   Patient presents with    Left Foot - Follow-up, Pain    Follow-up     NISHA PARMAR APRN  11/29/2023       HPI: The patient is a 73-year-old female who returns to the office today for a follow-up regarding her left foot.    The patient was last seen approximately 4 weeks ago. She reports improvement in her left foot. She states she wore the boot for approximately 1 week, but she has been wearing the inserts. The patient adds that she has been soaking her toe in Epsom salt, which has also been beneficial for her foot as well. She notes she had a nerve block on 01/29/2024.     Allergies   Allergen Reactions    Celebrex [Celecoxib] Hives     HIVES     Codeine Rash    Oxycodone Rash and Hives    Sulfa Antibiotics Itching     ITCHING        Current Outpatient Medications on File Prior to Visit   Medication Sig Dispense Refill    albuterol sulfate  (90 Base) MCG/ACT inhaler TAKE 2 PUFFS BY MOUTH EVERY 4 HOURS AS NEEDED FOR WHEEZE 8 g 1    Azelastine HCl 137 MCG/SPRAY solution SPRAY 1 TO 2 SPRAYS INTO EACH NOSTRIL TWICE A DAY AS NEEDED      Biotin 1 MG capsule Take 1 capsule by mouth Daily.      desloratadine (CLARINEX REDITAB) 5 MG disintegrating tablet Place 1 tablet on the tongue Daily. 90 tablet 0    Fluticasone-Salmeterol (ADVAIR/WIXELA) 500-50 MCG/ACT DISKUS INHALE 1 PUFF TWICE A  each 1    gabapentin (NEURONTIN) 100 MG capsule Take 3 capsules by mouth 2 (Two) Times a Day As Needed (pain). 180 capsule 2    meclizine (ANTIVERT) 25 MG tablet       NIFEdipine XL (PROCARDIA XL) 30 MG 24 hr tablet Take 1 tablet by mouth Daily. 90 tablet 0    pantoprazole (PROTONIX) 40 MG EC tablet Take 1 tablet by mouth Every Morning.      Polyethylene Glycol 3350 (MIRALAX PO) Take  by mouth.      VITAMIN D,  "CHOLECALCIFEROL, PO Take 1 capsule by mouth Daily.      traZODone (DESYREL) 100 MG tablet TAKE 1 TABLET BY MOUTH EVERY NIGHT FOR 90 DAYS. 90 tablet 1    zoledronic acid (RECLAST) 5 MG/100ML solution infusion Infuse 100 mL into a venous catheter 1 (One) Time for 1 dose. 100 mL 0     Current Facility-Administered Medications on File Prior to Visit   Medication Dose Route Frequency Provider Last Rate Last Admin    Chlorhexidine Gluconate Cloth 2 % pads   Apply externally Once Emeka Douglas MD        mupirocin (BACTROBAN) 2 % nasal ointment   Nasal BID Emeka Douglas MD           Objective   Resp 20   Ht 160 cm (63\")   Wt 81.2 kg (179 lb)   LMP  (LMP Unknown) Comment: LAST MAMMOGRAM 2020  BMI 31.71 kg/m²     Foot/Ankle Exam  GENERAL  Orientation:  AAOx3  Affect:  appropriate     VASCULAR      Right Foot Vascularity   Normal vascular exam    Dorsalis pedis:  2+  Posterior tibial:  2+  Skin temperature:  warm  Edema grading:  None  CFT:  < 3 seconds  Pedal hair growth:  Present  Varicosities:  none      Left Foot Vascularity   Normal vascular exam    Dorsalis pedis:  2+  Posterior tibial:  2+  Skin temperature:  warm  Edema grading:  None  CFT:  < 3 seconds  Pedal hair growth:  Present  Varicosities:  none     NEUROLOGIC      Right Foot Neurologic   Light touch sensation: normal  Hot/Cold sensation: normal  Achilles reflex:  2+      Left Foot Neurologic   Light touch sensation: normal  Hot/Cold sensation:  normal  Achilles reflex:  2+     MUSCULOSKELETAL      Right Foot Musculoskeletal   Arch:  Normal      Left Foot Musculoskeletal   Arch:  Normal     MUSCLE STRENGTH      Right Foot Muscle Strength   Normal strength    Foot dorsiflexion:  5  Foot plantar flexion:  5  Foot inversion:  5  Foot eversion:  5      Left Foot Muscle Strength   Normal strength    Foot dorsiflexion:  5  Foot plantar flexion:  5  Foot inversion:  5  Foot eversion:  5     DERMATOLOGIC       Right Foot Dermatologic   Skin  Right foot " skin is intact.   Nails comment:  Nails 1-5      Left Foot Dermatologic   Skin  Left foot skin is intact.   Nails comment:  Nails 1-5     TESTS      Right Foot Tests   Anterior drawer: negative  Varus tilt: negative      Left Foot Tests   Anterior drawer: negative  Varus tilt: negative     Left foot additional comments: Moderate discomfort and swelling involving the medial midfoot region. Discomfort with palpation. Moderate soft tissue rigidity and equinus contracture. Pes planus foot structure with stance. No open wounds or signs of infection.     01/04/2024: Continued discomfort which is less involving the dorsal medial aspect of the left foot. No progressive deformity. Less swelling and erythema today. Discomfort involving the medial border of the left hallux consistent with ingrown nail. No evidence of infection or massive inflammation or discomfort with palpation.    01/30/2024: Continued improvement involving the medial aspect of the foot. Mild swelling and soft tissue swelling to that area. No other issues or concerns.    Assessment & Plan   Diagnoses and all orders for this visit:    1. Left foot pain (Primary)    2. Arthritis of left foot    3. Foot joint instability, left        The patient returns to the office today for a follow-up regarding her left foot. No results were obtained or interpreted today. Explained she has significant soft tissue stress in the area that is likely causing her symptoms. Recommended continuing to wear the inserts to keep her foot supported, and she can continue in regular tennis shoes if she is comfortable. Additionally, recommended continuing with range-of-motion exercises, stretching, and massage to the area. Will provide her with stretching exercises today. The patient will return to the office in 2 months for re-evaluation. If she is doing well at that time, she may cancel the appointment. Greater than 20 minutes was spent before, during, and after evaluation for patient  care.    No orders of the defined types were placed in this encounter.         Note is dictated utilizing voice recognition software. Unfortunately this leads to occasional typographical errors. I apologize in advance if the situation occurs. If questions occur please do not hesitate to call our office.    Transcribed from ambient dictation for BETO Malloy DPM by Katherin Holbrook.  01/30/24   11:12 EST    Patient or patient representative verbalized consent to the visit recording.  I have personally performed the services described in this document as transcribed by the above individual, and it is both accurate and complete.

## 2024-02-01 NOTE — ADDENDUM NOTE
Encounter addended by: Heena Liang MD on: 2/1/2024 3:38 PM   Actions taken: Problem List reviewed, Medication List reviewed, Allergies reviewed, Follow-up modified, SmartForm saved, Clinical Note Signed, Charge Capture section accepted

## 2024-02-01 NOTE — PROCEDURES
"Subjective   CC back pain  Barbara Coelho is a 73 y.o. female with lumbar radiculitis here for LESI..   No anticoagulation    Pain Assessment   Location of Pain: Lower Back, R Hip, L Hip, leg  Description of Pain: Dull/Aching, Throbbing, Stabbing  Previous Pain Rating :6  Current Pain Ratin  Aggravating Factors: Activity  Alleviating Factors: Rest, Medication  Pain onset over 12 weeks ago  Pain interferes with ADL's  [place] back pain disability scale scanned in chart     The following portions of the patient's history were reviewed and updated as appropriate: allergies, current medications, past family history, past medical history, past social history, past surgical history and problem list.      Review of Systems  As in HPI  Objective   Physical Exam  Vitals reviewed.   Constitutional:       General: She is not in acute distress.  Pulmonary:      Effort: Pulmonary effort is normal.       /75 (BP Location: Left arm, Patient Position: Sitting)   Pulse 72   Temp 97.1 °F (36.2 °C) (Skin)   Resp 16   Ht 160 cm (63\")   Wt 81.2 kg (179 lb)   LMP  (LMP Unknown) Comment: LAST MAMMOGRAM   SpO2 96%   BMI 31.71 kg/m²     Assessment & Plan    underwent    RTC 4-6 weeks or as needed for repeat    DATE OF PROCEDURE: 2024    PREOPERATIVE DIAGNOSIS: lumbar DDD/radiculitis    POSTOPERATIVE DIAGNOSIS: same    PROCEDURE PERFORMED:  lumbar Epidural Steroid Injection    The patient presents with a history of   lumbar degenerative disc disease with  radiculitis. The patient presents today for a  lumbar epidural steroid injection at level  L5/S1.   The patient was seen in the preoperative area.  Patient's consent was obtained and updated.  Vitals were taken.  Patient was then brought to the procedure suite and placed in a prone position. The appropriate anatomic area was widely prepped with Chloraprep and draped in a sterile fashion. Noninvasive monitoring per routine anesthesia protocol was placed.  Under " fluoroscopic guidance using  AP view a 20 gauge styleted tuohy needle was passed through skin anesthetized with 1% Lidocaine without epinephrine.  The needle was advanced using the continuous loss of resistance to saline technique into the L5 epidural space. Needle tip placement in the epidural space was confirmed by loss of resistance and injection of 1 mL of  preservative free contrast.  Following this 8 mL of a solution containing  1 mL of 40 mg Depo-Medrol and 7 mL of preservative-free saline was carefully administered in the epidural space.  A sterile dressing was placed over the puncture site.    The patient tolerated the procedure with no complications . They were then brought to the post procedure area where they recovered nicely.

## 2024-02-05 PROCEDURE — 87186 SC STD MICRODIL/AGAR DIL: CPT

## 2024-02-05 PROCEDURE — 87086 URINE CULTURE/COLONY COUNT: CPT

## 2024-02-05 PROCEDURE — 87077 CULTURE AEROBIC IDENTIFY: CPT

## 2024-02-07 DIAGNOSIS — N39.0 ACUTE UTI: Primary | ICD-10-CM

## 2024-02-07 RX ORDER — CEFDINIR 300 MG/1
300 CAPSULE ORAL 2 TIMES DAILY
Qty: 14 CAPSULE | Refills: 0 | Status: SHIPPED | OUTPATIENT
Start: 2024-02-07 | End: 2024-02-14

## 2024-03-05 ENCOUNTER — LAB (OUTPATIENT)
Dept: LAB | Facility: HOSPITAL | Age: 74
End: 2024-03-05
Payer: MEDICARE

## 2024-03-05 ENCOUNTER — OFFICE VISIT (OUTPATIENT)
Dept: ONCOLOGY | Facility: CLINIC | Age: 74
End: 2024-03-05
Payer: MEDICARE

## 2024-03-05 VITALS
HEIGHT: 63 IN | HEART RATE: 86 BPM | DIASTOLIC BLOOD PRESSURE: 80 MMHG | RESPIRATION RATE: 18 BRPM | BODY MASS INDEX: 32.96 KG/M2 | SYSTOLIC BLOOD PRESSURE: 129 MMHG | WEIGHT: 186 LBS | TEMPERATURE: 98 F | OXYGEN SATURATION: 98 %

## 2024-03-05 DIAGNOSIS — C91.10 CLL (CHRONIC LYMPHOCYTIC LEUKEMIA): Primary | ICD-10-CM

## 2024-03-05 DIAGNOSIS — C91.10 LEUKEMIA, LYMPHOCYTIC, CHRONIC: Primary | ICD-10-CM

## 2024-03-05 DIAGNOSIS — C91.10 CLL (CHRONIC LYMPHOCYTIC LEUKEMIA): ICD-10-CM

## 2024-03-05 PROBLEM — K63.5 POLYP OF COLON: Status: ACTIVE | Noted: 2023-07-07

## 2024-03-05 PROBLEM — K30 FUNCTIONAL DYSPEPSIA: Status: ACTIVE | Noted: 2024-03-05

## 2024-03-05 PROBLEM — K57.30 DIVERTICULOSIS OF LARGE INTESTINE WITHOUT PERFORATION OR ABSCESS WITHOUT BLEEDING: Status: ACTIVE | Noted: 2023-07-07

## 2024-03-05 PROBLEM — K64.0 FIRST DEGREE HEMORRHOIDS: Status: ACTIVE | Noted: 2023-07-07

## 2024-03-05 LAB
ALBUMIN SERPL-MCNC: 4.4 G/DL (ref 3.5–5.2)
ALBUMIN/GLOB SERPL: 2.6 G/DL
ALP SERPL-CCNC: 79 U/L (ref 39–117)
ALT SERPL W P-5'-P-CCNC: 11 U/L (ref 1–33)
ANION GAP SERPL CALCULATED.3IONS-SCNC: 11 MMOL/L (ref 5–15)
AST SERPL-CCNC: 18 U/L (ref 1–32)
BASOPHILS # BLD AUTO: 0.15 10*3/MM3 (ref 0–0.2)
BASOPHILS NFR BLD AUTO: 0.4 % (ref 0–1.5)
BILIRUB SERPL-MCNC: 0.7 MG/DL (ref 0–1.2)
BUN SERPL-MCNC: 15 MG/DL (ref 8–23)
BUN/CREAT SERPL: 13.8 (ref 7–25)
CALCIUM SPEC-SCNC: 8.7 MG/DL (ref 8.6–10.5)
CHLORIDE SERPL-SCNC: 104 MMOL/L (ref 98–107)
CO2 SERPL-SCNC: 26 MMOL/L (ref 22–29)
CREAT SERPL-MCNC: 1.09 MG/DL (ref 0.57–1)
DEPRECATED RDW RBC AUTO: 45.5 FL (ref 37–54)
EGFRCR SERPLBLD CKD-EPI 2021: 53.1 ML/MIN/1.73
EOSINOPHIL # BLD AUTO: 0.45 10*3/MM3 (ref 0–0.4)
EOSINOPHIL NFR BLD AUTO: 1.1 % (ref 0.3–6.2)
ERYTHROCYTE [DISTWIDTH] IN BLOOD BY AUTOMATED COUNT: 13.9 % (ref 12.3–15.4)
GLOBULIN UR ELPH-MCNC: 1.7 GM/DL
GLUCOSE SERPL-MCNC: 100 MG/DL (ref 65–99)
HCT VFR BLD AUTO: 44.9 % (ref 34–46.6)
HGB BLD-MCNC: 13.9 G/DL (ref 12–15.9)
HOLD SPECIMEN: NORMAL
HOLD SPECIMEN: NORMAL
LDH SERPL-CCNC: 197 U/L (ref 135–214)
LYMPHOCYTES # BLD AUTO: 34.53 10*3/MM3 (ref 0.7–3.1)
LYMPHOCYTES NFR BLD AUTO: 81.3 % (ref 19.6–45.3)
MCH RBC QN AUTO: 28.4 PG (ref 26.6–33)
MCHC RBC AUTO-ENTMCNC: 31 G/DL (ref 31.5–35.7)
MCV RBC AUTO: 91.6 FL (ref 79–97)
MONOCYTES # BLD AUTO: 0.8 10*3/MM3 (ref 0.1–0.9)
MONOCYTES NFR BLD AUTO: 1.9 % (ref 5–12)
NEUTROPHILS NFR BLD AUTO: 15.3 % (ref 42.7–76)
NEUTROPHILS NFR BLD AUTO: 6.52 10*3/MM3 (ref 1.7–7)
PLATELET # BLD AUTO: 186 10*3/MM3 (ref 140–450)
PMV BLD AUTO: 9.8 FL (ref 6–12)
POTASSIUM SERPL-SCNC: 4.3 MMOL/L (ref 3.5–5.2)
PROT SERPL-MCNC: 6.1 G/DL (ref 6–8.5)
RBC # BLD AUTO: 4.9 10*6/MM3 (ref 3.77–5.28)
SODIUM SERPL-SCNC: 141 MMOL/L (ref 136–145)
URATE SERPL-MCNC: 5.3 MG/DL (ref 2.4–5.7)
WBC NRBC COR # BLD AUTO: 42.45 10*3/MM3 (ref 3.4–10.8)

## 2024-03-05 PROCEDURE — 36415 COLL VENOUS BLD VENIPUNCTURE: CPT

## 2024-03-05 PROCEDURE — 1125F AMNT PAIN NOTED PAIN PRSNT: CPT | Performed by: INTERNAL MEDICINE

## 2024-03-05 PROCEDURE — 83615 LACTATE (LD) (LDH) ENZYME: CPT | Performed by: INTERNAL MEDICINE

## 2024-03-05 PROCEDURE — 84550 ASSAY OF BLOOD/URIC ACID: CPT | Performed by: INTERNAL MEDICINE

## 2024-03-05 PROCEDURE — 85025 COMPLETE CBC W/AUTO DIFF WBC: CPT

## 2024-03-05 PROCEDURE — 99214 OFFICE O/P EST MOD 30 MIN: CPT | Performed by: INTERNAL MEDICINE

## 2024-03-05 PROCEDURE — 80053 COMPREHEN METABOLIC PANEL: CPT | Performed by: INTERNAL MEDICINE

## 2024-03-05 RX ORDER — HYDROXYCHLOROQUINE SULFATE 200 MG/1
1 TABLET, FILM COATED ORAL EVERY 12 HOURS SCHEDULED
COMMUNITY
Start: 2024-02-28

## 2024-03-05 NOTE — PROGRESS NOTES
Hematology/Oncology Outpatient Follow Up    PATIENT NAME:Barbara Coelho  :1950  MRN: [id number]  PRIMARY CARE PHYSICIAN: Liane Tran APRN  REFERRING PHYSICIAN: No ref. provider found    Chief Complaint   Patient presents with    Follow-up     Leukocytosis, unspecified type        HISTORY OF PRESENT ILLNESS:     73-year-old female has referred secondary to leukocytosis with differential lymphocytosis.  Patient was noted in .  Patient denies any fevers or chills or upper respiratory tract infection.  Review of her CBC indicates that her white count has ranged around 23-24,000 normal hemoglobin and normal platelets but her differentials have been 8% neutrophils 85% lymphocytes with an ANC of 2.1 and absolute lymphocyte count of 20.  Back in 2022 her white count was 12.8, Hemoglobin 13.2 platelets 211 and absolute lymphocyte count was 9600.  Patient states that she is fully active and denies any night sweats, weight loss or fatigue symptoms.     Patient has a history of mixed connective tissue disease.  Patient is now retired. Patient is . She has one child.     There is family hx of kidney cancer in her mother, breast cancer breast cancer 30,  father had lung cancer, paternal GM with stomach cancer, paternal GF with lung cancer     Patient does not smoke, drinks occasional etoh     2023: Patient had peripheral blood flow cytometry which showed chronic lymphocytic leukemia B-cell, CD38 negative and CD20 positive.  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  CLL panel by FISH was normal.  Chemistry panel BUN was 17 creatinine was 1, LDH was 206 normal uric acid was 6.8 normal is up to 5.7.  Peripheral smear showed numerous smudge cells  7/3/2023: Patient had CT scan of the chest, abdomen and pelvis, no significant lymphadenopathy patient had CT scan of the chest, abdomen and pelvis to suggest lymphomatous  process, supraumbilical midline ventral hernia containing omental fat without evidence of incarceration.  Past Medical History:   Diagnosis Date    Allergic 1967    take allergy shots    Allergic rhinitis     on allergy shots    Asthma     ALLERGIC    Back pain     Low    Bladder infection     Recurrent Bladder Infections    CLL (chronic lymphocytic leukemia)     Deep vein thrombosis 1989    Difficulty walking     Diverticulitis     Elevated laboratory test result 05/2023    elevated wbc/  elevated lymphs/ gfr and creatine    GERD (gastroesophageal reflux disease)     EGD 12/16 [facility] - reflux, HH. no barretts    Hammer toe     Hearing loss     High arches     History of DVT of lower extremity     left leg    Ingrown toenail     Knee pain, right     Leg pain     Low back pain 1990    had back surgery    Mixed connective tissue disease 01/2019    Dr. Thakur    CARITO (obstructive sleep apnea) 2018    Osteopenia 03/28/2018    calculated frax - 11/3%    Pinched nerve in neck      health care 03/30/2023    Rheumatoid arthritis 2018    Staph infection     Urinary incontinence     Wears contact lenses     Wears prescription eyeglasses        Past Surgical History:   Procedure Laterality Date    BACK SURGERY  1995    Back (L5/S1)    BARIATRIC SURGERY  1985    gastric bypass    BREAST EXCISIONAL BIOPSY Left 1985    BREAST SURGERY  1990    lump removed    CHOLECYSTECTOMY  2019    CHOLECYSTECTOMY WITH INTRAOPERATIVE CHOLANGIOGRAM N/A 04/26/2018    Procedure: CHOLECYSTECTOMY LAPAROSCOPIC INTRAOPERATIVE CHOLANGIOGRAM;  Surgeon: Lit Morelos MD;  Location: [facility];  Service: General    COLONOSCOPY  2016    GASTRIC BYPASS      HIGH GASTRIC BYPASS, 1980's    JOINT REPLACEMENT  2012,2020    left knee,right knee    OTHER SURGICAL HISTORY  1985    Stomach Stabled    REPLACEMENT TOTAL KNEE Left 2012    right  2021    SINUS SURGERY      x3    TOTAL KNEE ARTHROPLASTY Right 10/29/2021    Procedure: TOTAL KNEE ARTHROPLASTY  RIGHT;  Surgeon: Emeka Douglas MD;  Location: [facility];  Service: Orthopedics;  Laterality: Right;    TUBAL ABDOMINAL LIGATION  1980    WISDOM TOOTH EXTRACTION           Current Outpatient Medications:     hydroxychloroquine (PLAQUENIL) 200 MG tablet, Take 1 tablet by mouth Every 12 (Twelve) Hours., Disp: , Rfl:     albuterol sulfate  (90 Base) MCG/ACT inhaler, TAKE 2 PUFFS BY MOUTH EVERY 4 HOURS AS NEEDED FOR WHEEZE, Disp: 8 g, Rfl: 1    amoxicillin-clavulanate (AUGMENTIN) 875-125 MG per tablet, Take 1 tablet by mouth 2 (Two) Times a Day for 7 days., Disp: 14 tablet, Rfl: 0    Azelastine HCl 137 MCG/SPRAY solution, SPRAY 1 TO 2 SPRAYS INTO EACH NOSTRIL TWICE A DAY AS NEEDED, Disp: , Rfl:     Biotin 1 MG capsule, Take 1 capsule by mouth Daily., Disp: , Rfl:     desloratadine (CLARINEX REDITAB) 5 MG disintegrating tablet, Place 1 tablet on the tongue Daily., Disp: 90 tablet, Rfl: 0    Fluticasone-Salmeterol (ADVAIR/WIXELA) 500-50 MCG/ACT DISKUS, INHALE 1 PUFF TWICE A DAY, Disp: 180 each, Rfl: 1    gabapentin (NEURONTIN) 100 MG capsule, Take 3 capsules by mouth 2 (Two) Times a Day As Needed (pain)., Disp: 180 capsule, Rfl: 2    meclizine (ANTIVERT) 25 MG tablet, , Disp: , Rfl:     NIFEdipine XL (PROCARDIA XL) 30 MG 24 hr tablet, Take 1 tablet by mouth Daily., Disp: 90 tablet, Rfl: 0    pantoprazole (PROTONIX) 40 MG EC tablet, Take 1 tablet by mouth Every Morning., Disp: , Rfl:     Polyethylene Glycol 3350 (MIRALAX PO), Take  by mouth., Disp: , Rfl:     traZODone (DESYREL) 100 MG tablet, TAKE 1 TABLET BY MOUTH EVERY NIGHT FOR 90 DAYS., Disp: 90 tablet, Rfl: 1    VITAMIN D, CHOLECALCIFEROL, PO, Take 1 capsule by mouth Daily., Disp: , Rfl:     zoledronic acid (RECLAST) 5 MG/100ML solution infusion, Infuse 100 mL into a venous catheter 1 (One) Time for 1 dose., Disp: 100 mL, Rfl: 0  No current facility-administered medications for this visit.    Facility-Administered Medications Ordered in Other Visits:      Chlorhexidine Gluconate Cloth 2 % pads, , Apply externally, Once, Emeka Douglas MD    mupirocin (BACTROBAN) 2 % nasal ointment, , Nasal, BID, Emeka Douglas MD    Allergies   Allergen Reactions    Celebrex [Celecoxib] Hives     HIVES     Codeine Rash    Oxycodone Rash and Hives    Sulfa Antibiotics Itching     ITCHING        Family History   Problem Relation Age of Onset    Cancer Other         BREAST, LUNG, OVARIAN, KIDNEY    Kidney cancer Other     Lung cancer Other         pGF as well    Cancer Mother         kidney    Hearing loss Mother     Other Mother         dementia    Cancer Father         lung    Mental illness Father     Alcohol abuse Father     Breast cancer Sister 30    Connective Tissue Disease Sister     Hypertension Sister         in lungs    Diabetes Sister     Osteoporosis Sister         all 3 of my sister has osyeoporosis    Cancer Sister         Brest    Osteoporosis Sister     Cancer Paternal Grandmother         stomach    Cancer Paternal Grandfather         lung    Osteoporosis Sister     Ovarian cancer Neg Hx        Cancer-related family history includes Breast cancer (age of onset: 30) in her sister; Cancer in her father, mother, paternal grandfather, paternal grandmother, sister, and another family member; Kidney cancer in an other family member; Lung cancer in an other family member. There is no history of Ovarian cancer.    Social History     Tobacco Use    Smoking status: Never     Passive exposure: Never    Smokeless tobacco: Never   Vaping Use    Vaping status: Never Used   Substance Use Topics    Alcohol use: Yes     Comment: occasional    Drug use: Never       I have reviewed and confirmed the accuracy of the patient's history: Chief complaint, HPI, ROS, and Subjective as entered by the MA/LPN/RN. Felisha Echols MD 03/05/24            SUBJECTIVE:    She is here today accompanied by her sister for this appointment.  She complains of fatigue.  She has had 2 urinary  "tract infections in the past few months.          REVIEW OF SYSTEMS:  Review of Systems   Constitutional:  Positive for fatigue. Negative for chills and fever.   HENT:  Negative for congestion, drooling, ear discharge, rhinorrhea, sinus pressure and tinnitus.    Eyes:  Negative for photophobia, pain and discharge.   Respiratory:  Negative for apnea, choking and stridor.    Cardiovascular:  Negative for palpitations.   Gastrointestinal:  Negative for abdominal distention, abdominal pain and anal bleeding.   Endocrine: Negative for polydipsia and polyphagia.   Genitourinary:  Negative for decreased urine volume, flank pain and genital sores.   Musculoskeletal:  Negative for gait problem, neck pain and neck stiffness.        Bilateral lower extremity swelling   Skin:  Negative for color change, rash and wound.   Neurological:  Negative for tremors, seizures, syncope, facial asymmetry and speech difficulty.   Hematological:  Negative for adenopathy.   Psychiatric/Behavioral:  Negative for agitation, confusion, hallucinations and self-injury. The patient is not hyperactive.        OBJECTIVE:    Vitals:    03/05/24 1037   BP: 129/80   Pulse: 86   Resp: 18   Temp: 98 °F (36.7 °C)   TempSrc: Infrared   SpO2: 98%   Weight: 84.4 kg (186 lb)   Height: 160 cm (63\")   PainSc:   4   PainLoc: Comment: UTI         Body mass index is 32.95 kg/m².    ECOG  (0) Fully active, able to carry on all predisease performance without restriction    Physical Exam  Vitals and nursing note reviewed.   Constitutional:       General: She is not in acute distress.     Appearance: She is not diaphoretic.   HENT:      Head: Normocephalic and atraumatic.   Eyes:      General: No scleral icterus.        Right eye: No discharge.         Left eye: No discharge.      Conjunctiva/sclera: Conjunctivae normal.   Neck:      Thyroid: No thyromegaly.   Cardiovascular:      Rate and Rhythm: Normal rate and regular rhythm.      Heart sounds: Normal heart sounds. "      No friction rub. No gallop.   Pulmonary:      Effort: Pulmonary effort is normal. No respiratory distress.      Breath sounds: No stridor. No wheezing.   Abdominal:      General: Bowel sounds are normal.      Palpations: Abdomen is soft. There is no mass.      Tenderness: There is no abdominal tenderness. There is no guarding or rebound.   Musculoskeletal:         General: No tenderness. Normal range of motion.      Cervical back: Normal range of motion and neck supple.      Right lower leg: Edema present.      Left lower leg: Edema present.   Lymphadenopathy:      Cervical: No cervical adenopathy.   Skin:     General: Skin is warm.      Findings: No erythema or rash.   Neurological:      Mental Status: She is alert and oriented to person, place, and time.      Motor: No abnormal muscle tone.   Psychiatric:         Behavior: Behavior normal.       I have reexamined the patient and the results are consistent with the previously documented exam. Felisha Echols MD          RECENT LABS    WBC   Date Value Ref Range Status   03/05/2024 42.45 (C) 3.40 - 10.80 10*3/mm3 Final   06/20/2022 12.8 (H) 3.4 - 10.8 x10E3/uL Final     RBC   Date Value Ref Range Status   03/05/2024 4.90 3.77 - 5.28 10*6/mm3 Final   06/20/2022 4.58 3.77 - 5.28 x10E6/uL Final     Hemoglobin   Date Value Ref Range Status   03/05/2024 13.9 12.0 - 15.9 g/dL Final     Hematocrit   Date Value Ref Range Status   03/05/2024 44.9 34.0 - 46.6 % Final     MCV   Date Value Ref Range Status   03/05/2024 91.6 79.0 - 97.0 fL Final     MCH   Date Value Ref Range Status   03/05/2024 28.4 26.6 - 33.0 pg Final     MCHC   Date Value Ref Range Status   03/05/2024 31.0 (L) 31.5 - 35.7 g/dL Final     RDW   Date Value Ref Range Status   03/05/2024 13.9 12.3 - 15.4 % Final     RDW-SD   Date Value Ref Range Status   03/05/2024 45.5 37.0 - 54.0 fl Final     MPV   Date Value Ref Range Status   03/05/2024 9.8 6.0 - 12.0 fL Final     Platelets   Date Value Ref Range  Status   03/05/2024 186 140 - 450 10*3/mm3 Final     Neutrophil %   Date Value Ref Range Status   03/05/2024 15.3 (L) 42.7 - 76.0 % Final     Lymphocyte %   Date Value Ref Range Status   03/05/2024 81.3 (H) 19.6 - 45.3 % Final     Monocyte %   Date Value Ref Range Status   03/05/2024 1.9 (L) 5.0 - 12.0 % Final     Eosinophil %   Date Value Ref Range Status   03/05/2024 1.1 0.3 - 6.2 % Final     Basophil %   Date Value Ref Range Status   03/05/2024 0.4 0.0 - 1.5 % Final     Immature Grans %   Date Value Ref Range Status   10/21/2021 0.2 0.0 - 0.5 % Final     Neutrophils, Absolute   Date Value Ref Range Status   03/05/2024 6.52 1.70 - 7.00 10*3/mm3 Final     Lymphocytes, Absolute   Date Value Ref Range Status   03/05/2024 34.53 (H) 0.70 - 3.10 10*3/mm3 Final     Monocytes, Absolute   Date Value Ref Range Status   03/05/2024 0.80 0.10 - 0.90 10*3/mm3 Final     Eosinophils, Absolute   Date Value Ref Range Status   03/05/2024 0.45 (H) 0.00 - 0.40 10*3/mm3 Final     Basophils, Absolute   Date Value Ref Range Status   03/05/2024 0.15 0.00 - 0.20 10*3/mm3 Final     Immature Grans, Absolute   Date Value Ref Range Status   10/21/2021 0.02 0.00 - 0.05 10*3/mm3 Final     nRBC   Date Value Ref Range Status   05/04/2023 0.1 0.0 - 0.2 /100 WBC Final       Lab Results   Component Value Date    GLUCOSE 105 (H) 11/29/2023    BUN 20 11/29/2023    CREATININE 1.10 (H) 11/29/2023    EGFRIFNONA 62 11/01/2021    BCR 18.2 11/29/2023    K 4.3 11/29/2023    CO2 28.0 11/29/2023    CALCIUM 9.6 11/29/2023    ALBUMIN 4.4 11/29/2023    AST 19 11/29/2023    ALT 11 11/29/2023         Assessment & Plan     CLL (chronic lymphocytic leukemia)  - CBC & Differential  - Comprehensive Metabolic Panel  - Uric Acid  - Lactate Dehydrogenase          ASSESSMENT:    Leukocytosis, unspecified type  - CBC & Differential        CLL/SLL presenting as leukocytosis with differential lymphocytosis since March 2022.  Reviewed the diagnosis in detail with patient and  her daughter who is present today.  CT scans chest abdomen and pelvis do not show any evidence of lymphomatous process.  She has clinical stage 0 CLL.  Ongoing management.  Her leukocytosis ranges between 18 and 40,000.  Urinary tract infection: She is on antibiotics may be contributing to her leukocytosis for now we will continue to monitor.  Patient has mixed connective tissue disorder   Bilateral lower extremity swelling rule out DVTs.  Ultrasound was negative.  Abdominal hernia: Declines surgical evaluation.     Discussion       CLL, we discussed that indications for treatment would include but not limited to, pancytopenia, systemic symptoms including fatigue, night sweats and weight loss.  Other indications treatment will include recurrent infections, organ dysfunction due to massive lymphadenopathy, doubling  of leukocytosis in less than 6 months.  We discussed that CLL is not curable but can respond to chemo immunotherapy and some molecular targeted agents which can result in remissions but this disease is faulted by multiple relapses over the course of time.  We also discussed that median survivorship with low-grade lymphoma in general is estimated anywhere from 10-12 years.  I also explained that this disease is heterogeneous.  Some patients may succumb to the illness within a few days following diagnosis while some may have it for decades.  We discussed that lymphoproliferative diseases in general can affect the immune system so there is risk of L[place] infections.  When the bone marrow is heavily involved there is risk of pancytopenia  which can result in blood, platelet transfusions and also predisposition to infections if the white count is affected.  There is also a risk of transformation to high-grade lymphoma which can result in significant symptoms and would require systemic chemo immunotherapy.  The risk of transformation varies with different types of lymphoma.  Also is not dependent on prior  treatment or worsen by conservative management.      Plans     Reviewed her CBC with patient.  Her physical exam does not reveal any obvious lymphadenopathy.  Biochemical test today  Reviewed CT scan chest abdomen and pelvis with contrast  Doppler formed for bilateral lower extremity edema was negative  Previous uric acid and HIV tests were negative   For now continue surveillance blood work  Complete her age-appropriate immunizations including pneumococcal vaccine.  Patient follow-up with her PCP  Follow-up 3 months  Reviewed signs and symptoms to monitor for between appointments and to call with questions or concerns  All questions answered       Patient verbalized understanding and is in agreement of the above plan.        I spent 30 total minutes, face-to-face, caring for Barbara khan. 90% of this time involved counseling and/or coordination of care as documented within this note.

## 2024-03-21 ENCOUNTER — OFFICE (OUTPATIENT)
Dept: URBAN - METROPOLITAN AREA CLINIC 64 | Facility: CLINIC | Age: 74
End: 2024-03-21
Payer: COMMERCIAL

## 2024-03-21 VITALS
WEIGHT: 188 LBS | SYSTOLIC BLOOD PRESSURE: 115 MMHG | HEIGHT: 63 IN | HEART RATE: 72 BPM | DIASTOLIC BLOOD PRESSURE: 67 MMHG

## 2024-03-21 DIAGNOSIS — K64.8 OTHER HEMORRHOIDS: ICD-10-CM

## 2024-03-21 DIAGNOSIS — R11.0 NAUSEA: ICD-10-CM

## 2024-03-21 DIAGNOSIS — K46.9 UNSPECIFIED ABDOMINAL HERNIA WITHOUT OBSTRUCTION OR GANGRENE: ICD-10-CM

## 2024-03-21 DIAGNOSIS — R10.13 EPIGASTRIC PAIN: ICD-10-CM

## 2024-03-21 DIAGNOSIS — C91.10 CHRONIC LYMPHOCYTIC LEUKEMIA OF B-CELL TYPE NOT HAVING ACHIE: ICD-10-CM

## 2024-03-21 DIAGNOSIS — K59.00 CONSTIPATION, UNSPECIFIED: ICD-10-CM

## 2024-03-21 PROCEDURE — 99214 OFFICE O/P EST MOD 30 MIN: CPT

## 2024-03-28 ENCOUNTER — OFFICE VISIT (OUTPATIENT)
Dept: SURGERY | Facility: CLINIC | Age: 74
End: 2024-03-28
Payer: MEDICARE

## 2024-03-28 VITALS
RESPIRATION RATE: 18 BRPM | BODY MASS INDEX: 33.35 KG/M2 | WEIGHT: 188.2 LBS | DIASTOLIC BLOOD PRESSURE: 77 MMHG | SYSTOLIC BLOOD PRESSURE: 138 MMHG | OXYGEN SATURATION: 97 % | HEART RATE: 70 BPM | HEIGHT: 63 IN | TEMPERATURE: 98.4 F

## 2024-03-28 DIAGNOSIS — K43.2 INCISIONAL HERNIA, WITHOUT OBSTRUCTION OR GANGRENE: Primary | ICD-10-CM

## 2024-03-28 PROCEDURE — 1159F MED LIST DOCD IN RCRD: CPT | Performed by: SURGERY

## 2024-03-28 PROCEDURE — 99204 OFFICE O/P NEW MOD 45 MIN: CPT | Performed by: SURGERY

## 2024-03-28 PROCEDURE — 1160F RVW MEDS BY RX/DR IN RCRD: CPT | Performed by: SURGERY

## 2024-03-28 RX ORDER — SODIUM CHLORIDE 0.9 % (FLUSH) 0.9 %
3 SYRINGE (ML) INJECTION EVERY 12 HOURS SCHEDULED
OUTPATIENT
Start: 2024-03-28

## 2024-03-28 RX ORDER — SODIUM CHLORIDE 9 MG/ML
40 INJECTION, SOLUTION INTRAVENOUS AS NEEDED
OUTPATIENT
Start: 2024-03-28

## 2024-03-28 RX ORDER — SODIUM CHLORIDE 9 MG/ML
100 INJECTION, SOLUTION INTRAVENOUS CONTINUOUS
OUTPATIENT
Start: 2024-03-28

## 2024-03-28 RX ORDER — SODIUM CHLORIDE 0.9 % (FLUSH) 0.9 %
3-10 SYRINGE (ML) INJECTION AS NEEDED
OUTPATIENT
Start: 2024-03-28

## 2024-03-28 NOTE — PROGRESS NOTES
"Subjective   Barbara Coelho is a 74 y.o. female.   Chief Complaint   Patient presents with    Hernia     New pt ref Viri Josefa NP, Abd Hernia      /77 (BP Location: Left arm, Patient Position: Sitting, Cuff Size: Adult)   Pulse 70   Temp 98.4 °F (36.9 °C) (Infrared)   Resp 18   Ht 160 cm (63\")   Wt 85.4 kg (188 lb 3.2 oz)   LMP  (LMP Unknown) Comment: LAST MAMMOGRAM 2020  SpO2 97%   BMI 33.34 kg/m²     HISTORY OF PRESENT ILLNESS:  74-year-old lady with a history of gastric bypass requiring a upper midline laparotomy incision who over the last 6 months or so says that she has gained some weight and she has noticed that a hernia has been present for couple years starting to cause more symptoms.  She has discomfort in the epigastrium  with activity and when she overeats she says she feels quite a bit of discomfort in that location as well.  Has never had an incisional hernia repair before.  She says that she has a new diagnosis of CLL and is in discussion with Dr. Echols and she is currently in surveillance.      Outpatient Encounter Medications as of 3/28/2024   Medication Sig Dispense Refill    albuterol sulfate  (90 Base) MCG/ACT inhaler TAKE 2 PUFFS BY MOUTH EVERY 4 HOURS AS NEEDED FOR WHEEZE 8 g 1    Biotin 1 MG capsule Take 1 capsule by mouth Daily.      desloratadine (CLARINEX REDITAB) 5 MG disintegrating tablet Place 1 tablet on the tongue Daily. 90 tablet 0    Fluticasone-Salmeterol (ADVAIR/WIXELA) 500-50 MCG/ACT DISKUS INHALE 1 PUFF TWICE A  each 1    gabapentin (NEURONTIN) 100 MG capsule Take 3 capsules by mouth 2 (Two) Times a Day As Needed (pain). 180 capsule 2    hydroxychloroquine (PLAQUENIL) 200 MG tablet Take 1 tablet by mouth Every 12 (Twelve) Hours.      NIFEdipine XL (PROCARDIA XL) 30 MG 24 hr tablet Take 1 tablet by mouth Daily. 90 tablet 0    Polyethylene Glycol 3350 (MIRALAX PO) Take  by mouth.      VITAMIN D, CHOLECALCIFEROL, PO Take 1 capsule by mouth Daily.   "    traZODone (DESYREL) 100 MG tablet TAKE 1 TABLET BY MOUTH EVERY NIGHT FOR 90 DAYS. 90 tablet 1    zoledronic acid (RECLAST) 5 MG/100ML solution infusion Infuse 100 mL into a venous catheter 1 (One) Time for 1 dose. 100 mL 0    [DISCONTINUED] Azelastine HCl 137 MCG/SPRAY solution SPRAY 1 TO 2 SPRAYS INTO EACH NOSTRIL TWICE A DAY AS NEEDED (Patient not taking: Reported on 3/28/2024)      [DISCONTINUED] meclizine (ANTIVERT) 25 MG tablet  (Patient not taking: Reported on 3/28/2024)      [DISCONTINUED] pantoprazole (PROTONIX) 40 MG EC tablet Take 1 tablet by mouth Every Morning. (Patient not taking: Reported on 3/28/2024)       Facility-Administered Encounter Medications as of 3/28/2024   Medication Dose Route Frequency Provider Last Rate Last Admin    Chlorhexidine Gluconate Cloth 2 % pads   Apply externally Once Emeka Douglas MD        mupirocin (BACTROBAN) 2 % nasal ointment   Nasal BID Emeka Douglas MD         Past Medical History:   Diagnosis Date    Allergic 1967    take allergy shots    Allergic rhinitis     on allergy shots    Asthma     ALLERGIC    Back pain     Low    Bladder infection     Recurrent Bladder Infections    CLL (chronic lymphocytic leukemia)     Deep vein thrombosis 1989    Difficulty walking     Diverticulitis     Elevated laboratory test result 05/2023    elevated wbc/  elevated lymphs/ gfr and creatine    GERD (gastroesophageal reflux disease)     EGD 12/16 [facility] - reflux, HH. no barretts    Hammer toe     Hearing loss     High arches     History of DVT of lower extremity     left leg    Ingrown toenail     Knee pain, right     Leg pain     Low back pain 1990    had back surgery    Mixed connective tissue disease 01/2019    Dr. Thakur    CARITO (obstructive sleep apnea) 2018    Osteopenia 03/28/2018    calculated frax - 11/3%    Pinched nerve in neck     Preventative health care 03/30/2023    Rheumatoid arthritis 2018    Staph infection     Urinary incontinence     Wears contact lenses      Wears prescription eyeglasses      Past Surgical History:   Procedure Laterality Date    BACK SURGERY  1995    Back (L5/S1)    BARIATRIC SURGERY  1985    gastric bypass    BREAST EXCISIONAL BIOPSY Left 1985    BREAST SURGERY  1990    lump removed    CHOLECYSTECTOMY  2019    CHOLECYSTECTOMY WITH INTRAOPERATIVE CHOLANGIOGRAM N/A 04/26/2018    Procedure: CHOLECYSTECTOMY LAPAROSCOPIC INTRAOPERATIVE CHOLANGIOGRAM;  Surgeon: Lit Morelos MD;  Location:  [facility];  Service: General    COLONOSCOPY  2016    GASTRIC BYPASS      HIGH GASTRIC BYPASS, 1980's    JOINT REPLACEMENT  2012,2020    left knee,right knee    OTHER SURGICAL HISTORY  1985    Stomach Stabled    REPLACEMENT TOTAL KNEE Left 2012    right  2021    SINUS SURGERY      x3    TOTAL KNEE ARTHROPLASTY Right 10/29/2021    Procedure: TOTAL KNEE ARTHROPLASTY RIGHT;  Surgeon: Emeka Douglas MD;  Location:  [facility];  Service: Orthopedics;  Laterality: Right;    TUBAL ABDOMINAL LIGATION  1980    WISDOM TOOTH EXTRACTION       Family History   Problem Relation Age of Onset    Cancer Other         BREAST, LUNG, OVARIAN, KIDNEY    Kidney cancer Other     Lung cancer Other         pGF as well    Cancer Mother         kidney    Hearing loss Mother     Other Mother         dementia    Cancer Father         lung    Mental illness Father     Alcohol abuse Father     Breast cancer Sister 30    Connective Tissue Disease Sister     Hypertension Sister         in lungs    Diabetes Sister     Osteoporosis Sister         all 3 of my sister has osyeoporosis    Cancer Sister         Brest    Osteoporosis Sister     Cancer Paternal Grandmother         stomach    Cancer Paternal Grandfather         lung    Osteoporosis Sister     Ovarian cancer Neg Hx        Social History     Tobacco Use    Smoking status: Never     Passive exposure: Never    Smokeless tobacco: Never   Vaping Use    Vaping status: Never Used   Substance Use Topics    Alcohol use: Yes     Comment: occasional     Drug use: Never     Celebrex [celecoxib], Codeine, Oxycodone, and Sulfa antibiotics    Review of Systems  Complete review of systems has been obtained and is positive for fatigue unexpected weight change hearing loss postnasal drip sinus pressure sore throat environmental allergies immunocompromised apnea itching back pain easy bleeding and bruising and the remainder of the review of systems is negative  Objective     Physical Exam  Constitutional:       Appearance: Normal appearance.   HENT:      Head: Normocephalic and atraumatic.   Cardiovascular:      Rate and Rhythm: Normal rate.      Pulses: Normal pulses.   Pulmonary:      Effort: Pulmonary effort is normal. No respiratory distress.   Abdominal:      General: There is no distension.      Palpations: Abdomen is soft.      Comments: In the upper midline just to the right there is approximately 2 cm circular fascial defect with a about a 5 to 6 cm hernia sac that is incarcerated   Skin:     General: Skin is warm and dry.   Neurological:      General: No focal deficit present.      Mental Status: She is alert. Mental status is at baseline.   Psychiatric:         Mood and Affect: Mood normal.         Behavior: Behavior normal.           Assessment & Plan   Diagnoses and all orders for this visit:    1. Incisional hernia, without obstruction or gangrene (Primary)  -     Case Request; Standing  -     sodium chloride 0.9 % flush 3 mL  -     sodium chloride 0.9 % flush 3-10 mL  -     sodium chloride 0.9 % infusion 40 mL  -     sodium chloride 0.9 % infusion  -     ceFAZolin (ANCEF) 2,000 mg in sodium chloride 0.9 % 100 mL IVPB  -     Case Request    Other orders  -     Follow Anesthesia Guidelines / Protocol; Future  -     Follow Anesthesia Guidelines / Protocol; Standing  -     SCD (Sequential Compression Device) - Place on Patient in Pre-Op; Standing  -     Verify / Perform Chlorhexidine Skin Prep; Standing  -     Obtain Informed Consent; Future  -     Provide NPO  Instructions to Patient; Future  -     Chlorhexidine Skin Prep; Future  -     Notify Physician - Standard; Standing  -     Instructions on Coughing, Deep Breathing & Incentive Spirometry; Standing  -     Insert Peripheral IV; Standing  -     Saline Lock & Maintain IV Access; Standing    Counseled her about the findings of the incarcerated incisional hernia.  As this is incarcerated the intra-abdominal fat is plug in the hole.  Talked about the risks of the natural history of this including progression of symptoms and enlargement of the hernia itself.  We talked with the risks of bowel entering this hernia causing obstruction necrosis or perforation.  Talk for the steps of a hernia repair including the anticipated recovery the possible complications.  We talked about the risk and benefits of the use of mesh.  I think this could be a fairly small hernia the defect is only a couple centimeters in size and would like to do this as an open repair with a small underlay mesh.  She understands the risk of the operation and does wish to proceed.    This is a chronic problem progression of symptoms and counseled about a major abdominal surgery with risk factors for morbidity including her CLL    Kimo Moon MD  3/28/2024  12:13 PM EDT    This note was created using Dragon Voice Recognition software.

## 2024-04-09 ENCOUNTER — HOSPITAL ENCOUNTER (OUTPATIENT)
Dept: CARDIOLOGY | Facility: HOSPITAL | Age: 74
Discharge: HOME | End: 2024-04-09
Payer: MEDICARE

## 2024-04-09 ENCOUNTER — LAB (OUTPATIENT)
Dept: LAB | Facility: HOSPITAL | Age: 74
End: 2024-04-09
Payer: MEDICARE

## 2024-04-09 LAB
ANION GAP SERPL CALCULATED.3IONS-SCNC: 9 MMOL/L (ref 5–15)
BUN SERPL-MCNC: 16 MG/DL (ref 8–23)
BUN/CREAT SERPL: 14.8 (ref 7–25)
CALCIUM SPEC-SCNC: 8.9 MG/DL (ref 8.6–10.5)
CHLORIDE SERPL-SCNC: 105 MMOL/L (ref 98–107)
CO2 SERPL-SCNC: 27 MMOL/L (ref 22–29)
CREAT SERPL-MCNC: 1.08 MG/DL (ref 0.57–1)
DEPRECATED RDW RBC AUTO: 38 FL (ref 37–54)
EGFRCR SERPLBLD CKD-EPI 2021: 53.7 ML/MIN/1.73
ERYTHROCYTE [DISTWIDTH] IN BLOOD BY AUTOMATED COUNT: 12.6 % (ref 12.3–15.4)
GLUCOSE SERPL-MCNC: 92 MG/DL (ref 65–99)
HCT VFR BLD AUTO: 40 % (ref 34–46.6)
HGB BLD-MCNC: 13 G/DL (ref 12–15.9)
MCH RBC QN AUTO: 27.5 PG (ref 26.6–33)
MCHC RBC AUTO-ENTMCNC: 32.5 G/DL (ref 31.5–35.7)
MCV RBC AUTO: 84.6 FL (ref 79–97)
PLATELET # BLD AUTO: 192 10*3/MM3 (ref 140–450)
PMV BLD AUTO: 10 FL (ref 6–12)
POTASSIUM SERPL-SCNC: 4.2 MMOL/L (ref 3.5–5.2)
RBC # BLD AUTO: 4.73 10*6/MM3 (ref 3.77–5.28)
SODIUM SERPL-SCNC: 141 MMOL/L (ref 136–145)
WBC NRBC COR # BLD AUTO: 37.62 10*3/MM3 (ref 3.4–10.8)

## 2024-04-09 PROCEDURE — 85027 COMPLETE CBC AUTOMATED: CPT

## 2024-04-09 PROCEDURE — 93005 ELECTROCARDIOGRAM TRACING: CPT | Performed by: SURGERY

## 2024-04-09 PROCEDURE — 80048 BASIC METABOLIC PNL TOTAL CA: CPT

## 2024-04-10 LAB
QT INTERVAL: 373 MS
QTC INTERVAL: 378 MS

## 2024-04-11 ENCOUNTER — OFFICE VISIT (OUTPATIENT)
Dept: FAMILY MEDICINE CLINIC | Facility: CLINIC | Age: 74
End: 2024-04-11
Payer: MEDICARE

## 2024-04-11 VITALS
OXYGEN SATURATION: 98 % | HEART RATE: 69 BPM | SYSTOLIC BLOOD PRESSURE: 132 MMHG | DIASTOLIC BLOOD PRESSURE: 78 MMHG | WEIGHT: 189 LBS | HEIGHT: 63 IN | BODY MASS INDEX: 33.49 KG/M2

## 2024-04-11 DIAGNOSIS — R73.03 PREDIABETES: ICD-10-CM

## 2024-04-11 DIAGNOSIS — M51.36 DDD (DEGENERATIVE DISC DISEASE), LUMBAR: ICD-10-CM

## 2024-04-11 DIAGNOSIS — G47.33 OSA (OBSTRUCTIVE SLEEP APNEA): ICD-10-CM

## 2024-04-11 DIAGNOSIS — J45.20 MILD INTERMITTENT ASTHMA WITHOUT COMPLICATION: Primary | ICD-10-CM

## 2024-04-11 DIAGNOSIS — M06.9 RHEUMATOID ARTHRITIS INVOLVING BOTH KNEES, UNSPECIFIED WHETHER RHEUMATOID FACTOR PRESENT: ICD-10-CM

## 2024-04-11 DIAGNOSIS — K43.2 INCISIONAL HERNIA, WITHOUT OBSTRUCTION OR GANGRENE: ICD-10-CM

## 2024-04-11 DIAGNOSIS — C91.10 CLL (CHRONIC LYMPHOCYTIC LEUKEMIA): ICD-10-CM

## 2024-04-11 DIAGNOSIS — M81.0 AGE-RELATED OSTEOPOROSIS WITHOUT CURRENT PATHOLOGICAL FRACTURE: ICD-10-CM

## 2024-04-11 DIAGNOSIS — R30.0 DYSURIA: ICD-10-CM

## 2024-04-11 LAB
BILIRUB BLD-MCNC: NEGATIVE MG/DL
CLARITY, POC: ABNORMAL
COLOR UR: YELLOW
GLUCOSE UR STRIP-MCNC: NEGATIVE MG/DL
HOLD SPECIMEN: NORMAL
KETONES UR QL: NEGATIVE
LEUKOCYTE EST, POC: ABNORMAL
NITRITE UR-MCNC: NEGATIVE MG/ML
PH UR: 5.5 [PH] (ref 5–8)
PROT UR STRIP-MCNC: ABNORMAL MG/DL
RBC # UR STRIP: ABNORMAL /UL
SP GR UR: 1.02 (ref 1–1.03)
UROBILINOGEN UR QL: NORMAL

## 2024-04-11 PROCEDURE — 81001 URINALYSIS AUTO W/SCOPE: CPT | Performed by: NURSE PRACTITIONER

## 2024-04-11 PROCEDURE — 87086 URINE CULTURE/COLONY COUNT: CPT | Performed by: NURSE PRACTITIONER

## 2024-04-11 RX ORDER — LEVOFLOXACIN 500 MG/1
500 TABLET, FILM COATED ORAL DAILY
Qty: 7 TABLET | Refills: 0 | Status: SHIPPED | OUTPATIENT
Start: 2024-04-11 | End: 2024-04-18

## 2024-04-11 NOTE — PROGRESS NOTES
"Chief Complaint  Follow-up (6 month follow up ) and Urinary Frequency (Frequency and burning with urination )    Subjective        Barbara Coelho presents to [facility]  History of Present Illness    Patient presents for f/u visit.     Patient is c/o dysuria and urinary frequency - started today. She was treated for UTI in March.     Patient has history of Asthma, stable on Wixela and Albuterol PRN. She also receives allergy shots, followed by [facility].    Patient wears CPAP nightly for CARITO. She has no acute complaints.      Patient has history of Osteopenia, DEXA scan last completed in 5/2023 -revealed Osteoporosis. She takes supplemental Vitamin D,  Reclast infusions annually per endocrinology.      Prediabetes, managing with diet and exercise.     CLL, followed by Hematology.     Hernia repair scheduled for 4/22 with Dr. Moon.     Patient has RA, taking Plaquenil as prescribed. She is followed by  Rheumatology. Patient has had right TKR. She reports chronic back and bilateral knee pain. Patient did have epidural injections to her spine, previously followed by Pain Management. She also takes Gabapentin 100 mg TID.     Objective   Vital Signs:  /78 (BP Location: Left arm, Patient Position: Sitting, Cuff Size: Adult)   Pulse 69   Ht 160 cm (63\")   Wt 85.7 kg (189 lb)   SpO2 98%   BMI 33.48 kg/m²   Estimated body mass index is 33.48 kg/m² as calculated from the following:    Height as of this encounter: 160 cm (63\").    Weight as of this encounter: 85.7 kg (189 lb).       BMI is >= 30 and <35. (Class 1 Obesity). The following options were offered after discussion;: exercise counseling/recommendations and nutrition counseling/recommendations      Physical Exam  Constitutional:       Appearance: Normal appearance.   HENT:      Head: Normocephalic.   Cardiovascular:      Rate and Rhythm: Normal rate and regular rhythm.   Pulmonary:      Effort: Pulmonary effort is " normal.      Breath sounds: Normal breath sounds.   Abdominal:      General: Abdomen is flat. Bowel sounds are normal.      Palpations: Abdomen is soft.   Musculoskeletal:         General: Normal range of motion.      Cervical back: Neck supple.      Right lower leg: No edema.      Left lower leg: No edema.   Skin:     General: Skin is warm and dry.   Neurological:      Mental Status: She is alert and oriented to person, place, and time.      Gait: Gait is intact.   Psychiatric:         Attention and Perception: Attention normal.         Mood and Affect: Mood normal.         Speech: Speech normal.        Result Review :      CMP          11/29/2023    10:30 3/5/2024    10:28 4/9/2024    10:01   CMP   Glucose 105  100  92    BUN 20  15  16    Creatinine 1.10  1.09  1.08    EGFR 52.8  53.1  53.7    Sodium 139  141  141    Potassium 4.3  4.3  4.2    Chloride 101  104  105    Calcium 9.6  8.7  8.9    Total Protein 6.7  6.1     Albumin 4.4  4.4     Globulin 2.3  1.7     Total Bilirubin 0.5  0.7     Alkaline Phosphatase 99  79     AST (SGOT) 19  18     ALT (SGPT) 11  11     Albumin/Globulin Ratio 1.9  2.6     BUN/Creatinine Ratio 18.2  13.8  14.8    Anion Gap 10.0  11.0  9.0      CBC          11/29/2023    10:30 3/5/2024    10:28 4/9/2024    10:01   CBC   WBC 33.73  42.45  37.62    RBC 4.87  4.90  4.73    Hemoglobin 14.1  13.9  13.0    Hematocrit 44.5  44.9  40.0    MCV 91.4  91.6  84.6    MCH 29.0  28.4  27.5    MCHC 31.7  31.0  32.5    RDW 13.9  13.9  12.6    Platelets 178  186  192      Lipid Panel          11/29/2023    10:30   Lipid Panel   Total Cholesterol 194    Triglycerides 48    HDL Cholesterol 78    VLDL Cholesterol 9    LDL Cholesterol  107    LDL/HDL Ratio 1.36      TSH          11/10/2023    09:20   TSH   TSH 1.890      Most Recent A1C          11/29/2023    10:30   HGBA1C Most Recent   Hemoglobin A1C 5.80                   Assessment and Plan     Diagnoses and all orders for this visit:    1. Mild  intermittent asthma without complication (Primary)  Assessment & Plan:  Stable  Continue Wixela as prescribed, albuterol as needed            2. CARITO (obstructive sleep apnea)  Assessment & Plan:  Continue nightly use of CPAP      3. DDD (degenerative disc disease), lumbar  Assessment & Plan:  Gabapentin 100 mg 3 times daily per pain management      4. Rheumatoid arthritis involving both knees, unspecified whether rheumatoid factor present  Assessment & Plan:  Continue Plaquenil per rheumatology  Annual eye exam      5. CLL (chronic lymphocytic leukemia)  Assessment & Plan:  Hematology visit note reviewed, follow-up as instructed      6. Dysuria  Assessment & Plan:  Urinalysis is + for blood, protein and leukocytes -send for culture  Start Levaquin 500 mg daily x 7 days    Orders:  -     Urinalysis With Culture If Indicated - Urine, Clean Catch  -     POCT urinalysis dipstick, manual    7. Prediabetes  Assessment & Plan:  Managed with diet and exercise      8. Incisional hernia, without obstruction or gangrene  Assessment & Plan:  Surgery scheduled with Dr. Moon      9. Age-related osteoporosis without current pathological fracture  Assessment & Plan:  DEXA scan up-to-date, continue Reclast per endocrinology      Other orders  -     levoFLOXacin (Levaquin) 500 MG tablet; Take 1 tablet by mouth Daily for 7 days.  Dispense: 7 tablet; Refill: 0           I spent 30 minutes caring for Barbara on this date of service. This time includes time spent by me in the following activities:preparing for the visit, reviewing tests, obtaining and/or reviewing a separately obtained history, performing a medically appropriate examination and/or evaluation , counseling and educating the patient/family/caregiver, ordering medications, tests, or procedures, documenting information in the medical record, and care coordination  Follow Up     Return in about 6 months (around 10/14/2024) for Medicare Wellness.  Patient was given  instructions and counseling regarding her condition or for health maintenance advice. Please see specific information pulled into the AVS if appropriate.

## 2024-04-11 NOTE — ASSESSMENT & PLAN NOTE
Urinalysis is + for blood, protein and leukocytes -send for culture  Start Levaquin 500 mg daily x 7 days

## 2024-04-12 LAB
BACTERIA UR QL AUTO: ABNORMAL /HPF
BILIRUB UR QL STRIP: NEGATIVE
CLARITY UR: ABNORMAL
COLOR UR: YELLOW
GLUCOSE UR STRIP-MCNC: NEGATIVE MG/DL
HGB UR QL STRIP.AUTO: ABNORMAL
HYALINE CASTS UR QL AUTO: ABNORMAL /LPF
KETONES UR QL STRIP: NEGATIVE
LEUKOCYTE ESTERASE UR QL STRIP.AUTO: ABNORMAL
NITRITE UR QL STRIP: NEGATIVE
PH UR STRIP.AUTO: 6 [PH] (ref 5–8)
PROT UR QL STRIP: ABNORMAL
RBC # UR STRIP: ABNORMAL /HPF
REF LAB TEST METHOD: ABNORMAL
SP GR UR STRIP: 1.02 (ref 1–1.03)
SQUAMOUS #/AREA URNS HPF: ABNORMAL /HPF
UROBILINOGEN UR QL STRIP: ABNORMAL
WBC # UR STRIP: ABNORMAL /HPF

## 2024-04-12 NOTE — PROGRESS NOTES
Urine culture is in process, urinalysis certainly consistent with urinary infection.  Continue antibiotics as prescribed and we will notify her if anything needs to be changed

## 2024-04-13 LAB — BACTERIA SPEC AEROBE CULT: ABNORMAL

## 2024-04-14 NOTE — PROGRESS NOTES
Please let patient know that her urine culture is consistent with a urinary infection.  Finish antibiotics and let me know if symptoms persist

## 2024-04-15 DIAGNOSIS — G47.33 OSA (OBSTRUCTIVE SLEEP APNEA): Primary | ICD-10-CM

## 2024-04-18 ENCOUNTER — TELEPHONE (OUTPATIENT)
Dept: FAMILY MEDICINE CLINIC | Facility: CLINIC | Age: 74
End: 2024-04-18
Payer: MEDICARE

## 2024-04-18 RX ORDER — NITROFURANTOIN 25; 75 MG/1; MG/1
100 CAPSULE ORAL 2 TIMES DAILY
Qty: 14 CAPSULE | Refills: 0 | Status: SHIPPED | OUTPATIENT
Start: 2024-04-18

## 2024-04-18 NOTE — TELEPHONE ENCOUNTER
Caller: Barbara Coelho       Best call back number: [phone]      What is your medical concern? PATIENT STATES THAT SHE IS NOT HAVING BURNING ANYMORE, BUT SHE IS HAVING URGENCY AND TENDERNESS IS STILL THERE. PLEASE ADVISE PATIENT ON HER NEXT STEPS OF CARE.

## 2024-04-22 ENCOUNTER — ANESTHESIA (OUTPATIENT)
Dept: PERIOP | Facility: HOSPITAL | Age: 74
End: 2024-04-22
Payer: MEDICARE

## 2024-04-22 ENCOUNTER — ANESTHESIA EVENT (OUTPATIENT)
Dept: PERIOP | Facility: HOSPITAL | Age: 74
End: 2024-04-22
Payer: MEDICARE

## 2024-04-22 ENCOUNTER — HOSPITAL ENCOUNTER (OUTPATIENT)
Facility: HOSPITAL | Age: 74
Setting detail: HOSPITAL OUTPATIENT SURGERY
Discharge: HOME | End: 2024-04-22
Attending: SURGERY | Admitting: SURGERY
Payer: MEDICARE

## 2024-04-22 VITALS
OXYGEN SATURATION: 98 % | HEIGHT: 63 IN | SYSTOLIC BLOOD PRESSURE: 129 MMHG | DIASTOLIC BLOOD PRESSURE: 44 MMHG | BODY MASS INDEX: 33.42 KG/M2 | RESPIRATION RATE: 15 BRPM | WEIGHT: 188.6 LBS | HEART RATE: 70 BPM | TEMPERATURE: 97.3 F

## 2024-04-22 DIAGNOSIS — K43.2 INCISIONAL HERNIA, WITHOUT OBSTRUCTION OR GANGRENE: ICD-10-CM

## 2024-04-22 PROCEDURE — 25010000002 ONDANSETRON PER 1 MG: Performed by: ANESTHESIOLOGIST ASSISTANT

## 2024-04-22 PROCEDURE — 49592 RPR AA HRN 1ST < 3 NCR/STRN: CPT | Performed by: SURGERY

## 2024-04-22 PROCEDURE — 25010000002 CEFAZOLIN PER 500 MG: Performed by: SURGERY

## 2024-04-22 PROCEDURE — 25010000002 PROPOFOL 200 MG/20ML EMULSION: Performed by: ANESTHESIOLOGIST ASSISTANT

## 2024-04-22 PROCEDURE — 25010000002 BUPIVACAINE (PF) 0.25 % SOLUTION: Performed by: SURGERY

## 2024-04-22 PROCEDURE — 25010000002 FENTANYL CITRATE (PF) 100 MCG/2ML SOLUTION: Performed by: ANESTHESIOLOGIST ASSISTANT

## 2024-04-22 PROCEDURE — 25010000002 FENTANYL CITRATE (PF) 50 MCG/ML SOLUTION: Performed by: ANESTHESIOLOGIST ASSISTANT

## 2024-04-22 PROCEDURE — 25010000002 DEXAMETHASONE PER 1 MG: Performed by: ANESTHESIOLOGIST ASSISTANT

## 2024-04-22 PROCEDURE — 25810000003 LACTATED RINGERS PER 1000 ML: Performed by: ANESTHESIOLOGY

## 2024-04-22 PROCEDURE — C1781 MESH (IMPLANTABLE): HCPCS | Performed by: SURGERY

## 2024-04-22 PROCEDURE — 25010000002 SUGAMMADEX 200 MG/2ML SOLUTION: Performed by: ANESTHESIOLOGIST ASSISTANT

## 2024-04-22 PROCEDURE — 49592 RPR AA HRN 1ST < 3 NCR/STRN: CPT | Performed by: NURSE PRACTITIONER

## 2024-04-22 DEVICE — VENTRALEX ST HERNIA PATCH
Type: IMPLANTABLE DEVICE | Site: ABDOMEN | Status: FUNCTIONAL
Brand: VENTRALEX ST HERNIA PATCH

## 2024-04-22 RX ORDER — EPHEDRINE SULFATE 5 MG/ML
5 INJECTION INTRAVENOUS ONCE AS NEEDED
Status: DISCONTINUED | OUTPATIENT
Start: 2024-04-22 | End: 2024-04-22

## 2024-04-22 RX ORDER — SODIUM CHLORIDE 9 MG/ML
100 INJECTION, SOLUTION INTRAVENOUS CONTINUOUS
Status: DISCONTINUED | OUTPATIENT
Start: 2024-04-22 | End: 2024-04-22 | Stop reason: HOSPADM

## 2024-04-22 RX ORDER — EPHEDRINE SULFATE 5 MG/ML
5 INJECTION INTRAVENOUS ONCE AS NEEDED
Status: DISCONTINUED | OUTPATIENT
Start: 2024-04-22 | End: 2024-04-22 | Stop reason: HOSPADM

## 2024-04-22 RX ORDER — PHENYLEPHRINE HCL IN 0.9% NACL 1 MG/10 ML
SYRINGE (ML) INTRAVENOUS AS NEEDED
Status: DISCONTINUED | OUTPATIENT
Start: 2024-04-22 | End: 2024-04-22 | Stop reason: SURG

## 2024-04-22 RX ORDER — MIDAZOLAM HYDROCHLORIDE 1 MG/ML
2 INJECTION INTRAMUSCULAR; INTRAVENOUS
Status: DISCONTINUED | OUTPATIENT
Start: 2024-04-22 | End: 2024-04-22 | Stop reason: HOSPADM

## 2024-04-22 RX ORDER — DIPHENHYDRAMINE HYDROCHLORIDE 50 MG/ML
12.5 INJECTION INTRAMUSCULAR; INTRAVENOUS
Status: DISCONTINUED | OUTPATIENT
Start: 2024-04-22 | End: 2024-04-22 | Stop reason: HOSPADM

## 2024-04-22 RX ORDER — DEXAMETHASONE SODIUM PHOSPHATE 4 MG/ML
INJECTION, SOLUTION INTRA-ARTICULAR; INTRALESIONAL; INTRAMUSCULAR; INTRAVENOUS; SOFT TISSUE AS NEEDED
Status: DISCONTINUED | OUTPATIENT
Start: 2024-04-22 | End: 2024-04-22 | Stop reason: SURG

## 2024-04-22 RX ORDER — BUPIVACAINE HYDROCHLORIDE 2.5 MG/ML
INJECTION, SOLUTION EPIDURAL; INFILTRATION; INTRACAUDAL AS NEEDED
Status: DISCONTINUED | OUTPATIENT
Start: 2024-04-22 | End: 2024-04-22 | Stop reason: HOSPADM

## 2024-04-22 RX ORDER — DIPHENHYDRAMINE HYDROCHLORIDE 50 MG/ML
12.5 INJECTION INTRAMUSCULAR; INTRAVENOUS ONCE AS NEEDED
Status: DISCONTINUED | OUTPATIENT
Start: 2024-04-22 | End: 2024-04-22 | Stop reason: HOSPADM

## 2024-04-22 RX ORDER — LORAZEPAM 2 MG/ML
1 INJECTION INTRAMUSCULAR
Status: DISCONTINUED | OUTPATIENT
Start: 2024-04-22 | End: 2024-04-22

## 2024-04-22 RX ORDER — DIPHENHYDRAMINE HCL 25 MG
25 CAPSULE ORAL
Status: DISCONTINUED | OUTPATIENT
Start: 2024-04-22 | End: 2024-04-22

## 2024-04-22 RX ORDER — DIPHENHYDRAMINE HYDROCHLORIDE 50 MG/ML
12.5 INJECTION INTRAMUSCULAR; INTRAVENOUS
Status: DISCONTINUED | OUTPATIENT
Start: 2024-04-22 | End: 2024-04-22

## 2024-04-22 RX ORDER — EPHEDRINE SULFATE 5 MG/ML
INJECTION INTRAVENOUS AS NEEDED
Status: DISCONTINUED | OUTPATIENT
Start: 2024-04-22 | End: 2024-04-22 | Stop reason: SURG

## 2024-04-22 RX ORDER — IPRATROPIUM BROMIDE AND ALBUTEROL SULFATE 2.5; .5 MG/3ML; MG/3ML
3 SOLUTION RESPIRATORY (INHALATION) ONCE AS NEEDED
Status: DISCONTINUED | OUTPATIENT
Start: 2024-04-22 | End: 2024-04-22

## 2024-04-22 RX ORDER — FENTANYL CITRATE 50 UG/ML
50 INJECTION, SOLUTION INTRAMUSCULAR; INTRAVENOUS
Status: DISCONTINUED | OUTPATIENT
Start: 2024-04-22 | End: 2024-04-22 | Stop reason: HOSPADM

## 2024-04-22 RX ORDER — FENTANYL CITRATE 50 UG/ML
INJECTION, SOLUTION INTRAMUSCULAR; INTRAVENOUS AS NEEDED
Status: DISCONTINUED | OUTPATIENT
Start: 2024-04-22 | End: 2024-04-22 | Stop reason: SURG

## 2024-04-22 RX ORDER — HYDRALAZINE HYDROCHLORIDE 20 MG/ML
5 INJECTION INTRAMUSCULAR; INTRAVENOUS
Status: DISCONTINUED | OUTPATIENT
Start: 2024-04-22 | End: 2024-04-22

## 2024-04-22 RX ORDER — PROMETHAZINE HYDROCHLORIDE 25 MG/1
25 TABLET ORAL ONCE AS NEEDED
Status: DISCONTINUED | OUTPATIENT
Start: 2024-04-22 | End: 2024-04-22 | Stop reason: HOSPADM

## 2024-04-22 RX ORDER — PROMETHAZINE HYDROCHLORIDE 25 MG/1
25 SUPPOSITORY RECTAL ONCE AS NEEDED
Status: DISCONTINUED | OUTPATIENT
Start: 2024-04-22 | End: 2024-04-22 | Stop reason: HOSPADM

## 2024-04-22 RX ORDER — ROCURONIUM BROMIDE 10 MG/ML
INJECTION, SOLUTION INTRAVENOUS AS NEEDED
Status: DISCONTINUED | OUTPATIENT
Start: 2024-04-22 | End: 2024-04-22 | Stop reason: SURG

## 2024-04-22 RX ORDER — LORAZEPAM 2 MG/ML
1 INJECTION INTRAMUSCULAR
Status: DISCONTINUED | OUTPATIENT
Start: 2024-04-22 | End: 2024-04-22 | Stop reason: HOSPADM

## 2024-04-22 RX ORDER — LABETALOL HYDROCHLORIDE 5 MG/ML
5 INJECTION, SOLUTION INTRAVENOUS
Status: DISCONTINUED | OUTPATIENT
Start: 2024-04-22 | End: 2024-04-22

## 2024-04-22 RX ORDER — HYDROCODONE BITARTRATE AND ACETAMINOPHEN 5; 325 MG/1; MG/1
1 TABLET ORAL ONCE AS NEEDED
Status: COMPLETED | OUTPATIENT
Start: 2024-04-22 | End: 2024-04-22

## 2024-04-22 RX ORDER — LABETALOL HYDROCHLORIDE 5 MG/ML
5 INJECTION, SOLUTION INTRAVENOUS
Status: DISCONTINUED | OUTPATIENT
Start: 2024-04-22 | End: 2024-04-22 | Stop reason: HOSPADM

## 2024-04-22 RX ORDER — SODIUM CHLORIDE 0.9 % (FLUSH) 0.9 %
3-10 SYRINGE (ML) INJECTION AS NEEDED
Status: DISCONTINUED | OUTPATIENT
Start: 2024-04-22 | End: 2024-04-22 | Stop reason: HOSPADM

## 2024-04-22 RX ORDER — LIDOCAINE HYDROCHLORIDE 10 MG/ML
INJECTION, SOLUTION EPIDURAL; INFILTRATION; INTRACAUDAL; PERINEURAL AS NEEDED
Status: DISCONTINUED | OUTPATIENT
Start: 2024-04-22 | End: 2024-04-22 | Stop reason: SURG

## 2024-04-22 RX ORDER — PROMETHAZINE HYDROCHLORIDE 25 MG/1
25 TABLET ORAL ONCE AS NEEDED
Status: DISCONTINUED | OUTPATIENT
Start: 2024-04-22 | End: 2024-04-22

## 2024-04-22 RX ORDER — ONDANSETRON 2 MG/ML
INJECTION INTRAMUSCULAR; INTRAVENOUS AS NEEDED
Status: DISCONTINUED | OUTPATIENT
Start: 2024-04-22 | End: 2024-04-22 | Stop reason: SURG

## 2024-04-22 RX ORDER — MIDAZOLAM HYDROCHLORIDE 1 MG/ML
2 INJECTION INTRAMUSCULAR; INTRAVENOUS
Status: DISCONTINUED | OUTPATIENT
Start: 2024-04-22 | End: 2024-04-22

## 2024-04-22 RX ORDER — IPRATROPIUM BROMIDE AND ALBUTEROL SULFATE 2.5; .5 MG/3ML; MG/3ML
3 SOLUTION RESPIRATORY (INHALATION) ONCE AS NEEDED
Status: DISCONTINUED | OUTPATIENT
Start: 2024-04-22 | End: 2024-04-22 | Stop reason: HOSPADM

## 2024-04-22 RX ORDER — SODIUM CHLORIDE 0.9 % (FLUSH) 0.9 %
3 SYRINGE (ML) INJECTION EVERY 12 HOURS SCHEDULED
Status: DISCONTINUED | OUTPATIENT
Start: 2024-04-22 | End: 2024-04-22 | Stop reason: HOSPADM

## 2024-04-22 RX ORDER — FLUMAZENIL 0.1 MG/ML
0.5 INJECTION INTRAVENOUS AS NEEDED
Status: DISCONTINUED | OUTPATIENT
Start: 2024-04-22 | End: 2024-04-22 | Stop reason: HOSPADM

## 2024-04-22 RX ORDER — SODIUM CHLORIDE, SODIUM LACTATE, POTASSIUM CHLORIDE, CALCIUM CHLORIDE 600; 310; 30; 20 MG/100ML; MG/100ML; MG/100ML; MG/100ML
1000 INJECTION, SOLUTION INTRAVENOUS CONTINUOUS
Status: DISCONTINUED | OUTPATIENT
Start: 2024-04-22 | End: 2024-04-22 | Stop reason: HOSPADM

## 2024-04-22 RX ORDER — DIPHENHYDRAMINE HYDROCHLORIDE 50 MG/ML
12.5 INJECTION INTRAMUSCULAR; INTRAVENOUS ONCE AS NEEDED
Status: DISCONTINUED | OUTPATIENT
Start: 2024-04-22 | End: 2024-04-22

## 2024-04-22 RX ORDER — ONDANSETRON 2 MG/ML
4 INJECTION INTRAMUSCULAR; INTRAVENOUS ONCE AS NEEDED
Status: DISCONTINUED | OUTPATIENT
Start: 2024-04-22 | End: 2024-04-22 | Stop reason: HOSPADM

## 2024-04-22 RX ORDER — NALOXONE HCL 0.4 MG/ML
0.4 VIAL (ML) INJECTION AS NEEDED
Status: DISCONTINUED | OUTPATIENT
Start: 2024-04-22 | End: 2024-04-22 | Stop reason: HOSPADM

## 2024-04-22 RX ORDER — HYDRALAZINE HYDROCHLORIDE 20 MG/ML
5 INJECTION INTRAMUSCULAR; INTRAVENOUS
Status: DISCONTINUED | OUTPATIENT
Start: 2024-04-22 | End: 2024-04-22 | Stop reason: HOSPADM

## 2024-04-22 RX ORDER — ACETAMINOPHEN 325 MG/1
650 TABLET ORAL ONCE AS NEEDED
Status: DISCONTINUED | OUTPATIENT
Start: 2024-04-22 | End: 2024-04-22 | Stop reason: HOSPADM

## 2024-04-22 RX ORDER — ONDANSETRON 2 MG/ML
4 INJECTION INTRAMUSCULAR; INTRAVENOUS ONCE AS NEEDED
Status: DISCONTINUED | OUTPATIENT
Start: 2024-04-22 | End: 2024-04-22

## 2024-04-22 RX ORDER — DIPHENHYDRAMINE HCL 25 MG
25 CAPSULE ORAL
Status: DISCONTINUED | OUTPATIENT
Start: 2024-04-22 | End: 2024-04-22 | Stop reason: HOSPADM

## 2024-04-22 RX ORDER — ACETAMINOPHEN 650 MG/1
650 SUPPOSITORY RECTAL EVERY 4 HOURS PRN
Status: DISCONTINUED | OUTPATIENT
Start: 2024-04-22 | End: 2024-04-22 | Stop reason: HOSPADM

## 2024-04-22 RX ORDER — HYDROCODONE BITARTRATE AND ACETAMINOPHEN 5; 325 MG/1; MG/1
1 TABLET ORAL EVERY 4 HOURS PRN
Qty: 10 TABLET | Refills: 0 | Status: SHIPPED | OUTPATIENT
Start: 2024-04-22

## 2024-04-22 RX ORDER — PROMETHAZINE HYDROCHLORIDE 25 MG/1
25 SUPPOSITORY RECTAL ONCE AS NEEDED
Status: DISCONTINUED | OUTPATIENT
Start: 2024-04-22 | End: 2024-04-22

## 2024-04-22 RX ORDER — HYDROCODONE BITARTRATE AND ACETAMINOPHEN 7.5; 325 MG/1; MG/1
2 TABLET ORAL EVERY 4 HOURS PRN
Status: DISCONTINUED | OUTPATIENT
Start: 2024-04-22 | End: 2024-04-22 | Stop reason: HOSPADM

## 2024-04-22 RX ORDER — NALOXONE HCL 0.4 MG/ML
0.4 VIAL (ML) INJECTION AS NEEDED
Status: DISCONTINUED | OUTPATIENT
Start: 2024-04-22 | End: 2024-04-22

## 2024-04-22 RX ORDER — PROPOFOL 10 MG/ML
INJECTION, EMULSION INTRAVENOUS AS NEEDED
Status: DISCONTINUED | OUTPATIENT
Start: 2024-04-22 | End: 2024-04-22 | Stop reason: SURG

## 2024-04-22 RX ORDER — FENTANYL CITRATE 50 UG/ML
25 INJECTION, SOLUTION INTRAMUSCULAR; INTRAVENOUS
Status: DISCONTINUED | OUTPATIENT
Start: 2024-04-22 | End: 2024-04-22 | Stop reason: HOSPADM

## 2024-04-22 RX ORDER — SODIUM CHLORIDE 9 MG/ML
40 INJECTION, SOLUTION INTRAVENOUS AS NEEDED
Status: DISCONTINUED | OUTPATIENT
Start: 2024-04-22 | End: 2024-04-22

## 2024-04-22 RX ORDER — ACETAMINOPHEN 650 MG/1
325 SUPPOSITORY RECTAL EVERY 4 HOURS PRN
Status: DISCONTINUED | OUTPATIENT
Start: 2024-04-22 | End: 2024-04-22 | Stop reason: HOSPADM

## 2024-04-22 RX ADMIN — SODIUM CHLORIDE, POTASSIUM CHLORIDE, SODIUM LACTATE AND CALCIUM CHLORIDE 1000 ML: 600; 310; 30; 20 INJECTION, SOLUTION INTRAVENOUS at 06:28

## 2024-04-22 RX ADMIN — FENTANYL CITRATE 50 MCG: 50 INJECTION, SOLUTION INTRAMUSCULAR; INTRAVENOUS at 08:14

## 2024-04-22 RX ADMIN — EPHEDRINE SULFATE 10 MG: 5 INJECTION INTRAVENOUS at 07:44

## 2024-04-22 RX ADMIN — HYDROCODONE BITARTRATE AND ACETAMINOPHEN 1 TABLET: 5; 325 TABLET ORAL at 09:41

## 2024-04-22 RX ADMIN — DEXAMETHASONE SODIUM PHOSPHATE 8 MG: 4 INJECTION, SOLUTION INTRAMUSCULAR; INTRAVENOUS at 07:42

## 2024-04-22 RX ADMIN — SUGAMMADEX 200 MG: 100 INJECTION, SOLUTION INTRAVENOUS at 08:15

## 2024-04-22 RX ADMIN — FENTANYL CITRATE 25 MCG: 50 INJECTION, SOLUTION INTRAMUSCULAR; INTRAVENOUS at 09:05

## 2024-04-22 RX ADMIN — ROCURONIUM 50 MG: 50 INJECTION, SOLUTION INTRAVENOUS at 07:30

## 2024-04-22 RX ADMIN — CEFAZOLIN 2000 MG: 2 INJECTION, POWDER, FOR SOLUTION INTRAMUSCULAR; INTRAVENOUS at 07:33

## 2024-04-22 RX ADMIN — FENTANYL CITRATE 50 MCG: 50 INJECTION, SOLUTION INTRAMUSCULAR; INTRAVENOUS at 07:30

## 2024-04-22 RX ADMIN — ONDANSETRON 4 MG: 2 INJECTION INTRAMUSCULAR; INTRAVENOUS at 07:58

## 2024-04-22 RX ADMIN — PROPOFOL 170 MG: 10 INJECTION, EMULSION INTRAVENOUS at 07:30

## 2024-04-22 RX ADMIN — LIDOCAINE HYDROCHLORIDE 30 MG: 10 INJECTION, SOLUTION EPIDURAL; INFILTRATION; INTRACAUDAL; PERINEURAL at 07:30

## 2024-04-22 RX ADMIN — Medication 150 MCG: at 07:37

## 2024-04-22 RX ADMIN — Medication 10 ML: at 06:28

## 2024-04-22 RX ADMIN — Medication 150 MCG: at 07:32

## 2024-04-22 RX ADMIN — FENTANYL CITRATE 25 MCG: 50 INJECTION, SOLUTION INTRAMUSCULAR; INTRAVENOUS at 08:46

## 2024-04-22 NOTE — ANESTHESIA PROCEDURE NOTES
Airway  Urgency: elective    Date/Time: 4/22/2024 7:32 AM  End Time:4/22/2024 7:32 AM  Airway not difficult    General Information and Staff    Patient location during procedure: OR  Anesthesiologist: Moose Maki MD  CRNA/CAA: Quita Quintero CAA    Indications and Patient Condition  Indications for airway management: airway protection    Preoxygenated: yes  Mask difficulty assessment: 1 - vent by mask    Final Airway Details  Final airway type: endotracheal airway      Successful airway: ETT  Cuffed: yes   Successful intubation technique: video laryngoscopy  Facilitating devices/methods: intubating stylet  Endotracheal tube insertion site: oral  Blade: Calvin  Blade size: 3  ETT size (mm): 7.0  Cormack-Lehane Classification: grade I - full view of glottis  Placement verified by: chest auscultation, capnometry and palpation of cuff   Measured from: lips  ETT/EBT  to lips (cm): 21  Number of attempts at approach: 1  Assessment: lips, teeth, and gum same as pre-op and atraumatic intubation

## 2024-04-22 NOTE — ANESTHESIA POSTPROCEDURE EVALUATION
Patient: Barbara Coelho    Procedure Summary       Date: 04/22/24 Room / Location: [facility] OR [address] [place] [address] MAIN OR    Anesthesia Start: 0723 Anesthesia Stop: 0821    Procedure: VENTRAL/INCISIONAL HERNIA REPAIR (Abdomen) Diagnosis:       Incisional hernia, without obstruction or gangrene      (Incisional hernia, without obstruction or gangrene [K43.2])    Surgeons: Kimo Moon MD Provider: Moose Maki MD    Anesthesia Type: general ASA Status: 3            Anesthesia Type: general    Vitals  Vitals Value Taken Time   /49 04/22/24 0922   Temp 97.9 °F (36.6 °C) 04/22/24 0822   Pulse 68 04/22/24 0923   Resp 13 04/22/24 0922   SpO2 91 % 04/22/24 0923   Vitals shown include unfiled device data.        Post Anesthesia Care and Evaluation    Patient location during evaluation: PACU  Patient participation: complete - patient participated  Level of consciousness: awake  Pain scale: See nurse's notes for pain score.  Pain management: adequate    Airway patency: patent  Anesthetic complications: No anesthetic complications  PONV Status: none  Cardiovascular status: acceptable  Respiratory status: acceptable and spontaneous ventilation  Hydration status: acceptable    Comments: Patient seen and examined postoperatively; vital signs stable; SpO2 greater than or equal to 90%; cardiopulmonary status stable; nausea/vomiting adequately controlled; pain adequately controlled; no apparent anesthesia complications; patient discharged from anesthesia care when discharge criteria were met

## 2024-04-22 NOTE — INTERVAL H&P NOTE
H&P reviewed. The patient was examined and there are no changes to the H&P.      Since I last saw her she did have some urinary symptoms and had some urine culture demonstrating coag negative staph 50,000 colony-forming units she was treated for this with a course of antibiotics and symptoms did not resolve a second course was initiated and her symptoms have resolved.  She is on approximately day 12 of 14 of her antibiotics.  Counseled her about the options moving forward.  I think it would be reasonable to continue and move forward with the hernia repair as this is a fairly small hernia recovery fairly small mesh.  I gave her an option of rescheduling to help reduce her risk of infectious complications although they are low, does want to move forward with the operation.

## 2024-04-22 NOTE — OP NOTE
Operative Report:    Patient Name:  Barbara Coelho  YOB: 1950    Date of Surgery:  4/22/2024     Indications: 74-year-old lady who I met in the office with a chief complaint of a painful upper midline incisional hernia.  I counseled her about the risk and benefits of hernia repair she understood the risk and was willing to proceed.    Pre-op Diagnosis:   Incisional hernia, without obstruction or gangrene [K43.2]       Post-Op Diagnosis Codes:     * Incisional hernia, without obstruction or gangrene [K43.2]    Procedure/CPT® Codes:      Procedure(s):  Initial repair of an incarcerated 2 cm ventral hernia    Staff:  Surgeon(s):  Kimo Moon MD    Circulator: Danyell Mendoza RN; Brice Henderson RN  Scrub Person: Rita Ruiz  Assistant: Sandra Cruz APRN  was responsible for performing the following activities: Retraction and Closing and their skilled assistance was necessary for the success of this case.        Anesthesia: General    Estimated Blood Loss: minimal    Implants:    Implant Name Type Inv. Item Serial No.  Lot No. LRB No. Used Action   MESH ILIANA VENTRALEX ST W/STRAP 2.5 CA/1EA - [id number] Implant MESH ILIANA VENTRALEX ST W/STRAP 2.5 CA/1EA  [employer]) [id number] N/A 1 Implanted       Specimen:          None        Findings: Approximately 2 cm hernia defect containing a 5 to 6 cm hernia sac that was incarcerated standing preperitoneal fat and possibly omentum    Complications: None    Description of Procedure: Patient was taken to the operating room placed in the operating table in the supine position under general anesthesia.  Her abdomen was prepped draped normal sterile fashion.  Timeout was performed identify the correct patient received and site of operation.  I began the operation by entering the abdomen through a small midline incision at the level of her hernia.  This was about 2 and half inches craniocaudal.  Was unable to sit down to the fascia  using the Bovie.  Then defined the hernia sac and dissected free circumferentially from surrounding tissues.  The hernia sac was actually just lateral to the midline and was about 2 cm wide about a centimeter craniocaudal.  The hernia sac was circumferentially freed up from the fascia and the contents were reduced.  It appeared to contain either preperitoneal fat or omentum and this was reduced.  I then cleared a preperitoneal space using some blunt dissection.  I selected the medium Ventralex ST hernia patch and placed it through the defect.  The tails were secured laterally.  The fascia was then closed using 0 Ethibond sutures.  The wound was inspected hemostasis achieved the incision was closed using interrupted 3-0 Vicryl suture running 4-0 Vicryl suture and skin affix.  She tolerated procedure well's been transferred to recovery in satisfactory condition.      Kimo Moon MD     Date: 4/22/2024  Time: 08:08 EDT    This note was created using Dragon Voice Recognition software.

## 2024-04-22 NOTE — ANESTHESIA PREPROCEDURE EVALUATION
Anesthesia Evaluation     Patient summary reviewed and Nursing notes reviewed   no history of anesthetic complications:                Airway   Mallampati: II  TM distance: >3 FB  Neck ROM: full  No difficulty expected  Dental - normal exam     Comment: caps    Pulmonary - normal exam   (+) asthma,sleep apnea on CPAP  Cardiovascular - normal exam    (+) DVT resolved      Neuro/Psych  (+) psychiatric history Anxiety and Depression  GI/Hepatic/Renal/Endo    (+) obesity, GERD, renal disease- CRI    Musculoskeletal     (+) back pain  Abdominal  - normal exam    Bowel sounds: normal.   Substance History - negative use     OB/GYN negative ob/gyn ROS         Other   arthritis,   history of cancer (CLL)      Other Comment: Mixed CT d/o- plaquenil tx                    Anesthesia Plan    ASA 3     general     intravenous induction     Anesthetic plan, risks, benefits, and alternatives have been provided, discussed and informed consent has been obtained with: patient.    Plan discussed with CAA.

## 2024-04-23 ENCOUNTER — TELEPHONE (OUTPATIENT)
Dept: SURGERY | Facility: CLINIC | Age: 74
End: 2024-04-23
Payer: MEDICARE

## 2024-04-23 NOTE — TELEPHONE ENCOUNTER
PO FU Call- spoke with pt. sajan 2 wk po appt. Will fu then. Knows to call with any questions or concerns.      States doing good.

## 2024-04-30 ENCOUNTER — CLINICAL SUPPORT (OUTPATIENT)
Dept: FAMILY MEDICINE CLINIC | Facility: CLINIC | Age: 74
End: 2024-04-30
Payer: MEDICARE

## 2024-04-30 ENCOUNTER — OFFICE (OUTPATIENT)
Dept: URBAN - METROPOLITAN AREA CLINIC 64 | Facility: CLINIC | Age: 74
End: 2024-04-30

## 2024-04-30 VITALS — HEIGHT: 63 IN

## 2024-04-30 DIAGNOSIS — R30.0 DYSURIA: Primary | ICD-10-CM

## 2024-04-30 DIAGNOSIS — K64.1 SECOND DEGREE HEMORRHOIDS: ICD-10-CM

## 2024-04-30 LAB
BILIRUB BLD-MCNC: NEGATIVE MG/DL
CLARITY, POC: CLEAR
COLOR UR: YELLOW
GLUCOSE UR STRIP-MCNC: NEGATIVE MG/DL
KETONES UR QL: NEGATIVE
LEUKOCYTE EST, POC: NEGATIVE
NITRITE UR-MCNC: NEGATIVE MG/ML
PH UR: 6.5 [PH] (ref 5–8)
PROT UR STRIP-MCNC: NEGATIVE MG/DL
RBC # UR STRIP: NEGATIVE /UL
SP GR UR: 1 (ref 1–1.03)
UROBILINOGEN UR QL: NORMAL

## 2024-04-30 PROCEDURE — 81002 URINALYSIS NONAUTO W/O SCOPE: CPT | Performed by: NURSE PRACTITIONER

## 2024-04-30 PROCEDURE — 46221 LIGATION OF HEMORRHOID(S): CPT | Performed by: INTERNAL MEDICINE

## 2024-04-30 NOTE — PROGRESS NOTES
Clear, call with any new or worsening sx Received recent lab works from [facility]- CBC, BMP, Urine Na and Osmolality.  Placed in Dr Shelley's mailbox.

## 2024-04-30 NOTE — PROGRESS NOTES
Pt came into the office after finishing an antibiotic for UTI to leave a urine sample. Urine was collected and results sent to [place] for review.    DAGMAR Krause

## 2024-05-07 ENCOUNTER — OFFICE VISIT (OUTPATIENT)
Dept: SURGERY | Facility: CLINIC | Age: 74
End: 2024-05-07
Payer: MEDICARE

## 2024-05-07 VITALS
RESPIRATION RATE: 16 BRPM | OXYGEN SATURATION: 98 % | TEMPERATURE: 98 F | BODY MASS INDEX: 33.88 KG/M2 | SYSTOLIC BLOOD PRESSURE: 126 MMHG | WEIGHT: 191.2 LBS | HEIGHT: 63 IN | HEART RATE: 81 BPM | DIASTOLIC BLOOD PRESSURE: 79 MMHG

## 2024-05-07 DIAGNOSIS — K43.2 INCISIONAL HERNIA, WITHOUT OBSTRUCTION OR GANGRENE: Primary | ICD-10-CM

## 2024-05-07 PROCEDURE — 1159F MED LIST DOCD IN RCRD: CPT | Performed by: SURGERY

## 2024-05-07 PROCEDURE — 1160F RVW MEDS BY RX/DR IN RCRD: CPT | Performed by: SURGERY

## 2024-05-07 PROCEDURE — 99213 OFFICE O/P EST LOW 20 MIN: CPT | Performed by: SURGERY

## 2024-06-03 RX ORDER — DESLORATADINE 5 MG/1
5 TABLET, ORALLY DISINTEGRATING ORAL DAILY
Qty: 90 TABLET | Refills: 0 | OUTPATIENT
Start: 2024-06-03

## 2024-06-11 NOTE — PROGRESS NOTES
Hematology/Oncology Outpatient Follow Up    PATIENT NAME:Barbara Coelho  :1950  MRN: [id number]  PRIMARY CARE PHYSICIAN: Liane Tran APRN  REFERRING PHYSICIAN: No ref. provider found    Chief Complaint   Patient presents with    Follow-up     CLL        HISTORY OF PRESENT ILLNESS:     73-year-old female has referred secondary to leukocytosis with differential lymphocytosis.  Patient was noted in .  Patient denies any fevers or chills or upper respiratory tract infection.  Review of her CBC indicates that her white count has ranged around 23-24,000 normal hemoglobin and normal platelets but her differentials have been 8% neutrophils 85% lymphocytes with an ANC of 2.1 and absolute lymphocyte count of 20.  Back in 2022 her white count was 12.8, Hemoglobin 13.2 platelets 211 and absolute lymphocyte count was 9600.  Patient states that she is fully active and denies any night sweats, weight loss or fatigue symptoms.     Patient has a history of mixed connective tissue disease.  Patient is now retired. Patient is . She has one child.     There is family hx of kidney cancer in her mother, breast cancer breast cancer 30,  father had lung cancer, paternal GM with stomach cancer, paternal GF with lung cancer     Patient does not smoke, drinks occasional etoh     2023: Patient had peripheral blood flow cytometry which showed chronic lymphocytic leukemia B-cell, CD38 negative and CD20 positive.  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  CLL panel by FISH was normal.  Chemistry panel BUN was 17 creatinine was 1, LDH was 206 normal uric acid was 6.8 normal is up to 5.7.  Peripheral smear showed numerous smudge cells  7/3/2023: Patient had CT scan of the chest, abdomen and pelvis, no significant lymphadenopathy patient had CT scan of the chest, abdomen and pelvis to suggest lymphomatous process, supraumbilical midline  ventral hernia containing omental fat without evidence of incarceration.  Past Medical History:   Diagnosis Date    Allergic 1967    take allergy shots    Allergic rhinitis     on allergy shots    Asthma     ALLERGIC    Back pain     Low    Bladder infection     Recurrent Bladder Infections    CLL (chronic lymphocytic leukemia)     Deep vein thrombosis 1989    Difficulty walking     Diverticulitis     Elevated laboratory test result 05/2023    elevated wbc/  elevated lymphs/ gfr and creatine    GERD (gastroesophageal reflux disease)     EGD 12/16 [facility] - reflux, HH. no barretts    Hammer toe     Hearing loss     High arches     History of DVT of lower extremity     left leg    Ingrown toenail     Knee pain, right     Leg pain     Low back pain 1990    had back surgery    Mixed connective tissue disease 01/2019    Dr. Thakur    CARITO (obstructive sleep apnea) 2018    Osteopenia 03/28/2018    calculated frax - 11/3%    Osteoporosis     Pinched nerve in neck     Preventative health care 03/30/2023    Raynaud disease     Rheumatoid arthritis 2018    Staph infection     Urinary incontinence     Wears contact lenses     Wears prescription eyeglasses        Past Surgical History:   Procedure Laterality Date    BACK SURGERY  1995    Back (L5/S1)    BARIATRIC SURGERY  1985    gastric bypass    BREAST EXCISIONAL BIOPSY Left 1985    BREAST SURGERY  1990    lump removed    CHOLECYSTECTOMY  2019    CHOLECYSTECTOMY WITH INTRAOPERATIVE CHOLANGIOGRAM N/A 04/26/2018    Procedure: CHOLECYSTECTOMY LAPAROSCOPIC INTRAOPERATIVE CHOLANGIOGRAM;  Surgeon: Lit Morelos MD;  Location: [facility];  Service: General    COLONOSCOPY  2016    GASTRIC BYPASS      HIGH GASTRIC BYPASS, 1980's    HEMORRHOID BANDING  04/30/2024    HERNIA REPAIR      JOINT REPLACEMENT  2012,2020    left knee,right knee    OTHER SURGICAL HISTORY  1985    Stomach Stabled    REPLACEMENT TOTAL KNEE Left 2012    right  2021    SINUS SURGERY      x3    TOTAL KNEE  ARTHROPLASTY Right 10/29/2021    Procedure: TOTAL KNEE ARTHROPLASTY RIGHT;  Surgeon: Emeka Douglas MD;  Location:  [facility];  Service: Orthopedics;  Laterality: Right;    TUBAL ABDOMINAL LIGATION  1980    VENTRAL/INCISIONAL HERNIA REPAIR N/A 04/22/2024    Procedure: VENTRAL/INCISIONAL HERNIA REPAIR;  Surgeon: Kimo Moon MD;  Location: [facility] MAIN OR;  Service: General;  Laterality: N/A;    WISDOM TOOTH EXTRACTION           Current Outpatient Medications:     azelastine (OPTIVAR) 0.05 % ophthalmic solution, , Disp: , Rfl:     Magnesium Oxide -Mg Supplement 500 MG capsule, TAKE 1 CAPSULE BY MOUTH ONCE A DAY AT BEDTIME FOR 30 DAYS, Disp: , Rfl:     albuterol sulfate  (90 Base) MCG/ACT inhaler, TAKE 2 PUFFS BY MOUTH EVERY 4 HOURS AS NEEDED FOR WHEEZE, Disp: 8 g, Rfl: 1    Biotin 1 MG capsule, Take 1 capsule by mouth Daily., Disp: , Rfl:     Fluticasone-Salmeterol (ADVAIR/WIXELA) 500-50 MCG/ACT DISKUS, INHALE 1 PUFF TWICE A DAY, Disp: 180 each, Rfl: 1    gabapentin (NEURONTIN) 100 MG capsule, Take 3 capsules by mouth 2 (Two) Times a Day As Needed (pain)., Disp: 180 capsule, Rfl: 2    hydroxychloroquine (PLAQUENIL) 200 MG tablet, Take 1 tablet by mouth Every 12 (Twelve) Hours., Disp: , Rfl:     NIFEdipine XL (PROCARDIA XL) 30 MG 24 hr tablet, Take 1 tablet by mouth Daily., Disp: 90 tablet, Rfl: 0    nitrofurantoin, macrocrystal-monohydrate, (Macrobid) 100 MG capsule, Take 1 capsule by mouth 2 (Two) Times a Day., Disp: 14 capsule, Rfl: 0    Polyethylene Glycol 3350 (MIRALAX PO), Take  by mouth., Disp: , Rfl:     traZODone (DESYREL) 100 MG tablet, TAKE 1 TABLET BY MOUTH EVERY NIGHT FOR 90 DAYS., Disp: 90 tablet, Rfl: 1    VITAMIN D, CHOLECALCIFEROL, PO, Take 1 capsule by mouth Daily., Disp: , Rfl:     zoledronic acid (RECLAST) 5 MG/100ML solution infusion, Infuse 100 mL into a venous catheter 1 (One) Time for 1 dose., Disp: 100 mL, Rfl: 0  No current facility-administered medications for this  visit.    Facility-Administered Medications Ordered in Other Visits:     Chlorhexidine Gluconate Cloth 2 % pads, , Apply externally, Once, Emeka Douglas MD    mupirocin (BACTROBAN) 2 % nasal ointment, , Nasal, BID, Emeka Douglas MD    Allergies   Allergen Reactions    Celebrex [Celecoxib] Hives     HIVES     Codeine Rash    Oxycodone Rash and Hives    Sulfa Antibiotics Itching     ITCHING        Family History   Problem Relation Age of Onset    Cancer Other         BREAST, LUNG, OVARIAN, KIDNEY    Kidney cancer Other     Lung cancer Other         pGF as well    Cancer Mother         kidny    Hearing loss Mother     Other Mother         dementia    Cancer Father         ludmila    Mental illness Father     Alcohol abuse Father     Breast cancer Sister 30    Connective Tissue Disease Sister     Hypertension Sister         in lungs    Diabetes Sister     Osteoporosis Sister         all 3 of my sister has osyeoporosis    Sleep apnea Sister     Cancer Sister         brest    Osteoporosis Sister     Sleep apnea Sister     Cancer Paternal Grandmother         stomach    Cancer Paternal Grandfather         lung    Osteoporosis Sister     Sleep apnea Sister     Ovarian cancer Neg Hx        Cancer-related family history includes Breast cancer (age of onset: 30) in her sister; Cancer in her father, mother, paternal grandfather, paternal grandmother, sister, and another family member; Kidney cancer in an other family member; Lung cancer in an other family member. There is no history of Ovarian cancer.    Social History     Tobacco Use    Smoking status: Never     Passive exposure: Never    Smokeless tobacco: Never   Vaping Use    Vaping status: Never Used   Substance Use Topics    Alcohol use: Yes     Comment: occasional    Drug use: Never        I have reviewed and confirmed the accuracy of the patient's history: Chief complaint, HPI, ROS, and Subjective as entered by the MA/BONN/RN. Felisha Echols MD 06/13/24   "6/13/24       SUBJECTIVE:    She is here today accompanied by her sister for this appointment.  She complains of fatigue.  She has had 2 urinary tract infections in the past few months.    Patient seen today for follow-up and does not have any new issues.  She denies any B symptoms.          REVIEW OF SYSTEMS:  Review of Systems   Constitutional:  Positive for fatigue. Negative for chills and fever.   HENT:  Negative for congestion, drooling, ear discharge, rhinorrhea, sinus pressure and tinnitus.    Eyes:  Negative for photophobia, pain and discharge.   Respiratory:  Negative for apnea, choking and stridor.    Cardiovascular:  Negative for palpitations.   Gastrointestinal:  Negative for abdominal distention, abdominal pain and anal bleeding.   Endocrine: Negative for polydipsia and polyphagia.   Genitourinary:  Negative for decreased urine volume, flank pain and genital sores.   Musculoskeletal:  Negative for gait problem, neck pain and neck stiffness.        Bilateral lower extremity swelling   Skin:  Negative for color change, rash and wound.   Neurological:  Negative for tremors, seizures, syncope, facial asymmetry and speech difficulty.   Hematological:  Negative for adenopathy.   Psychiatric/Behavioral:  Negative for agitation, confusion, hallucinations and self-injury. The patient is not hyperactive.        OBJECTIVE:    Vitals:    06/13/24 1024   BP: 128/75   Pulse: 68   Resp: 18   Temp: 98 °F (36.7 °C)   TempSrc: Infrared   SpO2: 97%   Weight: 86.6 kg (191 lb)   Height: 160 cm (63\")   PainSc:   1   PainLoc: Abdomen           Body mass index is 33.83 kg/m².    ECOG  (0) Fully active, able to carry on all predisease performance without restriction    Physical Exam  Vitals and nursing note reviewed.   Constitutional:       General: She is not in acute distress.     Appearance: She is not diaphoretic.   HENT:      Head: Normocephalic and atraumatic.   Eyes:      General: No scleral icterus.        Right eye: No " discharge.         Left eye: No discharge.      Conjunctiva/sclera: Conjunctivae normal.   Neck:      Thyroid: No thyromegaly.   Cardiovascular:      Rate and Rhythm: Normal rate and regular rhythm.      Heart sounds: Normal heart sounds.      No friction rub. No gallop.   Pulmonary:      Effort: Pulmonary effort is normal. No respiratory distress.      Breath sounds: No stridor. No wheezing.   Abdominal:      General: Bowel sounds are normal.      Palpations: Abdomen is soft. There is no mass.      Tenderness: There is no abdominal tenderness. There is no guarding or rebound.   Musculoskeletal:         General: No tenderness. Normal range of motion.      Cervical back: Normal range of motion and neck supple.      Right lower leg: Edema present.      Left lower leg: Edema present.   Lymphadenopathy:      Cervical: No cervical adenopathy.   Skin:     General: Skin is warm.      Findings: No erythema or rash.   Neurological:      Mental Status: She is alert and oriented to person, place, and time.      Motor: No abnormal muscle tone.   Psychiatric:         Behavior: Behavior normal.       I have reexamined the patient and the results are consistent with the previously documented exam. Felisha Leah Echols MD          RECENT LABS    WBC   Date Value Ref Range Status   06/13/2024 35.77 (C) 3.40 - 10.80 10*3/mm3 Final   06/20/2022 12.8 (H) 3.4 - 10.8 x10E3/uL Final     RBC   Date Value Ref Range Status   06/13/2024 4.32 3.77 - 5.28 10*6/mm3 Final   06/20/2022 4.58 3.77 - 5.28 x10E6/uL Final     Hemoglobin   Date Value Ref Range Status   06/13/2024 12.4 12.0 - 15.9 g/dL Final     Hematocrit   Date Value Ref Range Status   06/13/2024 39.8 34.0 - 46.6 % Final     MCV   Date Value Ref Range Status   06/13/2024 92.1 79.0 - 97.0 fL Final     MCH   Date Value Ref Range Status   06/13/2024 28.7 26.6 - 33.0 pg Final     MCHC   Date Value Ref Range Status   06/13/2024 31.2 (L) 31.5 - 35.7 g/dL Final     RDW   Date Value Ref  Range Status   06/13/2024 13.8 12.3 - 15.4 % Final     RDW-SD   Date Value Ref Range Status   06/13/2024 45.3 37.0 - 54.0 fl Final     MPV   Date Value Ref Range Status   06/13/2024 9.2 6.0 - 12.0 fL Final     Platelets   Date Value Ref Range Status   06/13/2024 186 140 - 450 10*3/mm3 Final     Neutrophil %   Date Value Ref Range Status   06/13/2024 9.0 (L) 42.7 - 76.0 % Final     Lymphocyte %   Date Value Ref Range Status   06/13/2024 88.8 (H) 19.6 - 45.3 % Final     Monocyte %   Date Value Ref Range Status   06/13/2024 1.3 (L) 5.0 - 12.0 % Final     Eosinophil %   Date Value Ref Range Status   06/13/2024 0.7 0.3 - 6.2 % Final     Basophil %   Date Value Ref Range Status   06/13/2024 0.2 0.0 - 1.5 % Final     Immature Grans %   Date Value Ref Range Status   10/21/2021 0.2 0.0 - 0.5 % Final     Neutrophils, Absolute   Date Value Ref Range Status   06/13/2024 3.21 1.70 - 7.00 10*3/mm3 Final     Lymphocytes, Absolute   Date Value Ref Range Status   06/13/2024 31.76 (H) 0.70 - 3.10 10*3/mm3 Final     Monocytes, Absolute   Date Value Ref Range Status   06/13/2024 0.47 0.10 - 0.90 10*3/mm3 Final     Eosinophils, Absolute   Date Value Ref Range Status   06/13/2024 0.26 0.00 - 0.40 10*3/mm3 Final     Basophils, Absolute   Date Value Ref Range Status   06/13/2024 0.07 0.00 - 0.20 10*3/mm3 Final     Immature Grans, Absolute   Date Value Ref Range Status   10/21/2021 0.02 0.00 - 0.05 10*3/mm3 Final     nRBC   Date Value Ref Range Status   05/04/2023 0.1 0.0 - 0.2 /100 WBC Final       Lab Results   Component Value Date    GLUCOSE 92 04/09/2024    BUN 16 04/09/2024    CREATININE 1.08 (H) 04/09/2024    EGFRIFNONA 62 11/01/2021    BCR 14.8 04/09/2024    K 4.2 04/09/2024    CO2 27.0 04/09/2024    CALCIUM 8.9 04/09/2024    ALBUMIN 4.4 03/05/2024    AST 18 03/05/2024    ALT 11 03/05/2024         Assessment & Plan     CLL (chronic lymphocytic leukemia)  - CBC & Differential  - Comprehensive Metabolic Panel  - Uric Acid  - Lactate  Dehydrogenase            ASSESSMENT:    Leukocytosis, unspecified type  - CBC & Differential        CLL/SLL presenting as leukocytosis with differential lymphocytosis since March 2022.  Reviewed the diagnosis in detail with patient and her daughter who is present today.  CT scans chest abdomen and pelvis do not show any evidence of lymphomatous process.  She has clinical stage 0 CLL.  Ongoing management.  Her leukocytosis ranges between 18 and 40,000..  CBC reviewed overall she remained stable without any B symptoms  Urinary tract infection: She is on antibiotics may be contributing to her leukocytosis for now we will continue to monitor.  Patient has mixed connective tissue disorder.  She will follow-up with her rheumatologist  Bilateral lower extremity swelling rule out DVTs.  Ultrasound was negative.  Asymptomatic now  Abdominal hernia: Declines surgical evaluation.     Discussion       CLL, we discussed that indications for treatment would include but not limited to, pancytopenia, systemic symptoms including fatigue, night sweats and weight loss.  Other indications treatment will include recurrent infections, organ dysfunction due to massive lymphadenopathy, doubling  of leukocytosis in less than 6 months.  We discussed that CLL is not curable but can respond to chemo immunotherapy and some molecular targeted agents which can result in remissions but this disease is faulted by multiple relapses over the course of time.  We also discussed that median survivorship with low-grade lymphoma in general is estimated anywhere from 10-12 years.  I also explained that this disease is heterogeneous.  Some patients may succumb to the illness within a few days following diagnosis while some may have it for decades.  We discussed that lymphoproliferative diseases in general can affect the immune system so there is risk of Landon infections.  When the bone marrow is heavily involved there is risk of pancytopenia  which can  result in blood, platelet transfusions and also predisposition to infections if the white count is affected.  There is also a risk of transformation to high-grade lymphoma which can result in significant symptoms and would require systemic chemo immunotherapy.  The risk of transformation varies with different types of lymphoma.  Also is not dependent on prior treatment or worsen by conservative management.      Plans     Reviewed her CBCs.  Her physical exam today does not show any significant changes  Biochemical test today  Watch out for B symptoms  Reviewed CT scan chest abdomen and pelvis with contrast  Doppler formed for bilateral lower extremity edema was negative  Previous uric acid and HIV tests were negative   For now continue surveillance blood work  Complete her age-appropriate immunizations including pneumococcal vaccine.  Patient follow-up with her PCP  Follow-up 4 months  Reviewed signs and symptoms to monitor for between appointments and to call with questions or concerns  All questions answered       Patient verbalized understanding and is in agreement of the above plan.        I spent 30 total minutes, face-to-face, caring for Barbara khan. 90% of this time involved counseling and/or coordination of care as documented within this note.

## 2024-06-12 ENCOUNTER — OFFICE VISIT (OUTPATIENT)
Dept: SLEEP MEDICINE | Facility: CLINIC | Age: 74
End: 2024-06-12
Payer: MEDICARE

## 2024-06-12 VITALS
HEART RATE: 76 BPM | WEIGHT: 189 LBS | SYSTOLIC BLOOD PRESSURE: 120 MMHG | HEIGHT: 63 IN | DIASTOLIC BLOOD PRESSURE: 66 MMHG | OXYGEN SATURATION: 95 % | BODY MASS INDEX: 33.49 KG/M2

## 2024-06-12 DIAGNOSIS — E66.9 CLASS 1 OBESITY WITH SERIOUS COMORBIDITY AND BODY MASS INDEX (BMI) OF 33.0 TO 33.9 IN ADULT, UNSPECIFIED OBESITY TYPE: ICD-10-CM

## 2024-06-12 DIAGNOSIS — Z72.821 INADEQUATE SLEEP HYGIENE: ICD-10-CM

## 2024-06-12 DIAGNOSIS — G47.33 OBSTRUCTIVE SLEEP APNEA, ADULT: Primary | ICD-10-CM

## 2024-06-12 PROCEDURE — G0463 HOSPITAL OUTPT CLINIC VISIT: HCPCS

## 2024-06-12 PROCEDURE — 1159F MED LIST DOCD IN RCRD: CPT | Performed by: FAMILY MEDICINE

## 2024-06-12 PROCEDURE — 99214 OFFICE O/P EST MOD 30 MIN: CPT | Performed by: FAMILY MEDICINE

## 2024-06-12 PROCEDURE — 1160F RVW MEDS BY RX/DR IN RCRD: CPT | Performed by: FAMILY MEDICINE

## 2024-06-12 NOTE — PROGRESS NOTES
[facility]  [facility], [address]  [place] [address]  Phone   Fax       Barbara Coelho  [phone]   1950  74 y.o.  female      Referring physician/provider and  PCP Liane Tran APRN    Type of service: Initial Sleep Medicine Consult.  Date of service: 6/12/2024      Chief Complaint   Patient presents with    Sleep Apnea       History of present illness;  The patient was seen today on 6/12/2024 at [facility].    Thank you for asking to see Barbara Coelho, 74 y.o. PMHx chronic lymphocytic leukemia,  Mixed Connective tissue diseae/raynaud ( on prn procardia during the winter for same - managed by her rheumatologist), GERD, rheumatoid arthritis, DDD, asthma. Here to establish care obstructive sleep apnea (AHI 10.6/hr overall 47.8/hr during REM on 4/15/2019 PSG done in [facility]). Patient  denies prior surgery namely tonsillectomy, nasal surgery or UPPP.       Overall impression PAP therapy: States she feels air pressures are too little especially when starting out therapy every night  No leak symptoms with the current nasal pillow mask  Endorses her device as functioning without issue        Current device airsense 10 autoCPAP - obtained her past 30 day compliance from DME while in room with patient  Setting 4-20 cm H2O, EPR 3, Ramp standard auto  Compliance overall and 4-hour wander 100%  AHI 0.5  P95 pressure ~7 cm H2O  DME: [place]  Mask: nasal pillow no leak symptoms         -Since her last sleep study she has been diagnosed with chronic lymphocytic leukemia currently not requiring therapy for same under observation for same doing well  -Asthma has not needed albuterol inhaler at all in the past 2 weeks    Save what is noted above in HPI denies any other known past cardiopulmonary conditions/neurologic disorders/neuromuscular disorders  Never needed supplemental O2 at home  Denies any opioid therapy  Denies any metal in  head/neck/chest      Further Sleep History:    Bedtime: 11 pm    Rise Time: 6:30 - 6:45 am  Sleep Latency: 15 minutes to 60+ minutes  Screens in bed: yes  Wake after sleep onset: twice  Reasons for awakenings: nocturia  Number of naps per day denies  Naps restorative: n/a  Caffeine use: 3 beverages/d    RLS Symptoms: No   Bruxism:No   Current sleep related gastroesophageal reflux symptoms:  No   Cataplexy:  No   Sleep Paralysis:  No   Hypnagogic or hypnopompic hallucinations: No   Parasomnias such as sleep walking or sleep eating No   RBD Screening: NO    Disclaimer Sleep History: The above sleep history is based on this sleep physician's in room encounter with the patient. Pre encounter self administered questionnaires are taken into consideration and discussed with patient for any discordance. The above documentation by this sleep physician is the most accurate clinical information determined by in room sleep physician encounter with patient.     MEDICAL CONDITIONS (PMH)   Chronic lymphocytic leukemia  Ambulatory dysfunction  Hearing loss  GERD  Rheumatoid arthritis  DDD  Obesity    Social history:  Do you drive a commercial vehicle:  No   Shift work:  No   Tobacco use:  No   Alcohol use: ~1 per week  Occupation: Retired    Family Hx (parents and siblings) (pertaining to sleep medicine)  Multiple malignancies: kidney - mother, lung - father, breast - sister    Medications: reviewed    Review of systems is negative unless otherwise noted per HPI   Disclaimer History: The above history is based on this sleep physician's in room encounter with the patient. Pre encounter self administered questionnaires are taken into consideration and discussed with patient for any discordance. The above documentation by this sleep physician is the most accurate clinical information determined by in room sleep physician encounter with patient.     Physical exam:  Vitals:    06/12/24 0900   BP: 120/66   BP Location: Left arm   Patient  "Position: Sitting   Pulse: 76   SpO2: 95%   Weight: 85.7 kg (189 lb)   Height: 160 cm (63\")    Body mass index is 33.48 kg/m².   CONSTITUTIONAL:  Non-toxic, In no overt distress   Head: normocephalic   ENT: Mallampati class IV, + macroglossia, no septal defects   NECK:Neck Circumference: 14.5 inches,no nuchal rigidity  RESPIRATORY SYSTEM: Breath sounds are clear (no rales, no rhonchi, no wheezes), no accessory muscle use  CARDIOVASULAR SYSTEM: Heart sounds are regular rhythm and normal rate, no rub, no gallop, no edema  NEUROLOGICAL SYSTEM: Oriented x 3, No gross focal deficits   PSYCHIATRIC SYSTEM: Goal oriented, affect full range appropriate      Office notes from care team reviewed:    -4/11/2024 office visit PCP SULEMA Thomas    Labs reviewed.  TSH          11/10/2023    09:20   TSH   TSH 1.890       Most Recent A1C          11/29/2023    10:30   HGBA1C Most Recent   Hemoglobin A1C 5.80       Imaging/Diagnostics reviewed:       -4/15/2019 PSG  AHI 10.6/hr overall.  AHI REM 47.8/hr  O2 byron 66%  Oximetry minutes between 80-87% was 9.8 minutes  Average Oximetry: 93%  Diagnosis by interpreting physician: Obstructive sleep apnea, REM predominant  Interpreting physician listed as: Dr. ALBERTO Petersen    Current device airsense 10 autoCPAP - obtained her past 30 day compliance from DME while in room with patient  Setting 4-20 cm H2O, EPR 3, Ramp standard auto  Compliance overall and 4-hour wander 100%  AHI 0.5  P95 pressure ~7 cm H2O  DME: [place]  Mask: nasal pillow no leak symptoms     ASSESSMENT AND PLAN:  Obstructive sleep apnea ( G 47.33).    -Specific Changes made by me today:  I. After review of compliance data, in visit clinical correlation, and through shared decision making:   -Increase minimum pressure to 6 cm H2O  - Increase ramp pressure to 6 cm H2O  -Change ramp from auto to on  -Change ramp response from standard to soft  For new settings of: Auto CPAP 6-20 cm H2O, EPR 3, ramp soft response 6 " cm H2O for 20 minutes  II.  Counseled patient to follow-up with me in 3 months for therapy review.  The symptoms of sleep apnea have improved with the device and the treatment.  Patient's compliance with the device is excellent for treatment of sleep apnea.  I have independently reviewed the smart card down load and discussed with the patient the download data and encouarged the patient to continue to use the device.The residual AHI is acceptable. The device is benefiting the patient and the device is medically necessary.  Without proper control of sleep apnea and good compliance there is a increased risk for hypertension, diabetes mellitus and nonrestorative sleep with hypersomnia which can increase risk for motor vehicle accidents.  Untreated sleep apnea is also a risk factor for development of atrial fibrillation, pulmonary hypertension, insulin resistance and stroke. The patient is also instructed to get the supplies from the [employer] [facility] [employer] and and change them on a regular basis.  A prescription for supplies has been sent to the [employer] [facility].  I have also discussed the good sleep hygiene habits and adequate amount of sleep needed for good health.  Obesity, with BMI is Body mass index is 33.48 kg/m².. Counseled weight loss will be beneficial for reduction in severity of sleep apnea, healthy diet/exercise to achieve same, follow up with primary care physician for serial monitoring and to further guide management.   Inadequate sleep hygiene, Multiple inadequate circumstances for sleep preclude a diagnosis of insomnia. Counseled the patient lifestyle modifications as below:  maintain a regular bedtime and wake time, not to watch television or use screens in bed, limit caffeine-containing beverages before bed time and avoid naps during the day, reserve bed for intimacy or sleep only.  If patient encounters 20 minutes or more in bed without sleep, then instructed to get out of bed to chair, in dimly lit or dark area,  pursue a boring/non-stimulating activity, and return to bed only when sleepy (repeat this step as often as necessary).Follow up with PCP to reinforce my counseling towards healthy lifestyle modifications.    Follow up in 3 months . Patient's questions were answered.        EMR Dragon/Transcription disclaimer:   Much of this encounter note is an electronic transcription/translation of spoken language to printed text. The electronic translation of spoken language may permit erroneous, or at times, nonsensical words or phrases to be inadvertently transcribed; Although I have reviewed the note for such errors, some may still exist.       NPI #: [id number]    Tanesha Bautista, DO  Sleep Medicine  [facility]  06/12/24

## 2024-06-13 ENCOUNTER — OFFICE (OUTPATIENT)
Dept: URBAN - METROPOLITAN AREA CLINIC 64 | Facility: CLINIC | Age: 74
End: 2024-06-13
Payer: COMMERCIAL

## 2024-06-13 ENCOUNTER — OFFICE VISIT (OUTPATIENT)
Dept: ONCOLOGY | Facility: CLINIC | Age: 74
End: 2024-06-13
Payer: MEDICARE

## 2024-06-13 ENCOUNTER — LAB (OUTPATIENT)
Dept: LAB | Facility: HOSPITAL | Age: 74
End: 2024-06-13
Payer: MEDICARE

## 2024-06-13 VITALS
BODY MASS INDEX: 33.84 KG/M2 | WEIGHT: 191 LBS | HEART RATE: 68 BPM | DIASTOLIC BLOOD PRESSURE: 75 MMHG | SYSTOLIC BLOOD PRESSURE: 128 MMHG | HEIGHT: 63 IN | OXYGEN SATURATION: 97 % | TEMPERATURE: 98 F | RESPIRATION RATE: 18 BRPM

## 2024-06-13 VITALS
DIASTOLIC BLOOD PRESSURE: 73 MMHG | HEIGHT: 63 IN | WEIGHT: 190 LBS | HEART RATE: 64 BPM | SYSTOLIC BLOOD PRESSURE: 123 MMHG

## 2024-06-13 DIAGNOSIS — C91.10 CLL (CHRONIC LYMPHOCYTIC LEUKEMIA): Primary | ICD-10-CM

## 2024-06-13 DIAGNOSIS — R14.0 ABDOMINAL DISTENSION (GASEOUS): ICD-10-CM

## 2024-06-13 DIAGNOSIS — K59.00 CONSTIPATION, UNSPECIFIED: ICD-10-CM

## 2024-06-13 DIAGNOSIS — R10.32 LEFT LOWER QUADRANT PAIN: ICD-10-CM

## 2024-06-13 DIAGNOSIS — R10.31 RIGHT LOWER QUADRANT PAIN: ICD-10-CM

## 2024-06-13 DIAGNOSIS — R10.13 EPIGASTRIC PAIN: ICD-10-CM

## 2024-06-13 PROBLEM — K46.9 ABDOMINAL HERNIA: Status: ACTIVE | Noted: 2024-06-13

## 2024-06-13 LAB
ALBUMIN SERPL-MCNC: 4.2 G/DL (ref 3.5–5.2)
ALBUMIN/GLOB SERPL: 2.6 G/DL
ALP SERPL-CCNC: 92 U/L (ref 39–117)
ALT SERPL W P-5'-P-CCNC: 14 U/L (ref 1–33)
ANION GAP SERPL CALCULATED.3IONS-SCNC: 9.7 MMOL/L (ref 5–15)
AST SERPL-CCNC: 24 U/L (ref 1–32)
BASOPHILS # BLD AUTO: 0.07 10*3/MM3 (ref 0–0.2)
BASOPHILS NFR BLD AUTO: 0.2 % (ref 0–1.5)
BILIRUB SERPL-MCNC: 0.3 MG/DL (ref 0–1.2)
BUN SERPL-MCNC: 15 MG/DL (ref 8–23)
BUN/CREAT SERPL: 13.4 (ref 7–25)
CALCIUM SPEC-SCNC: 9.8 MG/DL (ref 8.6–10.5)
CHLORIDE SERPL-SCNC: 105 MMOL/L (ref 98–107)
CO2 SERPL-SCNC: 27.3 MMOL/L (ref 22–29)
CREAT SERPL-MCNC: 1.12 MG/DL (ref 0.57–1)
DEPRECATED RDW RBC AUTO: 45.3 FL (ref 37–54)
EGFRCR SERPLBLD CKD-EPI 2021: 51.4 ML/MIN/1.73
EOSINOPHIL # BLD AUTO: 0.26 10*3/MM3 (ref 0–0.4)
EOSINOPHIL NFR BLD AUTO: 0.7 % (ref 0.3–6.2)
ERYTHROCYTE [DISTWIDTH] IN BLOOD BY AUTOMATED COUNT: 13.8 % (ref 12.3–15.4)
GLOBULIN UR ELPH-MCNC: 1.6 GM/DL
GLUCOSE SERPL-MCNC: 92 MG/DL (ref 65–99)
HCT VFR BLD AUTO: 39.8 % (ref 34–46.6)
HGB BLD-MCNC: 12.4 G/DL (ref 12–15.9)
HOLD SPECIMEN: NORMAL
HOLD SPECIMEN: NORMAL
LDH SERPL-CCNC: 183 U/L (ref 135–214)
LYMPHOCYTES # BLD AUTO: 31.76 10*3/MM3 (ref 0.7–3.1)
LYMPHOCYTES NFR BLD AUTO: 88.8 % (ref 19.6–45.3)
MCH RBC QN AUTO: 28.7 PG (ref 26.6–33)
MCHC RBC AUTO-ENTMCNC: 31.2 G/DL (ref 31.5–35.7)
MCV RBC AUTO: 92.1 FL (ref 79–97)
MONOCYTES # BLD AUTO: 0.47 10*3/MM3 (ref 0.1–0.9)
MONOCYTES NFR BLD AUTO: 1.3 % (ref 5–12)
NEUTROPHILS NFR BLD AUTO: 3.21 10*3/MM3 (ref 1.7–7)
NEUTROPHILS NFR BLD AUTO: 9 % (ref 42.7–76)
PLATELET # BLD AUTO: 186 10*3/MM3 (ref 140–450)
PMV BLD AUTO: 9.2 FL (ref 6–12)
POTASSIUM SERPL-SCNC: 4.2 MMOL/L (ref 3.5–5.2)
PROT SERPL-MCNC: 5.8 G/DL (ref 6–8.5)
RBC # BLD AUTO: 4.32 10*6/MM3 (ref 3.77–5.28)
SODIUM SERPL-SCNC: 142 MMOL/L (ref 136–145)
URATE SERPL-MCNC: 5.4 MG/DL (ref 2.4–5.7)
WBC NRBC COR # BLD AUTO: 35.77 10*3/MM3 (ref 3.4–10.8)

## 2024-06-13 PROCEDURE — 85025 COMPLETE CBC W/AUTO DIFF WBC: CPT

## 2024-06-13 PROCEDURE — 80053 COMPREHEN METABOLIC PANEL: CPT | Performed by: INTERNAL MEDICINE

## 2024-06-13 PROCEDURE — 84550 ASSAY OF BLOOD/URIC ACID: CPT | Performed by: INTERNAL MEDICINE

## 2024-06-13 PROCEDURE — 36415 COLL VENOUS BLD VENIPUNCTURE: CPT

## 2024-06-13 PROCEDURE — 99213 OFFICE O/P EST LOW 20 MIN: CPT

## 2024-06-13 PROCEDURE — 1125F AMNT PAIN NOTED PAIN PRSNT: CPT | Performed by: INTERNAL MEDICINE

## 2024-06-13 PROCEDURE — 99214 OFFICE O/P EST MOD 30 MIN: CPT | Performed by: INTERNAL MEDICINE

## 2024-06-13 PROCEDURE — 83615 LACTATE (LD) (LDH) ENZYME: CPT | Performed by: INTERNAL MEDICINE

## 2024-06-13 RX ORDER — AZELASTINE HYDROCHLORIDE 0.5 MG/ML
SOLUTION/ DROPS OPHTHALMIC
COMMUNITY
Start: 2024-06-11

## 2024-06-13 RX ORDER — VITAMIN E (DL,TOCOPHERYL ACET) 180 MG
CAPSULE ORAL
COMMUNITY
Start: 2024-05-21

## 2024-07-02 ENCOUNTER — OFFICE (OUTPATIENT)
Dept: URBAN - METROPOLITAN AREA CLINIC 64 | Facility: CLINIC | Age: 74
End: 2024-07-02

## 2024-07-02 VITALS — HEIGHT: 63 IN

## 2024-07-02 DIAGNOSIS — K64.1 SECOND DEGREE HEMORRHOIDS: ICD-10-CM

## 2024-07-02 PROCEDURE — 46221 LIGATION OF HEMORRHOID(S): CPT | Performed by: INTERNAL MEDICINE

## 2024-07-29 ENCOUNTER — OFFICE (OUTPATIENT)
Dept: URBAN - METROPOLITAN AREA CLINIC 64 | Facility: CLINIC | Age: 74
End: 2024-07-29

## 2024-07-29 VITALS — HEIGHT: 63 IN

## 2024-07-29 DIAGNOSIS — K64.0 FIRST DEGREE HEMORRHOIDS: ICD-10-CM

## 2024-07-29 PROCEDURE — 46221 LIGATION OF HEMORRHOID(S): CPT | Performed by: INTERNAL MEDICINE

## 2024-09-18 ENCOUNTER — OFFICE VISIT (OUTPATIENT)
Dept: SLEEP MEDICINE | Facility: CLINIC | Age: 74
End: 2024-09-18
Payer: MEDICARE

## 2024-09-18 VITALS
BODY MASS INDEX: 34.55 KG/M2 | WEIGHT: 195 LBS | HEIGHT: 63 IN | DIASTOLIC BLOOD PRESSURE: 67 MMHG | HEART RATE: 72 BPM | OXYGEN SATURATION: 97 % | SYSTOLIC BLOOD PRESSURE: 126 MMHG

## 2024-09-18 DIAGNOSIS — G47.33 OBSTRUCTIVE SLEEP APNEA, ADULT: Primary | ICD-10-CM

## 2024-09-18 DIAGNOSIS — Z72.821 INADEQUATE SLEEP HYGIENE: ICD-10-CM

## 2024-09-18 DIAGNOSIS — K21.9 GASTROESOPHAGEAL REFLUX DISEASE, UNSPECIFIED WHETHER ESOPHAGITIS PRESENT: ICD-10-CM

## 2024-09-18 DIAGNOSIS — E66.9 CLASS 1 OBESITY WITH SERIOUS COMORBIDITY AND BODY MASS INDEX (BMI) OF 34.0 TO 34.9 IN ADULT, UNSPECIFIED OBESITY TYPE: ICD-10-CM

## 2024-09-18 PROCEDURE — 1160F RVW MEDS BY RX/DR IN RCRD: CPT | Performed by: FAMILY MEDICINE

## 2024-09-18 PROCEDURE — 99214 OFFICE O/P EST MOD 30 MIN: CPT | Performed by: FAMILY MEDICINE

## 2024-09-18 PROCEDURE — G0463 HOSPITAL OUTPT CLINIC VISIT: HCPCS

## 2024-09-18 PROCEDURE — 1159F MED LIST DOCD IN RCRD: CPT | Performed by: FAMILY MEDICINE

## 2024-09-19 ENCOUNTER — TELEPHONE (OUTPATIENT)
Dept: FAMILY MEDICINE CLINIC | Facility: CLINIC | Age: 74
End: 2024-09-19
Payer: MEDICARE

## 2024-09-19 DIAGNOSIS — Z12.31 SCREENING MAMMOGRAM FOR BREAST CANCER: Primary | ICD-10-CM

## 2024-09-23 ENCOUNTER — TELEPHONE (OUTPATIENT)
Dept: FAMILY MEDICINE CLINIC | Facility: CLINIC | Age: 74
End: 2024-09-23

## 2024-09-23 NOTE — TELEPHONE ENCOUNTER
Spoke with Barbara Coelho. Informed Bone dexa scans are performed every two years. Expressed understanding.

## 2024-09-23 NOTE — TELEPHONE ENCOUNTER
Caller: Barbara Coelho    Relationship: Self    Best call back number:    Barbara Coelho (Self) [phone] (Mobile)         What was the call regarding: PATIENT IS WONDERING WHY SHE CANNOT GET HER DEXA SCAN YET     SHE TAKE RECLAST FOR HER BONES, AND WOULD LIKE TO SCHEDULE WITH HER MAMMOGRAM         Is it okay if the provider responds through MyChart: CALL BACK PLEASE

## 2024-09-26 ENCOUNTER — HOSPITAL ENCOUNTER (OUTPATIENT)
Dept: MAMMOGRAPHY | Facility: HOSPITAL | Age: 74
Discharge: HOME | End: 2024-09-26
Admitting: NURSE PRACTITIONER
Payer: MEDICARE

## 2024-09-26 DIAGNOSIS — Z12.31 SCREENING MAMMOGRAM FOR BREAST CANCER: ICD-10-CM

## 2024-09-26 PROCEDURE — 77063 BREAST TOMOSYNTHESIS BI: CPT

## 2024-09-26 PROCEDURE — 77067 SCR MAMMO BI INCL CAD: CPT

## 2024-09-26 NOTE — PROGRESS NOTES
Procedure   Large Joint Arthrocentesis: R knee  Date/Time: 7/14/2021 10:55 AM  Consent given by: patient  Site marked: site marked  Timeout: Immediately prior to procedure a time out was called to verify the correct patient, procedure, equipment, support staff and site/side marked as required   Supporting Documentation  Indications: pain   Procedure Details  Location: knee - R knee  Preparation: Patient was prepped and draped in the usual sterile fashion  Needle size: 23 G  Approach: anterolateral  Medications administered: 30 mg Hyaluronan 30 MG/2ML  Patient tolerance: patient tolerated the procedure well with no immediate complications         I injected her right knee with Orthovisc.  She tolerated injection well  
Stable

## 2024-10-15 ENCOUNTER — OFFICE VISIT (OUTPATIENT)
Dept: FAMILY MEDICINE CLINIC | Facility: CLINIC | Age: 74
End: 2024-10-15
Payer: MEDICARE

## 2024-10-15 ENCOUNTER — LAB (OUTPATIENT)
Dept: FAMILY MEDICINE CLINIC | Facility: CLINIC | Age: 74
End: 2024-10-15
Payer: MEDICARE

## 2024-10-15 VITALS
DIASTOLIC BLOOD PRESSURE: 60 MMHG | SYSTOLIC BLOOD PRESSURE: 122 MMHG | BODY MASS INDEX: 35.08 KG/M2 | HEART RATE: 75 BPM | OXYGEN SATURATION: 97 % | WEIGHT: 198 LBS | HEIGHT: 63 IN

## 2024-10-15 DIAGNOSIS — E66.01 CLASS 2 SEVERE OBESITY WITH SERIOUS COMORBIDITY AND BODY MASS INDEX (BMI) OF 35.0 TO 35.9 IN ADULT, UNSPECIFIED OBESITY TYPE: ICD-10-CM

## 2024-10-15 DIAGNOSIS — Z00.00 PREVENTATIVE HEALTH CARE: ICD-10-CM

## 2024-10-15 DIAGNOSIS — M51.362 DEGENERATION OF INTERVERTEBRAL DISC OF LUMBAR REGION WITH DISCOGENIC BACK PAIN AND LOWER EXTREMITY PAIN: Primary | ICD-10-CM

## 2024-10-15 DIAGNOSIS — M54.16 LUMBAR RADICULITIS: ICD-10-CM

## 2024-10-15 DIAGNOSIS — E55.9 VITAMIN D DEFICIENCY: ICD-10-CM

## 2024-10-15 DIAGNOSIS — M06.9 RHEUMATOID ARTHRITIS INVOLVING BOTH KNEES, UNSPECIFIED WHETHER RHEUMATOID FACTOR PRESENT: ICD-10-CM

## 2024-10-15 DIAGNOSIS — R73.03 PREDIABETES: ICD-10-CM

## 2024-10-15 DIAGNOSIS — C91.10 CLL (CHRONIC LYMPHOCYTIC LEUKEMIA): ICD-10-CM

## 2024-10-15 DIAGNOSIS — G89.4 CHRONIC PAIN SYNDROME: ICD-10-CM

## 2024-10-15 DIAGNOSIS — Z00.00 ENCOUNTER FOR SUBSEQUENT ANNUAL WELLNESS VISIT IN MEDICARE PATIENT: ICD-10-CM

## 2024-10-15 DIAGNOSIS — M81.0 AGE-RELATED OSTEOPOROSIS WITHOUT CURRENT PATHOLOGICAL FRACTURE: ICD-10-CM

## 2024-10-15 DIAGNOSIS — E66.812 CLASS 2 SEVERE OBESITY WITH SERIOUS COMORBIDITY AND BODY MASS INDEX (BMI) OF 35.0 TO 35.9 IN ADULT, UNSPECIFIED OBESITY TYPE: ICD-10-CM

## 2024-10-15 PROCEDURE — G0439 PPPS, SUBSEQ VISIT: HCPCS | Performed by: NURSE PRACTITIONER

## 2024-10-15 PROCEDURE — 80061 LIPID PANEL: CPT | Performed by: NURSE PRACTITIONER

## 2024-10-15 PROCEDURE — 36415 COLL VENOUS BLD VENIPUNCTURE: CPT | Performed by: NURSE PRACTITIONER

## 2024-10-15 PROCEDURE — 99213 OFFICE O/P EST LOW 20 MIN: CPT | Performed by: NURSE PRACTITIONER

## 2024-10-15 PROCEDURE — 85027 COMPLETE CBC AUTOMATED: CPT | Performed by: NURSE PRACTITIONER

## 2024-10-15 PROCEDURE — 83036 HEMOGLOBIN GLYCOSYLATED A1C: CPT | Performed by: NURSE PRACTITIONER

## 2024-10-15 PROCEDURE — 1160F RVW MEDS BY RX/DR IN RCRD: CPT | Performed by: NURSE PRACTITIONER

## 2024-10-15 PROCEDURE — 1159F MED LIST DOCD IN RCRD: CPT | Performed by: NURSE PRACTITIONER

## 2024-10-15 PROCEDURE — 82306 VITAMIN D 25 HYDROXY: CPT | Performed by: NURSE PRACTITIONER

## 2024-10-15 PROCEDURE — 84443 ASSAY THYROID STIM HORMONE: CPT | Performed by: NURSE PRACTITIONER

## 2024-10-15 PROCEDURE — 1125F AMNT PAIN NOTED PAIN PRSNT: CPT | Performed by: NURSE PRACTITIONER

## 2024-10-15 PROCEDURE — 80053 COMPREHEN METABOLIC PANEL: CPT | Performed by: NURSE PRACTITIONER

## 2024-10-15 RX ORDER — GABAPENTIN 300 MG/1
300 CAPSULE ORAL 3 TIMES DAILY
Qty: 270 CAPSULE | Refills: 0 | Status: SHIPPED | OUTPATIENT
Start: 2024-10-15 | End: 2025-01-13

## 2024-10-15 RX ORDER — MELOXICAM 7.5 MG/1
7.5 TABLET ORAL DAILY
Qty: 30 TABLET | Refills: 1 | Status: SHIPPED | OUTPATIENT
Start: 2024-10-15

## 2024-10-15 NOTE — PROGRESS NOTES
Subjective   The ABCs of the Annual Wellness Visit  Medicare Wellness Visit      Barbara Coelho is a 74 y.o. patient who presents for a Medicare Wellness Visit.    Patient has history of Asthma, stable on Wixela and Albuterol PRN. She also receives allergy shots, followed by [facility].     Patient wears CPAP nightly for CARITO. She has no acute complaints.      Patient has history of Osteoporosis, DEXA scan last completed in 5/2023. She takes supplemental Vitamin D,  Reclast infusions annually per endocrinology.      Prediabetes, managing with diet and exercise. Patient is concerned about continued weight gain, 40 pounds over the last 2 years. Exercise is limited due to joint pain.      CLL, followed by Hematology.     Patient has RA, taking Plaquenil as prescribed. She is followed by  Rheumatology. Patient has had right TKR. She reports chronic back and bilateral knee pain. Patient did have epidural injections to her spine, previously followed by Pain Management. She also takes Gabapentin 100 mg TID.        The following portions of the patient's history were reviewed and   updated as appropriate: allergies, current medications, past family history, past medical history, past social history, past surgical history, and problem list.    Compared to one year ago, the patient's physical   health is the same.  Compared to one year ago, the patient's mental   health is the same.    Recent Hospitalizations:  She was not admitted to the hospital during the last year.     Current Medical Providers:  Patient Care Team:  Liane Tran APRN as PCP - General (Nurse Practitioner)  Oscar Arcos MD as Consulting Physician (Gastroenterology)  Emeka Douglas MD as Consulting Physician (Orthopedic Surgery)  Valdez Vega OD (Optometry)  Pinky Thakur MD as Consulting Physician (Rheumatology)  Daren Mathews MD as Consulting Physician (Pain Medicine)  Opal Alarcon APRN as Nurse Practitioner  (Nurse Practitioner)  Nagi aJramillo MD as Consulting Physician (Allergy)  Dr. Miller (Dental General Practice)  Valdez Vega, ALMA (Optometry)  Felisha Echols MD as Consulting Physician (Hematology and Oncology)  Kimo Moon MD as Consulting Physician (General Surgery)    Outpatient Medications Prior to Visit   Medication Sig Dispense Refill    albuterol sulfate  (90 Base) MCG/ACT inhaler TAKE 2 PUFFS BY MOUTH EVERY 4 HOURS AS NEEDED FOR WHEEZE 8 g 1    azelastine (OPTIVAR) 0.05 % ophthalmic solution       Biotin 1 MG capsule Take 1 capsule by mouth Daily.      Fluticasone-Salmeterol (ADVAIR/WIXELA) 500-50 MCG/ACT DISKUS INHALE 1 PUFF TWICE A  each 1    hydroxychloroquine (PLAQUENIL) 200 MG tablet Take 1 tablet by mouth Every 12 (Twelve) Hours.      Magnesium Oxide -Mg Supplement 500 MG capsule TAKE 1 CAPSULE BY MOUTH ONCE A DAY AT BEDTIME FOR 30 DAYS      NIFEdipine XL (PROCARDIA XL) 30 MG 24 hr tablet Take 1 tablet by mouth Daily. 90 tablet 0    Polyethylene Glycol 3350 (MIRALAX PO) Take  by mouth.      traZODone (DESYREL) 100 MG tablet TAKE 1 TABLET BY MOUTH EVERY NIGHT FOR 90 DAYS. 90 tablet 1    VITAMIN D, CHOLECALCIFEROL, PO Take 1 capsule by mouth Daily.      zoledronic acid (RECLAST) 5 MG/100ML solution infusion Infuse 100 mL into a venous catheter 1 (One) Time for 1 dose. 100 mL 0    gabapentin (NEURONTIN) 100 MG capsule Take 3 capsules by mouth 2 (Two) Times a Day As Needed (pain). 180 capsule 2    nitrofurantoin, macrocrystal-monohydrate, (Macrobid) 100 MG capsule Take 1 capsule by mouth 2 (Two) Times a Day. 14 capsule 0     Facility-Administered Medications Prior to Visit   Medication Dose Route Frequency Provider Last Rate Last Admin    Chlorhexidine Gluconate Cloth 2 % pads   Apply externally Once Emeka Douglas MD        mupirocin (BACTROBAN) 2 % nasal ointment   Nasal BID Emeka Douglas MD         No opioid medication identified on active medication list. I  have reviewed chart for other potential  high risk medication/s and harmful drug interactions in the elderly.      Aspirin is not on active medication list.  Aspirin use is not indicated based on review of current medical condition/s. Risk of harm outweighs potential benefits.  .    Patient Active Problem List   Diagnosis    Constipation    Screening for cardiovascular condition    Chronic allergic rhinitis due to food    GERD (gastroesophageal reflux disease)    DDD (degenerative disc disease), lumbar    Depression    Mixed connective tissue disease    Autoimmune Pleuritis    History of asthma    CARITO on CPAP    CARITO (obstructive sleep apnea)    Osteoarthritis of right knee    History of total right knee replacement    Postoperative pain    Anxiety    Saphenous neuralgia, right    Pain due to total right knee replacement    History of left knee replacement    History of lumbar surgery    Physical deconditioning    Gait disturbance    Spondylosis of lumbar region without myelopathy or radiculopathy    Status post total right knee replacement    Screening for malignant neoplasm of colon    Encounter for screening mammogram for malignant neoplasm of breast    Rheumatoid arthritis involving both knees    Asymptomatic menopause    Osteopenia    Prediabetes    Mild intermittent asthma without complication    Preventative health care    Abdominal distension (gaseous)    Back pain    Epigastric pain    Hemorrhoids    Influenza    Left lower quadrant pain    Onychomycosis    Osteoporosis    Other fecal abnormalities    Positive antinuclear antibody    Seasonal allergic rhinitis    Vitamin D deficiency    Raynaud's disease without gangrene    CLL (chronic lymphocytic leukemia)    Diverticulosis of large intestine without perforation or abscess without bleeding    First degree hemorrhoids    Functional dyspepsia    Polyp of colon    Incisional hernia, without obstruction or gangrene    Dysuria    Abdominal hernia    Class 2  "severe obesity with serious comorbidity and body mass index (BMI) of 35.0 to 35.9 in adult    Encounter for subsequent annual wellness visit in Medicare patient    Lumbar radiculitis    Chronic pain syndrome     Advance Care Planning Advance Directive is on file.  ACP discussion was held with the patient during this visit. Patient has an advance directive in EMR which is still valid.             Objective   Vitals:    10/15/24 0946   BP: 122/60   BP Location: Left arm   Patient Position: Sitting   Cuff Size: Adult   Pulse: 75   SpO2: 97%   Weight: 89.8 kg (198 lb)   Height: 160 cm (63\")   PainSc:   6   PainLoc: Knee       Estimated body mass index is 35.07 kg/m² as calculated from the following:    Height as of this encounter: 160 cm (63\").    Weight as of this encounter: 89.8 kg (198 lb).            Does the patient have evidence of cognitive impairment? No    ATTENTION  What is the year: correct  What is the month of the year: correct  What is the day of the week?: correct  What is the date?: correct  MEMORY  Repeat address three times, only score third attempt: Vlad Saavedra [address] [place] [address]: 6  HOW MANY ANIMALS DID THE PATIENT NAME  Verbal Fluency -- Animal Names (0-25): 22+  CLOCK DRAWING  Clock Drawing: All Correct  MEMORY RECALL  Tell me what you remember about that name and address we were repeating at the beginnin  ACE TOTAL SCORE  Total ACE Score - <25/30 strongly suggests cognitive impairment; <21/30 almost certainly shows dementia: 27                                                                                                 Health  Risk Assessment    Smoking Status:  Social History     Tobacco Use   Smoking Status Never    Passive exposure: Never   Smokeless Tobacco Never     Alcohol Consumption:  Social History     Substance and Sexual Activity   Alcohol Use Yes    Comment: occasional       Fall Risk Screen  STEADI Fall Risk Assessment was completed, and patient is at LOW " risk for falls.Assessment completed on:10/15/2024    Depression Screening:      10/15/2024     9:41 AM   PHQ-2/PHQ-9 Depression Screening   Little interest or pleasure in doing things Not at all   Feeling down, depressed, or hopeless Not at all     Health Habits and Functional and Cognitive Screening:      10/10/2024     1:17 PM   Functional & Cognitive Status   Do you have difficulty preparing food and eating? No    Do you have difficulty bathing yourself, getting dressed or grooming yourself? No    Do you have difficulty using the toilet? No    Do you have difficulty moving around from place to place? No    Do you have trouble with steps or getting out of a bed or a chair? No    Current Diet Other    Dental Exam Not up to date    Eye Exam Up to date    Exercise (times per week) 1 times per week    Current Exercises Include No Regular Exercise    Do you need help using the phone?  No    Are you deaf or do you have serious difficulty hearing?  No    Do you need help to go to places out of walking distance? No    Do you need help shopping? No    Do you need help preparing meals?  No    Do you need help with housework?  No    Do you need help with laundry? No    Do you need help taking your medications? No    Do you need help managing money? No    Do you ever drive or ride in a car without wearing a seat belt? No    Have you felt unusual stress, anger or loneliness in the last month? No    Who do you live with? Alone    If you need help, do you have trouble finding someone available to you? No    Have you been bothered in the last four weeks by sexual problems? No    Do you have difficulty concentrating, remembering or making decisions? No        Patient-reported           Age-appropriate Screening Schedule:  Refer to the list below for future screening recommendations based on patient's age, sex and/or medical conditions. Orders for these recommended tests are listed in the plan section. The patient has been provided  with a written plan.    Health Maintenance List  Health Maintenance   Topic Date Due    TDAP/TD VACCINES (1 - Tdap) Never done    LIPID PANEL  11/29/2024    COVID-19 Vaccine (7 - 2023-24 season) 12/03/2024    BMI FOLLOWUP  04/11/2025    DXA SCAN  05/12/2025    ANNUAL WELLNESS VISIT  10/15/2025    MAMMOGRAM  09/26/2026    COLORECTAL CANCER SCREENING  07/07/2033    HEPATITIS C SCREENING  Completed    RSV Vaccine - Adults  Completed    INFLUENZA VACCINE  Completed    Pneumococcal Vaccine 65+  Completed    ZOSTER VACCINE  Completed                                                                                                                                                CMS Preventative Services Quick Reference  Risk Factors Identified During Encounter  Chronic Pain:  Patient on Gabapentin  Fall Risk-High or Moderate: Discussed Fall Prevention in the home  Immunizations Discussed/Encouraged: Tdap, Influenza, Prevnar 20 (Pneumococcal 20-valent conjugate), Shingrix, COVID19, and RSV (Respiratory Syncytial Virus)  Dental Screening Recommended  Vision Screening Recommended    The above risks/problems have been discussed with the patient.  Pertinent information has been shared with the patient in the After Visit Summary.  An After Visit Summary and PPPS were made available to the patient.    Follow Up:   Next Medicare Wellness visit to be scheduled in 1 year.     Assessment & Plan  Degeneration of intervertebral disc of lumbar region with discogenic back pain and lower extremity pain  Increase Gabapentin to 300mg TID  Rheumatoid arthritis involving both knees, unspecified whether rheumatoid factor present  Continue Plaquenil  Follow up with Rheumatology  CLL (chronic lymphocytic leukemia)  Followed by Hematology  Prediabetes  Labs today  Limit carbohydrates and CS  Age-related osteoporosis without current pathological fracture  Reclast infusions per Endo  Vitamin D deficiency    Preventative health care    Class 2 severe  obesity with serious comorbidity and body mass index (BMI) of 35.0 to 35.9 in adult, unspecified obesity type  Patient's (Body mass index is 35.07 kg/m².) indicates that they are obese (BMI >30) with health conditions that include osteoarthritis . Weight is worsening. BMI  is above average; BMI management plan is completed. We discussed low calorie, low carb based diet program, portion control, increasing exercise, joining a fitness center or start home based exercise program, and Weight Watchers or other Commercial based weight reduction program.   Chronic pain syndrome  Increase Gabapentin to 300 mg TID  Add Mobic 7.5 mg daily PRN  Lumbar radiculitis    Encounter for subsequent annual wellness visit in Medicare patient  Labs today  Flu shot up to date    Orders Placed This Encounter   Procedures    CBC (No Diff)    Comprehensive Metabolic Panel    Hemoglobin A1c    Lipid Panel    TSH    Vitamin D,25-Hydroxy     New Medications Ordered This Visit   Medications    gabapentin (NEURONTIN) 300 MG capsule     Sig: Take 1 capsule by mouth 3 (Three) Times a Day for 90 days.     Dispense:  270 capsule     Refill:  0    meloxicam (Mobic) 7.5 MG tablet     Sig: Take 1 tablet by mouth Daily.     Dispense:  30 tablet     Refill:  1          Follow Up:   Return in about 6 months (around 4/15/2025) for HTN.

## 2024-10-15 NOTE — ASSESSMENT & PLAN NOTE
Patient's (Body mass index is 35.07 kg/m².) indicates that they are obese (BMI >30) with health conditions that include osteoarthritis . Weight is worsening. BMI  is above average; BMI management plan is completed. We discussed low calorie, low carb based diet program, portion control, increasing exercise, joining a fitness center or start home based exercise program, and Weight Watchers or other Commercial based weight reduction program.

## 2024-10-16 ENCOUNTER — TELEPHONE (OUTPATIENT)
Dept: FAMILY MEDICINE CLINIC | Facility: CLINIC | Age: 74
End: 2024-10-16
Payer: MEDICARE

## 2024-10-16 LAB
25(OH)D3 SERPL-MCNC: 54.9 NG/ML (ref 30–100)
ALBUMIN SERPL-MCNC: 4 G/DL (ref 3.5–5.2)
ALBUMIN/GLOB SERPL: 1.8 G/DL
ALP SERPL-CCNC: 89 U/L (ref 39–117)
ALT SERPL W P-5'-P-CCNC: 13 U/L (ref 1–33)
ANION GAP SERPL CALCULATED.3IONS-SCNC: 10.3 MMOL/L (ref 5–15)
AST SERPL-CCNC: 21 U/L (ref 1–32)
BILIRUB SERPL-MCNC: 0.3 MG/DL (ref 0–1.2)
BUN SERPL-MCNC: 12 MG/DL (ref 8–23)
BUN/CREAT SERPL: 10.8 (ref 7–25)
CALCIUM SPEC-SCNC: 9.5 MG/DL (ref 8.6–10.5)
CHLORIDE SERPL-SCNC: 104 MMOL/L (ref 98–107)
CHOLEST SERPL-MCNC: 178 MG/DL (ref 0–200)
CO2 SERPL-SCNC: 28.7 MMOL/L (ref 22–29)
CREAT SERPL-MCNC: 1.11 MG/DL (ref 0.57–1)
DEPRECATED RDW RBC AUTO: 41.2 FL (ref 37–54)
EGFRCR SERPLBLD CKD-EPI 2021: 51.9 ML/MIN/1.73
ERYTHROCYTE [DISTWIDTH] IN BLOOD BY AUTOMATED COUNT: 12.8 % (ref 12.3–15.4)
GLOBULIN UR ELPH-MCNC: 2.2 GM/DL
GLUCOSE SERPL-MCNC: 97 MG/DL (ref 65–99)
HBA1C MFR BLD: 5.8 % (ref 4.8–5.6)
HCT VFR BLD AUTO: 42.5 % (ref 34–46.6)
HDLC SERPL-MCNC: 65 MG/DL (ref 40–60)
HGB BLD-MCNC: 13.4 G/DL (ref 12–15.9)
LDLC SERPL CALC-MCNC: 97 MG/DL (ref 0–100)
LDLC/HDLC SERPL: 1.48 {RATIO}
MCH RBC QN AUTO: 27.9 PG (ref 26.6–33)
MCHC RBC AUTO-ENTMCNC: 31.5 G/DL (ref 31.5–35.7)
MCV RBC AUTO: 88.5 FL (ref 79–97)
PLATELET # BLD AUTO: 191 10*3/MM3 (ref 140–450)
PMV BLD AUTO: 10.2 FL (ref 6–12)
POTASSIUM SERPL-SCNC: 4.3 MMOL/L (ref 3.5–5.2)
PROT SERPL-MCNC: 6.2 G/DL (ref 6–8.5)
RBC # BLD AUTO: 4.8 10*6/MM3 (ref 3.77–5.28)
SODIUM SERPL-SCNC: 143 MMOL/L (ref 136–145)
TRIGL SERPL-MCNC: 85 MG/DL (ref 0–150)
TSH SERPL DL<=0.05 MIU/L-ACNC: 1.98 UIU/ML (ref 0.27–4.2)
VLDLC SERPL-MCNC: 16 MG/DL (ref 5–40)
WBC NRBC COR # BLD AUTO: 65.26 10*3/MM3 (ref 3.4–10.8)

## 2024-10-16 RX ORDER — DAPAGLIFLOZIN 10 MG/1
1 TABLET, FILM COATED ORAL DAILY
Qty: 90 TABLET | Refills: 3 | Status: SHIPPED | OUTPATIENT
Start: 2024-10-16

## 2024-10-17 ENCOUNTER — OFFICE VISIT (OUTPATIENT)
Dept: ONCOLOGY | Facility: CLINIC | Age: 74
End: 2024-10-17
Payer: MEDICARE

## 2024-10-17 ENCOUNTER — LAB (OUTPATIENT)
Dept: LAB | Facility: HOSPITAL | Age: 74
End: 2024-10-17
Payer: MEDICARE

## 2024-10-17 VITALS
WEIGHT: 200 LBS | BODY MASS INDEX: 35.44 KG/M2 | OXYGEN SATURATION: 96 % | TEMPERATURE: 97.8 F | HEART RATE: 72 BPM | DIASTOLIC BLOOD PRESSURE: 76 MMHG | HEIGHT: 63 IN | RESPIRATION RATE: 18 BRPM | SYSTOLIC BLOOD PRESSURE: 137 MMHG

## 2024-10-17 DIAGNOSIS — C91.10 CLL (CHRONIC LYMPHOCYTIC LEUKEMIA): Primary | ICD-10-CM

## 2024-10-17 LAB
BASOPHILS # BLD AUTO: 0.09 10*3/MM3 (ref 0–0.2)
BASOPHILS NFR BLD AUTO: 0.2 % (ref 0–1.5)
DEPRECATED RDW RBC AUTO: 46.4 FL (ref 37–54)
EOSINOPHIL # BLD AUTO: 0.26 10*3/MM3 (ref 0–0.4)
EOSINOPHIL NFR BLD AUTO: 0.5 % (ref 0.3–6.2)
ERYTHROCYTE [DISTWIDTH] IN BLOOD BY AUTOMATED COUNT: 14.1 % (ref 12.3–15.4)
HCT VFR BLD AUTO: 41.9 % (ref 34–46.6)
HGB BLD-MCNC: 12.8 G/DL (ref 12–15.9)
HOLD SPECIMEN: NORMAL
HOLD SPECIMEN: NORMAL
LDH SERPL-CCNC: 177 U/L (ref 135–214)
LYMPHOCYTES # BLD AUTO: 48.94 10*3/MM3 (ref 0.7–3.1)
LYMPHOCYTES NFR BLD AUTO: 92 % (ref 19.6–45.3)
MCH RBC QN AUTO: 28.1 PG (ref 26.6–33)
MCHC RBC AUTO-ENTMCNC: 30.5 G/DL (ref 31.5–35.7)
MCV RBC AUTO: 92.1 FL (ref 79–97)
MONOCYTES # BLD AUTO: 1.1 10*3/MM3 (ref 0.1–0.9)
MONOCYTES NFR BLD AUTO: 2.1 % (ref 5–12)
NEUTROPHILS NFR BLD AUTO: 2.79 10*3/MM3 (ref 1.7–7)
NEUTROPHILS NFR BLD AUTO: 5.2 % (ref 42.7–76)
PLATELET # BLD AUTO: 157 10*3/MM3 (ref 140–450)
PMV BLD AUTO: 9.5 FL (ref 6–12)
RBC # BLD AUTO: 4.55 10*6/MM3 (ref 3.77–5.28)
URATE SERPL-MCNC: 5.8 MG/DL (ref 2.4–5.7)
WBC NRBC COR # BLD AUTO: 53.18 10*3/MM3 (ref 3.4–10.8)

## 2024-10-17 PROCEDURE — 84550 ASSAY OF BLOOD/URIC ACID: CPT | Performed by: INTERNAL MEDICINE

## 2024-10-17 PROCEDURE — 85025 COMPLETE CBC W/AUTO DIFF WBC: CPT

## 2024-10-17 PROCEDURE — 83615 LACTATE (LD) (LDH) ENZYME: CPT | Performed by: INTERNAL MEDICINE

## 2024-10-17 PROCEDURE — 36415 COLL VENOUS BLD VENIPUNCTURE: CPT

## 2024-10-17 NOTE — PROGRESS NOTES
Hematology/Oncology Outpatient Follow Up    PATIENT NAME:Barbara Coelho  :1950  MRN: [id number]  PRIMARY CARE PHYSICIAN: Liane Tran APRN  REFERRING PHYSICIAN: No ref. provider found    Chief Complaint   Patient presents with    Follow-up     CLL (chronic lymphocytic leukemia)            HISTORY OF PRESENT ILLNESS:     73-year-old female has referred secondary to leukocytosis with differential lymphocytosis.  Patient was noted in .  Patient denies any fevers or chills or upper respiratory tract infection.  Review of her CBC indicates that her white count has ranged around 23-24,000 normal hemoglobin and normal platelets but her differentials have been 8% neutrophils 85% lymphocytes with an ANC of 2.1 and absolute lymphocyte count of 20.  Back in 2022 her white count was 12.8, Hemoglobin 13.2 platelets 211 and absolute lymphocyte count was 9600.  Patient states that she is fully active and denies any night sweats, weight loss or fatigue symptoms.     Patient has a history of mixed connective tissue disease.  Patient is now retired. Patient is . She has one child.     There is family hx of kidney cancer in her mother, breast cancer breast cancer 30,  father had lung cancer, paternal GM with stomach cancer, paternal GF with lung cancer     Patient does not smoke, drinks occasional etoh     2023: Patient had peripheral blood flow cytometry which showed chronic lymphocytic leukemia B-cell, CD38 negative and CD20 positive.  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  CLL panel by FISH was normal.  Chemistry panel BUN was 17 creatinine was 1, LDH was 206 normal uric acid was 6.8 normal is up to 5.7.  Peripheral smear showed numerous smudge cells  7/3/2023: Patient had CT scan of the chest, abdomen and pelvis, no significant lymphadenopathy patient had CT scan of the chest, abdomen and pelvis to suggest  lymphomatous process, supraumbilical midline ventral hernia containing omental fat without evidence of incarceration.  Past Medical History:   Diagnosis Date    Allergic 1967    take allergy shots    Allergic rhinitis     on allergy shots    Asthma     ALLERGIC    Back pain     Low    Bladder infection     Recurrent Bladder Infections    Cataract 2017    Cholelithiasis 2019    surgery    CLL (chronic lymphocytic leukemia)     Colon polyp 2023    Deep vein thrombosis 1989    Difficulty walking     Diverticulitis     Elevated laboratory test result 05/2023    elevated wbc/  elevated lymphs/ gfr and creatine    GERD (gastroesophageal reflux disease)     EGD 12/16 [facility] - reflux, HH. no barretts    Hammer toe     Hearing loss     High arches     History of DVT of lower extremity     left leg    Ingrown toenail     Knee pain, right     Leg pain     Low back pain 1990    had back surgery    Mixed connective tissue disease 01/2019    Dr. Thakur    CARITO (obstructive sleep apnea) 2018    Osteopenia 03/28/2018    calculated frax - 11/3%    Osteoporosis     Pinched nerve in neck     [facility] 03/30/2023    Raynaud disease     Rheumatoid arthritis 2018    Staph infection     Urinary incontinence     Wears contact lenses     Wears prescription eyeglasses        Past Surgical History:   Procedure Laterality Date    BACK SURGERY  1995    Back (L5/S1)    BARIATRIC SURGERY  1985    gastric bypass    BREAST EXCISIONAL BIOPSY Left 1985    BREAST SURGERY  1990    lump removed    CHOLECYSTECTOMY  2019    CHOLECYSTECTOMY WITH INTRAOPERATIVE CHOLANGIOGRAM N/A 04/26/2018    Procedure: CHOLECYSTECTOMY LAPAROSCOPIC INTRAOPERATIVE CHOLANGIOGRAM;  Surgeon: Lit Morelos MD;  Location: [facility];  Service: General    COLONOSCOPY  2016    GASTRIC BYPASS      HIGH GASTRIC BYPASS, 1980's    HEMORRHOID BANDING  04/30/2024    HERNIA REPAIR      JOINT REPLACEMENT  2012,2020    left knee,right knee    OTHER SURGICAL HISTORY  1985     Stomach Stabled    REPLACEMENT TOTAL KNEE Left 2012    right  2021    SINUS SURGERY      x3    TOTAL KNEE ARTHROPLASTY Right 10/29/2021    Procedure: TOTAL KNEE ARTHROPLASTY RIGHT;  Surgeon: Emeka Douglas MD;  Location: [facility] OR;  Service: Orthopedics;  Laterality: Right;    TUBAL ABDOMINAL LIGATION  1980    VENTRAL/INCISIONAL HERNIA REPAIR N/A 04/22/2024    Procedure: VENTRAL/INCISIONAL HERNIA REPAIR;  Surgeon: iKmo Moon MD;  Location: [facility] MAIN OR;  Service: General;  Laterality: N/A;    WISDOM TOOTH EXTRACTION           Current Outpatient Medications:     albuterol sulfate  (90 Base) MCG/ACT inhaler, TAKE 2 PUFFS BY MOUTH EVERY 4 HOURS AS NEEDED FOR WHEEZE, Disp: 8 g, Rfl: 1    azelastine (OPTIVAR) 0.05 % ophthalmic solution, , Disp: , Rfl:     Biotin 1 MG capsule, Take 1 capsule by mouth Daily., Disp: , Rfl:     dapagliflozin Propanediol (Farxiga) 10 MG tablet, Take 10 mg by mouth Daily. Indications: Chronic Kidney Disease, Disp: 90 tablet, Rfl: 3    Fluticasone-Salmeterol (ADVAIR/WIXELA) 500-50 MCG/ACT DISKUS, INHALE 1 PUFF TWICE A DAY, Disp: 180 each, Rfl: 1    gabapentin (NEURONTIN) 300 MG capsule, Take 1 capsule by mouth 3 (Three) Times a Day for 90 days., Disp: 270 capsule, Rfl: 0    hydroxychloroquine (PLAQUENIL) 200 MG tablet, Take 1 tablet by mouth Every 12 (Twelve) Hours., Disp: , Rfl:     Magnesium Oxide -Mg Supplement 500 MG capsule, TAKE 1 CAPSULE BY MOUTH ONCE A DAY AT BEDTIME FOR 30 DAYS, Disp: , Rfl:     meloxicam (Mobic) 7.5 MG tablet, Take 1 tablet by mouth Daily., Disp: 30 tablet, Rfl: 1    NIFEdipine XL (PROCARDIA XL) 30 MG 24 hr tablet, Take 1 tablet by mouth Daily., Disp: 90 tablet, Rfl: 0    Polyethylene Glycol 3350 (MIRALAX PO), Take  by mouth., Disp: , Rfl:     traZODone (DESYREL) 100 MG tablet, TAKE 1 TABLET BY MOUTH EVERY NIGHT FOR 90 DAYS., Disp: 90 tablet, Rfl: 1    VITAMIN D, CHOLECALCIFEROL, PO, Take 1 capsule by mouth Daily., Disp: , Rfl:     zoledronic acid  (RECLAST) 5 MG/100ML solution infusion, Infuse 100 mL into a venous catheter 1 (One) Time for 1 dose., Disp: 100 mL, Rfl: 0  No current facility-administered medications for this visit.    Facility-Administered Medications Ordered in Other Visits:     Chlorhexidine Gluconate Cloth 2 % pads, , Apply externally, Once, Emeka Douglas MD    mupirocin (BACTROBAN) 2 % nasal ointment, , Nasal, BID, Emeka Douglas MD    Allergies   Allergen Reactions    Celebrex [Celecoxib] Hives     HIVES     Codeine Rash    Oxycodone Rash and Hives    Sulfa Antibiotics Itching     ITCHING        Family History   Problem Relation Age of Onset    Cancer Other         BREAST, LUNG, OVARIAN, KIDNEY    Kidney cancer Other     Lung cancer Other         pGF as well    Cancer Mother         kidny    Hearing loss Mother     Cancer Father         ludmila    Mental illness Father         paranoid schizophrenia    Alcohol abuse Father     Breast cancer Sister 30    Connective Tissue Disease Sister     Hypertension Sister         in lungs    Diabetes Sister     Osteoporosis Sister         all 3 of my sister has osyeoporosis    Sleep apnea Sister     Cancer Sister         brest    Osteoporosis Sister     Sleep apnea Sister     Cancer Paternal Grandmother         stomach    Cancer Paternal Grandfather         lung    Osteoporosis Sister     Sleep apnea Sister     Ovarian cancer Neg Hx        Cancer-related family history includes Breast cancer (age of onset: 30) in her sister; Cancer in her father, mother, paternal grandfather, paternal grandmother, sister, and another family member; Kidney cancer in an other family member; Lung cancer in an other family member. There is no history of Ovarian cancer.    Social History     Tobacco Use    Smoking status: Never     Passive exposure: Never    Smokeless tobacco: Never   Vaping Use    Vaping status: Never Used   Substance Use Topics    Alcohol use: Yes     Comment: occasional    Drug use: Never     I  "have reviewed and confirmed the accuracy of the patient's history: Chief complaint, HPI, ROS, and Subjective as entered by the MA/LPN/RN. Felisha Leah Echols MD 10/17/24  10/17/24       SUBJECTIVE:    She is here today accompanied by her sister for this appointment.  She complains of fatigue.  She has had 2 urinary tract infections in the past few months.    Patient seen today for follow-up and does not have any new issues.  She denies any B symptoms.      Patient is on steroids back pain hence the  elevated WBCs          REVIEW OF SYSTEMS:    Review of Systems   Constitutional:  Positive for fatigue. Negative for chills and fever.   HENT:  Negative for congestion, drooling, ear discharge, rhinorrhea, sinus pressure and tinnitus.    Eyes:  Negative for photophobia, pain and discharge.   Respiratory:  Negative for apnea, choking and stridor.    Cardiovascular:  Negative for palpitations.   Gastrointestinal:  Negative for abdominal distention, abdominal pain and anal bleeding.   Endocrine: Negative for polydipsia and polyphagia.   Genitourinary:  Negative for decreased urine volume, flank pain and genital sores.   Musculoskeletal:  Negative for gait problem, neck pain and neck stiffness.        Bilateral lower extremity swelling   Skin:  Negative for color change, rash and wound.   Neurological:  Negative for tremors, seizures, syncope, facial asymmetry and speech difficulty.   Hematological:  Negative for adenopathy.   Psychiatric/Behavioral:  Negative for agitation, confusion, hallucinations and self-injury. The patient is not hyperactive.        OBJECTIVE:    Vitals:    10/17/24 1113   BP: 137/76   Pulse: 72   Resp: 18   Temp: 97.8 °F (36.6 °C)   TempSrc: Infrared   SpO2: 96%   Weight: 90.7 kg (200 lb)   Height: 160 cm (63\")   PainSc:   6   PainLoc: Comment: back and legs             Body mass index is 35.43 kg/m².    ECOG  (0) Fully active, able to carry on all predisease performance without " restriction    Physical Exam  Vitals and nursing note reviewed.   Constitutional:       General: She is not in acute distress.     Appearance: She is not diaphoretic.   HENT:      Head: Normocephalic and atraumatic.   Eyes:      General: No scleral icterus.        Right eye: No discharge.         Left eye: No discharge.      Conjunctiva/sclera: Conjunctivae normal.   Neck:      Thyroid: No thyromegaly.   Cardiovascular:      Rate and Rhythm: Normal rate and regular rhythm.      Heart sounds: Normal heart sounds.      No friction rub. No gallop.   Pulmonary:      Effort: Pulmonary effort is normal. No respiratory distress.      Breath sounds: No stridor. No wheezing.   Abdominal:      General: Bowel sounds are normal.      Palpations: Abdomen is soft. There is no mass.      Tenderness: There is no abdominal tenderness. There is no guarding or rebound.   Musculoskeletal:         General: No tenderness. Normal range of motion.      Cervical back: Normal range of motion and neck supple.      Right lower leg: Edema present.      Left lower leg: Edema present.   Lymphadenopathy:      Cervical: No cervical adenopathy.   Skin:     General: Skin is warm.      Findings: No erythema or rash.   Neurological:      Mental Status: She is alert and oriented to person, place, and time.      Motor: No abnormal muscle tone.   Psychiatric:         Behavior: Behavior normal.       I have reexamined the patient and the results are consistent with the previously documented exam. Felisha Echols MD          RECENT LABS    WBC   Date Value Ref Range Status   10/17/2024 53.18 (C) 3.40 - 10.80 10*3/mm3 Final   06/20/2022 12.8 (H) 3.4 - 10.8 x10E3/uL Final     RBC   Date Value Ref Range Status   10/17/2024 4.55 3.77 - 5.28 10*6/mm3 Final   06/20/2022 4.58 3.77 - 5.28 x10E6/uL Final     Hemoglobin   Date Value Ref Range Status   10/17/2024 12.8 12.0 - 15.9 g/dL Final     Hematocrit   Date Value Ref Range Status   10/17/2024 41.9 34.0 -  46.6 % Final     MCV   Date Value Ref Range Status   10/17/2024 92.1 79.0 - 97.0 fL Final     MCH   Date Value Ref Range Status   10/17/2024 28.1 26.6 - 33.0 pg Final     MCHC   Date Value Ref Range Status   10/17/2024 30.5 (L) 31.5 - 35.7 g/dL Final     RDW   Date Value Ref Range Status   10/17/2024 14.1 12.3 - 15.4 % Final     RDW-SD   Date Value Ref Range Status   10/17/2024 46.4 37.0 - 54.0 fl Final     MPV   Date Value Ref Range Status   10/17/2024 9.5 6.0 - 12.0 fL Final     Platelets   Date Value Ref Range Status   10/17/2024 157 140 - 450 10*3/mm3 Final     Neutrophil %   Date Value Ref Range Status   10/17/2024 5.2 (L) 42.7 - 76.0 % Final     Lymphocyte %   Date Value Ref Range Status   10/17/2024 92.0 (H) 19.6 - 45.3 % Final     Monocyte %   Date Value Ref Range Status   10/17/2024 2.1 (L) 5.0 - 12.0 % Final     Eosinophil %   Date Value Ref Range Status   10/17/2024 0.5 0.3 - 6.2 % Final     Basophil %   Date Value Ref Range Status   10/17/2024 0.2 0.0 - 1.5 % Final     Immature Grans %   Date Value Ref Range Status   10/21/2021 0.2 0.0 - 0.5 % Final     Neutrophils, Absolute   Date Value Ref Range Status   10/17/2024 2.79 1.70 - 7.00 10*3/mm3 Final     Lymphocytes, Absolute   Date Value Ref Range Status   10/17/2024 48.94 (H) 0.70 - 3.10 10*3/mm3 Final     Monocytes, Absolute   Date Value Ref Range Status   10/17/2024 1.10 (H) 0.10 - 0.90 10*3/mm3 Final     Eosinophils, Absolute   Date Value Ref Range Status   10/17/2024 0.26 0.00 - 0.40 10*3/mm3 Final     Basophils, Absolute   Date Value Ref Range Status   10/17/2024 0.09 0.00 - 0.20 10*3/mm3 Final     Immature Grans, Absolute   Date Value Ref Range Status   10/21/2021 0.02 0.00 - 0.05 10*3/mm3 Final     nRBC   Date Value Ref Range Status   05/04/2023 0.1 0.0 - 0.2 /100 WBC Final       Lab Results   Component Value Date    GLUCOSE 97 10/15/2024    BUN 12 10/15/2024    CREATININE 1.11 (H) 10/15/2024    EGFRIFNONA 62 11/01/2021    BCR 10.8 10/15/2024    K  4.3 10/15/2024    CO2 28.7 10/15/2024    CALCIUM 9.5 10/15/2024    ALBUMIN 4.0 10/15/2024    AST 21 10/15/2024    ALT 13 10/15/2024         Assessment & Plan     CLL (chronic lymphocytic leukemia)  - CBC & Differential              ASSESSMENT:    Leukocytosis, unspecified type  - CBC & Differential        CLL/SLL presenting as leukocytosis with differential lymphocytosis since March 2022.  Reviewed the diagnosis in detail with patient and her daughter who is present today.  CT scans chest abdomen and pelvis do not show any evidence of lymphomatous process.  She has clinical stage 0 CLL.  Ongoing management.  Her leukocytosis ranges between 18 and 40,000..  CBC reviewed overall she remained stable without any B symptoms  Worsening leukocytosis likely secondary to steroids for her back pain.  She denies any B symptoms  Urinary tract infection: She is on antibiotics may be contributing to her leukocytosis for now we will continue to monitor.  Patient has mixed connective tissue disorder.  She will follow-up with her rheumatologist  Bilateral lower extremity swelling rule out DVTs.  Ultrasound was negative.  Asymptomatic now  Abdominal hernia: Declines surgical evaluation.     Discussion       CLL, we discussed that indications for treatment would include but not limited to, pancytopenia, systemic symptoms including fatigue, night sweats and weight loss.  Other indications treatment will include recurrent infections, organ dysfunction due to massive lymphadenopathy, doubling  of leukocytosis in less than 6 months.  We discussed that CLL is not curable but can respond to chemo immunotherapy and some molecular targeted agents which can result in remissions but this disease is faulted by multiple relapses over the course of time.  We also discussed that median survivorship with low-grade lymphoma in general is estimated anywhere from 10-12 years.  I also explained that this disease is heterogeneous.  Some patients may  succumb to the illness within a few days following diagnosis while some may have it for decades.  We discussed that lymphoproliferative diseases in general can affect the immune system so there is risk of Landon infections.  When the bone marrow is heavily involved there is risk of pancytopenia  which can result in blood, platelet transfusions and also predisposition to infections if the white count is affected.  There is also a risk of transformation to high-grade lymphoma which can result in significant symptoms and would require systemic chemo immunotherapy.  The risk of transformation varies with different types of lymphoma.  Also is not dependent on prior treatment or worsen by conservative management.      Plans     Reviewed her CBCs.  Her physical exam today does not show any significant changes  Biochemical test today, including LDH and uric acid level  Call for B symptoms which were reviewed  Reviewed CT scan chest abdomen and pelvis with contrast  Doppler formed for bilateral lower extremity edema was negative  Previous uric acid and HIV tests were negative   Continue surveillance  Complete her age-appropriate immunizations including pneumococcal vaccine.  Patient follow-up with her PCP  Follow-up 4 months  Reviewed signs and symptoms to monitor for between appointments and to call with questions or concerns  All questions answered       Patient verbalized understanding and is in agreement of the above plan.

## 2024-11-07 ENCOUNTER — LAB (OUTPATIENT)
Dept: LAB | Facility: HOSPITAL | Age: 74
End: 2024-11-07
Payer: MEDICARE

## 2024-11-07 DIAGNOSIS — E55.9 VITAMIN D DEFICIENCY: ICD-10-CM

## 2024-11-07 DIAGNOSIS — M81.0 OSTEOPOROSIS WITHOUT CURRENT PATHOLOGICAL FRACTURE, UNSPECIFIED OSTEOPOROSIS TYPE: ICD-10-CM

## 2024-11-07 LAB
25(OH)D3 SERPL-MCNC: 66.5 NG/ML (ref 30–100)
ALBUMIN SERPL-MCNC: 4.5 G/DL (ref 3.5–5.2)
ALBUMIN/GLOB SERPL: 2 G/DL
ALP SERPL-CCNC: 96 U/L (ref 39–117)
ALT SERPL W P-5'-P-CCNC: 10 U/L (ref 1–33)
ANION GAP SERPL CALCULATED.3IONS-SCNC: 12.1 MMOL/L (ref 5–15)
AST SERPL-CCNC: 23 U/L (ref 1–32)
BILIRUB SERPL-MCNC: 0.4 MG/DL (ref 0–1.2)
BUN SERPL-MCNC: 17 MG/DL (ref 8–23)
BUN/CREAT SERPL: 14.8 (ref 7–25)
CALCIUM SPEC-SCNC: 10 MG/DL (ref 8.6–10.5)
CHLORIDE SERPL-SCNC: 104 MMOL/L (ref 98–107)
CO2 SERPL-SCNC: 26.9 MMOL/L (ref 22–29)
CREAT SERPL-MCNC: 1.15 MG/DL (ref 0.57–1)
EGFRCR SERPLBLD CKD-EPI 2021: 49.8 ML/MIN/1.73
GLOBULIN UR ELPH-MCNC: 2.3 GM/DL
GLUCOSE SERPL-MCNC: 107 MG/DL (ref 65–99)
POTASSIUM SERPL-SCNC: 4.6 MMOL/L (ref 3.5–5.2)
PROT SERPL-MCNC: 6.8 G/DL (ref 6–8.5)
SODIUM SERPL-SCNC: 143 MMOL/L (ref 136–145)

## 2024-11-07 PROCEDURE — 36415 COLL VENOUS BLD VENIPUNCTURE: CPT

## 2024-11-07 PROCEDURE — 82306 VITAMIN D 25 HYDROXY: CPT

## 2024-11-07 PROCEDURE — 80053 COMPREHEN METABOLIC PANEL: CPT

## 2024-11-12 ENCOUNTER — TELEPHONE (OUTPATIENT)
Dept: SLEEP MEDICINE | Facility: CLINIC | Age: 74
End: 2024-11-12
Payer: MEDICARE

## 2024-11-12 NOTE — TELEPHONE ENCOUNTER
Called the patient to schedule her compliance follow up. No answer. Left voicemail. Patient was set up on the machine on 11/12/24. Patient is needing to be seen between 12/13/24 - 2/10/25.

## 2024-11-13 ENCOUNTER — TELEPHONE (OUTPATIENT)
Dept: SLEEP MEDICINE | Facility: CLINIC | Age: 74
End: 2024-11-13
Payer: MEDICARE

## 2024-11-13 NOTE — TELEPHONE ENCOUNTER
Returned patient's call and left message that we need to get a 31-90 compliance follow-up scheduled.

## 2024-11-15 ENCOUNTER — OFFICE VISIT (OUTPATIENT)
Dept: ENDOCRINOLOGY | Facility: CLINIC | Age: 74
End: 2024-11-15
Payer: MEDICARE

## 2024-11-15 VITALS
DIASTOLIC BLOOD PRESSURE: 72 MMHG | HEART RATE: 80 BPM | WEIGHT: 195 LBS | BODY MASS INDEX: 34.55 KG/M2 | SYSTOLIC BLOOD PRESSURE: 110 MMHG | OXYGEN SATURATION: 97 % | HEIGHT: 63 IN

## 2024-11-15 DIAGNOSIS — M35.1 MIXED CONNECTIVE TISSUE DISEASE: ICD-10-CM

## 2024-11-15 DIAGNOSIS — N18.31 STAGE 3A CHRONIC KIDNEY DISEASE: ICD-10-CM

## 2024-11-15 DIAGNOSIS — K21.9 GASTROESOPHAGEAL REFLUX DISEASE WITHOUT ESOPHAGITIS: ICD-10-CM

## 2024-11-15 DIAGNOSIS — M81.0 OSTEOPOROSIS WITHOUT CURRENT PATHOLOGICAL FRACTURE, UNSPECIFIED OSTEOPOROSIS TYPE: Primary | ICD-10-CM

## 2024-11-15 DIAGNOSIS — E55.9 VITAMIN D DEFICIENCY: ICD-10-CM

## 2024-11-15 DIAGNOSIS — I73.00 RAYNAUD'S DISEASE WITHOUT GANGRENE: ICD-10-CM

## 2024-11-15 RX ORDER — ZOLEDRONIC ACID 0.05 MG/ML
5 INJECTION, SOLUTION INTRAVENOUS ONCE
Qty: 100 ML | Refills: 0 | Status: SHIPPED | OUTPATIENT
Start: 2024-11-15 | End: 2024-11-15

## 2024-11-15 RX ORDER — FLUTICASONE PROPIONATE 50 MCG
2 SPRAY, SUSPENSION (ML) NASAL DAILY
COMMUNITY
Start: 2024-11-07

## 2024-11-15 NOTE — PROGRESS NOTES
-----------------------------------------------------------------  ENDOCRINE CLINIC NOTE  -----------------------------------------------------------------        PATIENT NAME: Barbara Coelho  PATIENT : 1950 AGE: 74 y.o.  MRN NUMBER: [id number]  PRIMARY CARE: Liane Tran APRN    ==========================================================================    CHIEF COMPLAINT: Osteoporosis   DATE OF SERVICE: 11/15/24    ==========================================================================    HPI / SUBJECTIVE    74 y.o. female is seen in the clinic today for evaluation of osteoporosis.  Diagnosed with osteopenia around 15 years ago and had a DEXA scan in May 2023 which showed osteoporosis.  Therapy so far:  Reclast infusion on 2023, tolerated well without any side effect  No other reported therapy for osteoporosis in the past.  Patient currently on calcium and vitamin D supplementation.  Independent for ADLs and IADLs.  Patient says the last time have been complaining of back pain found to have radiculopathy without any evidence of fracture.  Patient did had fall but no reported fracture.  Menarche at age 15 with 1 biological daughter and natural menopause at age of 38.  Patient was on hormone replacement therapy till age 54.  No underlying history of hypercalcemia or nephrolithiasis.  Patient is a non-smoker and no alcohol use.  Patient to have a history significant for connective tissue disorder and Raynaud's disease but never been on steroid therapy.  No underlying history of thyroid disorder.  Family history significant for osteoporosis, younger sister.  No plan for dental workup.  Patient have a history significant for acid reflux, currently on medication.    ==========================================================================                                                PAST MEDICAL HISTORY    Past Medical History:   Diagnosis Date    Allergic 1967    take allergy shots     Allergic rhinitis     on allergy shots    Asthma     ALLERGIC    Back pain     Low    Bladder infection     Recurrent Bladder Infections    Cataract 2017    Cholelithiasis 2019    surgery    CLL (chronic lymphocytic leukemia)     Colon polyp 2023    Deep vein thrombosis 1989    Difficulty walking     Diverticulitis     Elevated laboratory test result 05/2023    elevated wbc/  elevated lymphs/ gfr and creatine    GERD (gastroesophageal reflux disease)     EGD 12/16 [facility] - reflux, HH. no barretts    Hammer toe     Hearing loss     High arches     History of DVT of lower extremity     left leg    Ingrown toenail     Knee pain, right     Leg pain     Low back pain 1990    had back surgery    Mixed connective tissue disease 01/2019    Dr. Thakur    CARITO (obstructive sleep apnea) 2018    Osteopenia 03/28/2018    calculated frax - 11/3%    Osteoporosis     Pinched nerve in neck     [facility] health care 03/30/2023    Raynaud disease     Rheumatoid arthritis 2018    Staph infection     Urinary incontinence     Vitamin D deficiency     Wears contact lenses     Wears prescription eyeglasses        ==========================================================================    PAST SURGICAL HISTORY    Past Surgical History:   Procedure Laterality Date    BACK SURGERY  1995    Back (L5/S1)    BARIATRIC SURGERY  1985    gastric bypass    BREAST EXCISIONAL BIOPSY Left 1985    BREAST SURGERY  1990    lump removed    CHOLECYSTECTOMY  2019    CHOLECYSTECTOMY WITH INTRAOPERATIVE CHOLANGIOGRAM N/A 04/26/2018    Procedure: CHOLECYSTECTOMY LAPAROSCOPIC INTRAOPERATIVE CHOLANGIOGRAM;  Surgeon: Lit Morelos MD;  Location: [facility] OR;  Service: General    COLONOSCOPY  2016    GASTRIC BYPASS      HIGH GASTRIC BYPASS, 1980's    HEMORRHOID BANDING  04/30/2024    HERNIA REPAIR      JOINT REPLACEMENT  2012,2020    left knee,right knee    OTHER SURGICAL HISTORY  1985    Stomach Stabled    REPLACEMENT TOTAL KNEE Left 2012    right  2021     SINUS SURGERY      x3    TOTAL KNEE ARTHROPLASTY Right 10/29/2021    Procedure: TOTAL KNEE ARTHROPLASTY RIGHT;  Surgeon: Emeka Douglas MD;  [facility];  Service: Orthopedics;  Laterality: Right;    TUBAL ABDOMINAL LIGATION  1980    VENTRAL/INCISIONAL HERNIA REPAIR N/A 04/22/2024    Procedure: VENTRAL/INCISIONAL HERNIA REPAIR;  Surgeon: Kimo Moon MD;  Location: [facility] MAIN OR;  Service: General;  Laterality: N/A;    WISDOM TOOTH EXTRACTION         ==========================================================================    FAMILY HISTORY    Family History   Problem Relation Age of Onset    Cancer Other         BREAST, LUNG, OVARIAN, KIDNEY    Kidney cancer Other     Lung cancer Other         pGF as well    Cancer Mother         kidny    Hearing loss Mother     Cancer Father         ludmila    Mental illness Father         paranoid schizophrenia    Alcohol abuse Father     Breast cancer Sister 30    Connective Tissue Disease Sister     Hypertension Sister         in lungs    Diabetes Sister     Osteoporosis Sister         all 3 of my sister has osyeoporosis    Sleep apnea Sister     Cancer Sister         brest    Osteoporosis Sister     Sleep apnea Sister     Cancer Paternal Grandmother         stomach    Cancer Paternal Grandfather         lung    Osteoporosis Sister     Sleep apnea Sister     Ovarian cancer Neg Hx        ==========================================================================    SOCIAL HISTORY    Social History     Socioeconomic History    Marital status:     Number of children: 1    Years of education: 12    Highest education level: Some college, no degree   Tobacco Use    Smoking status: Never     Passive exposure: Never    Smokeless tobacco: Never   Vaping Use    Vaping status: Never Used   Substance and Sexual Activity    Alcohol use: Yes     Comment: occasional    Drug use: Never    Sexual activity: Not Currently     Partners: Male        ==========================================================================    MEDICATIONS      Current Outpatient Medications:     albuterol sulfate  (90 Base) MCG/ACT inhaler, TAKE 2 PUFFS BY MOUTH EVERY 4 HOURS AS NEEDED FOR WHEEZE, Disp: 8 g, Rfl: 1    azelastine (OPTIVAR) 0.05 % ophthalmic solution, , Disp: , Rfl:     Biotin 1 MG capsule, Take 1 capsule by mouth Daily., Disp: , Rfl:     dapagliflozin Propanediol (Farxiga) 10 MG tablet, Take 10 mg by mouth Daily. Indications: Chronic Kidney Disease, Disp: 90 tablet, Rfl: 3    fluticasone (FLONASE) 50 MCG/ACT nasal spray, 2 sprays by Each Nare route Daily., Disp: , Rfl:     Fluticasone-Salmeterol (ADVAIR/WIXELA) 500-50 MCG/ACT DISKUS, INHALE 1 PUFF TWICE A DAY, Disp: 180 each, Rfl: 1    gabapentin (NEURONTIN) 300 MG capsule, Take 1 capsule by mouth 3 (Three) Times a Day for 90 days., Disp: 270 capsule, Rfl: 0    hydroxychloroquine (PLAQUENIL) 200 MG tablet, Take 1 tablet by mouth Every 12 (Twelve) Hours., Disp: , Rfl:     Magnesium Oxide -Mg Supplement 500 MG capsule, TAKE 1 CAPSULE BY MOUTH ONCE A DAY AT BEDTIME FOR 30 DAYS, Disp: , Rfl:     meloxicam (Mobic) 7.5 MG tablet, Take 1 tablet by mouth Daily., Disp: 30 tablet, Rfl: 1    NIFEdipine XL (PROCARDIA XL) 30 MG 24 hr tablet, Take 1 tablet by mouth Daily., Disp: 90 tablet, Rfl: 0    Polyethylene Glycol 3350 (MIRALAX PO), Take  by mouth., Disp: , Rfl:     VITAMIN D, CHOLECALCIFEROL, PO, Take 1 capsule by mouth Daily., Disp: , Rfl:     traZODone (DESYREL) 100 MG tablet, TAKE 1 TABLET BY MOUTH EVERY NIGHT FOR 90 DAYS., Disp: 90 tablet, Rfl: 1    zoledronic acid (RECLAST) 5 MG/100ML solution infusion, Infuse 100 mL into a venous catheter 1 (One) Time for 1 dose., Disp: 100 mL, Rfl: 0  No current facility-administered medications for this visit.    Facility-Administered Medications Ordered in Other Visits:     Chlorhexidine Gluconate Cloth 2 % pads, , Apply externally, Once, Emeka Douglas MD     mupirocin (BACTROBAN) 2 % nasal ointment, , Nasal, BID, Emeka Douglas MD ЮЛ[place]Я    ==========================================================================    ALLERGIES    Allergies   Allergen Reactions    Celebrex [Celecoxib] Hives     HIVES     Codeine Rash    Oxycodone Rash and Hives    Sulfa Antibiotics Itching     ITCHING        ==========================================================================    OBJECTIVE    Vitals:    11/15/24 0953   BP: 110/72   Pulse: 80   SpO2: 97%       Body mass index is 34.54 kg/m².     General: Alert, cooperative, no acute distress  Lungs: Clear to auscultation bilaterally, respirations unlabored  Heart: Regular rate and rhythm, S1 and S2 normal, no murmur, rub or gallop  Abdomen: Soft, NT, ND and Bowel sounds Positive  Extremities:  Extremities normal, atraumatic, no cyanosis or edema    ==========================================================================    LAB EVALUATION    Lab Results   Component Value Date    GLUCOSE 107 (H) 11/07/2024    BUN 17 11/07/2024    CREATININE 1.15 (H) 11/07/2024    EGFRIFNONA 62 11/01/2021    BCR 14.8 11/07/2024    K 4.6 11/07/2024    CO2 26.9 11/07/2024    CALCIUM 10.0 11/07/2024    ALBUMIN 4.5 11/07/2024    AST 23 11/07/2024    ALT 10 11/07/2024    CHOL 178 10/15/2024    TRIG 85 10/15/2024    HDL 65 (H) 10/15/2024    LDL 97 10/15/2024     Lab Results   Component Value Date    HGBA1C 5.80 (H) 10/15/2024    HGBA1C 5.80 (H) 11/29/2023    HGBA1C 5.80 (H) 08/01/2022     Lab Results   Component Value Date    CREATININE 1.15 (H) 11/07/2024     Lab Results   Component Value Date    TSH 1.980 10/15/2024    FREET4 1.23 11/10/2023       ==========================================================================    ASSESSMENT AND PLAN    #Age-related osteoporosis without pathological fracture  #Vitamin D deficiency  #CKD IIIa  #Mixed connective tissue disorder  #Raynaud's disease  #GERD    - Patient kidney function still show CKD stage III a, there  "is no contraindication for using Reclast therapy at this time  - Will plan for 1 more dose of Reclast therapy and repeat DEXA scan in 1 year time  - Continue calcium and vitamin D supplementation  - Repeat blood work and clinical follow-up in 1 year time    Return to clinic: one year time    Entire assessment and plan was discussed and counseled the patient in detail to which patient verbalized understanding and agreed with care.  Answered all queries and concerns.    This note was created using voice recognition software and is inherently subject to errors including those of syntax and \"sound-alike\" substitutions which may escape proofreading.  In such instances, original meaning may be extrapolated by contextual derivation.    ==========================================================================    INFORMATION PROVIDED TO PATIENT    Patient Instructions   Please,    - Continue calcium supplementation: Can take either calcium carbonate or calcium citrate  Calcium carbonate -600 mg 1 pill twice a day with meal  Calcium citrate-600 mg 1 pill twice a day with/without meal  (Can choose to be on either of those supplement but do not take both of them)    - Continue vitamin D supplementation 5000 units daily with meal.    - Please plan for Reclast therapy infusion.    Follow-up in my clinic back again in 1 year time.  Plan for repeat DEXA scan before next visit.    Thank you for your visit today.    If you have any questions or concerns please feel free to reach out of the office.       ==========================================================================  Jonathan Jarrett MD  Department of Endocrine, Diabetes and Metabolism  [facility], [place]  ==========================================================================    "

## 2024-11-15 NOTE — PATIENT INSTRUCTIONS
Please,    - Continue calcium supplementation: Can take either calcium carbonate or calcium citrate  Calcium carbonate -600 mg 1 pill twice a day with meal  Calcium citrate-600 mg 1 pill twice a day with/without meal  (Can choose to be on either of those supplement but do not take both of them)    - Continue vitamin D supplementation 5000 units daily with meal.    - Please plan for Reclast therapy infusion.    Follow-up in my clinic back again in 1 year time.  Plan for repeat DEXA scan before next visit.    Thank you for your visit today.    If you have any questions or concerns please feel free to reach out of the office.

## 2024-11-19 ENCOUNTER — TRANSCRIBE ORDERS (OUTPATIENT)
Dept: ADMINISTRATIVE | Facility: HOSPITAL | Age: 74
End: 2024-11-19
Payer: MEDICARE

## 2024-11-19 DIAGNOSIS — M81.0 OSTEOPOROSIS, UNSPECIFIED OSTEOPOROSIS TYPE, UNSPECIFIED PATHOLOGICAL FRACTURE PRESENCE: Primary | ICD-10-CM

## 2024-11-19 RX ORDER — SODIUM CHLORIDE 9 MG/ML
250 INJECTION, SOLUTION INTRAVENOUS ONCE
OUTPATIENT
Start: 2024-12-06

## 2024-11-19 RX ORDER — ZOLEDRONIC ACID 0.05 MG/ML
5 INJECTION, SOLUTION INTRAVENOUS ONCE
OUTPATIENT
Start: 2024-12-06

## 2024-12-03 ENCOUNTER — OFFICE (OUTPATIENT)
Age: 74
End: 2024-12-03
Payer: COMMERCIAL

## 2024-12-03 ENCOUNTER — OFFICE (OUTPATIENT)
Dept: URBAN - METROPOLITAN AREA CLINIC 64 | Facility: CLINIC | Age: 74
End: 2024-12-03
Payer: COMMERCIAL

## 2024-12-03 VITALS
DIASTOLIC BLOOD PRESSURE: 76 MMHG | HEART RATE: 76 BPM | HEIGHT: 63 IN | SYSTOLIC BLOOD PRESSURE: 131 MMHG | DIASTOLIC BLOOD PRESSURE: 76 MMHG | SYSTOLIC BLOOD PRESSURE: 131 MMHG | SYSTOLIC BLOOD PRESSURE: 131 MMHG | DIASTOLIC BLOOD PRESSURE: 76 MMHG | DIASTOLIC BLOOD PRESSURE: 76 MMHG | HEART RATE: 76 BPM | HEIGHT: 63 IN | WEIGHT: 198.8 LBS | HEART RATE: 76 BPM | HEIGHT: 63 IN | HEIGHT: 63 IN | HEIGHT: 63 IN | WEIGHT: 198.8 LBS | SYSTOLIC BLOOD PRESSURE: 131 MMHG | SYSTOLIC BLOOD PRESSURE: 131 MMHG | DIASTOLIC BLOOD PRESSURE: 76 MMHG | SYSTOLIC BLOOD PRESSURE: 131 MMHG | SYSTOLIC BLOOD PRESSURE: 131 MMHG | WEIGHT: 198.8 LBS | HEART RATE: 76 BPM | DIASTOLIC BLOOD PRESSURE: 76 MMHG | HEART RATE: 76 BPM | HEIGHT: 63 IN | HEIGHT: 63 IN | WEIGHT: 198.8 LBS | HEART RATE: 76 BPM | HEART RATE: 76 BPM | WEIGHT: 198.8 LBS | WEIGHT: 198.8 LBS | DIASTOLIC BLOOD PRESSURE: 76 MMHG | WEIGHT: 198.8 LBS

## 2024-12-03 DIAGNOSIS — R14.0 ABDOMINAL DISTENSION (GASEOUS): ICD-10-CM

## 2024-12-03 DIAGNOSIS — R10.11 RIGHT UPPER QUADRANT PAIN: ICD-10-CM

## 2024-12-03 DIAGNOSIS — K59.00 CONSTIPATION, UNSPECIFIED: ICD-10-CM

## 2024-12-03 PROCEDURE — 99213 OFFICE O/P EST LOW 20 MIN: CPT

## 2024-12-05 RX ORDER — SODIUM CHLORIDE 9 MG/ML
20 INJECTION, SOLUTION INTRAVENOUS ONCE
OUTPATIENT
Start: 2024-12-10

## 2024-12-05 RX ORDER — ZOLEDRONIC ACID 0.05 MG/ML
5 INJECTION, SOLUTION INTRAVENOUS ONCE
OUTPATIENT
Start: 2024-12-10

## 2024-12-09 RX ORDER — MELOXICAM 7.5 MG/1
7.5 TABLET ORAL DAILY
Qty: 30 TABLET | Refills: 1 | Status: SHIPPED | OUTPATIENT
Start: 2024-12-09

## 2024-12-10 ENCOUNTER — HOSPITAL ENCOUNTER (OUTPATIENT)
Dept: ONCOLOGY | Facility: HOSPITAL | Age: 74
Discharge: HOME | End: 2024-12-10
Admitting: INTERNAL MEDICINE
Payer: MEDICARE

## 2024-12-10 VITALS
SYSTOLIC BLOOD PRESSURE: 147 MMHG | HEIGHT: 63 IN | RESPIRATION RATE: 14 BRPM | WEIGHT: 196.6 LBS | OXYGEN SATURATION: 98 % | BODY MASS INDEX: 34.84 KG/M2 | HEART RATE: 89 BPM | DIASTOLIC BLOOD PRESSURE: 76 MMHG | TEMPERATURE: 97.7 F

## 2024-12-10 DIAGNOSIS — M81.8 OTHER OSTEOPOROSIS, UNSPECIFIED PATHOLOGICAL FRACTURE PRESENCE: Primary | ICD-10-CM

## 2024-12-10 DIAGNOSIS — M81.0 AGE-RELATED OSTEOPOROSIS WITHOUT CURRENT PATHOLOGICAL FRACTURE: ICD-10-CM

## 2024-12-10 LAB
ALBUMIN SERPL-MCNC: 4.2 G/DL (ref 3.5–5.2)
ALBUMIN/GLOB SERPL: 2.2 G/DL
ALP SERPL-CCNC: 109 U/L (ref 39–117)
ALT SERPL W P-5'-P-CCNC: 17 U/L (ref 1–33)
ANION GAP SERPL CALCULATED.3IONS-SCNC: 11.7 MMOL/L (ref 5–15)
AST SERPL-CCNC: 25 U/L (ref 1–32)
BILIRUB SERPL-MCNC: 0.2 MG/DL (ref 0–1.2)
BUN SERPL-MCNC: 18 MG/DL (ref 8–23)
BUN/CREAT SERPL: 13.6 (ref 7–25)
CALCIUM SPEC-SCNC: 9.3 MG/DL (ref 8.6–10.5)
CHLORIDE SERPL-SCNC: 105 MMOL/L (ref 98–107)
CO2 SERPL-SCNC: 26.3 MMOL/L (ref 22–29)
CREAT SERPL-MCNC: 1.32 MG/DL (ref 0.57–1)
EGFRCR SERPLBLD CKD-EPI 2021: 42.2 ML/MIN/1.73
GLOBULIN UR ELPH-MCNC: 1.9 GM/DL
GLUCOSE SERPL-MCNC: 114 MG/DL (ref 65–99)
MAGNESIUM SERPL-MCNC: 2.3 MG/DL (ref 1.6–2.4)
PHOSPHATE SERPL-MCNC: 3.1 MG/DL (ref 2.5–4.5)
POTASSIUM SERPL-SCNC: 3.9 MMOL/L (ref 3.5–5.2)
PROT SERPL-MCNC: 6.1 G/DL (ref 6–8.5)
SODIUM SERPL-SCNC: 143 MMOL/L (ref 136–145)

## 2024-12-10 PROCEDURE — 25010000002 ZOLEDRONIC ACID 5 MG/100ML SOLUTION: Performed by: INTERNAL MEDICINE

## 2024-12-10 PROCEDURE — 96365 THER/PROPH/DIAG IV INF INIT: CPT

## 2024-12-10 PROCEDURE — 83735 ASSAY OF MAGNESIUM: CPT | Performed by: INTERNAL MEDICINE

## 2024-12-10 PROCEDURE — 96374 THER/PROPH/DIAG INJ IV PUSH: CPT

## 2024-12-10 PROCEDURE — 80053 COMPREHEN METABOLIC PANEL: CPT | Performed by: INTERNAL MEDICINE

## 2024-12-10 PROCEDURE — 84100 ASSAY OF PHOSPHORUS: CPT | Performed by: INTERNAL MEDICINE

## 2024-12-10 RX ORDER — SODIUM CHLORIDE 9 MG/ML
20 INJECTION, SOLUTION INTRAVENOUS ONCE
Status: CANCELLED | OUTPATIENT
Start: 2024-12-10

## 2024-12-10 RX ORDER — SODIUM CHLORIDE 9 MG/ML
20 INJECTION, SOLUTION INTRAVENOUS ONCE
Status: DISCONTINUED | OUTPATIENT
Start: 2024-12-10 | End: 2024-12-11 | Stop reason: HOSPADM

## 2024-12-10 RX ORDER — ZOLEDRONIC ACID 0.05 MG/ML
5 INJECTION, SOLUTION INTRAVENOUS ONCE
Status: COMPLETED | OUTPATIENT
Start: 2024-12-10 | End: 2024-12-10

## 2024-12-10 RX ORDER — ZOLEDRONIC ACID 0.05 MG/ML
5 INJECTION, SOLUTION INTRAVENOUS ONCE
Status: CANCELLED | OUTPATIENT
Start: 2024-12-10

## 2024-12-10 RX ADMIN — ZOLEDRONIC ACID 5 MG: 0.05 INJECTION, SOLUTION INTRAVENOUS at 13:15

## 2025-01-15 ENCOUNTER — OFFICE VISIT (OUTPATIENT)
Dept: SLEEP MEDICINE | Facility: CLINIC | Age: 75
End: 2025-01-15
Payer: MEDICARE

## 2025-01-15 VITALS
SYSTOLIC BLOOD PRESSURE: 137 MMHG | HEIGHT: 63 IN | BODY MASS INDEX: 34.38 KG/M2 | DIASTOLIC BLOOD PRESSURE: 62 MMHG | WEIGHT: 194 LBS | OXYGEN SATURATION: 96 % | HEART RATE: 85 BPM

## 2025-01-15 DIAGNOSIS — K21.9 GASTROESOPHAGEAL REFLUX DISEASE, UNSPECIFIED WHETHER ESOPHAGITIS PRESENT: ICD-10-CM

## 2025-01-15 DIAGNOSIS — G47.33 OBSTRUCTIVE SLEEP APNEA, ADULT: Primary | ICD-10-CM

## 2025-01-15 DIAGNOSIS — E66.811 CLASS 1 OBESITY WITH SERIOUS COMORBIDITY AND BODY MASS INDEX (BMI) OF 34.0 TO 34.9 IN ADULT, UNSPECIFIED OBESITY TYPE: ICD-10-CM

## 2025-01-15 DIAGNOSIS — Z63.4 BEREAVEMENT: ICD-10-CM

## 2025-01-15 PROCEDURE — G0463 HOSPITAL OUTPT CLINIC VISIT: HCPCS

## 2025-01-15 SDOH — SOCIAL STABILITY - SOCIAL INSECURITY: DISSAPEARANCE AND DEATH OF FAMILY MEMBER: Z63.4

## 2025-01-15 NOTE — PROGRESS NOTES
[address] [place] [address], [address]  [place] [address]  Phone   Fax         SLEEP CLINIC FOLLOW UP PROGRESS NOTE.    Barbara Coelho  [id number]   1950  74 y.o.  female      PCP: Liane Tran APRN      Date of visit: 1/15/2025    Chief Complaint   Patient presents with    Sleep Apnea       Medications and allergies are reviewed by me and documented in the encounter.     SOCIAL (habits pertaining to sleep medicine)  History tobacco use:No   History of alcohol use: 0 per week  Caffeine use: 3 beverages/d    HPI:  This is a 74 y.o. PMHx GERD Here for management of obstructive sleep apnea (AHI 10.6/hr overall 47.8/hr during REM on 4/15/2019 PSG done in [facility]; received new device 11/12/2024).Patient is using positive airway pressure therapy and the symptoms of sleep apnea have improved significantly on the therapy. Normally patient goes to bed at 1130 PM and wakes up at 7 AM .  The patient wakes up 1 time(s) during the night and has no problem going back to sleep.  Feels refreshed after waking up.     Overall patient's Impression of their PAP therapy is: Going great  Got new device from DME as the reason for repeat check in for therapy and compliance  She got the new airtouch N3Oi  by descriptor she provides me doing great with this mask  No air pressure issues   She had rain out one night with PAP - her humidity is set to auto by DME      Compliance data as reviewed by me with patient room today:  Date range 12/10/2020 24-28 2025  Overall use 100%  4-hour wander 97%  Average days used 7 hours 1 minute  Device AirSense 11 AutoSet  Settings 6/20 cm H2O  Humidity Auto  EPR 2 full-time  95th percentile pressure is 9.1 cm H2O  Maximum pressures 10.1 cm H2O  95th percentile leak is 21.8 LPM  AHI 0.9-at goal  DME: [place]  Mask used: AirTouch N3Oi by descriptor      -Bereavement  She states her sister just passed away over new years due to pneumonia/bronchiolitis   She states she  "has a strong social support system at home  She denies SI   She has not discussed with her PCP  She is going to her sisters  in 2 days in [place] plans to bring her PAP with her     - GERD   Says it acts up sometimes unable to identify any food triggers  states overall well controlled  Not disrupting her sleep       - Obesity geriatric female  Body mass index is 34.37 kg/m².  Wt Readings from Last 3 Encounters:   01/15/25 88 kg (194 lb)   12/10/24 89.2 kg (196 lb 9.6 oz)   11/15/24 88.5 kg (195 lb)       - Last seen in sleep clinic~4 months ago on 2024 AHI 0.6, DME [place], nasal mask at that time I instructed 1 year follow-up  -2024 sleep staff documentation apparently another compliance follow-up needed as the patient received a new device on 2024    REVIEW OF SYSTEMS:   Is negative unless otherwise noted in HPI  Fleming Sleepiness Scale :Total score: 3     Disclaimer History: The above history is based on this sleep physician's in room encounter with the patient. Pre encounter self administered questionnaires are taken into consideration and discussed with patient for any discordance. The above documentation by this sleep physician is the most accurate clinical information determined by in room sleep physician encounter with patient.     PHYSICAL EXAMINATION:  Vitals:    01/15/25 0900   BP: 137/62   BP Location: Left arm   Patient Position: Sitting   Pulse: 85   SpO2: 96%   Weight: 88 kg (194 lb)   Height: 160 cm (63\")    Body mass index is 34.37 kg/m².   CONSTITUTIONAL: Well appearing, in no overt pain or respiratory distress   NOSE: No septal defect  RESP SYSTEM:  No overt respiratory distress, speaks in clear sentences without dyspnea, no accessory muscle use  CARDIOVASULAR: No edema noted  NEURO: Oriented x 3, no gross focal deficits   Psychiatric: She is very pleasant, she started crying when discussing her sister, she was consolable, her affect is full range and appropriate, she " is receptive to counseling and goal oriented    Compliance data as reviewed by me with patient room today:  Date range 12/10/2020 24-28 2025  Overall use 100%  4-hour wander 97%  Average days used 7 hours 1 minute  Device AirSense 11 AutoSet  Settings 6/20 cm H2O  Humidity Auto  EPR 2 full-time  95th percentile pressure is 9.1 cm H2O  Maximum pressures 10.1 cm H2O  95th percentile leak is 21.8 LPM  AHI 0.9-at goal  DME: [place]  Mask used: AirTouch N3Oi by descriptor    ASSESSMENT AND PLAN:  Obstructive sleep apnea ( G 47.33).    -Specific Changes made by me today:  I. After review of compliance data, in visit clinical correlation, and through shared decision making:     I had sleep staff log in to airview  I watched staff and instructed staff to change humidity to manual set at level 4   Counseled patient on how to address rain out - benign with above humidity setting instructions  Wrote order to DME to ensure humidity on manual  I also requested sleep staff tag her device to my airview     II.  She prefers to stay annual follow up I see no reason to rashida her otherwise with her AHI at goal. Counseled patient to follow-up with me in 1 year for therapy review.  Counseled may request sooner follow-up to sleep clinic anytime the patient feels necessary.  The symptoms of sleep apnea have improved with the device and the treatment.  Patient's compliance with the device is excellent for treatment of sleep apnea.  I have independently reviewed the smart card down load and discussed with the patient the download data and encouarged the patient to continue to use the device.The residual AHI is acceptable. The device is benefiting the patient and the device is medically necessary.  Without proper control of sleep apnea and good compliance there is a increased risk for hypertension, diabetes mellitus and nonrestorative sleep with hypersomnia which can increase risk for motor vehicle accidents.  Untreated sleep apnea is also a  risk factor for development of atrial fibrillation, pulmonary hypertension, insulin resistance and stroke. The patient is also instructed to get the supplies from the [facility] and and change them on a regular basis.  A prescription for supplies has been sent to the [facility].  I have also discussed the good sleep hygiene habits and adequate amount of sleep needed for good health.  Obesity, with BMI is Body mass index is 34.37 kg/m².. Counseled weight loss will be beneficial for reduction in severity of sleep apnea, healthy diet/exercise to achieve same, follow up with primary care physician for serial monitoring and to further guide management.  GERD, counseled GERD is known intrinsic disruptors of sleep.  Counseled patient lifestyle modifications first-line avoid eating near bedtime, attempt identified known food triggers (commonly red sauces, spicy foods, peppermint, chocolate).  Counseled follow-up with PCP for continued monitoring of same.  Bereavement, see HPI psychiatric exam.  Follow-up with PCP for continued monitoring.  Request my clinical notes forwarded to the patient's PCP listed in the EMR.       Follow up in 1 year. Patient's questions were answered.        EMR Dragon/Transcription disclaimer:   Much of this encounter note is an electronic transcription/translation of spoken language to printed text. The electronic translation of spoken language may permit erroneous, or at times, nonsensical words or phrases to be inadvertently transcribed; Although I have reviewed the note for such errors, some may still exist.       NPI #: [id number]    Tanesha Bautista, DO  Sleep Medicine  [facility]  01/15/25

## 2025-02-10 RX ORDER — MELOXICAM 7.5 MG/1
7.5 TABLET ORAL DAILY
Qty: 30 TABLET | Refills: 1 | Status: SHIPPED | OUTPATIENT
Start: 2025-02-10

## 2025-02-20 NOTE — PROGRESS NOTES
Hematology/Oncology Outpatient Follow Up    PATIENT NAME:Barbara Coelho  :1950  MRN: [id number]  PRIMARY CARE PHYSICIAN: Liane Tran APRN  REFERRING PHYSICIAN: Liane Tran AP*    Chief Complaint   Patient presents with    Follow-up     CLL (chronic lymphocytic leukemia)            HISTORY OF PRESENT ILLNESS:     73-year-old female has referred secondary to leukocytosis with differential lymphocytosis.  Patient was noted in .  Patient denies any fevers or chills or upper respiratory tract infection.  Review of her CBC indicates that her white count has ranged around 23-24,000 normal hemoglobin and normal platelets but her differentials have been 8% neutrophils 85% lymphocytes with an ANC of 2.1 and absolute lymphocyte count of 20.  Back in 2022 her white count was 12.8, Hemoglobin 13.2 platelets 211 and absolute lymphocyte count was 9600.  Patient states that she is fully active and denies any night sweats, weight loss or fatigue symptoms.     Patient has a history of mixed connective tissue disease.  Patient is now retired. Patient is . She has one child.     There is family hx of kidney cancer in her mother, breast cancer breast cancer 30,  father had lung cancer, paternal GM with stomach cancer, paternal GF with lung cancer     Patient does not smoke, drinks occasional etoh     2023: Patient had peripheral blood flow cytometry which showed chronic lymphocytic leukemia B-cell, CD38 negative and CD20 positive.  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  CLL panel by FISH was normal.  Chemistry panel BUN was 17 creatinine was 1, LDH was 206 normal uric acid was 6.8 normal is up to 5.7.  Peripheral smear showed numerous smudge cells  7/3/2023: Patient had CT scan of the chest, abdomen and pelvis, no significant lymphadenopathy patient had CT scan of the chest, abdomen and pelvis to suggest  lymphomatous process, supraumbilical midline ventral hernia containing omental fat without evidence of incarceration.  Past Medical History:   Diagnosis Date    Allergic 1967    take allergy shots    Allergic rhinitis     on allergy shots    Asthma     ALLERGIC    Back pain     Low    Bladder infection     Recurrent Bladder Infections    Cataract 2017    Cholelithiasis 2019    surgery    CLL (chronic lymphocytic leukemia)     Colon polyp 2023    Deep vein thrombosis 1989    Difficulty walking     Diverticulitis     Elevated laboratory test result 05/2023    elevated wbc/  elevated lymphs/ gfr and creatine    GERD (gastroesophageal reflux disease)     EGD 12/16 [facility] - reflux, HH. no barretts    Hammer toe     Hearing loss     High arches     History of DVT of lower extremity     left leg    Ingrown toenail     Knee pain, right     Leg pain     Low back pain 1990    had back surgery    Mixed connective tissue disease 01/2019    Dr. Thakur    CARITO (obstructive sleep apnea) 2018    Osteopenia 03/28/2018    calculated frax - 11/3%    Osteoporosis     Pinched nerve in neck     [facility] health care 03/30/2023    Raynaud disease     Rheumatoid arthritis 2018    Staph infection     Urinary incontinence     Vitamin D deficiency     Wears contact lenses     Wears prescription eyeglasses        Past Surgical History:   Procedure Laterality Date    BACK SURGERY  1995    Back (L5/S1)    BARIATRIC SURGERY  1985    gastric bypass    BREAST EXCISIONAL BIOPSY Left 1985    BREAST SURGERY  1990    lump removed    CHOLECYSTECTOMY  2019    CHOLECYSTECTOMY WITH INTRAOPERATIVE CHOLANGIOGRAM N/A 04/26/2018    Procedure: CHOLECYSTECTOMY LAPAROSCOPIC INTRAOPERATIVE CHOLANGIOGRAM;  Surgeon: Lit Morelos MD;  Location: [facility];  Service: General    COLONOSCOPY  2016    GASTRIC BYPASS      HIGH GASTRIC BYPASS, 1980's    HEMORRHOID BANDING  04/30/2024    HERNIA REPAIR      JOINT REPLACEMENT  2012,2020    left knee,right knee    OTHER  SURGICAL HISTORY  1985    Stomach Stabled    REPLACEMENT TOTAL KNEE Left 2012    right  2021    SINUS SURGERY      x3    TOTAL KNEE ARTHROPLASTY Right 10/29/2021    Procedure: TOTAL KNEE ARTHROPLASTY RIGHT;  Surgeon: Emeka Douglas MD;  Location: [facility] OR;  Service: Orthopedics;  Laterality: Right;    TUBAL ABDOMINAL LIGATION  1980    VENTRAL/INCISIONAL HERNIA REPAIR N/A 04/22/2024    Procedure: VENTRAL/INCISIONAL HERNIA REPAIR;  Surgeon: Kimo Moon MD;  Location: [facility] OR;  Service: General;  Laterality: N/A;    WISDOM TOOTH EXTRACTION           Current Outpatient Medications:     albuterol sulfate  (90 Base) MCG/ACT inhaler, TAKE 2 PUFFS BY MOUTH EVERY 4 HOURS AS NEEDED FOR WHEEZE, Disp: 8 g, Rfl: 1    azelastine (OPTIVAR) 0.05 % ophthalmic solution, , Disp: , Rfl:     Biotin 1 MG capsule, Take 1 capsule by mouth Daily., Disp: , Rfl:     dapagliflozin Propanediol (Farxiga) 10 MG tablet, Take 10 mg by mouth Daily. Indications: Chronic Kidney Disease, Disp: 90 tablet, Rfl: 3    fluticasone (FLONASE) 50 MCG/ACT nasal spray, 2 sprays by Each Nare route Daily., Disp: , Rfl:     Fluticasone-Salmeterol (ADVAIR/WIXELA) 500-50 MCG/ACT DISKUS, INHALE 1 PUFF TWICE A DAY, Disp: 180 each, Rfl: 1    gabapentin (NEURONTIN) 300 MG capsule, Take 1 capsule by mouth 3 (Three) Times a Day for 90 days., Disp: 270 capsule, Rfl: 0    hydroxychloroquine (PLAQUENIL) 200 MG tablet, Take 1 tablet by mouth Every 12 (Twelve) Hours., Disp: , Rfl:     Magnesium Oxide -Mg Supplement 500 MG capsule, TAKE 1 CAPSULE BY MOUTH ONCE A DAY AT BEDTIME FOR 30 DAYS, Disp: , Rfl:     meloxicam (MOBIC) 7.5 MG tablet, TAKE 1 TABLET BY MOUTH EVERY DAY, Disp: 30 tablet, Rfl: 1    NIFEdipine XL (PROCARDIA XL) 30 MG 24 hr tablet, Take 1 tablet by mouth Daily., Disp: 90 tablet, Rfl: 0    Polyethylene Glycol 3350 (MIRALAX PO), Take  by mouth., Disp: , Rfl:     traZODone (DESYREL) 100 MG tablet, TAKE 1 TABLET BY MOUTH EVERY NIGHT FOR 90  DAYS., Disp: 90 tablet, Rfl: 1    VITAMIN D, CHOLECALCIFEROL, PO, Take 1 capsule by mouth Daily., Disp: , Rfl:     zoledronic acid (RECLAST) 5 MG/100ML solution infusion, Infuse 100 mL into a venous catheter 1 (One) Time for 1 dose., Disp: 100 mL, Rfl: 0  No current facility-administered medications for this visit.    Facility-Administered Medications Ordered in Other Visits:     Chlorhexidine Gluconate Cloth 2 % pads, , Apply externally, Once, Emeka Douglas MD    mupirocin (BACTROBAN) 2 % nasal ointment, , Nasal, BID, Emeka Douglas MD    Allergies   Allergen Reactions    Celebrex [Celecoxib] Hives     HIVES     Codeine Rash    Oxycodone Rash and Hives    Sulfa Antibiotics Itching     ITCHING        Family History   Problem Relation Age of Onset    Cancer Other         BREAST, LUNG, OVARIAN, KIDNEY    Kidney cancer Other     Lung cancer Other         pGF as well    Cancer Mother         kidny    Hearing loss Mother     Cancer Father         ludmila    Mental illness Father         paranoid schizophrenia    Alcohol abuse Father     Breast cancer Sister 30    Connective Tissue Disease Sister     Hypertension Sister         in lungs    Diabetes Sister     Osteoporosis Sister         all 3 of my sister has osyeoporosis    Sleep apnea Sister     Cancer Sister         brest    Osteoporosis Sister     Sleep apnea Sister     Cancer Paternal Grandmother         stomach    Cancer Paternal Grandfather         lung    Osteoporosis Sister     Sleep apnea Sister     Ovarian cancer Neg Hx        Cancer-related family history includes Breast cancer (age of onset: 30) in her sister; Cancer in her father, mother, paternal grandfather, paternal grandmother, sister, and another family member; Kidney cancer in an other family member; Lung cancer in an other family member. There is no history of Ovarian cancer.    Social History     Tobacco Use    Smoking status: Never     Passive exposure: Never    Smokeless tobacco: Never  "  Vaping Use    Vaping status: Never Used   Substance Use Topics    Alcohol use: Yes     Comment: occasional    Drug use: Never     I have reviewed and confirmed the accuracy of the patient's history: Chief complaint, HPI, ROS, and Subjective as entered by the MA/LPN/RN. Felisha Echols MD 02/21/25      SUBJECTIVE:    She is here today accompanied by her sister for this appointment.  She complains of fatigue.  She has had 2 urinary tract infections in the past few months.    Patient seen today for follow-up and does not have any new issues.  She denies any B symptoms.      Patient is here today denies B symptoms.          REVIEW OF SYSTEMS:    Review of Systems   Constitutional:  Positive for fatigue. Negative for chills and fever.   HENT:  Negative for congestion, drooling, ear discharge, rhinorrhea, sinus pressure and tinnitus.    Eyes:  Negative for photophobia, pain and discharge.   Respiratory:  Negative for apnea, choking and stridor.    Cardiovascular:  Negative for palpitations.   Gastrointestinal:  Negative for abdominal distention, abdominal pain and anal bleeding.   Endocrine: Negative for polydipsia and polyphagia.   Genitourinary:  Negative for decreased urine volume, flank pain and genital sores.   Musculoskeletal:  Negative for gait problem, neck pain and neck stiffness.        Bilateral lower extremity swelling   Skin:  Negative for color change, rash and wound.   Neurological:  Negative for tremors, seizures, syncope, facial asymmetry and speech difficulty.   Hematological:  Negative for adenopathy.   Psychiatric/Behavioral:  Negative for agitation, confusion, hallucinations and self-injury. The patient is not hyperactive.        OBJECTIVE:    Vitals:    02/21/25 1113   BP: 123/71   Pulse: 98   Resp: 18   Temp: 97.9 °F (36.6 °C)   TempSrc: Infrared   SpO2: 94%   Weight: 87.1 kg (192 lb)   Height: 160 cm (63\")   PainSc: 0-No pain               Body mass index is 34.01 kg/m².    ECOG  (0) Fully " active, able to carry on all predisease performance without restriction    Physical Exam  Vitals and nursing note reviewed.   Constitutional:       General: She is not in acute distress.     Appearance: She is not diaphoretic.   HENT:      Head: Normocephalic and atraumatic.   Eyes:      General: No scleral icterus.        Right eye: No discharge.         Left eye: No discharge.      Conjunctiva/sclera: Conjunctivae normal.   Neck:      Thyroid: No thyromegaly.   Cardiovascular:      Rate and Rhythm: Normal rate and regular rhythm.      Heart sounds: Normal heart sounds.      No friction rub. No gallop.   Pulmonary:      Effort: Pulmonary effort is normal. No respiratory distress.      Breath sounds: No stridor. No wheezing.   Abdominal:      General: Bowel sounds are normal.      Palpations: Abdomen is soft. There is no mass.      Tenderness: There is no abdominal tenderness. There is no guarding or rebound.   Musculoskeletal:         General: No tenderness. Normal range of motion.      Cervical back: Normal range of motion and neck supple.      Right lower leg: Edema present.      Left lower leg: Edema present.   Lymphadenopathy:      Cervical: No cervical adenopathy.   Skin:     General: Skin is warm.      Findings: No erythema or rash.   Neurological:      Mental Status: She is alert and oriented to person, place, and time.      Motor: No abnormal muscle tone.   Psychiatric:         Behavior: Behavior normal.       I have reexamined the patient and the results are consistent with the previously documented exam. Felisha Echols MD          RECENT LABS    WBC   Date Value Ref Range Status   02/21/2025 68.56 (C) 3.40 - 10.80 10*3/mm3 Final   06/20/2022 12.8 (H) 3.4 - 10.8 x10E3/uL Final     RBC   Date Value Ref Range Status   02/21/2025 5.22 3.77 - 5.28 10*6/mm3 Final   06/20/2022 4.58 3.77 - 5.28 x10E6/uL Final     Hemoglobin   Date Value Ref Range Status   02/21/2025 14.5 12.0 - 15.9 g/dL Final      Hematocrit   Date Value Ref Range Status   02/21/2025 48.7 (H) 34.0 - 46.6 % Final     MCV   Date Value Ref Range Status   02/21/2025 93.3 79.0 - 97.0 fL Final     MCH   Date Value Ref Range Status   02/21/2025 27.8 26.6 - 33.0 pg Final     MCHC   Date Value Ref Range Status   02/21/2025 29.8 (L) 31.5 - 35.7 g/dL Final     RDW   Date Value Ref Range Status   02/21/2025 14.9 12.3 - 15.4 % Final     RDW-SD   Date Value Ref Range Status   02/21/2025 50.2 37.0 - 54.0 fl Final     MPV   Date Value Ref Range Status   02/21/2025 9.6 6.0 - 12.0 fL Final     Platelets   Date Value Ref Range Status   02/21/2025 202 140 - 450 10*3/mm3 Final     Neutrophil %   Date Value Ref Range Status   02/21/2025 5.7 (L) 42.7 - 76.0 % Final     Lymphocyte %   Date Value Ref Range Status   02/21/2025 91.4 (H) 19.6 - 45.3 % Final     Monocyte %   Date Value Ref Range Status   02/21/2025 2.2 (L) 5.0 - 12.0 % Final     Eosinophil %   Date Value Ref Range Status   02/21/2025 0.5 0.3 - 6.2 % Final     Basophil %   Date Value Ref Range Status   02/21/2025 0.2 0.0 - 1.5 % Final     Immature Grans %   Date Value Ref Range Status   10/21/2021 0.2 0.0 - 0.5 % Final     Neutrophils, Absolute   Date Value Ref Range Status   02/21/2025 3.93 1.70 - 7.00 10*3/mm3 Final     Lymphocytes, Absolute   Date Value Ref Range Status   02/21/2025 62.66 (H) 0.70 - 3.10 10*3/mm3 Final     Monocytes, Absolute   Date Value Ref Range Status   02/21/2025 1.50 (H) 0.10 - 0.90 10*3/mm3 Final     Eosinophils, Absolute   Date Value Ref Range Status   02/21/2025 0.33 0.00 - 0.40 10*3/mm3 Final     Basophils, Absolute   Date Value Ref Range Status   02/21/2025 0.14 0.00 - 0.20 10*3/mm3 Final     Immature Grans, Absolute   Date Value Ref Range Status   10/21/2021 0.02 0.00 - 0.05 10*3/mm3 Final     nRBC   Date Value Ref Range Status   05/04/2023 0.1 0.0 - 0.2 /100 WBC Final       Lab Results   Component Value Date    GLUCOSE 114 (H) 12/10/2024    BUN 18 12/10/2024     CREATININE 1.32 (H) 12/10/2024    EGFRIFNONA 62 11/01/2021    BCR 13.6 12/10/2024    K 3.9 12/10/2024    CO2 26.3 12/10/2024    CALCIUM 9.3 12/10/2024    ALBUMIN 4.2 12/10/2024    AST 25 12/10/2024    ALT 17 12/10/2024         Assessment & Plan     CLL (chronic lymphocytic leukemia)  - CBC & Differential  - Lactate Dehydrogenase  - Uric Acid                ASSESSMENT:    Leukocytosis, unspecified type  - CBC & Differential        CLL/SLL presenting as leukocytosis with differential lymphocytosis since March 2022.  Reviewed the diagnosis in detail with patient and her daughter who is present today.  CT scans chest abdomen and pelvis do not show any evidence of lymphomatous process.  She has clinical stage 0 CLL.  Ongoing management.  Her leukocytosis ranges between 18 and 40,000..  CBC reviewed overall she remained stable without any B symptoms.  Reviewed  Worsening leukocytosis likely secondary to steroids for her back pain.  She denies B symptoms  Urinary tract infection: She is on antibiotics may be contributing to her leukocytosis for now we will continue to monitor.  Patient has mixed connective tissue disorder.  She will follow-up with her rheumatologist  Bilateral lower extremity swelling rule out DVTs.  Ultrasound was negative.  Asymptomatic now  Abdominal hernia: Declines surgical evaluation.     Discussion       CLL, we discussed that indications for treatment would include but not limited to, pancytopenia, systemic symptoms including fatigue, night sweats and weight loss.  Other indications treatment will include recurrent infections, organ dysfunction due to massive lymphadenopathy, doubling  of leukocytosis in less than 6 months.  We discussed that CLL is not curable but can respond to chemo immunotherapy and some molecular targeted agents which can result in remissions but this disease is faulted by multiple relapses over the course of time.  We also discussed that median survivorship with low-grade  lymphoma in general is estimated anywhere from 10-12 years.  I also explained that this disease is heterogeneous.  Some patients may succumb to the illness within a few days following diagnosis while some may have it for decades.  We discussed that lymphoproliferative diseases in general can affect the immune system so there is risk of Landon infections.  When the bone marrow is heavily involved there is risk of pancytopenia  which can result in blood, platelet transfusions and also predisposition to infections if the white count is affected.  There is also a risk of transformation to high-grade lymphoma which can result in significant symptoms and would require systemic chemo immunotherapy.  The risk of transformation varies with different types of lymphoma.  Also is not dependent on prior treatment or worsen by conservative management.      Plans     Reviewed her CBCs.  Her physical exam today does not show any changes  Biochemical test today  Call for B symptoms which were reviewed  Reviewed CT scan chest abdomen and pelvis with contrast  Doppler formed for bilateral lower extremity edema was negative  Previous uric acid and HIV tests were negative   Continue surveillance  Complete her age-appropriate immunizations including pneumococcal vaccine.  Patient follow-up with her PCP  Follow-up 4 months  Reviewed signs and symptoms to monitor for between appointments and to call with questions or concerns  All questions answered       Patient verbalized understanding and is in agreement of the above plan.      Electronically signed by Felisha Echols MD, 02/21/25, 2:58 PM EST.

## 2025-02-21 ENCOUNTER — OFFICE VISIT (OUTPATIENT)
Dept: ONCOLOGY | Facility: CLINIC | Age: 75
End: 2025-02-21
Payer: MEDICARE

## 2025-02-21 ENCOUNTER — LAB (OUTPATIENT)
Dept: LAB | Facility: HOSPITAL | Age: 75
End: 2025-02-21
Payer: MEDICARE

## 2025-02-21 VITALS
DIASTOLIC BLOOD PRESSURE: 71 MMHG | BODY MASS INDEX: 34.02 KG/M2 | SYSTOLIC BLOOD PRESSURE: 123 MMHG | WEIGHT: 192 LBS | OXYGEN SATURATION: 94 % | HEART RATE: 98 BPM | TEMPERATURE: 97.9 F | RESPIRATION RATE: 18 BRPM | HEIGHT: 63 IN

## 2025-02-21 DIAGNOSIS — C91.10 CLL (CHRONIC LYMPHOCYTIC LEUKEMIA): Primary | ICD-10-CM

## 2025-02-21 LAB
BASOPHILS # BLD AUTO: 0.14 10*3/MM3 (ref 0–0.2)
BASOPHILS NFR BLD AUTO: 0.2 % (ref 0–1.5)
DEPRECATED RDW RBC AUTO: 50.2 FL (ref 37–54)
EOSINOPHIL # BLD AUTO: 0.33 10*3/MM3 (ref 0–0.4)
EOSINOPHIL NFR BLD AUTO: 0.5 % (ref 0.3–6.2)
ERYTHROCYTE [DISTWIDTH] IN BLOOD BY AUTOMATED COUNT: 14.9 % (ref 12.3–15.4)
HCT VFR BLD AUTO: 48.7 % (ref 34–46.6)
HGB BLD-MCNC: 14.5 G/DL (ref 12–15.9)
HOLD SPECIMEN: NORMAL
HOLD SPECIMEN: NORMAL
LDH SERPL-CCNC: 183 U/L (ref 135–214)
LYMPHOCYTES # BLD AUTO: 62.66 10*3/MM3 (ref 0.7–3.1)
LYMPHOCYTES NFR BLD AUTO: 91.4 % (ref 19.6–45.3)
MCH RBC QN AUTO: 27.8 PG (ref 26.6–33)
MCHC RBC AUTO-ENTMCNC: 29.8 G/DL (ref 31.5–35.7)
MCV RBC AUTO: 93.3 FL (ref 79–97)
MONOCYTES # BLD AUTO: 1.5 10*3/MM3 (ref 0.1–0.9)
MONOCYTES NFR BLD AUTO: 2.2 % (ref 5–12)
NEUTROPHILS NFR BLD AUTO: 3.93 10*3/MM3 (ref 1.7–7)
NEUTROPHILS NFR BLD AUTO: 5.7 % (ref 42.7–76)
PLATELET # BLD AUTO: 202 10*3/MM3 (ref 140–450)
PMV BLD AUTO: 9.6 FL (ref 6–12)
RBC # BLD AUTO: 5.22 10*6/MM3 (ref 3.77–5.28)
URATE SERPL-MCNC: 4.6 MG/DL (ref 2.4–5.7)
WBC NRBC COR # BLD AUTO: 68.56 10*3/MM3 (ref 3.4–10.8)

## 2025-02-21 PROCEDURE — 85025 COMPLETE CBC W/AUTO DIFF WBC: CPT

## 2025-02-21 PROCEDURE — 83615 LACTATE (LD) (LDH) ENZYME: CPT | Performed by: INTERNAL MEDICINE

## 2025-02-21 PROCEDURE — 84550 ASSAY OF BLOOD/URIC ACID: CPT | Performed by: INTERNAL MEDICINE

## 2025-02-21 PROCEDURE — 36415 COLL VENOUS BLD VENIPUNCTURE: CPT

## 2025-02-24 ENCOUNTER — TELEPHONE (OUTPATIENT)
Dept: FAMILY MEDICINE CLINIC | Facility: CLINIC | Age: 75
End: 2025-02-24
Payer: MEDICARE

## 2025-02-24 NOTE — TELEPHONE ENCOUNTER
We can certainly do an EKG at her upcoming appointment but if she is having acute chest pain and shortness of breath, that should not wait for 2 months.  She should go to urgent care or ER for sooner evaluation

## 2025-02-24 NOTE — TELEPHONE ENCOUNTER
Pt called in after seeing oncologist on Friday and discussed having SOB and chest above breast aches, oncologist adivsed she get and EKG. Pt was wondering if she could get one at her 4/15 appt?

## 2025-02-25 NOTE — TELEPHONE ENCOUNTER
Pt notified and expressed understanding. She said this is not happening daily, but if it gets worse she will go the [facility] for evaluation.

## 2025-02-28 DIAGNOSIS — M54.16 LUMBAR RADICULITIS: ICD-10-CM

## 2025-02-28 DIAGNOSIS — G89.4 CHRONIC PAIN SYNDROME: ICD-10-CM

## 2025-02-28 RX ORDER — GABAPENTIN 300 MG/1
300 CAPSULE ORAL 3 TIMES DAILY
Qty: 270 CAPSULE | Refills: 0 | Status: SHIPPED | OUTPATIENT
Start: 2025-02-28 | End: 2025-05-29

## 2025-04-10 RX ORDER — MELOXICAM 7.5 MG/1
7.5 TABLET ORAL DAILY
Qty: 30 TABLET | Refills: 1 | Status: SHIPPED | OUTPATIENT
Start: 2025-04-10

## 2025-04-11 PROCEDURE — 87086 URINE CULTURE/COLONY COUNT: CPT | Performed by: PHYSICIAN ASSISTANT

## 2025-04-15 ENCOUNTER — LAB (OUTPATIENT)
Dept: FAMILY MEDICINE CLINIC | Facility: CLINIC | Age: 75
End: 2025-04-15
Payer: MEDICARE

## 2025-04-15 ENCOUNTER — OFFICE VISIT (OUTPATIENT)
Dept: FAMILY MEDICINE CLINIC | Facility: CLINIC | Age: 75
End: 2025-04-15
Payer: MEDICARE

## 2025-04-15 VITALS
DIASTOLIC BLOOD PRESSURE: 74 MMHG | WEIGHT: 195 LBS | SYSTOLIC BLOOD PRESSURE: 122 MMHG | HEIGHT: 63 IN | OXYGEN SATURATION: 96 % | HEART RATE: 70 BPM | BODY MASS INDEX: 34.55 KG/M2

## 2025-04-15 DIAGNOSIS — G47.33 OSA (OBSTRUCTIVE SLEEP APNEA): ICD-10-CM

## 2025-04-15 DIAGNOSIS — M81.0 AGE-RELATED OSTEOPOROSIS WITHOUT CURRENT PATHOLOGICAL FRACTURE: ICD-10-CM

## 2025-04-15 DIAGNOSIS — C91.10 CLL (CHRONIC LYMPHOCYTIC LEUKEMIA): ICD-10-CM

## 2025-04-15 DIAGNOSIS — G89.4 CHRONIC PAIN SYNDROME: ICD-10-CM

## 2025-04-15 DIAGNOSIS — N18.31 STAGE 3A CHRONIC KIDNEY DISEASE: ICD-10-CM

## 2025-04-15 DIAGNOSIS — R73.03 PREDIABETES: ICD-10-CM

## 2025-04-15 DIAGNOSIS — N39.0 RECURRENT UTI: Primary | ICD-10-CM

## 2025-04-15 DIAGNOSIS — I73.00 RAYNAUD'S DISEASE WITHOUT GANGRENE: ICD-10-CM

## 2025-04-15 DIAGNOSIS — N39.3 STRESS INCONTINENCE: ICD-10-CM

## 2025-04-15 DIAGNOSIS — M06.9 RHEUMATOID ARTHRITIS INVOLVING BOTH KNEES, UNSPECIFIED WHETHER RHEUMATOID FACTOR PRESENT: ICD-10-CM

## 2025-04-15 DIAGNOSIS — J45.20 MILD INTERMITTENT ASTHMA WITHOUT COMPLICATION: ICD-10-CM

## 2025-04-15 DIAGNOSIS — M54.16 LUMBAR RADICULITIS: ICD-10-CM

## 2025-04-15 PROCEDURE — 36415 COLL VENOUS BLD VENIPUNCTURE: CPT | Performed by: NURSE PRACTITIONER

## 2025-04-15 PROCEDURE — 80048 BASIC METABOLIC PNL TOTAL CA: CPT | Performed by: NURSE PRACTITIONER

## 2025-04-15 PROCEDURE — 83036 HEMOGLOBIN GLYCOSYLATED A1C: CPT | Performed by: NURSE PRACTITIONER

## 2025-04-15 RX ORDER — NIFEDIPINE 30 MG/1
30 TABLET, EXTENDED RELEASE ORAL EVERY 24 HOURS
Qty: 90 TABLET | Refills: 0 | Status: SHIPPED | OUTPATIENT
Start: 2025-04-15

## 2025-04-15 RX ORDER — FLUTICASONE PROPIONATE AND SALMETEROL 500; 50 UG/1; UG/1
1 POWDER RESPIRATORY (INHALATION) 2 TIMES DAILY
Qty: 180 EACH | Refills: 1 | Status: SHIPPED | OUTPATIENT
Start: 2025-04-15

## 2025-04-15 RX ORDER — DAPAGLIFLOZIN 10 MG/1
1 TABLET, FILM COATED ORAL DAILY
Qty: 90 TABLET | Refills: 3 | Status: SHIPPED | OUTPATIENT
Start: 2025-04-15

## 2025-04-15 RX ORDER — MELOXICAM 7.5 MG/1
7.5 TABLET ORAL DAILY
Qty: 90 TABLET | Refills: 1 | Status: SHIPPED | OUTPATIENT
Start: 2025-04-15

## 2025-04-15 RX ORDER — GABAPENTIN 300 MG/1
300 CAPSULE ORAL 3 TIMES DAILY
Qty: 270 CAPSULE | Refills: 0 | Status: SHIPPED | OUTPATIENT
Start: 2025-04-15 | End: 2025-07-14

## 2025-04-15 NOTE — PROGRESS NOTES
"Chief Complaint  Follow-up (6 month follow up HTN )    Subjective        Barbara Coelho presents to [facility]  History of Present Illness    Patient presents for follow-up visit.    Patient currently on Keflex for UTI - seen at . She reports recurrent UTI, mother had kidney CA. Patient is c/o urinary leakage.     Patient has history of Asthma, stable on Wixela and Albuterol PRN. She also receives allergy shots, followed by [facility].     Patient wears CPAP nightly for CARITO. She has no acute complaints.      Patient has history of Osteoporosis, DEXA scan last completed in 5/2023. She takes supplemental Vitamin D,  Reclast infusions annually per endocrinology.      Prediabetes, managing with diet and exercise.      CLL, followed by Hematology.     Patient has RA, taking Plaquenil as prescribed. She is followed by  Rheumatology. Patient has had right TKR. She reports chronic back and bilateral knee pain. Patient did have epidural injections to her spine, previously followed by Pain Management. She also takes Gabapentin 100 mg TID.        Objective   Vital Signs:  /74 (BP Location: Left arm, Patient Position: Sitting, Cuff Size: Adult)   Pulse 70   Ht 160 cm (63\")   Wt 88.5 kg (195 lb)   SpO2 96%   BMI 34.54 kg/m²   Estimated body mass index is 34.54 kg/m² as calculated from the following:    Height as of this encounter: 160 cm (63\").    Weight as of this encounter: 88.5 kg (195 lb).          Physical Exam  Constitutional:       Appearance: Normal appearance.   HENT:      Head: Normocephalic.   Cardiovascular:      Rate and Rhythm: Normal rate and regular rhythm.   Pulmonary:      Effort: Pulmonary effort is normal.      Breath sounds: Normal breath sounds.   Abdominal:      General: Abdomen is flat. Bowel sounds are normal.      Palpations: Abdomen is soft.   Musculoskeletal:         General: Normal range of motion.      Cervical back: Neck supple.      Right lower " leg: No edema.      Left lower leg: No edema.   Skin:     General: Skin is warm and dry.   Neurological:      Mental Status: She is alert and oriented to person, place, and time.      Gait: Gait is intact.   Psychiatric:         Attention and Perception: Attention normal.         Mood and Affect: Mood normal.         Speech: Speech normal.        Result Review :    CMP          10/15/2024    10:13 11/7/2024    12:31 12/10/2024    13:10   CMP   Glucose 97  107  114    BUN 12  17  18    Creatinine 1.11  1.15  1.32    EGFR 51.9  49.8  42.2    Sodium 143  143  143    Potassium 4.3  4.6  3.9    Chloride 104  104  105    Calcium 9.5  10.0  9.3    Total Protein 6.2  6.8  6.1    Albumin 4.0  4.5  4.2    Globulin 2.2  2.3  1.9    Total Bilirubin 0.3  0.4  0.2    Alkaline Phosphatase 89  96  109    AST (SGOT) 21  23  25    ALT (SGPT) 13  10  17    Albumin/Globulin Ratio 1.8  2.0  2.2    BUN/Creatinine Ratio 10.8  14.8  13.6    Anion Gap 10.3  12.1  11.7      CBC          10/15/2024    10:13 10/17/2024    10:41 2/21/2025    10:35   CBC   WBC 65.26  53.18  68.56    RBC 4.80  4.55  5.22    Hemoglobin 13.4  12.8  14.5    Hematocrit 42.5  41.9  48.7    MCV 88.5  92.1  93.3    MCH 27.9  28.1  27.8    MCHC 31.5  30.5  29.8    RDW 12.8  14.1  14.9    Platelets 191  157  202      Lipid Panel          10/15/2024    10:13   Lipid Panel   Total Cholesterol 178    Triglycerides 85    HDL Cholesterol 65    VLDL Cholesterol 16    LDL Cholesterol  97    LDL/HDL Ratio 1.48      TSH          10/15/2024    10:13   TSH   TSH 1.980      Most Recent A1C          10/15/2024    10:13   HGBA1C Most Recent   Hemoglobin A1C 5.80      Data reviewed : Consultant notes Hematology/UC notes           Assessment and Plan   Diagnoses and all orders for this visit:    1. Recurrent UTI (Primary)  Assessment & Plan:  Finish Keflex and call with new or persisting concerns    Orders:  -     US Renal Bilateral; Future  -     Ambulatory Referral to Urology    2.  Stress incontinence  -     Ambulatory Referral to Urology    3. Stage 3a chronic kidney disease  -     US Renal Bilateral; Future  -     Basic Metabolic Panel  -     dapagliflozin Propanediol (Farxiga) 10 MG tablet; Take 10 mg by mouth Daily. Indications: Chronic Kidney Disease  Dispense: 90 tablet; Refill: 3    4. CARITO (obstructive sleep apnea)  Assessment & Plan:  Continue use of CPAP      5. Mild intermittent asthma without complication  Assessment & Plan:  Asthma is stable.  The patient is experiencing monthly daytime asthma symptoms and she is experiencing no nighttime asthma symptoms.    Plan: Continue same medication/s without change.    Discussed medication dosage, use, side effects, and goals of treatment in detail.    Discussed distinction between quick-relief and maintenance control medications.  Discussed monitoring symptoms and use of quick-relief medications and contacting provider early in the course of exacerbations.  Warning signs of respiratory distress were reviewed with the patient.     Patient Treatment Goals: symptom prevention, minimizing limitation in activity, prevention of exacerbations and use of ER/inpatient care, maintenance of optimal pulmonary function, and minimization of adverse effects of treatment.    Followup at the next regular appointment.            Orders:  -     Fluticasone-Salmeterol (ADVAIR/WIXELA) 500-50 MCG/ACT DISKUS; Inhale 1 puff 2 (Two) Times a Day.  Dispense: 180 each; Refill: 1    6. Rheumatoid arthritis involving both knees, unspecified whether rheumatoid factor present  Assessment & Plan:  Plaquenil per rheumatology    Orders:  -     meloxicam (MOBIC) 7.5 MG tablet; Take 1 tablet by mouth Daily.  Dispense: 90 tablet; Refill: 1    7. CLL (chronic lymphocytic leukemia)  Assessment & Plan:  Hematology note reviewed  Follow up as instructed      8. Prediabetes  Assessment & Plan:  Managing with diet and exercise  Labs today    Orders:  -     Hemoglobin A1c    9.  Age-related osteoporosis without current pathological fracture  Assessment & Plan:  Improving on Reclast infusions per endocrinology      10. Chronic pain syndrome  -     gabapentin (NEURONTIN) 300 MG capsule; Take 1 capsule by mouth 3 (Three) Times a Day for 90 days.  Dispense: 270 capsule; Refill: 0    11. Lumbar radiculitis  -     meloxicam (MOBIC) 7.5 MG tablet; Take 1 tablet by mouth Daily.  Dispense: 90 tablet; Refill: 1  -     gabapentin (NEURONTIN) 300 MG capsule; Take 1 capsule by mouth 3 (Three) Times a Day for 90 days.  Dispense: 270 capsule; Refill: 0    12. Raynaud's disease without gangrene  -     NIFEdipine XL (PROCARDIA XL) 30 MG 24 hr tablet; Take 1 tablet by mouth Daily.  Dispense: 90 tablet; Refill: 0           I spent 30 minutes caring for Barbara on this date of service. This time includes time spent by me in the following activities:preparing for the visit, reviewing tests, obtaining and/or reviewing a separately obtained history, performing a medically appropriate examination and/or evaluation , counseling and educating the patient/family/caregiver, ordering medications, tests, or procedures, documenting information in the medical record, and care coordination  Follow Up   Return in about 6 months (around 10/15/2025) for Medicare Wellness.  Patient was given instructions and counseling regarding her condition or for health maintenance advice. Please see specific information pulled into the AVS if appropriate.

## 2025-04-16 ENCOUNTER — RESULTS FOLLOW-UP (OUTPATIENT)
Dept: FAMILY MEDICINE CLINIC | Facility: CLINIC | Age: 75
End: 2025-04-16
Payer: MEDICARE

## 2025-04-16 LAB
ANION GAP SERPL CALCULATED.3IONS-SCNC: 13 MMOL/L (ref 5–15)
BUN SERPL-MCNC: 19 MG/DL (ref 8–23)
BUN/CREAT SERPL: 18.8 (ref 7–25)
CALCIUM SPEC-SCNC: 9.2 MG/DL (ref 8.6–10.5)
CHLORIDE SERPL-SCNC: 104 MMOL/L (ref 98–107)
CO2 SERPL-SCNC: 26 MMOL/L (ref 22–29)
CREAT SERPL-MCNC: 1.01 MG/DL (ref 0.57–1)
EGFRCR SERPLBLD CKD-EPI 2021: 57.8 ML/MIN/1.73
GLUCOSE SERPL-MCNC: 90 MG/DL (ref 65–99)
HBA1C MFR BLD: 5.8 % (ref 4.8–5.6)
POTASSIUM SERPL-SCNC: 3.9 MMOL/L (ref 3.5–5.2)
SODIUM SERPL-SCNC: 143 MMOL/L (ref 136–145)

## 2025-04-23 ENCOUNTER — HOSPITAL ENCOUNTER (OUTPATIENT)
Dept: ULTRASOUND IMAGING | Facility: HOSPITAL | Age: 75
Discharge: HOME | End: 2025-04-23
Admitting: NURSE PRACTITIONER
Payer: MEDICARE

## 2025-04-23 DIAGNOSIS — N18.31 STAGE 3A CHRONIC KIDNEY DISEASE: ICD-10-CM

## 2025-04-23 DIAGNOSIS — N39.0 RECURRENT UTI: ICD-10-CM

## 2025-04-23 PROCEDURE — 76775 US EXAM ABDO BACK WALL LIM: CPT

## 2025-05-13 ENCOUNTER — RESULTS FOLLOW-UP (OUTPATIENT)
Dept: ENDOCRINOLOGY | Facility: CLINIC | Age: 75
End: 2025-05-13
Payer: MEDICARE

## 2025-05-13 ENCOUNTER — HOSPITAL ENCOUNTER (OUTPATIENT)
Dept: BONE DENSITY | Facility: HOSPITAL | Age: 75
Discharge: HOME | End: 2025-05-13
Admitting: INTERNAL MEDICINE
Payer: MEDICARE

## 2025-05-13 DIAGNOSIS — M81.0 OSTEOPOROSIS WITHOUT CURRENT PATHOLOGICAL FRACTURE, UNSPECIFIED OSTEOPOROSIS TYPE: ICD-10-CM

## 2025-05-13 PROCEDURE — 77080 DXA BONE DENSITY AXIAL: CPT

## 2025-06-03 ENCOUNTER — OFFICE (OUTPATIENT)
Dept: URBAN - METROPOLITAN AREA CLINIC 64 | Facility: CLINIC | Age: 75
End: 2025-06-03
Payer: COMMERCIAL

## 2025-06-03 VITALS
HEIGHT: 63 IN | HEART RATE: 64 BPM | SYSTOLIC BLOOD PRESSURE: 117 MMHG | WEIGHT: 202 LBS | DIASTOLIC BLOOD PRESSURE: 71 MMHG

## 2025-06-03 DIAGNOSIS — K57.30 DIVERTICULOSIS OF LARGE INTESTINE WITHOUT PERFORATION OR ABS: ICD-10-CM

## 2025-06-03 DIAGNOSIS — C91.10 CHRONIC LYMPHOCYTIC LEUKEMIA OF B-CELL TYPE NOT HAVING ACHIE: ICD-10-CM

## 2025-06-03 DIAGNOSIS — R10.32 LEFT LOWER QUADRANT PAIN: ICD-10-CM

## 2025-06-03 DIAGNOSIS — K59.00 CONSTIPATION, UNSPECIFIED: ICD-10-CM

## 2025-06-03 PROCEDURE — 99213 OFFICE O/P EST LOW 20 MIN: CPT | Performed by: NURSE PRACTITIONER

## 2025-06-18 NOTE — PROGRESS NOTES
Hematology/Oncology Outpatient Follow Up    PATIENT NAME:Barbara Coelho  :1950  MRN: [id number]  PRIMARY CARE PHYSICIAN: Liane Tran APRN  REFERRING PHYSICIAN: No ref. provider found    Chief Complaint   Patient presents with    Follow-up     CLL (chronic lymphocytic leukemia)        HISTORY OF PRESENT ILLNESS:     73-year-old female has referred secondary to leukocytosis with differential lymphocytosis.  Patient was noted in .  Patient denies any fevers or chills or upper respiratory tract infection.  Review of her CBC indicates that her white count has ranged around 23-24,000 normal hemoglobin and normal platelets but her differentials have been 8% neutrophils 85% lymphocytes with an ANC of 2.1 and absolute lymphocyte count of 20.  Back in 2022 her white count was 12.8, Hemoglobin 13.2 platelets 211 and absolute lymphocyte count was 9600.  Patient states that she is fully active and denies any night sweats, weight loss or fatigue symptoms.     Patient has a history of mixed connective tissue disease.  Patient is now retired. Patient is . She has one child.     There is family hx of kidney cancer in her mother, breast cancer breast cancer 30,  father had lung cancer, paternal GM with stomach cancer, paternal GF with lung cancer     Patient does not smoke, drinks occasional etoh     2023: Patient had peripheral blood flow cytometry which showed chronic lymphocytic leukemia B-cell, CD38 negative and CD20 positive.  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  CLL panel by FISH was normal.  Chemistry panel BUN was 17 creatinine was 1, LDH was 206 normal uric acid was 6.8 normal is up to 5.7.  Peripheral smear showed numerous smudge cells  7/3/2023: Patient had CT scan of the chest, abdomen and pelvis, no significant lymphadenopathy patient had CT scan of the chest, abdomen and pelvis to suggest lymphomatous  process, supraumbilical midline ventral hernia containing omental fat without evidence of incarceration.  Past Medical History:   Diagnosis Date    Allergic 1967    take allergy shots    Allergic rhinitis     on allergy shots    Arthritis     take med    Asthma 1989    ALLERGIC    Back pain     Low    Bladder infection     Recurrent Bladder Infections    Cataract 2017    Cholelithiasis 2019    surgery    CLL (chronic lymphocytic leukemia)     Colon polyp 2023    Deep vein thrombosis 1989    Difficulty walking     Diverticulitis     Diverticulitis of colon 2023    Elevated laboratory test result 05/2023    elevated wbc/  elevated lymphs/ gfr and creatine    Fibrocystic breast 1989    GERD (gastroesophageal reflux disease)     EGD 12/16 [facility] - reflux, HH. no barretts    Hammer toe     Hearing loss     High arches     History of DVT of lower extremity     left leg    Hypoglycemia     Ingrown toenail     Knee pain, right     Leg pain     Low back pain 1990    had back surgery    Mixed connective tissue disease 01/2019    Dr. Thakur    CARITO (obstructive sleep apnea) 2018    Osteopenia 03/28/2018    calculated frax - 11/3%    Osteoporosis     Pinched nerve in neck     [facility] health care 03/30/2023    Raynaud disease     Staph infection     Urinary incontinence     Vitamin D deficiency 2019    Wears contact lenses     Wears prescription eyeglasses        Past Surgical History:   Procedure Laterality Date    BACK SURGERY  1995    Back (L5/S1)    BARIATRIC SURGERY  1985    gastric bypass    BREAST BIOPSY  1985    Left breast    BREAST CYST ASPIRATION  1985    Left breast    BREAST EXCISIONAL BIOPSY Left 1985    BREAST SURGERY  1990    lump removed    CHOLECYSTECTOMY  2019    CHOLECYSTECTOMY WITH INTRAOPERATIVE CHOLANGIOGRAM N/A 04/26/2018    Procedure: CHOLECYSTECTOMY LAPAROSCOPIC INTRAOPERATIVE CHOLANGIOGRAM;  Surgeon: Lit Morelos MD;  Location: [facility];  Service: General    COLONOSCOPY  2016    GASTRIC BYPASS       HIGH GASTRIC BYPASS, 1980's    GASTRIC RESTRICTION SURGERY  1985    HEMORRHOID BANDING  04/30/2024    HERNIA REPAIR      JOINT REPLACEMENT  2012,2020    left knee,right knee    OTHER SURGICAL HISTORY  1985    Stomach Stabled    REPLACEMENT TOTAL KNEE Left 2012    right  2021    SINUS SURGERY      x3    TOTAL KNEE ARTHROPLASTY Right 10/29/2021    Procedure: TOTAL KNEE ARTHROPLASTY RIGHT;  Surgeon: Emeka Douglas MD;  Location:  [facility];  Service: Orthopedics;  Laterality: Right;    TUBAL ABDOMINAL LIGATION  1980    VENTRAL/INCISIONAL HERNIA REPAIR N/A 04/22/2024    Procedure: VENTRAL/INCISIONAL HERNIA REPAIR;  Surgeon: Kimo Moon MD;  Location: [facility];  Service: General;  Laterality: N/A;    WISDOM TOOTH EXTRACTION           Current Outpatient Medications:     albuterol sulfate  (90 Base) MCG/ACT inhaler, TAKE 2 PUFFS BY MOUTH EVERY 4 HOURS AS NEEDED FOR WHEEZE, Disp: 8 g, Rfl: 1    azelastine (OPTIVAR) 0.05 % ophthalmic solution, , Disp: , Rfl:     Biotin 1 MG capsule, Take 1 capsule by mouth Daily., Disp: , Rfl:     calcium carbonate (OS-TAM) 1250 (500 Ca) MG chewable tablet, tablet, Disp: , Rfl:     dapagliflozin Propanediol (Farxiga) 10 MG tablet, Take 10 mg by mouth Daily. Indications: Chronic Kidney Disease, Disp: 90 tablet, Rfl: 3    fluticasone (FLONASE) 50 MCG/ACT nasal spray, 2 sprays by Each Nare route Daily., Disp: , Rfl:     Fluticasone-Salmeterol (ADVAIR/WIXELA) 500-50 MCG/ACT DISKUS, Inhale 1 puff 2 (Two) Times a Day., Disp: 180 each, Rfl: 1    gabapentin (NEURONTIN) 300 MG capsule, Take 1 capsule by mouth 3 (Three) Times a Day for 90 days., Disp: 270 capsule, Rfl: 0    hydroxychloroquine (PLAQUENIL) 200 MG tablet, Take 1 tablet by mouth Every 12 (Twelve) Hours., Disp: , Rfl:     meloxicam (MOBIC) 7.5 MG tablet, Take 1 tablet by mouth Daily., Disp: 90 tablet, Rfl: 1    Polyethylene Glycol 3350 (MIRALAX PO), Take  by mouth., Disp: , Rfl:     Vibegron (Gemtesa) 75 MG tablet,  , Disp: , Rfl:     VITAMIN D, CHOLECALCIFEROL, PO, Take 1 capsule by mouth Daily., Disp: , Rfl:     NIFEdipine XL (PROCARDIA XL) 30 MG 24 hr tablet, Take 1 tablet by mouth Daily. (Patient not taking: Reported on 6/24/2025), Disp: 90 tablet, Rfl: 0    traZODone (DESYREL) 100 MG tablet, TAKE 1 TABLET BY MOUTH EVERY NIGHT FOR 90 DAYS., Disp: 90 tablet, Rfl: 1    zoledronic acid (RECLAST) 5 MG/100ML solution infusion, Infuse 100 mL into a venous catheter 1 (One) Time for 1 dose., Disp: 100 mL, Rfl: 0  No current facility-administered medications for this visit.    Facility-Administered Medications Ordered in Other Visits:     Chlorhexidine Gluconate Cloth 2 % pads, , Apply externally, Once, Emeka Douglas MD    mupirocin (BACTROBAN) 2 % nasal ointment, , Nasal, BID, Emeka Douglas MD    Allergies   Allergen Reactions    Celebrex [Celecoxib] Hives     HIVES     Codeine Rash    Oxycodone Rash and Hives    Sulfa Antibiotics Itching     ITCHING        Family History   Problem Relation Age of Onset    Cancer Other         BREAST, LUNG, OVARIAN, KIDNEY    Kidney cancer Other     Lung cancer Other         pGF as well    Cancer Mother         kidney    Hearing loss Mother     Cancer Father         lung    Mental illness Father         paranoid schizophrenia    Alcohol abuse Father     Breast cancer Sister 30    Connective Tissue Disease Sister     Hypertension Sister         in lungs    Diabetes Sister     Osteoporosis Sister         all 3 of my sister has osyeoporosis    Sleep apnea Sister     COPD Sister     Cancer Sister         Brest    Osteoporosis Sister     Sleep apnea Sister     Cancer Paternal Grandmother         stomach    Cancer Paternal Grandfather         lung    Osteoporosis Sister     Sleep apnea Sister     Heart disease Maternal Grandmother     Hyperlipidemia Maternal Grandmother     Heart disease Maternal Uncle     Hyperlipidemia Maternal Uncle     Heart disease Maternal Aunt     Hyperlipidemia Maternal  Aunt     Ovarian cancer Neg Hx        Cancer-related family history includes Breast cancer (age of onset: 30) in her sister; Cancer in her father, mother, paternal grandfather, paternal grandmother, sister, and another family member; Kidney cancer in an other family member; Lung cancer in an other family member. There is no history of Ovarian cancer.    Social History     Tobacco Use    Smoking status: Never     Passive exposure: Never    Smokeless tobacco: Never   Vaping Use    Vaping status: Never Used   Substance Use Topics    Alcohol use: Yes     Comment: occasional    Drug use: Never       I have reviewed and confirmed the accuracy of the patient's history: Chief complaint, HPI, ROS, and Subjective as entered by the MA/LPN/RN. Felisha Leah Echols MD 06/24/25 6/24/25      SUBJECTIVE:    She is here today accompanied by her sister for this appointment.  She complains of fatigue.  She has had 2 urinary tract infections in the past few months.    Patient is here for FU. Denies any new issues today                REVIEW OF SYSTEMS:    Review of Systems   Constitutional:  Positive for fatigue. Negative for chills and fever.   HENT:  Negative for congestion, drooling, ear discharge, rhinorrhea, sinus pressure and tinnitus.    Eyes:  Negative for photophobia, pain and discharge.   Respiratory:  Negative for apnea, choking and stridor.    Cardiovascular:  Negative for palpitations.   Gastrointestinal:  Negative for abdominal distention, abdominal pain and anal bleeding.   Endocrine: Negative for polydipsia and polyphagia.   Genitourinary:  Negative for decreased urine volume, flank pain and genital sores.   Musculoskeletal:  Negative for gait problem, neck pain and neck stiffness.        Bilateral lower extremity swelling   Skin:  Negative for color change, rash and wound.   Neurological:  Negative for tremors, seizures, syncope, facial asymmetry and speech difficulty.   Hematological:  Negative for adenopathy.  "  Psychiatric/Behavioral:  Negative for agitation, confusion, hallucinations and self-injury. The patient is not hyperactive.        OBJECTIVE:    Vitals:    06/24/25 1016   BP: 131/91   Pulse: 75   Temp: 97.5 °F (36.4 °C)   TempSrc: Oral   SpO2: 95%   Weight: 91 kg (200 lb 9.6 oz)   Height: 160 cm (62.99\")   PainSc: 6    PainLoc: Hip  Comment: right         Body mass index is 35.54 kg/m².    ECOG  (0) Fully active, able to carry on all predisease performance without restriction    Physical Exam  Vitals and nursing note reviewed.   Constitutional:       General: She is not in acute distress.     Appearance: She is not diaphoretic.   HENT:      Head: Normocephalic and atraumatic.   Eyes:      General: No scleral icterus.        Right eye: No discharge.         Left eye: No discharge.      Conjunctiva/sclera: Conjunctivae normal.   Neck:      Thyroid: No thyromegaly.   Cardiovascular:      Rate and Rhythm: Normal rate and regular rhythm.      Heart sounds: Normal heart sounds.      No friction rub. No gallop.   Pulmonary:      Effort: Pulmonary effort is normal. No respiratory distress.      Breath sounds: No stridor. No wheezing.   Abdominal:      General: Bowel sounds are normal.      Palpations: Abdomen is soft. There is no mass.      Tenderness: There is no abdominal tenderness. There is no guarding or rebound.   Musculoskeletal:         General: No tenderness. Normal range of motion.      Cervical back: Normal range of motion and neck supple.      Right lower leg: Edema present.      Left lower leg: Edema present.   Lymphadenopathy:      Cervical: No cervical adenopathy.   Skin:     General: Skin is warm.      Findings: No erythema or rash.   Neurological:      Mental Status: She is alert and oriented to person, place, and time.      Motor: No abnormal muscle tone.   Psychiatric:         Behavior: Behavior normal.     I have reexamined the patient and the results are consistent with the previously documented " exam. Felisha Leah Echols MD      RECENT LABS    WBC   Date Value Ref Range Status   06/24/2025 64.21 (C) 3.40 - 10.80 10*3/mm3 Final   06/20/2022 12.8 (H) 3.4 - 10.8 x10E3/uL Final     RBC   Date Value Ref Range Status   06/24/2025 4.94 3.77 - 5.28 10*6/mm3 Final   06/20/2022 4.58 3.77 - 5.28 x10E6/uL Final     Hemoglobin   Date Value Ref Range Status   06/24/2025 13.6 12.0 - 15.9 g/dL Final     Hematocrit   Date Value Ref Range Status   06/24/2025 44.9 34.0 - 46.6 % Final     MCV   Date Value Ref Range Status   06/24/2025 90.9 79.0 - 97.0 fL Final     MCH   Date Value Ref Range Status   06/24/2025 27.5 26.6 - 33.0 pg Final     MCHC   Date Value Ref Range Status   06/24/2025 30.3 (L) 31.5 - 35.7 g/dL Final     RDW   Date Value Ref Range Status   06/24/2025 15.1 12.3 - 15.4 % Final     RDW-SD   Date Value Ref Range Status   06/24/2025 48.9 37.0 - 54.0 fl Final     MPV   Date Value Ref Range Status   06/24/2025 9.6 6.0 - 12.0 fL Final     Platelets   Date Value Ref Range Status   06/24/2025 175 140 - 450 10*3/mm3 Final     Neutrophil %   Date Value Ref Range Status   06/24/2025 6.3 (L) 42.7 - 76.0 % Final     Lymphocyte %   Date Value Ref Range Status   06/24/2025 93.0 (H) 19.6 - 45.3 % Final     Monocyte %   Date Value Ref Range Status   06/24/2025 0.3 (L) 5.0 - 12.0 % Final     Eosinophil %   Date Value Ref Range Status   06/24/2025 0.3 0.3 - 6.2 % Final     Basophil %   Date Value Ref Range Status   06/24/2025 0.1 0.0 - 1.5 % Final     Immature Grans %   Date Value Ref Range Status   10/21/2021 0.2 0.0 - 0.5 % Final     Neutrophils, Absolute   Date Value Ref Range Status   06/24/2025 4.02 1.70 - 7.00 10*3/mm3 Final     Lymphocytes, Absolute   Date Value Ref Range Status   06/24/2025 59.72 (H) 0.70 - 3.10 10*3/mm3 Final     Monocytes, Absolute   Date Value Ref Range Status   06/24/2025 0.20 0.10 - 0.90 10*3/mm3 Final     Eosinophils, Absolute   Date Value Ref Range Status   06/24/2025 0.21 0.00 - 0.40 10*3/mm3  Final     Basophils, Absolute   Date Value Ref Range Status   06/24/2025 0.06 0.00 - 0.20 10*3/mm3 Final     Immature Grans, Absolute   Date Value Ref Range Status   10/21/2021 0.02 0.00 - 0.05 10*3/mm3 Final     nRBC   Date Value Ref Range Status   05/04/2023 0.1 0.0 - 0.2 /100 WBC Final       Lab Results   Component Value Date    GLUCOSE 90 04/15/2025    BUN 19 04/15/2025    CREATININE 1.01 (H) 04/15/2025    EGFRIFNONA 62 11/01/2021    BCR 18.8 04/15/2025    K 3.9 04/15/2025    CO2 26.0 04/15/2025    CALCIUM 9.2 04/15/2025    ALBUMIN 4.2 12/10/2024    AST 25 12/10/2024    ALT 17 12/10/2024         Assessment & Plan     CLL (chronic lymphocytic leukemia)  - CBC & Differential                  ASSESSMENT:    Leukocytosis, unspecified type  - CBC & Differential        CLL/SLL presenting as leukocytosis with differential lymphocytosis since March 2022.  Reviewed the diagnosis in detail with patient and her daughter who is present today.  CT scans chest abdomen and pelvis do not show any evidence of lymphomatous process.  She has clinical stage 0 CLL.  Ongoing management.  Her leukocytosis ranges between 18 and 40,000..  CBC reviewed overall she remained stable without any B symptoms. Reviewed . Stable  Worsening leukocytosis likely secondary to steroids for her back pain. Continue to monitor for now  She denies B symptoms  Urinary tract infection: She is on antibiotics may be contributing to her leukocytosis for now we will continue to monitor.  Patient has mixed connective tissue disorder.  She will follow-up with her rheumatologist  Bilateral lower extremity swelling rule out DVTs.  Ultrasound was negative.  Asymptomatic now  Abdominal hernia: Declines surgical evaluation.     Discussion       CLL, we discussed that indications for treatment would include but not limited to, pancytopenia, systemic symptoms including fatigue, night sweats and weight loss.  Other indications treatment will include recurrent  infections, organ dysfunction due to massive lymphadenopathy, doubling  of leukocytosis in less than 6 months.  We discussed that CLL is not curable but can respond to chemo immunotherapy and some molecular targeted agents which can result in remissions but this disease is faulted by multiple relapses over the course of time.  We also discussed that median survivorship with low-grade lymphoma in general is estimated anywhere from 10-12 years.  I also explained that this disease is heterogeneous.  Some patients may succumb to the illness within a few days following diagnosis while some may have it for decades.  We discussed that lymphoproliferative diseases in general can affect the immune system so there is risk of Lanodn infections.  When the bone marrow is heavily involved there is risk of pancytopenia  which can result in blood, platelet transfusions and also predisposition to infections if the white count is affected.  There is also a risk of transformation to high-grade lymphoma which can result in significant symptoms and would require systemic chemo immunotherapy.  The risk of transformation varies with different types of lymphoma.  Also is not dependent on prior treatment or worsen by conservative management.      Plans     cmp , uric acid and LDH levels today, FU in 4 months   She will monitor for B symptoms and let me know  FU in 4 months  Labs reviewed  Call for B symptoms which were reviewed  Reviewed CT scan chest abdomen and pelvis with contrast  Doppler formed for bilateral lower extremity edema was negative  Previous uric acid and HIV tests were negative   Continue surveillance  Complete her age-appropriate immunizations including pneumococcal vaccine.  Patient follow-up with her PCP  Follow-up 4 months  Reviewed signs and symptoms to monitor for between appointments and to call with questions or concerns  All questions answered       Patient verbalized understanding and is in agreement of the above  plan.     Electronically signed by Felisha Echols MD, 06/24/25, 5:50 PM EDT.

## 2025-06-24 ENCOUNTER — OFFICE VISIT (OUTPATIENT)
Dept: ONCOLOGY | Facility: CLINIC | Age: 75
End: 2025-06-24
Payer: MEDICARE

## 2025-06-24 ENCOUNTER — LAB (OUTPATIENT)
Dept: LAB | Facility: HOSPITAL | Age: 75
End: 2025-06-24
Payer: MEDICARE

## 2025-06-24 VITALS
HEIGHT: 63 IN | BODY MASS INDEX: 35.54 KG/M2 | DIASTOLIC BLOOD PRESSURE: 91 MMHG | HEART RATE: 75 BPM | WEIGHT: 200.6 LBS | OXYGEN SATURATION: 95 % | TEMPERATURE: 97.5 F | SYSTOLIC BLOOD PRESSURE: 131 MMHG

## 2025-06-24 DIAGNOSIS — C91.10 CLL (CHRONIC LYMPHOCYTIC LEUKEMIA): Primary | ICD-10-CM

## 2025-06-24 LAB
ALBUMIN SERPL-MCNC: 4.6 G/DL (ref 3.5–5.2)
ALBUMIN/GLOB SERPL: 2.7 G/DL
ALP SERPL-CCNC: 88 U/L (ref 39–117)
ALT SERPL W P-5'-P-CCNC: 31 U/L (ref 1–33)
ANION GAP SERPL CALCULATED.3IONS-SCNC: 11.7 MMOL/L (ref 5–15)
AST SERPL-CCNC: 31 U/L (ref 1–32)
BASOPHILS # BLD AUTO: 0.06 10*3/MM3 (ref 0–0.2)
BASOPHILS NFR BLD AUTO: 0.1 % (ref 0–1.5)
BILIRUB SERPL-MCNC: 0.4 MG/DL (ref 0–1.2)
BUN SERPL-MCNC: 20.2 MG/DL (ref 8–23)
BUN/CREAT SERPL: 17 (ref 7–25)
CALCIUM SPEC-SCNC: 10 MG/DL (ref 8.6–10.5)
CHLORIDE SERPL-SCNC: 103 MMOL/L (ref 98–107)
CO2 SERPL-SCNC: 27.3 MMOL/L (ref 22–29)
CREAT SERPL-MCNC: 1.19 MG/DL (ref 0.57–1)
DEPRECATED RDW RBC AUTO: 48.9 FL (ref 37–54)
EGFRCR SERPLBLD CKD-EPI 2021: 47.5 ML/MIN/1.73
EOSINOPHIL # BLD AUTO: 0.21 10*3/MM3 (ref 0–0.4)
EOSINOPHIL NFR BLD AUTO: 0.3 % (ref 0.3–6.2)
ERYTHROCYTE [DISTWIDTH] IN BLOOD BY AUTOMATED COUNT: 15.1 % (ref 12.3–15.4)
GLOBULIN UR ELPH-MCNC: 1.7 GM/DL
GLUCOSE SERPL-MCNC: 96 MG/DL (ref 65–99)
HCT VFR BLD AUTO: 44.9 % (ref 34–46.6)
HGB BLD-MCNC: 13.6 G/DL (ref 12–15.9)
HOLD SPECIMEN: NORMAL
HOLD SPECIMEN: NORMAL
LDH SERPL-CCNC: 191 U/L (ref 135–214)
LYMPHOCYTES # BLD AUTO: 59.72 10*3/MM3 (ref 0.7–3.1)
LYMPHOCYTES NFR BLD AUTO: 93 % (ref 19.6–45.3)
MCH RBC QN AUTO: 27.5 PG (ref 26.6–33)
MCHC RBC AUTO-ENTMCNC: 30.3 G/DL (ref 31.5–35.7)
MCV RBC AUTO: 90.9 FL (ref 79–97)
MONOCYTES # BLD AUTO: 0.2 10*3/MM3 (ref 0.1–0.9)
MONOCYTES NFR BLD AUTO: 0.3 % (ref 5–12)
NEUTROPHILS NFR BLD AUTO: 4.02 10*3/MM3 (ref 1.7–7)
NEUTROPHILS NFR BLD AUTO: 6.3 % (ref 42.7–76)
PLATELET # BLD AUTO: 175 10*3/MM3 (ref 140–450)
PMV BLD AUTO: 9.6 FL (ref 6–12)
POTASSIUM SERPL-SCNC: 4.5 MMOL/L (ref 3.5–5.2)
PROT SERPL-MCNC: 6.3 G/DL (ref 6–8.5)
RBC # BLD AUTO: 4.94 10*6/MM3 (ref 3.77–5.28)
SODIUM SERPL-SCNC: 142 MMOL/L (ref 136–145)
URATE SERPL-MCNC: 4.8 MG/DL (ref 2.4–5.7)
WBC NRBC COR # BLD AUTO: 64.21 10*3/MM3 (ref 3.4–10.8)

## 2025-06-24 PROCEDURE — 80053 COMPREHEN METABOLIC PANEL: CPT | Performed by: INTERNAL MEDICINE

## 2025-06-24 PROCEDURE — 84550 ASSAY OF BLOOD/URIC ACID: CPT | Performed by: INTERNAL MEDICINE

## 2025-06-24 PROCEDURE — 36415 COLL VENOUS BLD VENIPUNCTURE: CPT

## 2025-06-24 PROCEDURE — 99214 OFFICE O/P EST MOD 30 MIN: CPT | Performed by: INTERNAL MEDICINE

## 2025-06-24 PROCEDURE — 83615 LACTATE (LD) (LDH) ENZYME: CPT | Performed by: INTERNAL MEDICINE

## 2025-06-24 PROCEDURE — 85025 COMPLETE CBC W/AUTO DIFF WBC: CPT

## 2025-06-24 PROCEDURE — 1125F AMNT PAIN NOTED PAIN PRSNT: CPT | Performed by: INTERNAL MEDICINE

## 2025-06-24 RX ORDER — CALCIUM CARBONATE 500(1250)
TABLET,CHEWABLE ORAL
COMMUNITY

## 2025-06-24 RX ORDER — VIBEGRON 75 MG/1
TABLET, FILM COATED ORAL
COMMUNITY
Start: 2025-06-05

## 2025-07-23 DIAGNOSIS — I73.00 RAYNAUD'S DISEASE WITHOUT GANGRENE: ICD-10-CM

## 2025-07-23 RX ORDER — NIFEDIPINE 30 MG/1
30 TABLET, EXTENDED RELEASE ORAL DAILY
Qty: 90 TABLET | Refills: 0 | Status: SHIPPED | OUTPATIENT
Start: 2025-07-23

## (undated) DEVICE — SUT SILK 2/0 TIES 18IN A185H

## (undated) DEVICE — PK LAP LASR CHOLE 10

## (undated) DEVICE — SUT VIC 0 UR6 27IN VCP603H

## (undated) DEVICE — ANTIBACTERIAL UNDYED BRAIDED (POLYGLACTIN 910), SYNTHETIC ABSORBABLE SUTURE: Brand: COATED VICRYL

## (undated) DEVICE — PAD ARMBRD SURG CONVOL 7.5X20X2IN

## (undated) DEVICE — 3M™ IOBAN™ 2 ANTIMICROBIAL INCISE DRAPE 6650EZ: Brand: IOBAN™ 2

## (undated) DEVICE — SYR LUERLOK 20CC

## (undated) DEVICE — DUAL LUMEN STOMACH TUBE,ANTI-REFLUX VALVE: Brand: SALEM SUMP

## (undated) DEVICE — MEDI-VAC YANKAUER SUCTION HANDLE W/BULBOUS TIP: Brand: CARDINAL HEALTH

## (undated) DEVICE — GLV SURG BIOGEL LTX PF 7

## (undated) DEVICE — ENDOPATH XCEL UNIVERSAL TROCAR STABLILITY SLEEVES: Brand: ENDOPATH XCEL

## (undated) DEVICE — Device

## (undated) DEVICE — SUT ETHIB 0/0 MO6 I8IN CX45D

## (undated) DEVICE — TRY EPID SFTY 18G 3.5IN 1T7680

## (undated) DEVICE — RECIPROCATING BLADE, DOUBLE SIDED, OFFSET  (70.0 X 0.8 X 12.5MM)

## (undated) DEVICE — [HIGH FLOW HEATED INSUFFLATOR TUBING,  DO NOT USE IF PACKAGE IS DAMAGED]

## (undated) DEVICE — SYS CLS SKIN PREMIERPRO EXOFINFUSION 22CM

## (undated) DEVICE — ENDOPATH XCEL BLADELESS TROCARS WITH STABILITY SLEEVES: Brand: ENDOPATH XCEL

## (undated) DEVICE — SUT PROLN 0 CT1 30IN 8424H

## (undated) DEVICE — DRSNG TELFA PAD NONADH STR 1S 3X8IN

## (undated) DEVICE — CANNULA,OXY,ADULT,SUPERSOFT,W/7'TUB,UC: Brand: MEDLINE

## (undated) DEVICE — SNAP KOVER: Brand: UNBRANDED

## (undated) DEVICE — PUMP PAIN AUTOFUSER AUTO SELCT NOBOLUS 1TO14ML/HR 550ML DISP

## (undated) DEVICE — GOWN,PREVENTION PLUS,XXLARGE,STERILE: Brand: MEDLINE

## (undated) DEVICE — GLV SURG PREMIERPRO MIC LTX PF SZ8 BRN

## (undated) DEVICE — SUT PERMAHAND SILK 3/0 SH1 18IN

## (undated) DEVICE — ADHS SKIN PREMIERPRO EXOFIN TOPICAL HI/VISC .5ML

## (undated) DEVICE — BLANKT WARM UPPR/BDY ARM/OUT 57X196CM

## (undated) DEVICE — ENDOCUT SCISSOR TIP, DISPOSABLE: Brand: RENEW

## (undated) DEVICE — GAUZE,SPONGE,2"X2",8PLY,STERILE,LF,2'S: Brand: MEDLINE

## (undated) DEVICE — PK MAJ LAPAROTOMY 50

## (undated) DEVICE — KT SURG TURNOVER 050

## (undated) DEVICE — ELECTRD BLD EZ CLN STD 2.5IN

## (undated) DEVICE — PENCL ROCKRSWCH MEGADYNE W/HOLSTR 10FT SS

## (undated) DEVICE — SYR LUERLOK 30CC

## (undated) DEVICE — PATIENT RETURN ELECTRODE, SINGLE-USE, CONTACT QUALITY MONITORING, ADULT, WITH 9FT CORD, FOR PATIENTS WEIGING OVER 33LBS. (15KG): Brand: MEGADYNE

## (undated) DEVICE — VISUALIZATION SYSTEM: Brand: CLEARIFY

## (undated) DEVICE — ENDOPOUCH RETRIEVER SPECIMEN RETRIEVAL BAGS: Brand: ENDOPOUCH RETRIEVER

## (undated) DEVICE — UNDERCAST PADDING: Brand: DEROYAL

## (undated) DEVICE — SLV SCD CALF HEMOFORCE DVT THERP REPROC MD

## (undated) DEVICE — COVER,LIGHT HANDLE,FLX,1/PK: Brand: MEDLINE INDUSTRIES, INC.

## (undated) DEVICE — TBG PENCL TELESCP MEGADYNE SMOKE EVAC 15FT

## (undated) DEVICE — 3 BONE CEMENT MIXER: Brand: MIXEVAC

## (undated) DEVICE — STRYKER PERFORMANCE SERIES SAGITTAL BLADE: Brand: STRYKER PERFORMANCE SERIES

## (undated) DEVICE — BNDG ELAS W/CLIP 6IN 10YD LF STRL

## (undated) DEVICE — ST EXT IV TBG W SECUR LK 20IN

## (undated) DEVICE — PK KN TOTL 10

## (undated) DEVICE — GLV SURG SENSICARE MICRO PF LF 7.5 STRL

## (undated) DEVICE — SOLUTION,WATER,IRRIGATION,1000ML,STERILE: Brand: MEDLINE

## (undated) DEVICE — MEDI-VAC NON-CONDUCTIVE SUCTION TUBING: Brand: CARDINAL HEALTH

## (undated) DEVICE — PAD, GROUNDING, UNIVERSAL, SPLIT, 9': Brand: MEDLINE

## (undated) DEVICE — ENDOPATH XCEL BLUNT TIP TROCARS WITH SMOOTH SLEEVES: Brand: ENDOPATH XCEL

## (undated) DEVICE — LUER-LOK 360°: Brand: CONNECTA, LUER-LOK

## (undated) DEVICE — GLV SURG SENSICARE MICRO PF LF 7 STRL

## (undated) DEVICE — 3M™ TEGADERM™ IV TRANSPARENT FILM DRESSING WITH BORDER 1610: Brand: 3M™ TEGADERM™

## (undated) DEVICE — UNDERGLV SURG BIOGEL INDICATOR LTX PF 7

## (undated) DEVICE — ADHS LIQ MASTISOL 2/3ML

## (undated) DEVICE — 3M™ STERI-STRIP™ REINFORCED ADHESIVE SKIN CLOSURES, R1547, 1/2 IN X 4 IN (12 MM X 100 MM), 6 STRIPS/ENVELOPE: Brand: 3M™ STERI-STRIP™

## (undated) DEVICE — GLV SURG SENSICARE PI MIC PF SZ8.5 LF STRL